# Patient Record
Sex: FEMALE | Race: ASIAN | NOT HISPANIC OR LATINO | Employment: OTHER | URBAN - METROPOLITAN AREA
[De-identification: names, ages, dates, MRNs, and addresses within clinical notes are randomized per-mention and may not be internally consistent; named-entity substitution may affect disease eponyms.]

---

## 2017-02-08 ENCOUNTER — RADIANT APPOINTMENT (OUTPATIENT)
Dept: GENERAL RADIOLOGY | Facility: CLINIC | Age: 77
End: 2017-02-08
Attending: FAMILY MEDICINE
Payer: MEDICARE

## 2017-02-08 ENCOUNTER — OFFICE VISIT (OUTPATIENT)
Dept: FAMILY MEDICINE | Facility: CLINIC | Age: 77
End: 2017-02-08
Payer: MEDICARE

## 2017-02-08 VITALS
SYSTOLIC BLOOD PRESSURE: 139 MMHG | OXYGEN SATURATION: 96 % | HEART RATE: 90 BPM | TEMPERATURE: 97.8 F | WEIGHT: 104.2 LBS | DIASTOLIC BLOOD PRESSURE: 70 MMHG | BODY MASS INDEX: 24.43 KG/M2

## 2017-02-08 DIAGNOSIS — R07.89 LEFT-SIDED CHEST WALL PAIN: ICD-10-CM

## 2017-02-08 DIAGNOSIS — M17.9 OSTEOARTHRITIS OF KNEE, UNSPECIFIED LATERALITY, UNSPECIFIED OSTEOARTHRITIS TYPE: ICD-10-CM

## 2017-02-08 DIAGNOSIS — R07.89 LEFT-SIDED CHEST WALL PAIN: Primary | ICD-10-CM

## 2017-02-08 PROCEDURE — 71101 X-RAY EXAM UNILAT RIBS/CHEST: CPT | Mod: LT

## 2017-02-08 PROCEDURE — 99214 OFFICE O/P EST MOD 30 MIN: CPT | Performed by: FAMILY MEDICINE

## 2017-02-08 NOTE — PATIENT INSTRUCTIONS
This summary includes the important diagnoses, test, medications and other important parts of your medical history.  Below are a few good we sites you can use to learn more about these.     Www.fruux.org : Up to date and easily searchable information on multiple topics.  Www.fruux.org/Pharmacy/c_539084.asp : [facility] $4.99 medications  Www.medlineplus.gov : medication info, interactive tutorials, watch real surgeries online  Www.familydoctor.org : good info from the Academy of Family Physicians  Www.mayoSolar Power Partnersinic.com : good info from the [facility]  Www.cdc.gov : public health info, travel advisories, epidemics (H1N1)  Www.aap.org : children's health info, normal development, vaccinations  Www.health.[facility].[place].us : [place] dept of heat, public health issues in [place], N1N1    Based on your medical history and these are the current health maintenance or preventive care services that you are due for (some may have been done at this visit:)  There are no preventive care reminders to display for this patient.  =================================================================================  Normal Values   Blood pressure  <140/90 for most adults    <130/80 for some chronic diseases (ask your care team about yours)    BMI (body mass index)  18.5-25 kg/m2 (based on height and weight)     Thank you for visiting [facility]    Normal or non-critical lab and imaging results will be communicated to you by MyChart, letter or phone within 7 days.  If you do not hear from us within 10 days, please call the clinic. If you have a critical or abnormal lab result, we will notify you by phone as soon as possible.     If you have any questions regarding your visit please contact:     Team Comfort:   Clinic Hours Telephone Number   Dr. Mic Bellamy   7am-5pm  Monday - Friday [phone]  [facility]   Pharmacy 8:30am-9pm Monday-Friday     9am-5pm Saturday-Sunday [phone]   Urgent Care 11am-9pm Monday-Friday        9am-5pm Saturday-Sunday [phone]     After hours, weekend or if you need to make an appointment with your primary provider please call [phone].   After Hours nurse advise: call [facility]: [phone]    Medication Refills:  Call your pharmacy and they will forward the refill to us. Please allow 3 business days for your refills to be completed.    Use Optini (secure email communication and access to your chart) to send your primary care provider a message or make an appointment. Ask someone on your Team how to sign up for Optini. To log on to FreeLunched or for more information in FoKo please visit the website at [email or web address].  As of October 8, 2013, all password changes, disabled accounts, or ID changes in Optini/MyHealth will be done by our Access Services Department.   If you need help with your account or password, call: [phone]. Clinic staff no longer has the ability to change passwords.

## 2017-02-08 NOTE — MR AVS SNAPSHOT
After Visit Summary   2/8/2017    Kerry Vega    MRN: [id number]           Patient Information     Date Of Birth          1940        Visit Information        Provider Department      2/8/2017 10:00 AM Shadi Cotton MD [facility]        Today's Diagnoses     Left-sided chest wall pain    -  1     Osteoarthritis of knee, unspecified laterality, unspecified osteoarthritis type           Care Instructions    This summary includes the important diagnoses, test, medications and other important parts of your medical history.  Below are a few good we sites you can use to learn more about these.     Www.MarketMeSuite.org : Up to date and easily searchable information on multiple topics.  Www.[facility]ios[employer]"/Pharmacy/c_539084.asp : [facility] $4.99 medications  Www.iStoryTime.gov : medication info, interactive tutorials, watch real surgeries online  Www.familydoctor.org : good info from the Academy of Family Physicians  Www.Passlogix.ScramblerMail : good info from the [facility]  Www.cdc.gov : public health info, travel advisories, epidemics (H1N1)  Www.aap.org : children's health info, normal development, vaccinations  Www.health.state.[place].us : [place] dept of heatlh, public health issues in [place], N1N1    Based on your medical history and these are the current health maintenance or preventive care services that you are due for (some may have been done at this visit:)  There are no preventive care reminders to display for this patient.  =================================================================================  Normal Values   Blood pressure  <140/90 for most adults    <130/80 for some chronic diseases (ask your care team about yours)    BMI (body mass index)  18.5-25 kg/m2 (based on height and weight)     Thank you for visiting [facility]    Normal or non-critical lab and imaging results will be communicated to you by MyChart, letter or phone within 7 days.  If you do  not hear from us within 10 days, please call the clinic. If you have a critical or abnormal lab result, we will notify you by phone as soon as possible.     If you have any questions regarding your visit please contact:     Team Comfort:   Clinic Hours Telephone Number   Dr. Mic Bellamy   7am-5pm  Monday - Friday [phone]  [facility]   Pharmacy 8:30am-9pm Monday-Friday    9am-5pm Saturday-Sunday [phone]   Urgent Care 11am-9pm Monday-Friday        9am-5pm Saturday-Sunday [phone]     After hours, weekend or if you need to make an appointment with your primary provider please call [phone].   After Hours nurse advise: call [facility]: [phone]    Medication Refills:  Call your pharmacy and they will forward the refill to us. Please allow 3 business days for your refills to be completed.    Use Bedloo (secure email communication and access to your chart) to send your primary care provider a message or make an appointment. Ask someone on your Team how to sign up for Bedloo. To log on to Clipyoo or for more information in "[employer] please visit the website at [email or web address].  As of October 8, 2013, all password changes, disabled accounts, or ID changes in Bedloo/MyHealth will be done by our Access Services Department.   If you need help with your account or password, call: [phone]. Clinic staff no longer has the ability to change passwords.           Follow-ups after your visit        Who to contact     If you have questions or need follow up information about today's clinic visit or your schedule please contact [facility] NOWAK directly at [phone].  Normal or non-critical lab and imaging results will be communicated to you by MyChart, letter or phone within 4 business days after the clinic has received the results. If you do not hear from us within 7 days, please contact the  "clinic through Biosensiat or phone. If you have a critical or abnormal lab result, we will notify you by phone as soon as possible.  Submit refill requests through QuantiSense or call your pharmacy and they will forward the refill request to us. Please allow 3 business days for your refill to be completed.          Additional Information About Your Visit        H2MobharBellmetric Information     QuantiSense lets you send messages to your doctor, view your test results, renew your prescriptions, schedule appointments and more. To sign up, go to [email or web address] . Click on \"Log in\" on the left side of the screen, which will take you to the Welcome page. Then click on \"Sign up Now\" on the right side of the page.     You will be asked to enter the access code listed below, as well as some personal information. Please follow the directions to create your username and password.     Your access code is: [email or web address]  Expires: 2017 10:05 AM     Your access code will  in 90 days. If you need help or a new code, please call your [place] clinic or [phone].        Care EveryWhere ID     This is your Care EveryWhere ID. This could be used by other organizations to access your [place] medical records  [id number]        Your Vitals Were     Pulse Temperature Pulse Oximetry             90 97.8  F (36.6  C) (Oral) 96%          Blood Pressure from Last 3 Encounters:   17 139/70   16 115/65   16 114/66    Weight from Last 3 Encounters:   17 104 lb 3.2 oz (47.265 kg)   16 102 lb 3.2 oz (46.358 kg)   16 103 lb 3.2 oz (46.811 kg)                 Today's Medication Changes          These changes are accurate as of: 17 10:05 AM.  If you have any questions, ask your nurse or doctor.               These medicines have changed or have updated prescriptions.        Dose/Directions    ferrous sulfate 325 (65 FE) MG tablet   Commonly known as:  IRON   This may have changed:  when to take this   Used " for:  Iron deficiency anemia, unspecified iron deficiency        Dose:  325 mg   Take 1 tablet (325 mg) by mouth 3 times daily (with meals)   Quantity:  90 tablet   Refills:  3            Where to get your medicines      Some of these will need a paper prescription and others can be bought over the counter.  Ask your nurse if you have questions.     Bring a paper prescription for each of these medications    - acetaminophen-codeine 300-30 MG per tablet             Primary Care Provider Office Phone # Fax #    Shadi Cotton [phone]       [facility] [address] [facility] [address]        Thank you!     Thank you for choosing [facility]  for your care. Our goal is always to provide you with excellent care. Hearing back from our patients is one way we can continue to improve our services. Please take a few minutes to complete the written survey that you may receive in the mail after your visit with us. Thank you!             Your Updated Medication List - Protect others around you: Learn how to safely use, store and throw away your medicines at www.disposemymeds.org.          This list is accurate as of: 2/8/17 10:05 AM.  Always use your most recent med list.                   Brand Name Dispense Instructions for use    acetaminophen-codeine 300-30 MG per tablet    TYLENOL #3    90 tablet    TAKE ONE TO TWO TABLETS BY MOUTH EVERY 6 HOURS AS NEEDED FOR PAIN       alendronate 70 MG tablet    FOSAMAX    12 tablet    Take 1 tablet (70 mg) by mouth every 7 days       * ASPIRIN NOT PRESCRIBED    INTENTIONAL     due to Iron therapy       calcium-vitamin D 600-400 MG-UNIT per tablet    CALTRATE    100 tablet    Take 1 tablet by mouth 2 times daily       cetirizine 10 MG tablet    ZYRTEC    90 tablet    Take 1 tablet (10 mg) by mouth daily       cyanocobalamin 1000 MCG Tbcr    VITAMIN B-12 ER    30 tablet    Take 1,000 mcg by mouth daily       diclofenac 1 % Gel topical  gel    VOLTAREN    100 g    Apply 4 grams to knees or 2 grams to hands four times daily using enclosed dosing card.       ferrous sulfate 325 (65 FE) MG tablet    IRON    90 tablet    Take 1 tablet (325 mg) by mouth 3 times daily (with meals)       hypromellose 0.4 % Soln ophthalmic solution    ARTIFICIAL TEARS    1 Bottle    Place 1 drop into both eyes every hour as needed for dry eyes       naproxen 500 MG tablet    NAPROSYN    60 tablet    TAKE 1 TABLET BY MOUTH TWICE DAILY WITH FOOD AS NEEDED FOR PAIN       olopatadine 0.1 % ophthalmic solution    PATANOL    15 mL    INSTILL 1 DROP IN BOTH EYES TWICE DAILY       omeprazole 40 MG capsule    priLOSEC    90 capsule    TAKE ONE CAPSULE BY MOUTH ONCE DAILY       * order for DME     1 Container    Pull up briefs for incontinence of urine change 2 to 3 times daily       vitamin D 2000 UNITS tablet     100 tablet    Take 2,000 Units by mouth daily Start in 2 months, after completing 8 weeks of weekly high dose vitamin D.       * Notice:  This list has 2 medication(s) that are the same as other medications prescribed for you. Read the directions carefully, and ask your doctor or other care provider to review them with you.

## 2017-02-08 NOTE — NURSING NOTE
"Chief Complaint   Patient presents with     Shoulder Pain     Chest Pain       Initial /78 mmHg  Pulse 90  Temp(Src) 97.8  F (36.6  C) (Oral)  Ht   Wt 104 lb 3.2 oz (47.265 kg)  SpO2 96% Estimated body mass index is 24.43 kg/(m^2) as calculated from the following:    Height as of 9/7/16: 4' 6.75\" (1.391 m).    Weight as of this encounter: 104 lb 3.2 oz (47.265 kg).  Medication Reconciliation: complete. LUPE Kothari      "

## 2017-02-08 NOTE — PROGRESS NOTES
SUBJECTIVE:                                                    Kerry Vega is a 76 year old female who presents to clinic today for the following health issues:    CHEST PAIN     Onset: yesterday after fall from going downstairs     Description:   Location:  left side  Character: achey  Radiation: from left side over shoulder  Duration: constant     Intensity: moderate    Progression of Symptoms:  same    Accompanying Signs & Symptoms:  Shortness of breath: YES  Sweating: no   Nausea/vomiting: no   Lightheadedness: no  Palpitations: no   Fever/Chills: no  Cough: no   Heartburn: YES    History:   Family history of heart disease no   Tobacco use: no     Precipitating factors:   Worse with exertion: YES  Worse with deep breaths :  YES  Related to food: no, but pt isn't sure if she is taking Prilosec     Alleviating factors:  Advil helped per pt       Therapies Tried and outcome: none    No increased pain with exertion.  Problem list and histories reviewed & adjusted, as indicated.  Additional history: as documented    Problem list, Medication list, Allergies, and Medical/Social/Surgical histories reviewed in EPIC and updated as appropriate.    ROS:  Constitutional, HEENT, cardiovascular, pulmonary, GI, , musculoskeletal, neuro, skin, endocrine and psych systems are negative, except as otherwise noted.    OBJECTIVE:                                                    /70 mmHg  Pulse 90  Temp(Src) 97.8  F (36.6  C) (Oral)  Ht   Wt 104 lb 3.2 oz (47.265 kg)  SpO2 96%  Body mass index is 24.43 kg/(m^2).  GENERAL: healthy, alert and no distress  NECK: no adenopathy, no asymmetry, masses, or scars and thyroid normal to palpation  RESP: lungs clear to auscultation - no rales, rhonchi or wheezes  CV: regular rate and rhythm, normal S1 S2, no S3 or S4, no murmur, click or rub, no peripheral edema and peripheral pulses strong  ABDOMEN: soft, nontender, no hepatosplenomegaly, no masses and bowel sounds  normal  MS: left-sided chest wall pain with palpation  Diagnostic Test Results:  none      ASSESSMENT/PLAN:                                                      1. Left-sided chest wall pain  No signs of fx on xray, will await final read. May take tylenol#3 as needed. May take naproxen as needed. May use ice/heat as needed. RTC if no improving as expected.  - XR Ribs & Chest Left G/E 3 Views; Future    2. Osteoarthritis of knee, unspecified laterality, unspecified osteoarthritis type  Needed refills.  - acetaminophen-codeine (TYLENOL #3) 300-30 MG per tablet; TAKE ONE TO TWO TABLETS BY MOUTH EVERY 6 HOURS AS NEEDED FOR PAIN  Dispense: 90 tablet; Refill: 0    See Patient Instructions    Shadi Cotton MD, MD  [facility]

## 2017-02-22 ENCOUNTER — OFFICE VISIT (OUTPATIENT)
Dept: FAMILY MEDICINE | Facility: CLINIC | Age: 77
End: 2017-02-22
Payer: MEDICARE

## 2017-02-22 VITALS
HEART RATE: 99 BPM | HEIGHT: 55 IN | BODY MASS INDEX: 24.07 KG/M2 | DIASTOLIC BLOOD PRESSURE: 64 MMHG | SYSTOLIC BLOOD PRESSURE: 113 MMHG | WEIGHT: 104 LBS | TEMPERATURE: 97 F | OXYGEN SATURATION: 99 %

## 2017-02-22 DIAGNOSIS — D50.8 OTHER IRON DEFICIENCY ANEMIA: ICD-10-CM

## 2017-02-22 DIAGNOSIS — H69.92 ETD (EUSTACHIAN TUBE DYSFUNCTION), LEFT: ICD-10-CM

## 2017-02-22 DIAGNOSIS — R07.89 LEFT-SIDED CHEST WALL PAIN: Primary | ICD-10-CM

## 2017-02-22 LAB
BASOPHILS # BLD AUTO: 0.1 10E9/L (ref 0–0.2)
BASOPHILS NFR BLD AUTO: 0.9 %
DIFFERENTIAL METHOD BLD: ABNORMAL
EOSINOPHIL # BLD AUTO: 0.7 10E9/L (ref 0–0.7)
EOSINOPHIL NFR BLD AUTO: 13.2 %
ERYTHROCYTE [DISTWIDTH] IN BLOOD BY AUTOMATED COUNT: 14.1 % (ref 10–15)
FERRITIN SERPL-MCNC: 12 NG/ML (ref 8–252)
HCT VFR BLD AUTO: 36.4 % (ref 35–47)
HGB BLD-MCNC: 11.4 G/DL (ref 11.7–15.7)
LYMPHOCYTES # BLD AUTO: 1.2 10E9/L (ref 0.8–5.3)
LYMPHOCYTES NFR BLD AUTO: 22.4 %
MCH RBC QN AUTO: 26 PG (ref 26.5–33)
MCHC RBC AUTO-ENTMCNC: 31.3 G/DL (ref 31.5–36.5)
MCV RBC AUTO: 83 FL (ref 78–100)
MONOCYTES # BLD AUTO: 0.4 10E9/L (ref 0–1.3)
MONOCYTES NFR BLD AUTO: 6.7 %
NEUTROPHILS # BLD AUTO: 3 10E9/L (ref 1.6–8.3)
NEUTROPHILS NFR BLD AUTO: 56.8 %
PLATELET # BLD AUTO: 334 10E9/L (ref 150–450)
RBC # BLD AUTO: 4.39 10E12/L (ref 3.8–5.2)
WBC # BLD AUTO: 5.4 10E9/L (ref 4–11)

## 2017-02-22 PROCEDURE — 85025 COMPLETE CBC W/AUTO DIFF WBC: CPT | Performed by: FAMILY MEDICINE

## 2017-02-22 PROCEDURE — 82728 ASSAY OF FERRITIN: CPT | Performed by: FAMILY MEDICINE

## 2017-02-22 PROCEDURE — 36415 COLL VENOUS BLD VENIPUNCTURE: CPT | Performed by: FAMILY MEDICINE

## 2017-02-22 PROCEDURE — 99214 OFFICE O/P EST MOD 30 MIN: CPT | Performed by: FAMILY MEDICINE

## 2017-02-22 RX ORDER — DIPHENHYDRAMINE HCL 25 MG
25-50 TABLET ORAL
Qty: 60 TABLET | Refills: 1 | Status: SHIPPED | OUTPATIENT
Start: 2017-02-22 | End: 2017-09-20

## 2017-02-22 RX ORDER — PREDNISONE 20 MG/1
TABLET ORAL
Qty: 20 TABLET | Refills: 0 | Status: SHIPPED | OUTPATIENT
Start: 2017-02-22 | End: 2017-03-07

## 2017-02-22 ASSESSMENT — PAIN SCALES - GENERAL: PAINLEVEL: EXTREME PAIN (8)

## 2017-02-22 NOTE — LETTER
[address] [id number] [phone]             February 23, 2017    Kerry Vega  [address] [place] [id number]        Dear Kerry,     Your hemoglobin is low but stable. Please continue with the iron supplement daily. Enclosed are the results.  Results for orders placed or performed in visit on 02/22/17   Ferritin   Result Value Ref Range    Ferritin 12 8 - 252 ng/mL   CBC with platelets differential   Result Value Ref Range    WBC 5.4 4.0 - 11.0 10e9/L    RBC Count 4.39 3.8 - 5.2 10e12/L    Hemoglobin 11.4 (L) 11.7 - 15.7 g/dL    Hematocrit 36.4 35.0 - 47.0 %    MCV 83 78 - 100 fl    MCH 26.0 (L) 26.5 - 33.0 pg    MCHC 31.3 (L) 31.5 - 36.5 g/dL    RDW 14.1 10.0 - 15.0 %    Platelet Count 334 150 - 450 10e9/L    Diff Method Automated Method     % Neutrophils 56.8 %    % Lymphocytes 22.4 %    % Monocytes 6.7 %    % Eosinophils 13.2 %    % Basophils 0.9 %    Absolute Neutrophil 3.0 1.6 - 8.3 10e9/L    Absolute Lymphocytes 1.2 0.8 - 5.3 10e9/L    Absolute Monocytes 0.4 0.0 - 1.3 10e9/L    Absolute Eosinophils 0.7 0.0 - 0.7 10e9/L    Absolute Basophils 0.1 0.0 - 0.2 10e9/L       Sincerely,   Shadi Cotton MD/bc

## 2017-02-22 NOTE — NURSING NOTE
"Chief Complaint   Patient presents with     Ear Problem     pain in left ear     Facial Swelling     swelling in left side of face     RECHECK     follow up chest wall pain, not getting better       Initial /64  Pulse 99  Temp 97  F (36.1  C) (Oral)  Ht 4' 6.75\" (1.391 m)  Wt 104 lb (47.2 kg)  SpO2 99%  BMI 24.39 kg/m2 Estimated body mass index is 24.39 kg/(m^2) as calculated from the following:    Height as of this encounter: 4' 6.75\" (1.391 m).    Weight as of this encounter: 104 lb (47.2 kg).  Medication Reconciliation: complete     Nilsa Huerta MA      "

## 2017-02-22 NOTE — PROGRESS NOTES
"  SUBJECTIVE:                                                    Kerry Vega is a 76 year old female who presents to clinic today for the following health issues:      Concern - facial swelling     Onset: 2 days ago    Description:   Swelling left side of face    Intensity: mild    Progression of Symptoms:  same    Accompanying Signs & Symptoms:  Left ear pain       Previous history of similar problem:   no    Precipitating factors:   Worsened by: None.    Alleviating factors:  Improved by: hot pack       Therapies Tried and outcome: heat therapy helps.    Follow up chest wall pain, not getting better.    Problem list and histories reviewed & adjusted, as indicated.  Additional history: as documented    Problem list, Medication list, Allergies, and Medical/Social/Surgical histories reviewed in EPIC and updated as appropriate.    ROS:  Constitutional, HEENT, cardiovascular, pulmonary, GI, , musculoskeletal, neuro, skin, endocrine and psych systems are negative, except as otherwise noted.    OBJECTIVE:                                                    /64  Pulse 99  Temp 97  F (36.1  C) (Oral)  Ht 4' 6.75\" (1.391 m)  Wt 104 lb (47.2 kg)  SpO2 99%  BMI 24.39 kg/m2  Body mass index is 24.39 kg/(m^2).  GENERAL: healthy, alert and no distress  NECK: no adenopathy, no asymmetry, masses, or scars and thyroid normal to palpation  RESP: lungs clear to auscultation - no rales, rhonchi or wheezes  CV: regular rate and rhythm, normal S1 S2, no S3 or S4, no murmur, click or rub, no peripheral edema and peripheral pulses strong  ABDOMEN: soft, nontender, no hepatosplenomegaly, no masses and bowel sounds normal  MS: tenderness along LSB with palpation  Diagnostic Test Results:  none      ASSESSMENT/PLAN:                                                      1. Left-sided chest wall pain  Costochondritis. Treat with oral steroid, tapering dose. RTC if not improving.  - predniSONE (DELTASONE) 20 MG tablet; Take 3 tabs " (60 mg) by mouth daily x 3 days, 2 tabs (40 mg) daily x 3 days, 1 tab (20 mg) daily x 3 days, then 1/2 tab (10 mg) x 3 days.  Dispense: 20 tablet; Refill: 0    2. ETD (eustachian tube dysfunction), left  Reassured that this should resolve. Trial Benadryl to help dry upper airway.  - diphenhydrAMINE (BENADRYL) 25 MG tablet; Take 1-2 tablets (25-50 mg) by mouth nightly as needed for other (for eustachian tube dysfunction, for ear pain.)  Dispense: 60 tablet; Refill: 1    3. Other iron deficiency anemia  Recheck.  - Ferritin  - CBC with platelets differential    See Patient Instructions    Shadi Cotton MD, MD  [facility]

## 2017-02-22 NOTE — PATIENT INSTRUCTIONS
How to contact your care team providers:    Team Heart/Comfort  [phone]  Pharmacy [phone]    [address] Dr., Dr., PA-C    Team RN: Edgardo GARCIA    Clinic hours  M-Th 7am-7pm   Fri 7am-5pm.   Urgent care M-F 11am-9pm,   Sat/sun 9am-5pm.  Pharmacy M-F 8:00am-8pm Sat/sun 9am-5pm.     All password changes, disabled accounts, or ID changes in [employer]/MyHealth will be done by our Access Services Department.   If you need help with your account or password, call: [phone]. Clinic staff no longer has the ability to change passwords.

## 2017-02-22 NOTE — MR AVS SNAPSHOT
After Visit Summary   2/22/2017    Kerry Vega    MRN: [id number]           Patient Information     Date Of Birth          1940        Visit Information        Provider Department      2/22/2017 2:00 PM Shadi Cotton MD [facility]        Today's Diagnoses     Left-sided chest wall pain    -  1    ETD (eustachian tube dysfunction), left        Other iron deficiency anemia          Care Instructions    How to contact your care team providers:    [facility]  [phone]  Pharmacy [phone]    ANTIONE [address] Dr., Dr., PA-C    Team RN: Edgardo GARCIA    Clinic hours  M-Th 7am-7pm   Fri 7am-5pm.   Urgent care M-F 11am-9pm,   Sat/sun 9am-5pm.  Pharmacy M-F 8:00am-8pm Sat/sun 9am-5pm.     All password changes, disabled accounts, or ID changes in Arachnys/MyHealth will be done by our Access Services Department.   If you need help with your account or password, call: [phone]. Clinic staff no longer has the ability to change passwords.                       Follow-ups after your visit        Who to contact     If you have questions or need follow up information about today's clinic visit or your schedule please contact [facility] directly at [phone].  Normal or non-critical lab and imaging results will be communicated to you by NeuStringhart, letter or phone within 4 business days after the clinic has received the results. If you do not hear from us within 7 days, please contact the clinic through TranslationExchanget or phone. If you have a critical or abnormal lab result, we will notify you by phone as soon as possible.  Submit refill requests through Arachnys or call your pharmacy and they will forward the refill request to us. Please allow 3 business days for your refill to be completed.          Additional Information About Your Visit        Arachnys  "Information     Speakaboos lets you send messages to your doctor, view your test results, renew your prescriptions, schedule appointments and more. To sign up, go to [email or web address] . Click on \"Log in\" on the left side of the screen, which will take you to the Welcome page. Then click on \"Sign up Now\" on the right side of the page.     You will be asked to enter the access code listed below, as well as some personal information. Please follow the directions to create your username and password.     Your access code is: [email or web address]  Expires: 2017 10:05 AM     Your access code will  in 90 days. If you need help or a new code, please call your [place] clinic or [phone].        Care EveryWhere ID     This is your Care EveryWhere ID. This could be used by other organizations to access your [place] medical records  [id number]        Your Vitals Were     Pulse Temperature Height Pulse Oximetry BMI (Body Mass Index)       99 97  F (36.1  C) (Oral) 4' 6.75\" (1.391 m) 99% 24.39 kg/m2        Blood Pressure from Last 3 Encounters:   17 113/64   17 139/70   16 115/65    Weight from Last 3 Encounters:   17 104 lb (47.2 kg)   17 104 lb 3.2 oz (47.3 kg)   16 102 lb 3.2 oz (46.4 kg)              We Performed the Following     CBC with platelets differential     Ferritin          Today's Medication Changes          These changes are accurate as of: 17  2:23 PM.  If you have any questions, ask your nurse or doctor.               Start taking these medicines.        Dose/Directions    diphenhydrAMINE 25 MG tablet   Commonly known as:  BENADRYL   Used for:  ETD (eustachian tube dysfunction), left   Started by:  Shadi Cotton MD        Dose:  25-50 mg   Take 1-2 tablets (25-50 mg) by mouth nightly as needed for other (for eustachian tube dysfunction, for ear pain.)   Quantity:  60 tablet   Refills:  1       predniSONE 20 MG tablet   Commonly known as:  DELTASONE   Used for:  " Left-sided chest wall pain   Started by:  Shadi Cotton MD        Take 3 tabs (60 mg) by mouth daily x 3 days, 2 tabs (40 mg) daily x 3 days, 1 tab (20 mg) daily x 3 days, then 1/2 tab (10 mg) x 3 days.   Quantity:  20 tablet   Refills:  0         These medicines have changed or have updated prescriptions.        Dose/Directions    ferrous sulfate 325 (65 FE) MG tablet   Commonly known as:  IRON   This may have changed:  when to take this   Used for:  Iron deficiency anemia, unspecified iron deficiency        Dose:  325 mg   Take 1 tablet (325 mg) by mouth 3 times daily (with meals)   Quantity:  90 tablet   Refills:  3            Where to get your medicines      These medications were sent to [facility] [address] [facility] [address] [facility]  [address] [facility] [address]    Hours:  24-hours Phone:  [phone]     diphenhydrAMINE 25 MG tablet    predniSONE 20 MG tablet                Primary Care Provider Office Phone # Fax #    Shadi Cotton [phone]       [facility] [address]  [facility] [address]        Thank you!     Thank you for choosing [facility]  for your care. Our goal is always to provide you with excellent care. Hearing back from our patients is one way we can continue to improve our services. Please take a few minutes to complete the written survey that you may receive in the mail after your visit with us. Thank you!             Your Updated Medication List - Protect others around you: Learn how to safely use, store and throw away your medicines at www.disposemymeds.org.          This list is accurate as of: 2/22/17  2:23 PM.  Always use your most recent med list.                   Brand Name Dispense Instructions for use    acetaminophen-codeine 300-30 MG per tablet    TYLENOL #3    90 tablet    TAKE ONE TO TWO TABLETS BY MOUTH EVERY 6 HOURS AS NEEDED FOR PAIN       alendronate 70  MG tablet    FOSAMAX    12 tablet    Take 1 tablet (70 mg) by mouth every 7 days       * ASPIRIN NOT PRESCRIBED    INTENTIONAL     due to Iron therapy       calcium-vitamin D 600-400 MG-UNIT per tablet    CALTRATE    100 tablet    Take 1 tablet by mouth 2 times daily       cetirizine 10 MG tablet    ZYRTEC    90 tablet    Take 1 tablet (10 mg) by mouth daily       cyanocobalamin 1000 MCG Tbcr    VITAMIN B-12 ER    30 tablet    Take 1,000 mcg by mouth daily       diclofenac 1 % Gel topical gel    VOLTAREN    100 g    Apply 4 grams to knees or 2 grams to hands four times daily using enclosed dosing card.       diphenhydrAMINE 25 MG tablet    BENADRYL    60 tablet    Take 1-2 tablets (25-50 mg) by mouth nightly as needed for other (for eustachian tube dysfunction, for ear pain.)       ferrous sulfate 325 (65 FE) MG tablet    IRON    90 tablet    Take 1 tablet (325 mg) by mouth 3 times daily (with meals)       hypromellose 0.4 % Soln ophthalmic solution    ARTIFICIAL TEARS    1 Bottle    Place 1 drop into both eyes every hour as needed for dry eyes       naproxen 500 MG tablet    NAPROSYN    60 tablet    TAKE 1 TABLET BY MOUTH TWICE DAILY WITH FOOD AS NEEDED FOR PAIN       olopatadine 0.1 % ophthalmic solution    PATANOL    15 mL    INSTILL 1 DROP IN BOTH EYES TWICE DAILY       omeprazole 40 MG capsule    priLOSEC    90 capsule    TAKE ONE CAPSULE BY MOUTH ONCE DAILY       * order for DME     1 Container    Pull up briefs for incontinence of urine change 2 to 3 times daily       predniSONE 20 MG tablet    DELTASONE    20 tablet    Take 3 tabs (60 mg) by mouth daily x 3 days, 2 tabs (40 mg) daily x 3 days, 1 tab (20 mg) daily x 3 days, then 1/2 tab (10 mg) x 3 days.       vitamin D 2000 UNITS tablet     100 tablet    Take 2,000 Units by mouth daily Start in 2 months, after completing 8 weeks of weekly high dose vitamin D.       * Notice:  This list has 2 medication(s) that are the same as other medications prescribed for  you. Read the directions carefully, and ask your doctor or other care provider to review them with you.

## 2017-03-07 ENCOUNTER — OFFICE VISIT (OUTPATIENT)
Dept: FAMILY MEDICINE | Facility: CLINIC | Age: 77
End: 2017-03-07
Payer: MEDICARE

## 2017-03-07 VITALS
SYSTOLIC BLOOD PRESSURE: 120 MMHG | DIASTOLIC BLOOD PRESSURE: 70 MMHG | HEART RATE: 88 BPM | BODY MASS INDEX: 23.61 KG/M2 | HEIGHT: 55 IN | RESPIRATION RATE: 16 BRPM | TEMPERATURE: 97 F | OXYGEN SATURATION: 99 % | WEIGHT: 102 LBS

## 2017-03-07 DIAGNOSIS — H93.12 TINNITUS, LEFT: ICD-10-CM

## 2017-03-07 DIAGNOSIS — M54.9 UPPER BACK PAIN ON LEFT SIDE: Primary | ICD-10-CM

## 2017-03-07 PROCEDURE — 93000 ELECTROCARDIOGRAM COMPLETE: CPT | Performed by: PHYSICIAN ASSISTANT

## 2017-03-07 PROCEDURE — 99214 OFFICE O/P EST MOD 30 MIN: CPT | Performed by: PHYSICIAN ASSISTANT

## 2017-03-07 RX ORDER — TRAMADOL HYDROCHLORIDE 50 MG/1
50-100 TABLET ORAL EVERY 6 HOURS PRN
Qty: 30 TABLET | Refills: 0 | Status: SHIPPED | OUTPATIENT
Start: 2017-03-07 | End: 2017-09-20

## 2017-03-07 RX ORDER — METHOCARBAMOL 750 MG/1
750 TABLET, FILM COATED ORAL 3 TIMES DAILY PRN
Qty: 45 TABLET | Refills: 1 | Status: SHIPPED | OUTPATIENT
Start: 2017-03-07 | End: 2018-03-21

## 2017-03-07 ASSESSMENT — PAIN SCALES - GENERAL: PAINLEVEL: MODERATE PAIN (5)

## 2017-03-07 NOTE — NURSING NOTE
"Chief Complaint   Patient presents with     Shoulder     Ear Problem       Initial /70  Pulse 88  Temp 97  F (36.1  C) (Oral)  Resp 16  Ht 4' 6.75\" (1.391 m)  Wt 102 lb (46.3 kg)  SpO2 99%  Breastfeeding? No  BMI 23.92 kg/m2 Estimated body mass index is 23.92 kg/(m^2) as calculated from the following:    Height as of this encounter: 4' 6.75\" (1.391 m).    Weight as of this encounter: 102 lb (46.3 kg).  Medication Reconciliation: complete      "

## 2017-03-07 NOTE — MR AVS SNAPSHOT
After Visit Summary   3/7/2017    Kerry Vega    MRN: [id number]           Patient Information     Date Of Birth          1940        Visit Information        Provider Department      3/7/2017 8:20 AM Suzie Sultana PA-C [facility]        Today's Diagnoses     Upper back pain on left side    -  1    Tinnitus, left          Care Instructions    Tramadol 1 tablet every 6 hours as needed for back pain  Robaxin 750 mg three times a day as needed for back and neck muscle spasms  Apply heating pad to the neck and back 4 times a day 30 minutes each time   Do neck stretches  See ENT specialist for ringing in the ear.   Neck Exercises: Passive Neck Rotation        To start, lie on your back, knees bent and feet flat on the floor. Keep your ears, shoulders, and hips aligned, but don t press your lower back to the floor. Rest your hands on your pelvis. Breathe deeply and relax.    With your neck relaxed, place the palm of one hand on your forehead. Use your hand to turn your head to one side until you feel a stretch in the neck muscles. Do not push through pain.    Hold for 5 seconds. Then turn to the other side.    Repeat 5 times on each side.   Note: Keep your shoulders on the floor. Don t lift your chin as you turn your head.    6164-9502 [employer]. [address] [place] [address], [place] [id number]. All rights reserved. This information is not intended as a substitute for professional medical care. Always follow your healthcare professional's instructions.        Neck Exercises: Active Neck Rotation    To start, lie on your back, knees bent and feet flat on the floor. Keep your ears, shoulders, and hips aligned, but don t press your lower back to the floor. Rest your hands on your pelvis. Breathe deeply and relax.      Use your neck muscles to turn your head to one side until you feel a stretch in the muscles.    Hold for 5 seconds. Then turn to the other  side.    Repeat 5 times on each side.  Note: Keep your shoulders on the floor. Don t lift or tuck your chin as you turn your head.    4421-5476 [employer]. [address] [place] [address], [place] [address]. All rights reserved. This information is not intended as a substitute for professional medical care. Always follow your healthcare professional's instructions.              Follow-ups after your visit        Additional Services     OTOLARYNGOLOGY REFERRAL       Your provider has referred you to: [facility]: [facility] [place] [phone]   [email or web address]    Please be aware that coverage of these services is subject to the terms and limitations of your health insurance plan.  Call member services at your health plan with any benefit or coverage questions.      Please bring the following with you to your appointment:    (1) Any X-Rays, CTs or MRIs which have been performed.  Contact the facility where they were done to arrange for  prior to your scheduled appointment.   (2) List of current medications  (3) This referral request   (4) Any documents/labs given to you for this referral                  Who to contact     If you have questions or need follow up information about today's clinic visit or your schedule please contact [facility] directly at [phone].  Normal or non-critical lab and imaging results will be communicated to you by MyChart, letter or phone within 4 business days after the clinic has received the results. If you do not hear from us within 7 days, please contact the clinic through MyChart or phone. If you have a critical or abnormal lab result, we will notify you by phone as soon as possible.  Submit refill requests through cafegive or call your pharmacy and they will forward the refill request to us. Please allow 3 business days for your refill to be completed.          Additional Information About Your  "Visit        OpenbucksharThe Convenience Network Information     AroundWire lets you send messages to your doctor, view your test results, renew your prescriptions, schedule appointments and more. To sign up, go to [email or web address] . Click on \"Log in\" on the left side of the screen, which will take you to the Welcome page. Then click on \"Sign up Now\" on the right side of the page.     You will be asked to enter the access code listed below, as well as some personal information. Please follow the directions to create your username and password.     Your access code is: [email or web address]  Expires: 2017 10:05 AM     Your access code will  in 90 days. If you need help or a new code, please call your [place] clinic or [phone].        Care EveryWhere ID     This is your Care EveryWhere ID. This could be used by other organizations to access your [place] medical records  [id number]        Your Vitals Were     Pulse Temperature Respirations Height Pulse Oximetry Breastfeeding?    88 97  F (36.1  C) (Oral) 16 4' 6.75\" (1.391 m) 99% No    BMI (Body Mass Index)                   23.92 kg/m2            Blood Pressure from Last 3 Encounters:   17 120/70   17 113/64   17 139/70    Weight from Last 3 Encounters:   17 102 lb (46.3 kg)   17 104 lb (47.2 kg)   17 104 lb 3.2 oz (47.3 kg)              We Performed the Following     EKG 12-lead complete w/read - Clinics     OTOLARYNGOLOGY REFERRAL          Today's Medication Changes          These changes are accurate as of: 3/7/17  9:34 AM.  If you have any questions, ask your nurse or doctor.               Start taking these medicines.        Dose/Directions    methocarbamol 750 MG tablet   Commonly known as:  ROBAXIN   Used for:  Upper back pain on left side   Started by:  Suzie Sultana PA-C        Dose:  750 mg   Take 1 tablet (750 mg) by mouth 3 times daily as needed for muscle spasms   Quantity:  45 tablet   Refills:  1       traMADol 50 " MG tablet   Commonly known as:  ULTRAM   Used for:  Upper back pain on left side   Started by:  Suzie Sultana PA-C        Dose:   mg   Take 1-2 tablets ( mg) by mouth every 6 hours as needed for pain maximum 4 tablet(s) per day   Quantity:  30 tablet   Refills:  0         These medicines have changed or have updated prescriptions.        Dose/Directions    ferrous sulfate 325 (65 FE) MG tablet   Commonly known as:  IRON   This may have changed:  when to take this   Used for:  Iron deficiency anemia, unspecified iron deficiency        Dose:  325 mg   Take 1 tablet (325 mg) by mouth 3 times daily (with meals)   Quantity:  90 tablet   Refills:  3            Where to get your medicines      These medications were sent to [facility] - [facility] [address] [facility] [address] [facility]  [address] [facility] [address]    Hours:  24-hours Phone:  [phone]     methocarbamol 750 MG tablet         Some of these will need a paper prescription and others can be bought over the counter.  Ask your nurse if you have questions.     Bring a paper prescription for each of these medications     traMADol 50 MG tablet                Primary Care Provider Office Phone # Fax #    Shadi Cotton [phone]       [address] [address]        Thank you!     Thank you for choosing [facility]  for your care. Our goal is always to provide you with excellent care. Hearing back from our patients is one way we can continue to improve our services. Please take a few minutes to complete the written survey that you may receive in the mail after your visit with us. Thank you!             Your Updated Medication List - Protect others around you: Learn how to safely use, store and throw away your medicines at www.disposemymeds.org.          This list is accurate as of: 3/7/17  9:34 AM.  Always use  your most recent med list.                   Brand Name Dispense Instructions for use    acetaminophen-codeine 300-30 MG per tablet    TYLENOL #3    90 tablet    TAKE ONE TO TWO TABLETS BY MOUTH EVERY 6 HOURS AS NEEDED FOR PAIN       alendronate 70 MG tablet    FOSAMAX    12 tablet    Take 1 tablet (70 mg) by mouth every 7 days       * ASPIRIN NOT PRESCRIBED    INTENTIONAL     due to Iron therapy       calcium-vitamin D 600-400 MG-UNIT per tablet    CALTRATE    100 tablet    Take 1 tablet by mouth 2 times daily       cetirizine 10 MG tablet    ZYRTEC    90 tablet    Take 1 tablet (10 mg) by mouth daily       cyanocobalamin 1000 MCG Tbcr    VITAMIN B-12 ER    30 tablet    Take 1,000 mcg by mouth daily       diclofenac 1 % Gel topical gel    VOLTAREN    100 g    Apply 4 grams to knees or 2 grams to hands four times daily using enclosed dosing card.       diphenhydrAMINE 25 MG tablet    BENADRYL    60 tablet    Take 1-2 tablets (25-50 mg) by mouth nightly as needed for other (for eustachian tube dysfunction, for ear pain.)       ferrous sulfate 325 (65 FE) MG tablet    IRON    90 tablet    Take 1 tablet (325 mg) by mouth 3 times daily (with meals)       hypromellose 0.4 % Soln ophthalmic solution    ARTIFICIAL TEARS    1 Bottle    Place 1 drop into both eyes every hour as needed for dry eyes       methocarbamol 750 MG tablet    ROBAXIN    45 tablet    Take 1 tablet (750 mg) by mouth 3 times daily as needed for muscle spasms       naproxen 500 MG tablet    NAPROSYN    60 tablet    TAKE 1 TABLET BY MOUTH TWICE DAILY WITH FOOD AS NEEDED FOR PAIN       olopatadine 0.1 % ophthalmic solution    PATANOL    15 mL    INSTILL 1 DROP IN BOTH EYES TWICE DAILY       omeprazole 40 MG capsule    priLOSEC    90 capsule    TAKE ONE CAPSULE BY MOUTH ONCE DAILY       * order for DME     1 Container    Pull up briefs for incontinence of urine change 2 to 3 times daily       traMADol 50 MG tablet    ULTRAM    30 tablet    Take 1-2 tablets  ( mg) by mouth every 6 hours as needed for pain maximum 4 tablet(s) per day       vitamin D 2000 UNITS tablet     100 tablet    Take 2,000 Units by mouth daily Start in 2 months, after completing 8 weeks of weekly high dose vitamin D.       * Notice:  This list has 2 medication(s) that are the same as other medications prescribed for you. Read the directions carefully, and ask your doctor or other care provider to review them with you.

## 2017-03-07 NOTE — PATIENT INSTRUCTIONS
Tramadol 1 tablet every 6 hours as needed for back pain  Robaxin 750 mg three times a day as needed for back and neck muscle spasms  Apply heating pad to the neck and back 4 times a day 30 minutes each time   Do neck stretches  See ENT specialist for ringing in the ear.   Neck Exercises: Passive Neck Rotation        To start, lie on your back, knees bent and feet flat on the floor. Keep your ears, shoulders, and hips aligned, but don t press your lower back to the floor. Rest your hands on your pelvis. Breathe deeply and relax.    With your neck relaxed, place the palm of one hand on your forehead. Use your hand to turn your head to one side until you feel a stretch in the neck muscles. Do not push through pain.    Hold for 5 seconds. Then turn to the other side.    Repeat 5 times on each side.   Note: Keep your shoulders on the floor. Don t lift your chin as you turn your head.    2531-2655 [employer]. [address] [place] [address]. All rights reserved. This information is not intended as a substitute for professional medical care. Always follow your healthcare professional's instructions.        Neck Exercises: Active Neck Rotation    To start, lie on your back, knees bent and feet flat on the floor. Keep your ears, shoulders, and hips aligned, but don t press your lower back to the floor. Rest your hands on your pelvis. Breathe deeply and relax.      Use your neck muscles to turn your head to one side until you feel a stretch in the muscles.    Hold for 5 seconds. Then turn to the other side.    Repeat 5 times on each side.  Note: Keep your shoulders on the floor. Don t lift or tuck your chin as you turn your head.    1611-7272 [employer]. [address] [place] [address]. All rights reserved. This information is not intended as a substitute for professional medical care. Always follow your healthcare professional's instructions.

## 2017-03-07 NOTE — PROGRESS NOTES
"  SUBJECTIVE:                                                    Kerry Vega is a 76 year old female who presents to clinic today for the following health issues:    Medication Followup of benadryl. Patient takes it for ear \"buzzing and ringing\"    Taking Medication as prescribed: yes    Side Effects:  None    Medication Helping Symptoms:  NO, still having high pitches ringing in the left ear     Joint Pain     Onset: 2 weeks    Description:   Location: left upper shoulder and upper back  Character: Sharp pain with movement of the arm and muscle spasms     Intensity: moderate    Progression of Symptoms: same    Accompanying Signs & Symptoms:  Other symptoms: none   History:   Previous similar pain: YES      Precipitating factors:   Trauma or overuse: no     Alleviating factors:  Improved by: nothing       Therapies Tried and outcome: prednisone    No shortness of breath, no cough, no chest pain , but occasionally when upper back hurts or spasms she feels pain the chest as well. No chest pain on exertion.     Problem list and histories reviewed & adjusted, as indicated.  Additional history: as documented    Patient Active Problem List   Diagnosis     GERD (gastroesophageal reflux disease)     CARDIOVASCULAR SCREENING; LDL GOAL LESS THAN 160     Right lumbar radiculopathy     Left knee pain     Osteoporosis     Advanced directives, counseling/discussion     Abnormal Pap smear     Dysphagia     Health Care Home     Iron deficiency anemia     Sliding hiatal hernia     OA (osteoarthritis) of knee     Osteoarthritis of left thumb     Cervical radiculopathy     Past Surgical History   Procedure Laterality Date     Esophagoscopy, gastroscopy, duodenoscopy (egd), combined  1/20/2012     Procedure:COMBINED ESOPHAGOSCOPY, GASTROSCOPY, DUODENOSCOPY (EGD); EGD, dysphagia; Surgeon:SHEREE VALLE; Location: OR     Esophagoscopy, gastroscopy, duodenoscopy (egd), combined  7/18/2012     Procedure: COMBINED ESOPHAGOSCOPY, " GASTROSCOPY, DUODENOSCOPY (EGD);  EGD for dysphagia rp Tonio;  Surgeon: Rey Bee MD;  Location: [facility] OR     Esophagoscopy, gastroscopy, duodenoscopy (egd), combined  1/29/2013     Procedure: COMBINED ESOPHAGOSCOPY, GASTROSCOPY, DUODENOSCOPY (EGD), BIOPSY SINGLE OR MULTIPLE;  Colonoscopy, EGD, dysphagia;  Surgeon: Edgard Prieto MD;  Location: [facility] OR     Colonoscopy  1/29/2013     Procedure: COLONOSCOPY;;  Surgeon: Edgard Prieto MD;  Location: [facility] OR       Social History   Substance Use Topics     Smoking status: Never Smoker     Smokeless tobacco: Never Used     Alcohol use No     Family History   Problem Relation Age of Onset     CANCER No family hx of      DIABETES No family hx of      Hypertension No family hx of      CEREBROVASCULAR DISEASE No family hx of      Thyroid Disease No family hx of      Glaucoma No family hx of      Macular Degeneration No family hx of          Current Outpatient Prescriptions   Medication Sig Dispense Refill     methocarbamol (ROBAXIN) 750 MG tablet Take 1 tablet (750 mg) by mouth 3 times daily as needed for muscle spasms 45 tablet 1     traMADol (ULTRAM) 50 MG tablet Take 1-2 tablets ( mg) by mouth every 6 hours as needed for pain maximum 4 tablet(s) per day 30 tablet 0     diphenhydrAMINE (BENADRYL) 25 MG tablet Take 1-2 tablets (25-50 mg) by mouth nightly as needed for other (for eustachian tube dysfunction, for ear pain.) 60 tablet 1     acetaminophen-codeine (TYLENOL #3) 300-30 MG per tablet TAKE ONE TO TWO TABLETS BY MOUTH EVERY 6 HOURS AS NEEDED FOR PAIN 90 tablet 0     naproxen (NAPROSYN) 500 MG tablet TAKE 1 TABLET BY MOUTH TWICE DAILY WITH FOOD AS NEEDED FOR PAIN 60 tablet 3     omeprazole (PRILOSEC) 40 MG capsule TAKE ONE CAPSULE BY MOUTH ONCE DAILY 90 capsule 3     alendronate (FOSAMAX) 70 MG tablet Take 1 tablet (70 mg) by mouth every 7 days 12 tablet 3     olopatadine (PATANOL) 0.1 % ophthalmic solution INSTILL 1 DROP IN BOTH EYES TWICE DAILY 15 mL  "0     hypromellose (ARTIFICIAL TEARS) 0.4 % SOLN Place 1 drop into both eyes every hour as needed for dry eyes 1 Bottle 2     ferrous sulfate (IRON) 325 (65 FE) MG tablet Take 1 tablet (325 mg) by mouth 3 times daily (with meals) (Patient taking differently: Take 325 mg by mouth 2 times daily ) 90 tablet 3     diclofenac (VOLTAREN) 1 % GEL Apply 4 grams to knees or 2 grams to hands four times daily using enclosed dosing card. 100 g 1     Cholecalciferol (VITAMIN D) 2000 UNITS tablet Take 2,000 Units by mouth daily Start in 2 months, after completing 8 weeks of weekly high dose vitamin D. 100 tablet 3     cetirizine (ZYRTEC) 10 MG tablet Take 1 tablet (10 mg) by mouth daily 90 tablet 3     calcium-vitamin D (CALTRATE) 600-400 MG-UNIT per tablet Take 1 tablet by mouth 2 times daily 100 tablet 3     cyanocobalamin 1000 MCG TBCR Take 1,000 mcg by mouth daily 30 tablet 2     ORDER FOR DME Pull up briefs for incontinence of urine change 2 to 3 times daily 1 Container 11     ASPIRIN NOT PRESCRIBED (INTENTIONAL) due to Iron therapy       Allergies   Allergen Reactions     Nkda [No Known Drug Allergies]        Reviewed and updated as needed this visit by clinical staff       Reviewed and updated as needed this visit by Provider         ROS:  Constitutional, HEENT, cardiovascular, pulmonary, gi and gu systems are negative, except as otherwise noted.    OBJECTIVE:                                                    /70  Pulse 88  Temp 97  F (36.1  C) (Oral)  Resp 16  Ht 4' 6.75\" (1.391 m)  Wt 102 lb (46.3 kg)  SpO2 99%  Breastfeeding? No  BMI 23.92 kg/m2  Body mass index is 23.92 kg/(m^2).  GENERAL: healthy, alert and no distress  NECK: no adenopathy, no asymmetry, masses, or scars and thyroid normal to palpation  ENT: normal ear canals and TMs bilaterally, no signs of effusion or infection.   RESP: lungs clear to auscultation - no rales, rhonchi or wheezes  CV: regular rate and rhythm, normal S1 S2, no S3 or S4, no " murmur, click or rub, no peripheral edema and peripheral pulses strong  ABDOMEN: soft, nontender, no hepatosplenomegaly, no masses and bowel sounds normal  MS: no gross musculoskeletal defects noted, no edema  MS: moderate tenderness to palpation of left  upper trapezius and rhomboid muscle region    Diagnostic Test Results:  EKG - negative, NSR, no acute STwave changes     ASSESSMENT/PLAN:                                                        ICD-10-CM    1. Upper back pain on left side M54.9 EKG 12-lead complete w/read - Clinics     methocarbamol (ROBAXIN) 750 MG tablet     traMADol (ULTRAM) 50 MG tablet   2. Tinnitus, left H93.12 OTOLARYNGOLOGY REFERRAL     1.Tramadol 1 tablet every 6 hours as needed for back pain  Robaxin 750 mg three times a day as needed for back and neck muscle spasms  Apply heating pad to the neck and back 4 times a day 30 minutes each time   Do neck stretches  2.See ENT specialist for ringing in the ear.       Suzie Sultana PA-C  [facility]

## 2017-03-24 ENCOUNTER — OFFICE VISIT (OUTPATIENT)
Dept: URGENT CARE | Facility: URGENT CARE | Age: 77
End: 2017-03-24
Payer: MEDICARE

## 2017-03-24 VITALS
BODY MASS INDEX: 22.99 KG/M2 | WEIGHT: 98 LBS | HEART RATE: 108 BPM | DIASTOLIC BLOOD PRESSURE: 67 MMHG | TEMPERATURE: 97.8 F | SYSTOLIC BLOOD PRESSURE: 138 MMHG | OXYGEN SATURATION: 96 %

## 2017-03-24 DIAGNOSIS — R05.9 COUGH: ICD-10-CM

## 2017-03-24 DIAGNOSIS — H66.002 ACUTE SUPPURATIVE OTITIS MEDIA OF LEFT EAR WITHOUT SPONTANEOUS RUPTURE OF TYMPANIC MEMBRANE, RECURRENCE NOT SPECIFIED: Primary | ICD-10-CM

## 2017-03-24 PROCEDURE — 99213 OFFICE O/P EST LOW 20 MIN: CPT | Performed by: NURSE PRACTITIONER

## 2017-03-24 RX ORDER — CODEINE PHOSPHATE AND GUAIFENESIN 10; 100 MG/5ML; MG/5ML
1 SOLUTION ORAL EVERY 4 HOURS PRN
Qty: 120 ML | Refills: 0 | Status: SHIPPED | OUTPATIENT
Start: 2017-03-24 | End: 2017-03-29

## 2017-03-24 NOTE — PROGRESS NOTES
SUBJECTIVE:                                                    Kerry Vega is a 76 year old female who presents to clinic today for the following health issues:    RESPIRATORY SYMPTOMS/ear pain      Duration: 2 days     Description  Coughing, ear pain/cough attacks, right hand tingling and numbness (one week ago), chest discomfort and rib pain from coughing     Severity: moderate    Accompanying signs and symptoms: None    History (predisposing factors):  none    Precipitating or alleviating factors: None    Therapies tried and outcome:  rest and fluids, halls cough drops, Delsym cough syrup- no relief.           Allergies   Allergen Reactions     Nkda [No Known Drug Allergies]        Past Medical History:   Diagnosis Date     Anemia      Arthritis     OSTEO         Current Outpatient Prescriptions on File Prior to Visit:  methocarbamol (ROBAXIN) 750 MG tablet Take 1 tablet (750 mg) by mouth 3 times daily as needed for muscle spasms   traMADol (ULTRAM) 50 MG tablet Take 1-2 tablets ( mg) by mouth every 6 hours as needed for pain maximum 4 tablet(s) per day   diphenhydrAMINE (BENADRYL) 25 MG tablet Take 1-2 tablets (25-50 mg) by mouth nightly as needed for other (for eustachian tube dysfunction, for ear pain.)   naproxen (NAPROSYN) 500 MG tablet TAKE 1 TABLET BY MOUTH TWICE DAILY WITH FOOD AS NEEDED FOR PAIN   omeprazole (PRILOSEC) 40 MG capsule TAKE ONE CAPSULE BY MOUTH ONCE DAILY   alendronate (FOSAMAX) 70 MG tablet Take 1 tablet (70 mg) by mouth every 7 days   olopatadine (PATANOL) 0.1 % ophthalmic solution INSTILL 1 DROP IN BOTH EYES TWICE DAILY   hypromellose (ARTIFICIAL TEARS) 0.4 % SOLN Place 1 drop into both eyes every hour as needed for dry eyes   ferrous sulfate (IRON) 325 (65 FE) MG tablet Take 1 tablet (325 mg) by mouth 3 times daily (with meals) (Patient taking differently: Take 325 mg by mouth 2 times daily )   diclofenac (VOLTAREN) 1 % GEL Apply 4 grams to knees or 2 grams to hands four  times daily using enclosed dosing card.   Cholecalciferol (VITAMIN D) 2000 UNITS tablet Take 2,000 Units by mouth daily Start in 2 months, after completing 8 weeks of weekly high dose vitamin D.   cetirizine (ZYRTEC) 10 MG tablet Take 1 tablet (10 mg) by mouth daily   calcium-vitamin D (CALTRATE) 600-400 MG-UNIT per tablet Take 1 tablet by mouth 2 times daily   cyanocobalamin 1000 MCG TBCR Take 1,000 mcg by mouth daily   ORDER FOR DME Pull up briefs for incontinence of urine change 2 to 3 times daily   ASPIRIN NOT PRESCRIBED (INTENTIONAL) due to Iron therapy     No current facility-administered medications on file prior to visit.     Social History   Substance Use Topics     Smoking status: Never Smoker     Smokeless tobacco: Never Used     Alcohol use No       ROS:   Constitutional: no fevers  ENT: as above    OBJECTIVE:  /67 (BP Location: Left arm, Patient Position: Chair, Cuff Size: Adult Small)  Pulse 108  Temp 97.8  F (36.6  C) (Oral)  Wt 98 lb (44.5 kg)  SpO2 96%  Breastfeeding? No  BMI 22.99 kg/m2   General:   awake, alert, and cooperative.  NAD.   Head: Normocephalic, atraumatic.  Eyes: Conjunctiva clear,   ENT: . Left TM has bulging and erythema, Ear canals are intact  Neuro: Alert and oriented - normal speech.    ASSESSMENT:    ICD-10-CM    1. Acute suppurative otitis media of left ear without spontaneous rupture of tympanic membrane, recurrence not specified H66.002 amoxicillin-clavulanate (AUGMENTIN) 875-125 MG per tablet   2. Cough R05 guaiFENesin-codeine (ROBITUSSIN AC) 100-10 MG/5ML SOLN solution       PLAN:   Patient educational/instructional material provided including reasons for follow-up    The patient indicates understanding of these issues and agrees with the plan.     Advised about symptoms which might herald more serious problems.   Miracle Zhang  [facility]  Family Nurse Practitoner

## 2017-03-24 NOTE — MR AVS SNAPSHOT
After Visit Summary   3/24/2017    Kerry Vega    MRN: [id number]           Patient Information     Date Of Birth          1940        Visit Information        Provider Department      3/24/2017 3:15 PM Miracle Zhang NP [facility]        Today's Diagnoses     Acute suppurative otitis media of left ear without spontaneous rupture of tympanic membrane, recurrence not specified    -  1    Cough          Care Instructions      Otitis Media (Middle-Ear Infection) in Adults  Otitis media is another name for a middle-ear infection. It means an infection behind your eardrum. This kind of ear infection can happen after any condition that keeps fluid from draining from the middle ear. These conditions include allergies, a cold, a sore throat, or a respiratory infection.  Middle-ear infections are common in children, but they can also happen in adults. An ear infection in an adult may mean a more serious problem than in a child. So you may need additional tests. If you have an ear infection, you should see your health care provider for treatment.  What are the types of middle-ear infections?  Infections can affect the middle ear in several ways. They are:    Acute otitis media. This middle-ear infection occurs suddenly. It causes swelling and redness. Fluid and mucus become trapped inside the ear. You can have a fever and ear pain.    Otitis media with effusion. Fluid (effusion) and mucus build up in the middle ear after the infection goes away. You may feel like your middle ear is full. This can continue for months and may affect your hearing.    Chronic otitis media with effusion. Fluid (effusion) remains in the middle ear for a long time. Or it builds up again and again, even though there is no infection. This type of middle-ear infection may be hard to treat. It may also affect your hearing.  Who is more likely to get a middle-ear infection?  You are more likely to get an ear  infection if you:    Smoke or are around someone who smokes    Have seasonal or year-round allergy symptoms    Have a cold or other upper respiratory infection  What causes a middle-ear infection?  The middle ear connects to the throat by a canal called the eustachian tube. This tube helps even out the pressure between the outer ear and the inner ear. A cold or allergy can irritate the tube or cause the area around it to swell. This can keep fluid from draining from the middle ear. The fluid builds up behind the eardrum. Bacteria and viruses can grow in this fluid. The bacteria and viruses cause the middle-ear infection.  What are the symptoms of a middle-ear infection?  Common symptoms of a middle-ear infection in adults are:    Pain in 1 or both ears    Drainage from the ear    Muffled hearing    Sore throat   You may also have a fever. Rarely, your balance can be affected.  These symptoms may be the same as for other conditions. It s important to talk with your health care provider if you think you have a middle-ear infection. If you have a high fever, severe pain behind your ear, or paralysis in your face, see your provider as soon as you can.  How is a middle-ear infection diagnosed?  Your health care provider will take a medical history and do a physical exam. He or she will look at the outer ear and eardrum with an otoscope. The otoscope is a lighted tool that lets your provider see inside the ear. A pneumatic otoscope blows a puff of air into the ear to check how well your eardrum moves. If you eardrum doesn t move well, it may mean you have fluid behind it.  Your provider may also do a test called tympanometry. This test tells how well the middle ear is working. It can find any changes in pressure in the middle ear. Your provider may test your hearing with a tuning fork.  How is a middle-ear infection treated?  A middle-ear infection may be treated with:    Antibiotics, taken by mouth or as ear  drops    Medication for pain    Decongestants, antihistamines, or nasal steroids  Your health care provider may also have you try autoinsufflation. This helps adjust the air pressure in your ear. For this, you pinch your nose and gently exhale. This forces air back through the eustachian tube.  The exact treatment for your ear infection will depend on the type of infection you have. In general, if your symptoms don t get better in 48 to 72 hours, contact your health care provider.  Middle-ear infections can cause long-term problems if not treated. They can lead to:    Infection in other parts of the head    Permanent hearing loss    Paralysis of a nerve in your face  If you have a middle-ear infection that doesn t get better, you may need to see an ear, nose, and throat specialist (otolaryngologist). You may need a CT scan or MRI to check for head and neck cancer.  Ear tubes  Sometimes fluid stays in the middle ear even after you take antibiotics and the infection goes away. In this case, your health care provider may suggest that a small tube be placed in your ear. The tube is put at the opening of the eardrum. The tube keeps fluid from building up and relieves pressure in the middle ear. It can also help you hear better. This surgery is called myringotomy. It is not often done in adults.  The tubes usually fall out on their own after 6 months to a year.    [id number] [employer]. [address] [place], [place] [address]. All rights reserved. This information is not intended as a substitute for professional medical care. Always follow your healthcare professional's instructions.              Follow-ups after your visit        Who to contact     If you have questions or need follow up information about today's clinic visit or your schedule please contact [facility] directly at [phone].  Normal or non-critical lab and imaging results will be communicated to you by [facility]  "letter or phone within 4 business days after the clinic has received the results. If you do not hear from us within 7 days, please contact the clinic through [employer] or phone. If you have a critical or abnormal lab result, we will notify you by phone as soon as possible.  Submit refill requests through [employer] or call your pharmacy and they will forward the refill request to us. Please allow 3 business days for your refill to be completed.          Additional Information About Your Visit        [employer] Information     [employer] lets you send messages to your doctor, view your test results, renew your prescriptions, schedule appointments and more. To sign up, go to [email or web address] . Click on \"Log in\" on the left side of the screen, which will take you to the Welcome page. Then click on \"Sign up Now\" on the right side of the page.     You will be asked to enter the access code listed below, as well as some personal information. Please follow the directions to create your username and password.     Your access code is: [email or web address]  Expires: 2017 11:05 AM     Your access code will  in 90 days. If you need help or a new code, please call your [place] clinic or [phone].        Care EveryWhere ID     This is your Care EveryWhere ID. This could be used by other organizations to access your [place] medical records  [id number]        Your Vitals Were     Pulse Temperature Pulse Oximetry Breastfeeding? BMI (Body Mass Index)       108 97.8  F (36.6  C) (Oral) 96% No 22.99 kg/m2        Blood Pressure from Last 3 Encounters:   17 138/67   17 120/70   17 113/64    Weight from Last 3 Encounters:   17 98 lb (44.5 kg)   17 102 lb (46.3 kg)   17 104 lb (47.2 kg)              Today, you had the following     No orders found for display         Today's Medication Changes          These changes are accurate as of: 3/24/17  5:28 PM.  If you have any questions, ask your nurse or " doctor.               Start taking these medicines.        Dose/Directions    amoxicillin-clavulanate 875-125 MG per tablet   Commonly known as:  AUGMENTIN   Used for:  Acute suppurative otitis media of left ear without spontaneous rupture of tympanic membrane, recurrence not specified   Started by:  Miracle Zhang NP        Dose:  1 tablet   Take 1 tablet by mouth 2 times daily for 7 days   Quantity:  14 tablet   Refills:  0       guaiFENesin-codeine 100-10 MG/5ML Soln solution   Commonly known as:  ROBITUSSIN AC   Used for:  Cough   Started by:  Miracle Zhang NP        Dose:  1 tsp.   Take 5 mLs by mouth every 4 hours as needed   Quantity:  120 mL   Refills:  0         These medicines have changed or have updated prescriptions.        Dose/Directions    ferrous sulfate 325 (65 FE) MG tablet   Commonly known as:  IRON   This may have changed:  when to take this   Used for:  Iron deficiency anemia, unspecified iron deficiency        Dose:  325 mg   Take 1 tablet (325 mg) by mouth 3 times daily (with meals)   Quantity:  90 tablet   Refills:  3            Where to get your medicines      These medications were sent to [id number] - [place], [place] - [address] [facility]  [address] [facility] [address]    Hours:  24-hours Phone:  [phone]     amoxicillin-clavulanate 875-125 MG per tablet         Some of these will need a paper prescription and others can be bought over the counter.  Ask your nurse if you have questions.     Bring a paper prescription for each of these medications     guaiFENesin-codeine 100-10 MG/5ML Soln solution                Primary Care Provider Office Phone # Fax #    Shadi Cotton [phone]       [facility] [address]  [place] [address]        Thank you!     Thank you for choosing [facility]  for your care. Our goal is always to provide you with excellent care. Hearing back  from our patients is one way we can continue to improve our services. Please take a few minutes to complete the written survey that you may receive in the mail after your visit with us. Thank you!             Your Updated Medication List - Protect others around you: Learn how to safely use, store and throw away your medicines at www.disposemymeds.org.          This list is accurate as of: 3/24/17  5:28 PM.  Always use your most recent med list.                   Brand Name Dispense Instructions for use    alendronate 70 MG tablet    FOSAMAX    12 tablet    Take 1 tablet (70 mg) by mouth every 7 days       amoxicillin-clavulanate 875-125 MG per tablet    AUGMENTIN    14 tablet    Take 1 tablet by mouth 2 times daily for 7 days       * ASPIRIN NOT PRESCRIBED    INTENTIONAL     due to Iron therapy       calcium-vitamin D 600-400 MG-UNIT per tablet    CALTRATE    100 tablet    Take 1 tablet by mouth 2 times daily       cetirizine 10 MG tablet    ZYRTEC    90 tablet    Take 1 tablet (10 mg) by mouth daily       cyanocobalamin 1000 MCG Tbcr    VITAMIN B-12 ER    30 tablet    Take 1,000 mcg by mouth daily       diclofenac 1 % Gel topical gel    VOLTAREN    100 g    Apply 4 grams to knees or 2 grams to hands four times daily using enclosed dosing card.       diphenhydrAMINE 25 MG tablet    BENADRYL    60 tablet    Take 1-2 tablets (25-50 mg) by mouth nightly as needed for other (for eustachian tube dysfunction, for ear pain.)       ferrous sulfate 325 (65 FE) MG tablet    IRON    90 tablet    Take 1 tablet (325 mg) by mouth 3 times daily (with meals)       guaiFENesin-codeine 100-10 MG/5ML Soln solution    ROBITUSSIN AC    120 mL    Take 5 mLs by mouth every 4 hours as needed       hypromellose 0.4 % Soln ophthalmic solution    ARTIFICIAL TEARS    1 Bottle    Place 1 drop into both eyes every hour as needed for dry eyes       methocarbamol 750 MG tablet    ROBAXIN    45 tablet    Take 1 tablet (750 mg) by mouth 3 times  daily as needed for muscle spasms       naproxen 500 MG tablet    NAPROSYN    60 tablet    TAKE 1 TABLET BY MOUTH TWICE DAILY WITH FOOD AS NEEDED FOR PAIN       olopatadine 0.1 % ophthalmic solution    PATANOL    15 mL    INSTILL 1 DROP IN BOTH EYES TWICE DAILY       omeprazole 40 MG capsule    priLOSEC    90 capsule    TAKE ONE CAPSULE BY MOUTH ONCE DAILY       * order for DME     1 Container    Pull up briefs for incontinence of urine change 2 to 3 times daily       traMADol 50 MG tablet    ULTRAM    30 tablet    Take 1-2 tablets ( mg) by mouth every 6 hours as needed for pain maximum 4 tablet(s) per day       vitamin D 2000 UNITS tablet     100 tablet    Take 2,000 Units by mouth daily Start in 2 months, after completing 8 weeks of weekly high dose vitamin D.       * Notice:  This list has 2 medication(s) that are the same as other medications prescribed for you. Read the directions carefully, and ask your doctor or other care provider to review them with you.

## 2017-03-24 NOTE — NURSING NOTE
"Chief Complaint   Patient presents with     Cough     Pt c/o Coughing, cough attacks, right hand tingling and numbness (one week ago), chest discomfort and rib pain from coughing        Initial /67 (BP Location: Left arm, Patient Position: Chair, Cuff Size: Adult Small)  Pulse 108  Temp 97.8  F (36.6  C) (Oral)  Wt 98 lb (44.5 kg)  SpO2 96%  Breastfeeding? No  BMI 22.99 kg/m2 Estimated body mass index is 22.99 kg/(m^2) as calculated from the following:    Height as of 3/7/17: 4' 6.75\" (1.391 m).    Weight as of this encounter: 98 lb (44.5 kg).  Medication Reconciliation: complete     Consuelo Wolf CMA (AAMA)      "

## 2017-03-24 NOTE — PATIENT INSTRUCTIONS
Otitis Media (Middle-Ear Infection) in Adults  Otitis media is another name for a middle-ear infection. It means an infection behind your eardrum. This kind of ear infection can happen after any condition that keeps fluid from draining from the middle ear. These conditions include allergies, a cold, a sore throat, or a respiratory infection.  Middle-ear infections are common in children, but they can also happen in adults. An ear infection in an adult may mean a more serious problem than in a child. So you may need additional tests. If you have an ear infection, you should see your health care provider for treatment.  What are the types of middle-ear infections?  Infections can affect the middle ear in several ways. They are:    Acute otitis media. This middle-ear infection occurs suddenly. It causes swelling and redness. Fluid and mucus become trapped inside the ear. You can have a fever and ear pain.    Otitis media with effusion. Fluid (effusion) and mucus build up in the middle ear after the infection goes away. You may feel like your middle ear is full. This can continue for months and may affect your hearing.    Chronic otitis media with effusion. Fluid (effusion) remains in the middle ear for a long time. Or it builds up again and again, even though there is no infection. This type of middle-ear infection may be hard to treat. It may also affect your hearing.  Who is more likely to get a middle-ear infection?  You are more likely to get an ear infection if you:    Smoke or are around someone who smokes    Have seasonal or year-round allergy symptoms    Have a cold or other upper respiratory infection  What causes a middle-ear infection?  The middle ear connects to the throat by a canal called the eustachian tube. This tube helps even out the pressure between the outer ear and the inner ear. A cold or allergy can irritate the tube or cause the area around it to swell. This can keep fluid from draining from  the middle ear. The fluid builds up behind the eardrum. Bacteria and viruses can grow in this fluid. The bacteria and viruses cause the middle-ear infection.  What are the symptoms of a middle-ear infection?  Common symptoms of a middle-ear infection in adults are:    Pain in 1 or both ears    Drainage from the ear    Muffled hearing    Sore throat   You may also have a fever. Rarely, your balance can be affected.  These symptoms may be the same as for other conditions. It s important to talk with your health care provider if you think you have a middle-ear infection. If you have a high fever, severe pain behind your ear, or paralysis in your face, see your provider as soon as you can.  How is a middle-ear infection diagnosed?  Your health care provider will take a medical history and do a physical exam. He or she will look at the outer ear and eardrum with an otoscope. The otoscope is a lighted tool that lets your provider see inside the ear. A pneumatic otoscope blows a puff of air into the ear to check how well your eardrum moves. If you eardrum doesn t move well, it may mean you have fluid behind it.  Your provider may also do a test called tympanometry. This test tells how well the middle ear is working. It can find any changes in pressure in the middle ear. Your provider may test your hearing with a tuning fork.  How is a middle-ear infection treated?  A middle-ear infection may be treated with:    Antibiotics, taken by mouth or as ear drops    Medication for pain    Decongestants, antihistamines, or nasal steroids  Your health care provider may also have you try autoinsufflation. This helps adjust the air pressure in your ear. For this, you pinch your nose and gently exhale. This forces air back through the eustachian tube.  The exact treatment for your ear infection will depend on the type of infection you have. In general, if your symptoms don t get better in 48 to 72 hours, contact your health care  provider.  Middle-ear infections can cause long-term problems if not treated. They can lead to:    Infection in other parts of the head    Permanent hearing loss    Paralysis of a nerve in your face  If you have a middle-ear infection that doesn t get better, you may need to see an ear, nose, and throat specialist (otolaryngologist). You may need a CT scan or MRI to check for head and neck cancer.  Ear tubes  Sometimes fluid stays in the middle ear even after you take antibiotics and the infection goes away. In this case, your health care provider may suggest that a small tube be placed in your ear. The tube is put at the opening of the eardrum. The tube keeps fluid from building up and relieves pressure in the middle ear. It can also help you hear better. This surgery is called myringotomy. It is not often done in adults.  The tubes usually fall out on their own after 6 months to a year.    9089-5861 [employer]. [address] [place] [address], [place] [address]. All rights reserved. This information is not intended as a substitute for professional medical care. Always follow your healthcare professional's instructions.

## 2017-04-04 ENCOUNTER — TELEPHONE (OUTPATIENT)
Dept: FAMILY MEDICINE | Facility: CLINIC | Age: 77
End: 2017-04-04

## 2017-04-04 ENCOUNTER — OFFICE VISIT (OUTPATIENT)
Dept: FAMILY MEDICINE | Facility: CLINIC | Age: 77
End: 2017-04-04
Payer: MEDICARE

## 2017-04-04 VITALS
DIASTOLIC BLOOD PRESSURE: 56 MMHG | BODY MASS INDEX: 22.68 KG/M2 | WEIGHT: 98 LBS | HEART RATE: 105 BPM | HEIGHT: 55 IN | TEMPERATURE: 97.1 F | OXYGEN SATURATION: 99 % | SYSTOLIC BLOOD PRESSURE: 138 MMHG

## 2017-04-04 DIAGNOSIS — L50.9 URTICARIA: ICD-10-CM

## 2017-04-04 DIAGNOSIS — K21.9 GASTROESOPHAGEAL REFLUX DISEASE WITHOUT ESOPHAGITIS: ICD-10-CM

## 2017-04-04 DIAGNOSIS — J01.90 ACUTE SINUSITIS WITH SYMPTOMS > 10 DAYS: Primary | ICD-10-CM

## 2017-04-04 PROCEDURE — 99214 OFFICE O/P EST MOD 30 MIN: CPT | Performed by: PHYSICIAN ASSISTANT

## 2017-04-04 RX ORDER — DOXYCYCLINE 100 MG/1
100 TABLET ORAL 2 TIMES DAILY
Qty: 28 TABLET | Refills: 0 | Status: SHIPPED | OUTPATIENT
Start: 2017-04-04 | End: 2017-04-18

## 2017-04-04 RX ORDER — CETIRIZINE HYDROCHLORIDE 10 MG/1
10 TABLET ORAL DAILY
Qty: 30 TABLET | Refills: 1 | Status: SHIPPED | OUTPATIENT
Start: 2017-04-04 | End: 2018-11-27

## 2017-04-04 RX ORDER — DIPHENHYDRAMINE HCL 25 MG
CAPSULE ORAL
Refills: 1 | COMMUNITY
Start: 2017-03-27 | End: 2017-05-09

## 2017-04-04 NOTE — PROGRESS NOTES
SUBJECTIVE:                                                    Kerry Vega is a 76 year old female who presents to clinic today for the following health issues:      Acute Illness   Acute illness concerns: cough   Onset: 13 days ago     Fever: no    Chills/Sweats: no    Headache (location?): YES    Sinus Pressure:YES- left side     Conjunctivitis:  no    Ear Pain: YES: left    Rhinorrhea: YES    Congestion: YES    Sore Throat: no  Facial pain - feels swollen on left side    Cough: YES-productive of white sputum    Wheeze: no    Decreased Appetite: YES    Nausea: no    Vomiting: no    Diarrhea:  no    Dysuria/Freq.: no    Fatigue/Achiness: YES    Sick/Strep Exposure: no     Therapies Tried and outcome: guaifenesin- codeine and augmentin . Felt better on Augmentin, she was with course  2 days ago, now started to feel worse.   Patient also developed swelling around left eye 3 days. Swelling is worse in am and gets better as day progresses, its slightly itchy, no pain.           Problem list and histories reviewed & adjusted, as indicated.  Additional history: refill for Zantac    Patient Active Problem List   Diagnosis     GERD (gastroesophageal reflux disease)     CARDIOVASCULAR SCREENING; LDL GOAL LESS THAN 160     Right lumbar radiculopathy     Left knee pain     Osteoporosis     Advanced directives, counseling/discussion     Abnormal Pap smear     Dysphagia     Health Care Home     Iron deficiency anemia     Sliding hiatal hernia     OA (osteoarthritis) of knee     Osteoarthritis of left thumb     Cervical radiculopathy     Past Surgical History:   Procedure Laterality Date     COLONOSCOPY  1/29/2013    Procedure: COLONOSCOPY;;  Surgeon: Edgard Prieto MD;  Location:  OR     ESOPHAGOSCOPY, GASTROSCOPY, DUODENOSCOPY (EGD), COMBINED  1/20/2012    Procedure:COMBINED ESOPHAGOSCOPY, GASTROSCOPY, DUODENOSCOPY (EGD); EGD, dysphagia; Surgeon:SHEREE VALLE; Location: OR     ESOPHAGOSCOPY, GASTROSCOPY,  DUODENOSCOPY (EGD), COMBINED  7/18/2012    Procedure: COMBINED ESOPHAGOSCOPY, GASTROSCOPY, DUODENOSCOPY (EGD);  EGD for dysphagia rp Tonio;  Surgeon: Rey Bee MD;  Location:  OR     ESOPHAGOSCOPY, GASTROSCOPY, DUODENOSCOPY (EGD), COMBINED  1/29/2013    Procedure: COMBINED ESOPHAGOSCOPY, GASTROSCOPY, DUODENOSCOPY (EGD), BIOPSY SINGLE OR MULTIPLE;  Colonoscopy, EGD, dysphagia;  Surgeon: Edgard Prieto MD;  Location:  OR       Social History   Substance Use Topics     Smoking status: Never Smoker     Smokeless tobacco: Never Used     Alcohol use No     Family History   Problem Relation Age of Onset     CANCER No family hx of      DIABETES No family hx of      Hypertension No family hx of      CEREBROVASCULAR DISEASE No family hx of      Thyroid Disease No family hx of      Glaucoma No family hx of      Macular Degeneration No family hx of          Current Outpatient Prescriptions   Medication Sig Dispense Refill     diphenhydrAMINE (BENADRYL) 25 MG capsule TK 1-2 CS PO EVERY NIGHT PRF EAR PAIN  1     RaNITidine HCl (ZANTAC PO) Take 150 mg by mouth       doxycycline Monohydrate 100 MG TABS Take 100 mg by mouth 2 times daily for 14 days 28 tablet 0     ranitidine (ZANTAC) 300 MG tablet Take 1 tablet (300 mg) by mouth At Bedtime 30 tablet 11     cetirizine (ZYRTEC) 10 MG tablet Take 1 tablet (10 mg) by mouth daily 30 tablet 1     methocarbamol (ROBAXIN) 750 MG tablet Take 1 tablet (750 mg) by mouth 3 times daily as needed for muscle spasms 45 tablet 1     naproxen (NAPROSYN) 500 MG tablet TAKE 1 TABLET BY MOUTH TWICE DAILY WITH FOOD AS NEEDED FOR PAIN 60 tablet 3     omeprazole (PRILOSEC) 40 MG capsule TAKE ONE CAPSULE BY MOUTH ONCE DAILY 90 capsule 3     Cholecalciferol (VITAMIN D) 2000 UNITS tablet Take 2,000 Units by mouth daily Start in 2 months, after completing 8 weeks of weekly high dose vitamin D. 100 tablet 3     calcium-vitamin D (CALTRATE) 600-400 MG-UNIT per tablet Take 1 tablet by mouth 2  times daily 100 tablet 3     traMADol (ULTRAM) 50 MG tablet Take 1-2 tablets ( mg) by mouth every 6 hours as needed for pain maximum 4 tablet(s) per day (Patient not taking: Reported on 4/4/2017) 30 tablet 0     diphenhydrAMINE (BENADRYL) 25 MG tablet Take 1-2 tablets (25-50 mg) by mouth nightly as needed for other (for eustachian tube dysfunction, for ear pain.) (Patient not taking: Reported on 4/4/2017) 60 tablet 1     alendronate (FOSAMAX) 70 MG tablet Take 1 tablet (70 mg) by mouth every 7 days (Patient not taking: Reported on 4/4/2017) 12 tablet 3     olopatadine (PATANOL) 0.1 % ophthalmic solution INSTILL 1 DROP IN BOTH EYES TWICE DAILY (Patient not taking: Reported on 4/4/2017) 15 mL 0     hypromellose (ARTIFICIAL TEARS) 0.4 % SOLN Place 1 drop into both eyes every hour as needed for dry eyes (Patient not taking: Reported on 4/4/2017) 1 Bottle 2     ferrous sulfate (IRON) 325 (65 FE) MG tablet Take 1 tablet (325 mg) by mouth 3 times daily (with meals) (Patient not taking: Reported on 4/4/2017) 90 tablet 3     diclofenac (VOLTAREN) 1 % GEL Apply 4 grams to knees or 2 grams to hands four times daily using enclosed dosing card. (Patient not taking: Reported on 4/4/2017) 100 g 1     cetirizine (ZYRTEC) 10 MG tablet Take 1 tablet (10 mg) by mouth daily (Patient not taking: Reported on 4/4/2017) 90 tablet 3     cyanocobalamin 1000 MCG TBCR Take 1,000 mcg by mouth daily (Patient not taking: Reported on 4/4/2017) 30 tablet 2     ORDER FOR DME Pull up briefs for incontinence of urine change 2 to 3 times daily (Patient not taking: Reported on 4/4/2017) 1 Container 11     ASPIRIN NOT PRESCRIBED (INTENTIONAL) Reported on 4/4/2017       Allergies   Allergen Reactions     Nkda [No Known Drug Allergies]        Reviewed and updated as needed this visit by clinical staff  Tobacco  Allergies  Meds  Problems  Med Hx  Surg Hx  Fam Hx  Soc Hx        Reviewed and updated as needed this visit by Provider  Allergies   "Meds  Problems         ROS:  Constitutional, HEENT, cardiovascular, pulmonary, gi and gu systems are negative, except as otherwise noted.    OBJECTIVE:                                                    /56  Pulse 105  Temp 97.1  F (36.2  C) (Oral)  Ht 4' 6.75\" (1.391 m)  Wt 98 lb (44.5 kg)  SpO2 99%  Breastfeeding? No  BMI 22.99 kg/m2  Body mass index is 22.99 kg/(m^2).  GENERAL: healthy, alert and no distress  EYES: conjunctivae and sclerae normal and urticarial swelling around left upper nad lower eyelids.   HENT: normal cephalic/atraumatic, ear canals and TM's normal, nasal mucosa edematous , oropharynx clear, oral mucous membranes moist and sinuses: maxillary tenderness on left  NECK: no adenopathy, no asymmetry, masses, or scars and thyroid normal to palpation  RESP: lungs clear to auscultation - no rales, rhonchi or wheezes  CV: regular rate and rhythm, normal S1 S2, no S3 or S4, no murmur, click or rub, no peripheral edema and peripheral pulses strong  ABDOMEN: soft, nontender, no hepatosplenomegaly, no masses and bowel sounds normal  MS: no gross musculoskeletal defects noted, no edema    Diagnostic Test Results:  none      ASSESSMENT/PLAN:                                                        ICD-10-CM    1. Acute sinusitis with symptoms > 10 days J01.90 doxycycline Monohydrate 100 MG TABS   2. Gastroesophageal reflux disease without esophagitis K21.9 ranitidine (ZANTAC) 300 MG tablet   3. Urticaria L50.9 cetirizine (ZYRTEC) 10 MG tablet     1.Doxycyline 100 mg twice a day for 14 days  Increase fluids  Nasal wash  Mucinex DM for cough and mucous 1 tablet twice a day   Follow up if not better after the antibiotic course.     2. Zantac 300 mg at bedtime   3. Zyrtec 10 mg daily  Apply cold compress          Suzie Sultana PA-C  [facility]  "

## 2017-04-04 NOTE — NURSING NOTE
"Chief Complaint   Patient presents with     URI       Initial /56  Pulse 105  Temp 97.1  F (36.2  C) (Oral)  Ht 4' 6.75\" (1.391 m)  Wt 98 lb (44.5 kg)  SpO2 99%  Breastfeeding? No  BMI 22.99 kg/m2 Estimated body mass index is 22.99 kg/(m^2) as calculated from the following:    Height as of this encounter: 4' 6.75\" (1.391 m).    Weight as of this encounter: 98 lb (44.5 kg).  Medication Reconciliation: complete       Kamila Evans CMA      "

## 2017-04-04 NOTE — MR AVS SNAPSHOT
After Visit Summary   4/4/2017    Kerry Vega    MRN: [id number]           Patient Information     Date Of Birth          1940        Visit Information        Provider Department      4/4/2017 2:00 PM Suzie Sultana PA-C [facility]        Today's Diagnoses     Acute sinusitis with symptoms > 10 days    -  1    Gastroesophageal reflux disease without esophagitis        Urticaria          Care Instructions    Doxycyline 100 mg twice a day for 14 days  Increase fluids  Nasal wash  Mucinex DM for cough and mucous 1 tablet twice a day   Follow up if not better after the antibiotic course.     Zyrtec 10 mg daily for eye swelling    Zantac 300 mg at bedtime  for acidity  Sinusitis (Antibiotic Treatment)    The sinuses are air-filled spaces within the bones of the face. They connect to the inside of the nose. Sinusitis is an inflammation of the tissue lining the sinus cavity. Sinus inflammation can occur during a cold. It can also be due to allergies to pollens and other particles in the air. Sinusitis can cause symptoms of sinus congestion and fullness. A sinus infection causes fever, headache and facial pain. There is often green or yellow drainage from the nose or into the back of the throat (post-nasal drip). You have been given antibiotics to treat this condition.  Home care:    Take the full course of antibiotics as instructed. Do not stop taking them, even if you feel better.    Drink plenty of water, hot tea, and other liquids. This may help thin mucus. It also may promote sinus drainage.    Heat may help soothe painful areas of the face. Use a towel soaked in hot water. Or,  the shower and direct the hot spray onto your face. Using a vaporizer along with a menthol rub at night may also help.     An expectorant containing guaifenesin may help thin the mucus and promote drainage from the sinuses.    Over-the-counter decongestants may be used unless  a similar medicine was prescribed. Nasal sprays work the fastest. Use one that contains phenylephrine or oxymetazoline. First blow the nose gently. Then use the spray. Do not use these medicines more often than directed on the label or symptoms may get worse. You may also use tablets containing pseudoephedrine. Avoid products that combine ingredients, because side effects may be increased. Read labels. You can also ask the pharmacist for help. (NOTE: Persons with high blood pressure should not use decongestants. They can raise blood pressure.)    Over-the-counter antihistamines may help if allergies contributed to your sinusitis.      Do not use nasal rinses or irrigation during an acute sinus infection, unless told to by your health care provider. Rinsing may spread the infection to other sinuses.    Use acetaminophen or ibuprofen to control pain, unless another pain medicine was prescribed. (If you have chronic liver or kidney disease or ever had a stomach ulcer, talk with your doctor before using these medicines. Aspirin should never be used in anyone under 18 years of age who is ill with a fever. It may cause severe liver damage.)    Don't smoke. This can worsen symptoms.  Follow-up care  Follow up with your healthcare provider or our staff if you are not improving within the next week.  When to seek medical advice  Call your healthcare provider if any of these occur:    Facial pain or headache becoming more severe    Stiff neck    Unusual drowsiness or confusion    Swelling of the forehead or eyelids    Vision problems, including blurred or double vision    Fever of 100.4 F (38 C) or higher, or as directed by your healthcare provider    Seizure    Breathing problems    Symptoms not resolving within 10 days    6355-5875 [employer]. [address] [place] [address], [place] [address]. All rights reserved. This information is not intended as a substitute for professional medical care. Always follow your  "healthcare professional's instructions.              Follow-ups after your visit        Who to contact     If you have questions or need follow up information about today's clinic visit or your schedule please contact [facility] directly at [phone].  Normal or non-critical lab and imaging results will be communicated to you by [email or web address], letter or phone within 4 business days after the clinic has received the results. If you do not hear from us within 7 days, please contact the clinic through [email or web address] or phone. If you have a critical or abnormal lab result, we will notify you by phone as soon as possible.  Submit refill requests through [employer] or call your pharmacy and they will forward the refill request to us. Please allow 3 business days for your refill to be completed.          Additional Information About Your Visit        [email or web address] Information     [employer] lets you send messages to your doctor, view your test results, renew your prescriptions, schedule appointments and more. To sign up, go to [email or web address] . Click on \"Log in\" on the left side of the screen, which will take you to the Welcome page. Then click on \"Sign up Now\" on the right side of the page.     You will be asked to enter the access code listed below, as well as some personal information. Please follow the directions to create your username and password.     Your access code is: [email or web address]  Expires: 2017 11:05 AM     Your access code will  in 90 days. If you need help or a new code, please call your [facility] or [phone].        Care EveryWhere ID     This is your Care EveryWhere ID. This could be used by other organizations to access your [facility] medical records  [id number]        Your Vitals Were     Pulse Temperature Height Pulse Oximetry Breastfeeding? BMI (Body Mass Index)    105 97.1  F (36.2  C) (Oral) 4' 6.75\" (1.391 m) 99% No 22.99 kg/m2       Blood Pressure from Last 3 Encounters: "   04/04/17 138/56   03/24/17 138/67   03/07/17 120/70    Weight from Last 3 Encounters:   04/04/17 98 lb (44.5 kg)   03/24/17 98 lb (44.5 kg)   03/07/17 102 lb (46.3 kg)              Today, you had the following     No orders found for display         Today's Medication Changes          These changes are accurate as of: 4/4/17  2:35 PM.  If you have any questions, ask your nurse or doctor.               Start taking these medicines.        Dose/Directions    doxycycline Monohydrate 100 MG Tabs   Used for:  Acute sinusitis with symptoms > 10 days   Started by:  Suzie Sultana PA-C        Dose:  100 mg   Take 100 mg by mouth 2 times daily for 14 days   Quantity:  28 tablet   Refills:  0         These medicines have changed or have updated prescriptions.        Dose/Directions    * cetirizine 10 MG tablet   Commonly known as:  ZYRTEC   This may have changed:  Another medication with the same name was added. Make sure you understand how and when to take each.   Used for:  Seasonal allergies   Changed by:  Shadi Cotton MD        Dose:  10 mg   Take 1 tablet (10 mg) by mouth daily   Quantity:  90 tablet   Refills:  3       * cetirizine 10 MG tablet   Commonly known as:  zyrTEC   This may have changed:  You were already taking a medication with the same name, and this prescription was added. Make sure you understand how and when to take each.   Used for:  Urticaria   Changed by:  Suzie Sultana PA-C        Dose:  10 mg   Take 1 tablet (10 mg) by mouth daily   Quantity:  30 tablet   Refills:  1       * ZANTAC PO   This may have changed:  Another medication with the same name was added. Make sure you understand how and when to take each.   Changed by:  Suzie Sultana PA-C        Dose:  150 mg   Take 150 mg by mouth   Refills:  0       * ranitidine 300 MG tablet   Commonly known as:  ZANTAC   This may have changed:  You were already taking a medication with the same name, and this  prescription was added. Make sure you understand how and when to take each.   Used for:  Gastroesophageal reflux disease without esophagitis   Changed by:  Suzie Sultana PA-C        Dose:  300 mg   Take 1 tablet (300 mg) by mouth At Bedtime   Quantity:  30 tablet   Refills:  11       * Notice:  This list has 4 medication(s) that are the same as other medications prescribed for you. Read the directions carefully, and ask your doctor or other care provider to review them with you.         Where to get your medicines      These medications were sent to [id number] - [facility] [address] [facility]  [address], [facility] [address]    Hours:  24-hours Phone:  [phone]     cetirizine 10 MG tablet    doxycycline Monohydrate 100 MG Tabs    ranitidine 300 MG tablet                Primary Care Provider Office Phone # Fax #    Shadi Cotton [phone]       [facility] [place] [address]  [facility] [address]        Thank you!     Thank you for choosing [facility]  for your care. Our goal is always to provide you with excellent care. Hearing back from our patients is one way we can continue to improve our services. Please take a few minutes to complete the written survey that you may receive in the mail after your visit with us. Thank you!             Your Updated Medication List - Protect others around you: Learn how to safely use, store and throw away your medicines at www.disposemymeds.org.          This list is accurate as of: 4/4/17  2:35 PM.  Always use your most recent med list.                   Brand Name Dispense Instructions for use    alendronate 70 MG tablet    FOSAMAX    12 tablet    Take 1 tablet (70 mg) by mouth every 7 days       * ASPIRIN NOT PRESCRIBED    INTENTIONAL     Reported on 4/4/2017       calcium-vitamin D 600-400 MG-UNIT per tablet    CALTRATE    100 tablet    Take 1  tablet by mouth 2 times daily       * cetirizine 10 MG tablet    ZYRTEC    90 tablet    Take 1 tablet (10 mg) by mouth daily       * cetirizine 10 MG tablet    zyrTEC    30 tablet    Take 1 tablet (10 mg) by mouth daily       cyanocobalamin 1000 MCG Tbcr    VITAMIN B-12 ER    30 tablet    Take 1,000 mcg by mouth daily       diclofenac 1 % Gel topical gel    VOLTAREN    100 g    Apply 4 grams to knees or 2 grams to hands four times daily using enclosed dosing card.       * diphenhydrAMINE 25 MG tablet    BENADRYL    60 tablet    Take 1-2 tablets (25-50 mg) by mouth nightly as needed for other (for eustachian tube dysfunction, for ear pain.)       * diphenhydrAMINE 25 MG capsule    BENADRYL     TK 1-2 CS PO EVERY NIGHT PRF EAR PAIN       doxycycline Monohydrate 100 MG Tabs     28 tablet    Take 100 mg by mouth 2 times daily for 14 days       ferrous sulfate 325 (65 FE) MG tablet    IRON    90 tablet    Take 1 tablet (325 mg) by mouth 3 times daily (with meals)       hypromellose 0.4 % Soln ophthalmic solution    ARTIFICIAL TEARS    1 Bottle    Place 1 drop into both eyes every hour as needed for dry eyes       methocarbamol 750 MG tablet    ROBAXIN    45 tablet    Take 1 tablet (750 mg) by mouth 3 times daily as needed for muscle spasms       naproxen 500 MG tablet    NAPROSYN    60 tablet    TAKE 1 TABLET BY MOUTH TWICE DAILY WITH FOOD AS NEEDED FOR PAIN       olopatadine 0.1 % ophthalmic solution    PATANOL    15 mL    INSTILL 1 DROP IN BOTH EYES TWICE DAILY       omeprazole 40 MG capsule    priLOSEC    90 capsule    TAKE ONE CAPSULE BY MOUTH ONCE DAILY       * order for DME     1 Container    Pull up briefs for incontinence of urine change 2 to 3 times daily       traMADol 50 MG tablet    ULTRAM    30 tablet    Take 1-2 tablets ( mg) by mouth every 6 hours as needed for pain maximum 4 tablet(s) per day       vitamin D 2000 UNITS tablet     100 tablet    Take 2,000 Units by mouth daily Start in 2 months, after  completing 8 weeks of weekly high dose vitamin D.       * ZANTAC PO      Take 150 mg by mouth       * ranitidine 300 MG tablet    ZANTAC    30 tablet    Take 1 tablet (300 mg) by mouth At Bedtime       * Notice:  This list has 8 medication(s) that are the same as other medications prescribed for you. Read the directions carefully, and ask your doctor or other care provider to review them with you.

## 2017-04-04 NOTE — PATIENT INSTRUCTIONS
Doxycyline 100 mg twice a day for 14 days  Increase fluids  Nasal wash  Mucinex DM for cough and mucous 1 tablet twice a day   Follow up if not better after the antibiotic course.     Zyrtec 10 mg daily for eye swelling    Zantac 300 mg at bedtime  for acidity  Sinusitis (Antibiotic Treatment)    The sinuses are air-filled spaces within the bones of the face. They connect to the inside of the nose. Sinusitis is an inflammation of the tissue lining the sinus cavity. Sinus inflammation can occur during a cold. It can also be due to allergies to pollens and other particles in the air. Sinusitis can cause symptoms of sinus congestion and fullness. A sinus infection causes fever, headache and facial pain. There is often green or yellow drainage from the nose or into the back of the throat (post-nasal drip). You have been given antibiotics to treat this condition.  Home care:    Take the full course of antibiotics as instructed. Do not stop taking them, even if you feel better.    Drink plenty of water, hot tea, and other liquids. This may help thin mucus. It also may promote sinus drainage.    Heat may help soothe painful areas of the face. Use a towel soaked in hot water. Or,  the shower and direct the hot spray onto your face. Using a vaporizer along with a menthol rub at night may also help.     An expectorant containing guaifenesin may help thin the mucus and promote drainage from the sinuses.    Over-the-counter decongestants may be used unless a similar medicine was prescribed. Nasal sprays work the fastest. Use one that contains phenylephrine or oxymetazoline. First blow the nose gently. Then use the spray. Do not use these medicines more often than directed on the label or symptoms may get worse. You may also use tablets containing pseudoephedrine. Avoid products that combine ingredients, because side effects may be increased. Read labels. You can also ask the pharmacist for help. (NOTE: Persons with high  blood pressure should not use decongestants. They can raise blood pressure.)    Over-the-counter antihistamines may help if allergies contributed to your sinusitis.      Do not use nasal rinses or irrigation during an acute sinus infection, unless told to by your health care provider. Rinsing may spread the infection to other sinuses.    Use acetaminophen or ibuprofen to control pain, unless another pain medicine was prescribed. (If you have chronic liver or kidney disease or ever had a stomach ulcer, talk with your doctor before using these medicines. Aspirin should never be used in anyone under 18 years of age who is ill with a fever. It may cause severe liver damage.)    Don't smoke. This can worsen symptoms.  Follow-up care  Follow up with your healthcare provider or our staff if you are not improving within the next week.  When to seek medical advice  Call your healthcare provider if any of these occur:    Facial pain or headache becoming more severe    Stiff neck    Unusual drowsiness or confusion    Swelling of the forehead or eyelids    Vision problems, including blurred or double vision    Fever of 100.4 F (38 C) or higher, or as directed by your healthcare provider    Seizure    Breathing problems    Symptoms not resolving within 10 days    [id number] [employer]. [address] [place] [address], [place] [address]. All rights reserved. This information is not intended as a substitute for professional medical care. Always follow your healthcare professional's instructions.

## 2017-04-04 NOTE — TELEPHONE ENCOUNTER
calling for wife stating that she is better but not completely better after being seen on 3/24/and 3/7. The patient is still coughing. She is coughing up white. She has a headache that it rating 5-6 out of ten. She is having swollen left side of her face. No redness but is painful. Then he stated she was having left chest pain. Asked further questions about this. Wife and  spoke it native language and he notes that wife is concerned about headache and facial swelling.    Originally advised to be seen in the ER for the chest pain. Review of chart notes a recent EKG has been done. Patient wants to been seen for the facial pain. Patient scheduled in clinic for assessment.     Offered to call for  and  declines.    Elba Ag RN, [facility] Triage

## 2017-04-05 ENCOUNTER — TELEPHONE (OUTPATIENT)
Dept: FAMILY MEDICINE | Facility: CLINIC | Age: 77
End: 2017-04-05

## 2017-04-05 DIAGNOSIS — K21.9 GASTROESOPHAGEAL REFLUX DISEASE WITHOUT ESOPHAGITIS: ICD-10-CM

## 2017-04-05 NOTE — TELEPHONE ENCOUNTER
Last Rx 4/4/17 for #30 with 11 refills.  Resent Rx for 90 day supply with 3 refills per patient request.     Edgardo Woods RN

## 2017-04-05 NOTE — TELEPHONE ENCOUNTER
Patient is requesting 90 days supply for ranitidine (ZANTAC) 300 MG tablet.          Lazaro Faarax  [facility]

## 2017-05-09 ENCOUNTER — RADIANT APPOINTMENT (OUTPATIENT)
Dept: GENERAL RADIOLOGY | Facility: CLINIC | Age: 77
End: 2017-05-09
Attending: FAMILY MEDICINE
Payer: MEDICARE

## 2017-05-09 ENCOUNTER — OFFICE VISIT (OUTPATIENT)
Dept: FAMILY MEDICINE | Facility: CLINIC | Age: 77
End: 2017-05-09
Payer: MEDICARE

## 2017-05-09 VITALS
HEART RATE: 92 BPM | TEMPERATURE: 97.3 F | SYSTOLIC BLOOD PRESSURE: 111 MMHG | DIASTOLIC BLOOD PRESSURE: 52 MMHG | OXYGEN SATURATION: 99 % | BODY MASS INDEX: 23.37 KG/M2 | WEIGHT: 101 LBS | HEIGHT: 55 IN

## 2017-05-09 DIAGNOSIS — M79.652 PAIN OF LEFT THIGH: ICD-10-CM

## 2017-05-09 DIAGNOSIS — M25.561 ACUTE PAIN OF BOTH KNEES: ICD-10-CM

## 2017-05-09 DIAGNOSIS — M25.562 ACUTE PAIN OF BOTH KNEES: ICD-10-CM

## 2017-05-09 DIAGNOSIS — M79.89 SWELLING OF LOWER EXTREMITY: ICD-10-CM

## 2017-05-09 DIAGNOSIS — M79.652 PAIN OF LEFT THIGH: Primary | ICD-10-CM

## 2017-05-09 PROCEDURE — 73565 X-RAY EXAM OF KNEES: CPT

## 2017-05-09 PROCEDURE — 73552 X-RAY EXAM OF FEMUR 2/>: CPT | Mod: LT

## 2017-05-09 PROCEDURE — 99214 OFFICE O/P EST MOD 30 MIN: CPT | Performed by: FAMILY MEDICINE

## 2017-05-09 PROCEDURE — 72100 X-RAY EXAM L-S SPINE 2/3 VWS: CPT

## 2017-05-09 RX ORDER — FUROSEMIDE 20 MG
20 TABLET ORAL DAILY PRN
Qty: 60 TABLET | Refills: 1 | Status: SHIPPED | OUTPATIENT
Start: 2017-05-09 | End: 2018-03-21

## 2017-05-09 RX ORDER — FUROSEMIDE 20 MG
TABLET ORAL
Qty: 90 TABLET | Refills: 1 | OUTPATIENT
Start: 2017-05-09

## 2017-05-09 ASSESSMENT — PAIN SCALES - GENERAL: PAINLEVEL: MODERATE PAIN (5)

## 2017-05-09 NOTE — PATIENT INSTRUCTIONS
================================================================================  Normal Values   Blood pressure  <140/90 for most adults    <130/80 for some chronic diseases (ask your care team about yours)    BMI (body mass index)  18.5-25 kg/m2 (based on height and weight)     Thank you for visiting [facility]    Normal or non-critical lab and imaging results will be communicated to you by MyChart, letter or phone within 7 days.  If you do not hear from us within 10 days, please call the clinic. If you have a critical or abnormal lab result, we will notify you by phone as soon as possible.     If you have any questions regarding your visit please contact:     Team Comfort:   Clinic Hours Telephone Number   Dr. Mic Burns 7am-5pm  Monday - Friday [phone]  Sharon Schuler RN   Pharmacy 8:00am-8pm Monday-Friday    9am-5pm Saturday-Sunday [phone]   Urgent Care 11am-9pm Monday-Friday        9am-5pm Saturday-Sunday [phone]     After hours, weekend or if you need to make an appointment with your primary provider please call [phone].   After Hours nurse advise: call [facility]: [phone]    Medication Refills:  Call your pharmacy and they will forward the refill to us. Please allow 3 business days for your refills to be completed.

## 2017-05-09 NOTE — PROGRESS NOTES
"  SUBJECTIVE:                                                    Kerry Vega is a 77 year old female who presents to clinic today for the following health issues:  She is accompanied by her .     Joint Pain     Onset: 5/6/17    Description:   Location: Left anterior thigh, bilateral knees (worse medially than laterally)  Character: Sharp    Intensity: 5/10    Progression of Symptoms: worse    Accompanying Signs & Symptoms:  -Radiation of pain to lower left leg  -Lower extremity swelling  -Patient notes a little bit of back pain which is new  - reports patient was unable to walk one morning. He notes she took codeine which alleviated the pain enough for her to walk again.      History:   Previous similar pain: no  Previous XR done: YES       Precipitating factors:   Trauma or overuse: no     Alleviating factors:  Improved by: nothing       Therapies Tried and outcome: codeine - helps      Past medical, family, and social histories, medications, and allergies are reviewed and updated in Epic.     ROS:  C: NEGATIVE for fever, chills, change in weight  E/M: NEGATIVE for ear, mouth and throat problems  R: NEGATIVE for significant cough or SOB  CV: NEGATIVE for chest pain, palpitations or peripheral edema  ROS otherwise negative      Any history above obtained by the Medical Assistant was reviewed by Dr. Mic Tai MD, and edited when necessary.    This document serves as a record of the services and decisions personally performed and made by Dr. Tai. It was created on his behalf by Dolly Man, a trained medical scribe. The creation of this document is based on the provider's statements to the medical scribe.  Dolly Man May 9, 2017 10:57 AM   OBJECTIVE:                                                    /52 (BP Location: Left arm, Patient Position: Chair, Cuff Size: Adult Regular)  Pulse 92  Temp 97.3  F (36.3  C) (Oral)  Ht 4' 6.75\" (1.391 m)  Wt 101 lb (45.8 kg)  SpO2 " 99%  BMI 23.69 kg/m2  Body mass index is 23.69 kg/(m^2).  GENERAL: healthy, alert and no distress  EYES: Eyes grossly normal to inspection, PERRL and conjunctivae and sclerae normal  MS: no gross musculoskeletal defects noted, trace pretibial edema bilaterally.   SKIN: no suspicious lesions or rashes  NEURO: Normal strength and tone, mentation intact and speech normal, cranial nerves 2-12 intact   PSYCH: mentation appears normal, affect normal/bright       A left femur X-Ray was ordered. My reading of this film is negative, including left hip joint. (No comparison films available: pending review by Radiologist.)     A bilateral knee X-Ray was ordered. My reading of this film is medial joint line narrowing bilaterally, unchanged from previous. (Comparison films available: pending review by Radiologist.)     A lumbar X-Ray was ordered. My reading of this film is nonspecific degenerative changes. Kyphosis and scoliosis are more pronounced compared to 6 years ago. (Comparison films available: pending review by Radiologist.)      ASSESSMENT/PLAN:                                                    (M79.652) Pain of left thigh  (primary encounter diagnosis)  Comment: normal x-ray, and not enough DJD in the hip to suspect referred pain.  Plan: XR Lumbar Spine 2/3 Views, XR Femur Left 2         Views            (M25.561,  M25.562) Acute pain of both knees  Comment: with medial DJD. Perhaps she would benefit from cortisone injections.   Plan: XR Knee AP Standing Bilateral, ORTHO          REFERRAL, [facility]         REFERRAL            (M79.89) Swelling of lower extremity  Comment: I suspect dependent edema from her knee problem. Perhaps the diuretic will be helpful. I'll have her try this for a little while.   Plan: furosemide (LASIX) 20 MG tablet               The information in this document, created by the medical scribe for me, accurately reflects the services I personally performed and the  decisions made by me. I have reviewed and approved this document for accuracy prior to leaving the patient care area.    Mic aTi MD

## 2017-05-09 NOTE — NURSING NOTE
"Chief Complaint   Patient presents with     Pain     Left hip       Initial /52 (BP Location: Left arm, Patient Position: Chair, Cuff Size: Adult Regular)  Pulse 92  Temp 97.3  F (36.3  C) (Oral)  Ht 4' 6.75\" (1.391 m)  Wt 101 lb (45.8 kg)  SpO2 99%  BMI 23.69 kg/m2 Estimated body mass index is 23.69 kg/(m^2) as calculated from the following:    Height as of this encounter: 4' 6.75\" (1.391 m).    Weight as of this encounter: 101 lb (45.8 kg).  Medication Reconciliation: complete   SACHIN Crespo MA      "

## 2017-05-09 NOTE — TELEPHONE ENCOUNTER
This was sent today for #60 with 1 refill.    Patient is requesting this be filled as a 90 day script.    Please send new Rx if appropriate.

## 2017-05-09 NOTE — TELEPHONE ENCOUNTER
Declined. I do not expect this to be a chronic problem (I don't expect her to use more than 30 tabs, but if she does, she can discuss refilling chronically with her PMD).

## 2017-05-09 NOTE — MR AVS SNAPSHOT
After Visit Summary   5/9/2017    Kerry Vega    MRN: [id number]           Patient Information     Date Of Birth          1940        Visit Information        Provider Department      5/9/2017 10:40 AM Mic Tai MD [facility]        Today's Diagnoses     Pain of left thigh    -  1    Acute pain of both knees        Swelling of lower extremity          Care Instructions        ================================================================================  Normal Values   Blood pressure  <140/90 for most adults    <130/80 for some chronic diseases (ask your care team about yours)    BMI (body mass index)  18.5-25 kg/m2 (based on height and weight)     Thank you for visiting [facility]    Normal or non-critical lab and imaging results will be communicated to you by MyChart, letter or phone within 7 days.  If you do not hear from us within 10 days, please call the clinic. If you have a critical or abnormal lab result, we will notify you by phone as soon as possible.     If you have any questions regarding your visit please contact:     Team Comfort:   Clinic Hours Telephone Number   Dr. Mic Burns 7am-5pm  Monday - Friday [phone]  Sharon Schuler RN   Pharmacy 8:00am-8pm Monday-Friday    9am-5pm Saturday-Sunday [phone]   Urgent Care 11am-9pm Monday-Friday        9am-5pm Saturday-Sunday [phone]     After hours, weekend or if you need to make an appointment with your primary provider please call [phone].   After Hours nurse advise: call [facility]: [phone]    Medication Refills:  Call your pharmacy and they will forward the refill to us. Please allow 3 business days for your refills to be completed.                Follow-ups after your visit        Additional Services     [facility] PT, HAND, AND CHIROPRACTIC REFERRAL       **This  order will print in the [facility] Scheduling Office**    Physical Therapy, Hand Therapy and Chiropractic Care are available through:    *[facility]  *[facility]  *[facility]    Call one number to schedule at any of the above locations: [phone].    Your provider has referred you to: Physical Therapy at [facility] or [facility]    Indication/Reason for Referral: Knee Pain, low back pain, thigh pain  Onset of Illness: 5/6/17  Therapy Orders: Evaluate and Treat  Special Programs: None  Special Request: Equipment: As Indicated, Modalities: As Indicated.    Additional Comments for the Therapist or Chiropractor: Ho patient    Please be aware that coverage of these services is subject to the terms and limitations of your health insurance plan.  Call member services at your health plan with any benefit or coverage questions.      Please bring the following to your appointment:    *Your personal calendar for scheduling future appointments  *Comfortable clothing            ORTHO  REFERRAL       [facility] is referring you to the Orthopedic  Services at [facility].       The  Representative will assist you in the coordination of your Orthopedic and Musculoskeletal Care as prescribed by your physician.    The  Representative will call you within 1 business day to help schedule your appointment, or you may contact the  Representative at:    All areas ~ [phone]     Type of Referral : Surgical / Specialist       Timeframe requested: Routine    Coverage of these services is subject to the terms and limitations of your health insurance plan.  Please call member services at your health plan with any benefit or coverage questions.      If X-rays, CT or MRI's have been performed, please contact the facility where they were done to arrange for , prior to your scheduled appointment.  Please bring  "this referral request to your appointment and present it to your specialist.                  Who to contact     If you have questions or need follow up information about today's clinic visit or your schedule please contact [facility] [place] directly at [phone].  Normal or non-critical lab and imaging results will be communicated to you by Dattohart, letter or phone within 4 business days after the clinic has received the results. If you do not hear from us within 7 days, please contact the clinic through Dattohart or phone. If you have a critical or abnormal lab result, we will notify you by phone as soon as possible.  Submit refill requests through [employer] or call your pharmacy and they will forward the refill request to us. Please allow 3 business days for your refill to be completed.          Additional Information About Your Visit        [employer] Information     [employer] lets you send messages to your doctor, view your test results, renew your prescriptions, schedule appointments and more. To sign up, go to [email or web address] . Click on \"Log in\" on the left side of the screen, which will take you to the Welcome page. Then click on \"Sign up Now\" on the right side of the page.     You will be asked to enter the access code listed below, as well as some personal information. Please follow the directions to create your username and password.     Your access code is: [email or web address]  Expires: 2017 12:20 PM     Your access code will  in 90 days. If you need help or a new code, please call your [facility] or [phone].        Care EveryWhere ID     This is your Care EveryWhere ID. This could be used by other organizations to access your [facility] medical records  [id number]        Your Vitals Were     Pulse Temperature Height Pulse Oximetry BMI (Body Mass Index)       92 97.3  F (36.3  C) (Oral) 4' 6.75\" (1.391 m) 99% 23.69 kg/m2        Blood Pressure from Last 3 Encounters:   17 " 111/52   04/04/17 138/56   03/24/17 138/67    Weight from Last 3 Encounters:   05/09/17 101 lb (45.8 kg)   04/04/17 98 lb (44.5 kg)   03/24/17 98 lb (44.5 kg)              We Performed the Following     [facility] PT, HAND, AND CHIROPRACTIC REFERRAL     ORTHO  REFERRAL          Today's Medication Changes          These changes are accurate as of: 5/9/17 12:20 PM.  If you have any questions, ask your nurse or doctor.               Start taking these medicines.        Dose/Directions    furosemide 20 MG tablet   Commonly known as:  LASIX   Used for:  Swelling of lower extremity   Started by:  Mic Tai MD        Dose:  20 mg   Take 1 tablet (20 mg) by mouth daily as needed for leg swelling.   Quantity:  60 tablet   Refills:  1            Where to get your medicines      These medications were sent to [id number] - [place], [place] - [address] AT [address]  [address] [address]    Hours:  24-hours Phone:  [phone]     furosemide 20 MG tablet                Primary Care Provider Office Phone # Fax #    Shadi Cotton [phone]       [facility] [address]  [place] [address]        Thank you!     Thank you for choosing [facility]  for your care. Our goal is always to provide you with excellent care. Hearing back from our patients is one way we can continue to improve our services. Please take a few minutes to complete the written survey that you may receive in the mail after your visit with us. Thank you!             Your Updated Medication List - Protect others around you: Learn how to safely use, store and throw away your medicines at www.disposemymeds.org.          This list is accurate as of: 5/9/17 12:20 PM.  Always use your most recent med list.                   Brand Name Dispense Instructions for use    alendronate 70 MG tablet    FOSAMAX    12 tablet    Take 1 tablet (70  mg) by mouth every 7 days       * ASPIRIN NOT PRESCRIBED    INTENTIONAL     Reported on 4/4/2017       calcium-vitamin D 600-400 MG-UNIT per tablet    CALTRATE    100 tablet    Take 1 tablet by mouth 2 times daily       cetirizine 10 MG tablet    zyrTEC    30 tablet    Take 1 tablet (10 mg) by mouth daily       cyanocobalamin 1000 MCG Tbcr    VITAMIN B-12 ER    30 tablet    Take 1,000 mcg by mouth daily       diclofenac 1 % Gel topical gel    VOLTAREN    100 g    Apply 4 grams to knees or 2 grams to hands four times daily using enclosed dosing card.       diphenhydrAMINE 25 MG tablet    BENADRYL    60 tablet    Take 1-2 tablets (25-50 mg) by mouth nightly as needed for other (for eustachian tube dysfunction, for ear pain.)       ferrous sulfate 325 (65 FE) MG tablet    IRON    90 tablet    Take 1 tablet (325 mg) by mouth 3 times daily (with meals)       furosemide 20 MG tablet    LASIX    60 tablet    Take 1 tablet (20 mg) by mouth daily as needed for leg swelling.       hypromellose 0.4 % Soln ophthalmic solution    ARTIFICIAL TEARS    1 Bottle    Place 1 drop into both eyes every hour as needed for dry eyes       methocarbamol 750 MG tablet    ROBAXIN    45 tablet    Take 1 tablet (750 mg) by mouth 3 times daily as needed for muscle spasms       naproxen 500 MG tablet    NAPROSYN    60 tablet    TAKE 1 TABLET BY MOUTH TWICE DAILY WITH FOOD AS NEEDED FOR PAIN       olopatadine 0.1 % ophthalmic solution    PATANOL    15 mL    INSTILL 1 DROP IN BOTH EYES TWICE DAILY       omeprazole 40 MG capsule    priLOSEC    90 capsule    TAKE ONE CAPSULE BY MOUTH ONCE DAILY       * order for DME     1 Container    Pull up briefs for incontinence of urine change 2 to 3 times daily       ranitidine 300 MG tablet    ZANTAC    90 tablet    Take 1 tablet (300 mg) by mouth At Bedtime       traMADol 50 MG tablet    ULTRAM    30 tablet    Take 1-2 tablets ( mg) by mouth every 6 hours as needed for pain maximum 4 tablet(s) per day        vitamin D 2000 UNITS tablet     100 tablet    Take 2,000 Units by mouth daily Start in 2 months, after completing 8 weeks of weekly high dose vitamin D.       * Notice:  This list has 2 medication(s) that are the same as other medications prescribed for you. Read the directions carefully, and ask your doctor or other care provider to review them with you.

## 2017-05-11 NOTE — TELEPHONE ENCOUNTER
Call patient and spoke with son.  Informed of Dr. Tai message below.  Stated understood and will let patient know.    Amarjit Uribe MA

## 2017-05-17 ENCOUNTER — OFFICE VISIT (OUTPATIENT)
Dept: ORTHOPEDICS | Facility: CLINIC | Age: 77
End: 2017-05-17
Payer: MEDICARE

## 2017-05-17 ENCOUNTER — RADIANT APPOINTMENT (OUTPATIENT)
Dept: GENERAL RADIOLOGY | Facility: CLINIC | Age: 77
End: 2017-05-17
Attending: ORTHOPAEDIC SURGERY
Payer: MEDICARE

## 2017-05-17 VITALS — WEIGHT: 98.2 LBS | HEIGHT: 55 IN | RESPIRATION RATE: 16 BRPM | BODY MASS INDEX: 22.72 KG/M2

## 2017-05-17 DIAGNOSIS — M25.562 ACUTE PAIN OF BOTH KNEES: ICD-10-CM

## 2017-05-17 DIAGNOSIS — M25.561 CHRONIC PAIN OF BOTH KNEES: Primary | ICD-10-CM

## 2017-05-17 DIAGNOSIS — M25.562 CHRONIC PAIN OF BOTH KNEES: Primary | ICD-10-CM

## 2017-05-17 DIAGNOSIS — G89.29 CHRONIC PAIN OF BOTH KNEES: Primary | ICD-10-CM

## 2017-05-17 DIAGNOSIS — M25.561 ACUTE PAIN OF BOTH KNEES: ICD-10-CM

## 2017-05-17 DIAGNOSIS — M17.0 PRIMARY OSTEOARTHRITIS OF BOTH KNEES: ICD-10-CM

## 2017-05-17 PROCEDURE — 73560 X-RAY EXAM OF KNEE 1 OR 2: CPT | Mod: RT

## 2017-05-17 PROCEDURE — 99213 OFFICE O/P EST LOW 20 MIN: CPT | Mod: 25 | Performed by: ORTHOPAEDIC SURGERY

## 2017-05-17 PROCEDURE — 20610 DRAIN/INJ JOINT/BURSA W/O US: CPT | Mod: 50 | Performed by: ORTHOPAEDIC SURGERY

## 2017-05-17 RX ORDER — METHYLPREDNISOLONE ACETATE 80 MG/ML
160 INJECTION, SUSPENSION INTRA-ARTICULAR; INTRALESIONAL; INTRAMUSCULAR; SOFT TISSUE ONCE
Qty: 2 ML | Refills: 0 | OUTPATIENT
Start: 2017-05-17 | End: 2017-05-17

## 2017-05-17 ASSESSMENT — PAIN SCALES - GENERAL: PAINLEVEL: MODERATE PAIN (5)

## 2017-05-17 NOTE — NURSING NOTE
"Chief Complaint   Patient presents with     Knee Pain     Bilateral knee pain. Onset: 4-5 years. Pain comes and goes. Recently it started about 5/6/17. Pain is over entire knee. She has been getting cortisone injections every 5-6 months. Possible last injections in 11/27/15 with Kenalog. She would like more injections today.        Initial Resp 16  Ht 1.391 m (4' 6.75\")  Wt 44.5 kg (98 lb 3.2 oz)  BMI 23.03 kg/m2 Estimated body mass index is 23.03 kg/(m^2) as calculated from the following:    Height as of this encounter: 1.391 m (4' 6.75\").    Weight as of this encounter: 44.5 kg (98 lb 3.2 oz).  Medication Reconciliation: dick Merchant Certified Medical Assistant    "

## 2017-05-17 NOTE — PROGRESS NOTES
The patient's Bilateral knee was prepped with betadine solution after verification of allergies. Area approximately 10 cm x 10 cm prepped in a sterile fashion. After injection, betadine removed with soap and water and band-aids applied.    4cc Lidocaine 1%  NDC 31914-252-75, LOT [id number],  10/20  3cc Bupivacaine 0.25% NDC 55150-167-10, LOT [id number],  2018  1cc Depo Medrol 80 mg/ml NDC 2894-8423-56, LOT [id number],  2019 injected into patient's Bilateral Knee by:  HUGO DhaliwalC  Supervising physician: Dom Najera MD  Dept. of Orthopedics  [facility]

## 2017-05-17 NOTE — PATIENT INSTRUCTIONS
Please remember to call and schedule a follow up appointment if one was recommended at your earliest convenience.  Orthopedics CLINIC HOURS TELEPHONE NUMBER   Dr. Sha Tony  Certified Medical Assistant   Monday & Wednesday   8am - 5pm  Thursday 1pm - 5pm  Friday 8am -11:30am Specialty schedulers:   [phone] to schedule your surgery.  Main Clinic:   [phone] to make an appointment with any provider.    Urgent Care locations:    [facility] Monday-Friday Closed  Saturday-Sunday 9am-5pm      Monday-Friday 12pm - 8pm  Saturday-Sunday 9am-5pm [phone]     If SURGERY has been recommended, please call our Specialty Schedulers at [phone] to schedule your procedure.    If you need a medication refill, please contact your pharmacy. Please allow 3 business days for your refill to be completed.    If an MRI or CT scan has been recommended, please call [facility]ulers at [phone] to schedule your appointment.  Use NetManage (secure e-mail communication and access to your chart) to send a message or to make an appointment. Please ask how you can sign up for NetManage.  Your care team's suggested websites for health information:   [email or web address] : Up to date and easily searchable information on multiple topics.   [email or web address] : [place] dept of heat, public health issues in [place], N1N1

## 2017-05-17 NOTE — PROGRESS NOTES
CHIEF COMPLAINT:   Chief Complaint   Patient presents with     Knee Pain     Bilateral knee pain. Onset: 4-5 years. Pain comes and goes. Recently it started about 5/6/17. Pain is over entire knee. She has been getting cortisone injections every 5-6 months. Possible last injections in 11/27/15 with Kenalog. She would like more injections today.        HISTORY OF PRESENT ILLNESS    Kerry Vega is a 77 year old female seen for evaluation of ongoing bilateral knee pain (Left>Right) with no known injury. Pain has been present for 4-5 years. She notes that pain is moderate today, rated 5/10. Reports a burning sensation over the left knee intermittently. Pain is located diffusely over the entire knee. Since 5/6/2017, she states pain has gotten worse. Her last injection was on 11/27/2015 with Kenalog. She would like repeat injections today.    Present symptoms: pain medially  and anteriorly, pain sharp, dull/achy, moderate pain, no swelling, +catching/popping, no locking, no giving way.    Pain severity: 6/10  Pain quality: dull, aching and sharp  Frequency of symptoms: are constant  Exacerbating Factors: weight bearing, stairs, [id number]  Relieving Factors: rest, sitting, medications  Night Pain: No  Pain while at rest: No   Numbness or tingling: No   Patient has tried:     NSAIDS: Yes      Physical Therapy: No      Activity modification: Yes      Bracing: No      Injections: Yes , 2+ years ago with relief.     Ice: No      Other: pain medications.    Other PMH:  has a past medical history of Anemia and Arthritis. She also has no past medical history of Amblyopia; Diabetes (H); Diabetic retinopathy (H); Glaucoma; Hypertension; Macular degeneration; Nonsenile cataract; Retinal detachment; Strabismus; or Uveitis.    Surgical Hx:  has a past surgical history that includes Esophagoscopy, gastroscopy, duodenoscopy (EGD), combined (1/20/2012); Esophagoscopy, gastroscopy, duodenoscopy (EGD), combined (7/18/2012);  Esophagoscopy, gastroscopy, duodenoscopy (EGD), combined (1/29/2013); and Colonoscopy (1/29/2013).    Medications:   Current Outpatient Prescriptions:      furosemide (LASIX) 20 MG tablet, Take 1 tablet (20 mg) by mouth daily as needed for leg swelling., Disp: 60 tablet, Rfl: 1     ranitidine (ZANTAC) 300 MG tablet, Take 1 tablet (300 mg) by mouth At Bedtime, Disp: 90 tablet, Rfl: 3     cetirizine (ZYRTEC) 10 MG tablet, Take 1 tablet (10 mg) by mouth daily, Disp: 30 tablet, Rfl: 1     methocarbamol (ROBAXIN) 750 MG tablet, Take 1 tablet (750 mg) by mouth 3 times daily as needed for muscle spasms, Disp: 45 tablet, Rfl: 1     traMADol (ULTRAM) 50 MG tablet, Take 1-2 tablets ( mg) by mouth every 6 hours as needed for pain maximum 4 tablet(s) per day, Disp: 30 tablet, Rfl: 0     diphenhydrAMINE (BENADRYL) 25 MG tablet, Take 1-2 tablets (25-50 mg) by mouth nightly as needed for other (for eustachian tube dysfunction, for ear pain.), Disp: 60 tablet, Rfl: 1     naproxen (NAPROSYN) 500 MG tablet, TAKE 1 TABLET BY MOUTH TWICE DAILY WITH FOOD AS NEEDED FOR PAIN, Disp: 60 tablet, Rfl: 3     omeprazole (PRILOSEC) 40 MG capsule, TAKE ONE CAPSULE BY MOUTH ONCE DAILY, Disp: 90 capsule, Rfl: 3     alendronate (FOSAMAX) 70 MG tablet, Take 1 tablet (70 mg) by mouth every 7 days, Disp: 12 tablet, Rfl: 3     olopatadine (PATANOL) 0.1 % ophthalmic solution, INSTILL 1 DROP IN BOTH EYES TWICE DAILY, Disp: 15 mL, Rfl: 0     hypromellose (ARTIFICIAL TEARS) 0.4 % SOLN, Place 1 drop into both eyes every hour as needed for dry eyes, Disp: 1 Bottle, Rfl: 2     ferrous sulfate (IRON) 325 (65 FE) MG tablet, Take 1 tablet (325 mg) by mouth 3 times daily (with meals), Disp: 90 tablet, Rfl: 3     diclofenac (VOLTAREN) 1 % GEL, Apply 4 grams to knees or 2 grams to hands four times daily using enclosed dosing card., Disp: 100 g, Rfl: 1     Cholecalciferol (VITAMIN D) 2000 UNITS tablet, Take 2,000 Units by mouth daily Start in 2 months, after  "completing 8 weeks of weekly high dose vitamin D., Disp: 100 tablet, Rfl: 3     calcium-vitamin D (CALTRATE) 600-400 MG-UNIT per tablet, Take 1 tablet by mouth 2 times daily, Disp: 100 tablet, Rfl: 3     cyanocobalamin 1000 MCG TBCR, Take 1,000 mcg by mouth daily, Disp: 30 tablet, Rfl: 2     ORDER FOR DME, Pull up briefs for incontinence of urine change 2 to 3 times daily, Disp: 1 Container, Rfl: 11     ASPIRIN NOT PRESCRIBED (INTENTIONAL), Reported on 4/4/2017, Disp: , Rfl:     Allergies:   No Known Allergies    Social Hx: .   reports that she has never smoked. She has never used smokeless tobacco. She reports that she does not drink alcohol or use illicit drugs.    Family Hx: family history is negative for CANCER, DIABETES, Hypertension, CEREBROVASCULAR DISEASE, Thyroid Disease, Glaucoma, and Macular Degeneration.     This document serves as a record of the services and decisions personally performed and made by Navid Dalton MD. It was created on his behalf by Paris Brito, a trained medical scribe. The creation of this document is based the provider's statements to the medical scribe.    Scribe Paris Brito 4:14 PM 5/17/2017     REVIEW OF SYSTEMS:   CONSTITUTIONAL:NEGATIVE for fever, chills, change in weight  INTEGUMENTARY/SKIN: NEGATIVE for worrisome rashes, moles or lesions  MUSCULOSKELETAL:See HPI above  NEURO: NEGATIVE for weakness, dizziness or paresthesias    PHYSICAL EXAM:  Resp 16  Ht 1.391 m (4' 6.75\")  Wt 44.5 kg (98 lb 3.2 oz)  BMI 23.03 kg/m2   GENERAL APPEARANCE: elderly, frail appearing, alert, no distress  SKIN: no suspicious lesions or rashes  NEURO: Normal strength and tone, mentation intact and speech normal  PSYCH:  mentation appears normal and affect normal, not anxious  RESPIRATORY: No increased work of breathing.    BILATERAL LOWER EXTREMITIES:  Gait: Trendelenburg bilateral. Hunched.  Alignment: varus  No gross deformities or masses.  Diffuse bilateral Quad atrophy, strength " weak.  Intact sensation deep peroneal nerve, superficial peroneal nerve, med/lat tibial nerve, sural nerve, saphenous nerve  Intact EHL, EDL, TA, FHL, GS, quadriceps hamstrings and hip flexors  Toes warm and well perfused, brisk capillary refill. Palpable 2+ dp pulses.  Bilateral calf soft and nttp or squeeze.  Edema: none      LEFT KNEE EXAM:    Skin: intact, no ecchymosis or erythema  ROM: 0 extension to 120 flexion  Tight hamstrings on straight leg raise.  Effusion: none  Tender: tender to palpation medial and anteromedial knee; nontender to palpation lateral and posterior knee.     MCL: stable, and non-painful at both 0 and 30 degrees knee flexion  Varus stress: stable, and non-painful at both 0 and 30 degrees knee flexion  Lachmans: neg, firm endpoint  Posterior Drawer stable  Patellofemoral joint:                Apprehension: negative              Crepitations: moderate   Grind: positive     RIGHT KNEE EXAM:    Skin: intact, no ecchymosis or erythema  ROM: 0 extension to 125 flexion with crepitation  Tight hamstrings on straight leg raise.  Effusion: none  Tender: tender to palpation medial joint line, NTTP lat joint line, anterior or posterior knee    MCL: stable, and non-painful at both 0 and 30 degrees knee flexion  Varus stress: stable, and non-painful at both 0 and 30 degrees knee flexion  Lachmans: neg, firm endpoint  Posterior Drawer stable  Patellofemoral joint:                Apprehension: negative              Crepitations: moderate   Grind: positive     X-RAY:  3 views bilateral knee from 5/17/2017 were reviewed in clinic today. On my review, no obvious fractures or dislocations.severe bilateral tricompartmental degenerative changes with  Bone on bone complete loss of medial space, large marginal osteophytes anterior, posterior, medial and lateral. Flattening of the articular surfaces, subchondral sclerosis.      Impression:   77 year old female with bilateral knee pain, advanced bone on bone  primary osteoarthritis.    Plan:    * reviewed imaging studies with patient, . Advanced, end-stage osteoarthritis.    Discussed with patient typical symptoms of arthritic pain is pain aggravated with weight bearing activities, stiffness, relieved by sitting or rest. Swelling may be associated. It is common to have some grinding and popping in the knee with arthritis.    Discussed various treatments for degenerative arthrosis of the knee, including nonoperative and operative approaches. Nonoperative approaches, which are exhausted prior to operative treatment, include doing nothing and living with the pain as patient has been doing, activity modification (avoid aggravating activities), physical therapy and strengthening exercises, weight loss, anti-inflammatory medications, bracing, and injections. Once these have been tried and are deemed unsuccessful with a good effort, and the patient is an appropriate candidate, the next treatment would be knee arthroplasty. Depending on the location of the arthritis, knee replacement can be partial (one side of the knee affected by arthritis) or a total knee arthroplasty (all 3 compartments). The risks, perceived benefits and perioperative rehabilitation expectations of knee arthroplasty were discussed in detail. Also discussed that approximately 10% of patients that undergo knee arthroplasty are not happy following surgery and may have worse pain or no improvement in pain, contrary to their preoperative expectations.    At this time, patient does not have interest in surgery, so nonoperative treatment will be continued.    Treatment:  * rest  * Activity modification - avoid impact activities or activities that aggravate symptoms.  * NSAIDS - regular use for inflammation ( twice daily or three times daily), with food, as long as no contra-indications Please discuss with pcp if needed  * ice, 15-20 minutes at a time several times a day or as needed.  * Strengthening of  quadriceps muscles  * Physical Therapy ordered for strengthening, stretching and range of motion exercises  * Tylenol as needed for pain  * Weight loss: Weight loss: The patient's Body mass index is 23.03 kg/(m^2).. Discussed with patient weight loss benefits, not only for the current pain symptoms, but also overall health. Recommend a good diet plan that works for the patient, with the assistance of a dietician or PCP as needed. Also, a good, low-impact exercise program for at least 20 minutes per day, 3 times per week, such as exercise bike, elliptical , or pool.  * Exercise: low impact such as stationary bike, elliptical, pool.  * Injections: risks and perceived benefits of cortisone versus viscosupplementation injections discussed. Patient elected to proceed. See procedure note below for bilateral knee cortisone injections.  * Bracing: bracing the knee may offer some relief of symptoms when worn  *  Consider visco injections in future as needed.  * Return to clinic as needed.    PROCEDURE NOTE:  The risks, perceived benefits and potential complications (including but not limited to, bleeding, infection, pain, scar, damage to adjacent structures, atrophy or necrosis of soft tissue, skin blanching, failure to relieve symptoms, allergic reaction) of injection were discussed with the patient. Questions were addressed and answered.The patient elected to proceed. Written informed consent was obtained. The correct procedural site was identified and confirmed. A Right Knee intraarticular injection was performed using 1mL Depo Medrol 80mg per mL and 7mL (4mL 1% lidocaine, 3mL 0.25% marcaine)  of local anesthetic after sterile prep, to the correct procedural site. Sterile bandaid applied. This was tolerated well by the patient. No apparent complications.  Did also discuss that if diabetic, recommend close monitoring of blood sugars over the next week as cortisone injections can temporarily elevate blood  sugars.    The risks, perceived benefits and potential complications (including but not limited to, bleeding, infection, pain, scar, damage to adjacent structures, atrophy or necrosis of soft tissue, skin blanching, failure to relieve symptoms, allergic reaction) of injection were discussed with the patient. Questions were addressed and answered.The patient elected to proceed. Written informed consent was obtained. The correct procedural site was identified and confirmed. A Left Knee intraarticular injection was performed using 1mL Depo Medrol 80mg per mL and 7mL (4mL 1% lidocaine, 3mL 0.25% marcaine)  of local anesthetic after sterile prep, to the correct procedural site. Sterile bandaid applied. This was tolerated well by the patient. No apparent complications.  Did also discuss that if diabetic, recommend close monitoring of blood sugars over the next week as cortisone injections can temporarily elevate blood sugars.    The information in this document, created by a scribe for me, accurately reflects the services I personally performed and the decisions made by me. I have reviewed and approved this document for accuracy.      Navid Dalton M.D., M.S.  Dept. of Orthopaedic Surgery  [facility]

## 2017-05-17 NOTE — LETTER
5/17/2017      RE: Kerry Vega  [address]  [place] [facility] [address]       CHIEF COMPLAINT:   Chief Complaint   Patient presents with     Knee Pain     Bilateral knee pain. Onset: 4-5 years. Pain comes and goes. Recently it started about 5/6/17. Pain is over entire knee. She has been getting cortisone injections every 5-6 months. Possible last injections in 11/27/15 with Kenalog. She would like more injections today.        HISTORY OF PRESENT ILLNESS    Kerry Vega is a 77 year old female seen for evaluation of ongoing bilateral knee pain (Left>Right) with no known injury. Pain has been present for 4-5 years. She notes that pain is moderate today, rated 5/10. Reports a burning sensation over the left knee intermittently. Pain is located diffusely over the entire knee. Since 5/6/2017, she states pain has gotten worse. Her last injection was on 11/27/2015 with Kenalog. She would like repeat injections today.    Present symptoms: pain medially  and anteriorly, pain sharp, dull/achy, moderate pain, no swelling, +catching/popping, no locking, no giving way.    Pain severity: 6/10  Pain quality: dull, aching and sharp  Frequency of symptoms: are constant  Exacerbating Factors: weight bearing, stairs, [id number]  Relieving Factors: rest, sitting, medications  Night Pain: No  Pain while at rest: No   Numbness or tingling: No   Patient has tried:     NSAIDS: Yes      Physical Therapy: No      Activity modification: Yes      Bracing: No      Injections: Yes , 2+ years ago with relief.     Ice: No      Other: pain medications.    Other PMH:  has a past medical history of Anemia and Arthritis. She also has no past medical history of Amblyopia; Diabetes (H); Diabetic retinopathy (H); Glaucoma; Hypertension; Macular degeneration; Nonsenile cataract; Retinal detachment; Strabismus; or Uveitis.    Surgical Hx:  has a past surgical history that includes Esophagoscopy, gastroscopy, duodenoscopy (EGD),  combined (1/20/2012); Esophagoscopy, gastroscopy, duodenoscopy (EGD), combined (7/18/2012); Esophagoscopy, gastroscopy, duodenoscopy (EGD), combined (1/29/2013); and Colonoscopy (1/29/2013).    Medications:   Current Outpatient Prescriptions:      furosemide (LASIX) 20 MG tablet, Take 1 tablet (20 mg) by mouth daily as needed for leg swelling., Disp: 60 tablet, Rfl: 1     ranitidine (ZANTAC) 300 MG tablet, Take 1 tablet (300 mg) by mouth At Bedtime, Disp: 90 tablet, Rfl: 3     cetirizine (ZYRTEC) 10 MG tablet, Take 1 tablet (10 mg) by mouth daily, Disp: 30 tablet, Rfl: 1     methocarbamol (ROBAXIN) 750 MG tablet, Take 1 tablet (750 mg) by mouth 3 times daily as needed for muscle spasms, Disp: 45 tablet, Rfl: 1     traMADol (ULTRAM) 50 MG tablet, Take 1-2 tablets ( mg) by mouth every 6 hours as needed for pain maximum 4 tablet(s) per day, Disp: 30 tablet, Rfl: 0     diphenhydrAMINE (BENADRYL) 25 MG tablet, Take 1-2 tablets (25-50 mg) by mouth nightly as needed for other (for eustachian tube dysfunction, for ear pain.), Disp: 60 tablet, Rfl: 1     naproxen (NAPROSYN) 500 MG tablet, TAKE 1 TABLET BY MOUTH TWICE DAILY WITH FOOD AS NEEDED FOR PAIN, Disp: 60 tablet, Rfl: 3     omeprazole (PRILOSEC) 40 MG capsule, TAKE ONE CAPSULE BY MOUTH ONCE DAILY, Disp: 90 capsule, Rfl: 3     alendronate (FOSAMAX) 70 MG tablet, Take 1 tablet (70 mg) by mouth every 7 days, Disp: 12 tablet, Rfl: 3     olopatadine (PATANOL) 0.1 % ophthalmic solution, INSTILL 1 DROP IN BOTH EYES TWICE DAILY, Disp: 15 mL, Rfl: 0     hypromellose (ARTIFICIAL TEARS) 0.4 % SOLN, Place 1 drop into both eyes every hour as needed for dry eyes, Disp: 1 Bottle, Rfl: 2     ferrous sulfate (IRON) 325 (65 FE) MG tablet, Take 1 tablet (325 mg) by mouth 3 times daily (with meals), Disp: 90 tablet, Rfl: 3     diclofenac (VOLTAREN) 1 % GEL, Apply 4 grams to knees or 2 grams to hands four times daily using enclosed dosing card., Disp: 100 g, Rfl: 1      "Cholecalciferol (VITAMIN D) 2000 UNITS tablet, Take 2,000 Units by mouth daily Start in 2 months, after completing 8 weeks of weekly high dose vitamin D., Disp: 100 tablet, Rfl: 3     calcium-vitamin D (CALTRATE) 600-400 MG-UNIT per tablet, Take 1 tablet by mouth 2 times daily, Disp: 100 tablet, Rfl: 3     cyanocobalamin 1000 MCG TBCR, Take 1,000 mcg by mouth daily, Disp: 30 tablet, Rfl: 2     ORDER FOR DME, Pull up briefs for incontinence of urine change 2 to 3 times daily, Disp: 1 Container, Rfl: 11     ASPIRIN NOT PRESCRIBED (INTENTIONAL), Reported on 4/4/2017, Disp: , Rfl:     Allergies:   No Known Allergies    Social Hx: .   reports that she has never smoked. She has never used smokeless tobacco. She reports that she does not drink alcohol or use illicit drugs.    Family Hx: family history is negative for CANCER, DIABETES, Hypertension, CEREBROVASCULAR DISEASE, Thyroid Disease, Glaucoma, and Macular Degeneration.     This document serves as a record of the services and decisions personally performed and made by Navid Dalton MD. It was created on his behalf by Paris Brito, a trained medical scribe. The creation of this document is based the provider's statements to the medical scribe.    Scribe Paris Brito 4:14 PM 5/17/2017     REVIEW OF SYSTEMS:   CONSTITUTIONAL:NEGATIVE for fever, chills, change in weight  INTEGUMENTARY/SKIN: NEGATIVE for worrisome rashes, moles or lesions  MUSCULOSKELETAL:See HPI above  NEURO: NEGATIVE for weakness, dizziness or paresthesias    PHYSICAL EXAM:  Resp 16  Ht 1.391 m (4' 6.75\")  Wt 44.5 kg (98 lb 3.2 oz)  BMI 23.03 kg/m2   GENERAL APPEARANCE: elderly, frail appearing, alert, no distress  SKIN: no suspicious lesions or rashes  NEURO: Normal strength and tone, mentation intact and speech normal  PSYCH:  mentation appears normal and affect normal, not anxious  RESPIRATORY: No increased work of breathing.    BILATERAL LOWER EXTREMITIES:  Gait: Trendelenburg bilateral. " Hunched.  Alignment: varus  No gross deformities or masses.  Diffuse bilateral Quad atrophy, strength weak.  Intact sensation deep peroneal nerve, superficial peroneal nerve, med/lat tibial nerve, sural nerve, saphenous nerve  Intact EHL, EDL, TA, FHL, GS, quadriceps hamstrings and hip flexors  Toes warm and well perfused, brisk capillary refill. Palpable 2+ dp pulses.  Bilateral calf soft and nttp or squeeze.  Edema: none      LEFT KNEE EXAM:    Skin: intact, no ecchymosis or erythema  ROM: 0 extension to 120 flexion  Tight hamstrings on straight leg raise.  Effusion: none  Tender: tender to palpation medial and anteromedial knee; nontender to palpation lateral and posterior knee.     MCL: stable, and non-painful at both 0 and 30 degrees knee flexion  Varus stress: stable, and non-painful at both 0 and 30 degrees knee flexion  Lachmans: neg, firm endpoint  Posterior Drawer stable  Patellofemoral joint:                Apprehension: negative              Crepitations: moderate   Grind: positive     RIGHT KNEE EXAM:    Skin: intact, no ecchymosis or erythema  ROM: 0 extension to 125 flexion with crepitation  Tight hamstrings on straight leg raise.  Effusion: none  Tender: tender to palpation medial joint line, NTTP lat joint line, anterior or posterior knee    MCL: stable, and non-painful at both 0 and 30 degrees knee flexion  Varus stress: stable, and non-painful at both 0 and 30 degrees knee flexion  Lachmans: neg, firm endpoint  Posterior Drawer stable  Patellofemoral joint:                Apprehension: negative              Crepitations: moderate   Grind: positive     X-RAY:  3 views bilateral knee from 5/17/2017 were reviewed in clinic today. On my review, no obvious fractures or dislocations.severe bilateral tricompartmental degenerative changes with  Bone on bone complete loss of medial space, large marginal osteophytes anterior, posterior, medial and lateral. Flattening of the articular surfaces, subchondral  sclerosis.      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    Plan:    * reviewed imaging studies with patient, . Advanced, end-stage osteoarthritis.    Discussed with patient typical symptoms of arthritic pain is pain aggravated with weight bearing activities, stiffness, relieved by sitting or rest. Swelling may be associated. It is common to have some grinding and popping in the knee with arthritis.    Discussed various treatments for degenerative arthrosis of the knee, including nonoperative and operative approaches. Nonoperative approaches, which are exhausted prior to operative treatment, include doing nothing and living with the pain as patient has been doing, activity modification (avoid aggravating activities), physical therapy and strengthening exercises, weight loss, anti-inflammatory medications, bracing, and injections. Once these have been tried and are deemed unsuccessful with a good effort, and the patient is an appropriate candidate, the next treatment would be knee arthroplasty. Depending on the location of the arthritis, knee replacement can be partial (one side of the knee affected by arthritis) or a total knee arthroplasty (all 3 compartments). The risks, perceived benefits and perioperative rehabilitation expectations of knee arthroplasty were discussed in detail. Also discussed that approximately 10% of patients that undergo knee arthroplasty are not happy following surgery and may have worse pain or no improvement in pain, contrary to their preoperative expectations.    At this time, patient does not have interest in surgery, so nonoperative treatment will be continued.    Treatment:  * rest  * Activity modification - avoid impact activities or activities that aggravate symptoms.  * NSAIDS - regular use for inflammation ( twice daily or three times daily), with food, as long as no contra-indications Please discuss with pcp if  needed  * ice, 15-20 minutes at a time several times a day or as needed.  * Strengthening of quadriceps muscles  * Physical Therapy ordered for strengthening, stretching and range of motion exercises  * Tylenol as needed for pain  * Weight loss: Weight loss: The patient's Body mass index is 23.03 kg/(m^2).. Discussed with patient weight loss benefits, not only for the current pain symptoms, but also overall health. Recommend a good diet plan that works for the patient, with the assistance of a dietician or PCP as needed. Also, a good, low-impact exercise program for at least 20 minutes per day, 3 times per week, such as exercise bike, elliptical , or pool.  * Exercise: low impact such as stationary bike, elliptical, pool.  * Injections: risks and perceived benefits of cortisone versus viscosupplementation injections discussed. Patient elected to proceed. See procedure note below for bilateral knee cortisone injections.  * Bracing: bracing the knee may offer some relief of symptoms when worn  *  Consider visco injections in future as needed.  * Return to clinic as needed.    PROCEDURE NOTE:  The risks, perceived benefits and potential complications (including but not limited to, bleeding, infection, pain, scar, damage to adjacent structures, atrophy or necrosis of soft tissue, skin blanching, failure to relieve symptoms, allergic reaction) of injection were discussed with the patient. Questions were addressed and answered.The patient elected to proceed. Written informed consent was obtained. The correct procedural site was identified and confirmed. A Right Knee intraarticular injection was performed using 1mL Depo Medrol 80mg per mL and 7mL (4mL 1% lidocaine, 3mL 0.25% marcaine)  of local anesthetic after sterile prep, to the correct procedural site. Sterile bandaid applied. This was tolerated well by the patient. No apparent complications.  Did also discuss that if diabetic, recommend close monitoring of  blood sugars over the next week as cortisone injections can temporarily elevate blood sugars.    The risks, perceived benefits and potential complications (including but not limited to, bleeding, infection, pain, scar, damage to adjacent structures, atrophy or necrosis of soft tissue, skin blanching, failure to relieve symptoms, allergic reaction) of injection were discussed with the patient. Questions were addressed and answered.The patient elected to proceed. Written informed consent was obtained. The correct procedural site was identified and confirmed. A Left Knee intraarticular injection was performed using 1mL Depo Medrol 80mg per mL and 7mL (4mL 1% lidocaine, 3mL 0.25% marcaine)  of local anesthetic after sterile prep, to the correct procedural site. Sterile bandaid applied. This was tolerated well by the patient. No apparent complications.  Did also discuss that if diabetic, recommend close monitoring of blood sugars over the next week as cortisone injections can temporarily elevate blood sugars.    The information in this document, created by a scribe for me, accurately reflects the services I personally performed and the decisions made by me. I have reviewed and approved this document for accuracy.      Navid Dalton M.D., M.S.  Dept. of Orthopaedic Surgery  [facility]        The patient's Bilateral knee was prepped with betadine solution after verification of allergies. Area approximately 10 cm x 10 cm prepped in a sterile fashion. After injection, betadine removed with soap and water and band-aids applied.    4cc Lidocaine 1%  NDC 73089-113-95, LOT [id number],  10/20  3cc Bupivacaine 0.25% NDC 55150-167-10, LOT [id number],  2018  1cc Depo Medrol 80 mg/ml NDC 8027-2586-01, LOT [id number],  2019 injected into patient's Bilateral Knee by:  Deepak Rubi PA-C  Supervising physician: Dom Najera MD  Dept. of Orthopedics  [facility]          Navid Curiel  MD Sha

## 2017-05-17 NOTE — Clinical Note
5/17/2017       RE: Kerry Vega  [address]  [place] [facility] [address]           Dear Colleague,    Thank you for referring your patient, Kerry Vega, to the [facility]. Please see a copy of my visit note below.    CHIEF COMPLAINT:   Chief Complaint   Patient presents with     Knee Pain     Bilateral knee pain. Onset: 4-5 years. Pain comes and goes. Recently it started about 5/6/17. Pain is over entire knee. She has been getting cortisone injections every 5-6 months. Possible last injections in 11/27/15 with Kenalog. She would like more injections today.        HISTORY OF PRESENT ILLNESS    Kerry Vega is a 77 year old female seen for evaluation of ongoing bilateral knee pain (Left>Right) with no known injury. Pain has been present for 4-5 years. She notes that pain is moderate today, rated 5/10. Reports a burning sensation over the left knee intermittently. Pain is located diffusely over the entire knee. Since 5/6/2017, she states pain has gotten worse. Her last injection was on 11/27/2015 with Kenalog. She would like repeat injections today.    Present symptoms: pain medially  and anteriorly, pain sharp, dull/achy, moderate pain, no swelling, +catching/popping, no locking, no giving way.    Pain severity: 6/10  Pain quality: dull, aching and sharp  Frequency of symptoms: are constant  Exacerbating Factors: weight bearing, stairs, [id number]  Relieving Factors: rest, sitting, medications  Night Pain: No  Pain while at rest: No   Numbness or tingling: No   Patient has tried:     NSAIDS: Yes      Physical Therapy: No      Activity modification: Yes      Bracing: No      Injections: Yes , 2+ years ago with relief.     Ice: No      Other: pain medications.    Other PMH:  has a past medical history of Anemia and Arthritis. She also has no past medical history of Amblyopia; Diabetes (H); Diabetic retinopathy (H); Glaucoma; Hypertension; Macular degeneration;  Nonsenile cataract; Retinal detachment; Strabismus; or Uveitis.    Surgical Hx:  has a past surgical history that includes Esophagoscopy, gastroscopy, duodenoscopy (EGD), combined (1/20/2012); Esophagoscopy, gastroscopy, duodenoscopy (EGD), combined (7/18/2012); Esophagoscopy, gastroscopy, duodenoscopy (EGD), combined (1/29/2013); and Colonoscopy (1/29/2013).    Medications:   Current Outpatient Prescriptions:      furosemide (LASIX) 20 MG tablet, Take 1 tablet (20 mg) by mouth daily as needed for leg swelling., Disp: 60 tablet, Rfl: 1     ranitidine (ZANTAC) 300 MG tablet, Take 1 tablet (300 mg) by mouth At Bedtime, Disp: 90 tablet, Rfl: 3     cetirizine (ZYRTEC) 10 MG tablet, Take 1 tablet (10 mg) by mouth daily, Disp: 30 tablet, Rfl: 1     methocarbamol (ROBAXIN) 750 MG tablet, Take 1 tablet (750 mg) by mouth 3 times daily as needed for muscle spasms, Disp: 45 tablet, Rfl: 1     traMADol (ULTRAM) 50 MG tablet, Take 1-2 tablets ( mg) by mouth every 6 hours as needed for pain maximum 4 tablet(s) per day, Disp: 30 tablet, Rfl: 0     diphenhydrAMINE (BENADRYL) 25 MG tablet, Take 1-2 tablets (25-50 mg) by mouth nightly as needed for other (for eustachian tube dysfunction, for ear pain.), Disp: 60 tablet, Rfl: 1     naproxen (NAPROSYN) 500 MG tablet, TAKE 1 TABLET BY MOUTH TWICE DAILY WITH FOOD AS NEEDED FOR PAIN, Disp: 60 tablet, Rfl: 3     omeprazole (PRILOSEC) 40 MG capsule, TAKE ONE CAPSULE BY MOUTH ONCE DAILY, Disp: 90 capsule, Rfl: 3     alendronate (FOSAMAX) 70 MG tablet, Take 1 tablet (70 mg) by mouth every 7 days, Disp: 12 tablet, Rfl: 3     olopatadine (PATANOL) 0.1 % ophthalmic solution, INSTILL 1 DROP IN BOTH EYES TWICE DAILY, Disp: 15 mL, Rfl: 0     hypromellose (ARTIFICIAL TEARS) 0.4 % SOLN, Place 1 drop into both eyes every hour as needed for dry eyes, Disp: 1 Bottle, Rfl: 2     ferrous sulfate (IRON) 325 (65 FE) MG tablet, Take 1 tablet (325 mg) by mouth 3 times daily (with meals), Disp: 90  "tablet, Rfl: 3     diclofenac (VOLTAREN) 1 % GEL, Apply 4 grams to knees or 2 grams to hands four times daily using enclosed dosing card., Disp: 100 g, Rfl: 1     Cholecalciferol (VITAMIN D) 2000 UNITS tablet, Take 2,000 Units by mouth daily Start in 2 months, after completing 8 weeks of weekly high dose vitamin D., Disp: 100 tablet, Rfl: 3     calcium-vitamin D (CALTRATE) 600-400 MG-UNIT per tablet, Take 1 tablet by mouth 2 times daily, Disp: 100 tablet, Rfl: 3     cyanocobalamin 1000 MCG TBCR, Take 1,000 mcg by mouth daily, Disp: 30 tablet, Rfl: 2     ORDER FOR DME, Pull up briefs for incontinence of urine change 2 to 3 times daily, Disp: 1 Container, Rfl: 11     ASPIRIN NOT PRESCRIBED (INTENTIONAL), Reported on 4/4/2017, Disp: , Rfl:     Allergies:   No Known Allergies    Social Hx: .   reports that she has never smoked. She has never used smokeless tobacco. She reports that she does not drink alcohol or use illicit drugs.    Family Hx: family history is negative for CANCER, DIABETES, Hypertension, CEREBROVASCULAR DISEASE, Thyroid Disease, Glaucoma, and Macular Degeneration.     This document serves as a record of the services and decisions personally performed and made by Navid Dalton MD. It was created on his behalf by Paris Brito, a trained medical scribe. The creation of this document is based the provider's statements to the medical scribe.    Sulmaibcassidy Brito 4:14 PM 5/17/2017     REVIEW OF SYSTEMS: 10 point ROS neg other than the symptoms noted above in the HPI and PMH. Notables include  CONSTITUTIONAL:NEGATIVE for fever, chills, change in weight  INTEGUMENTARY/SKIN: NEGATIVE for worrisome rashes, moles or lesions  MUSCULOSKELETAL:See HPI above  NEURO: NEGATIVE for weakness, dizziness or paresthesias    PHYSICAL EXAM:  Resp 16  Ht 1.391 m (4' 6.75\")  Wt 44.5 kg (98 lb 3.2 oz)  BMI 23.03 kg/m2   GENERAL APPEARANCE: elderly, alert, no distress  SKIN: no suspicious lesions or rashes  NEURO: Normal " strength and tone, mentation intact and speech normal  PSYCH:  mentation appears normal and affect normal/bright, not anxious  RESPIRATORY: No increased work of breathing.  HANDS: no clubbing, nail pitting or nodes.    BILATERAL LOWER EXTREMITIES:  Gait: Trendelenburg bilateral. Hunched.  Alignment: varus  No gross deformities or masses.  Diffuse bilateral Quad atrophy, strength weak.  Intact sensation deep peroneal nerve, superficial peroneal nerve, med/lat tibial nerve, sural nerve, saphenous nerve  Intact EHL, EDL, TA, FHL, GS, quadriceps hamstrings and hip flexors  Toes warm and well perfused, brisk capillary refill. Palpable 2+ dp pulses.  Bilateral calf soft and nttp or squeeze.  No palpable popliteal lymphadenopathy.  DTRs: achilles 2+, patella 2+.  Edema: none  Hips with full, pain-free motion. No irritability with flexion, adduction, and internal rotation.    LEFT KNEE EXAM:    Skin: intact, no ecchymosis or erythema  ROM: 0 extension to 120 flexion  Tight hamstrings on straight leg raise.  Effusion: none  Tender: tender to palpation medial and anteromedial knee; nontender to palpation lateral and posterior knee.   McMurrays: negative    MCL: stable, and non-painful at both 0 and 30 degrees knee flexion  Varus stress: stable, and non-painful at both 0 and 30 degrees knee flexion  Lachmans: neg, firm endpoint  Posterior Drawer stable  Patellofemoral joint:                Apprehension: negative              Crepitations: moderate   Grind: positive     RIGHT KNEE EXAM:    Skin: intact, no ecchymosis or erythema  ROM: 0 extension to 125 flexion with crepitation  Tight hamstrings on straight leg raise.  Effusion: none  Tender: tender to palpation medial joint line, NTTP lat joint line, anterior or posterior knee  McMurrays: negative    MCL: stable, and non-painful at both 0 and 30 degrees knee flexion  Varus stress: stable, and non-painful at both 0 and 30 degrees knee flexion  Lachmans: neg, firm  endpoint  Posterior Drawer stable  Patellofemoral joint:                Apprehension: negative              Crepitations: moderate   Grind: positive     X-RAY:  3 views bilateral knee from 9/26/2013 were reviewed in clinic today. On my review, no obvious fractures or dislocations. Moderate-severe bilateral tricompartmental degenerative changes with near complete loss of medial space, large marginal osteophytes anterior, posterior, medial and lateral. Flattening of the articular surfaces, subchondral sclerosis.      Impression:   77 year old female with bilateral knee pain, advanced arthritis.    Plan:    * reviewed imaging studies with patient.    Discussed with patient typical symptoms of arthritic pain is pain aggravated with weight bearing activities, stiffness, relieved by sitting or rest. Swelling may be associated. It is common to have some grinding and popping in the knee with arthritis.      Workup for degenerative knee arthrosis and pain, typically starts with plain xrays of the knee. Xrays are usually all that is needed for evaluation of ongoing knee pain for arthritis, as they show the bony anatomy well and underlying degenerative changes. An MRI is not usually needed in cases of degenerative knee pain and arthritis, as degenerative cartilage and meniscal changes seen on MRI are expected, given findings on xray. MRI may be indicated if the arthritis is mild and there are mechanical symptoms such as locking or catching in the knee, or an acute knee injury.    Discussed various treatments for degenerative arthrosis of the knee, including nonoperative and operative approaches. Nonoperative approaches, which are exhausted prior to operative treatment, include doing nothing and living with the pain as patient has been doing, activity modification (avoid aggravating activities), physical therapy and strengthening exercises, weight loss, anti-inflammatory medications, bracing, and injections. Once these have  been tried and are deemed unsuccessful with a good effort, and the patient is an appropriate candidate, the next treatment would be knee arthroplasty. Depending on the location of the arthritis, knee replacement can be partial (one side of the knee affected by arthritis) or a total knee arthroplasty (all 3 compartments). The risks, perceived benefits and perioperative rehabilitation expectations of knee arthroplasty were discussed in detail. Also discussed that approximately 10% of patients that undergo knee arthroplasty are not happy following surgery and may have worse pain or no improvement in pain, contrary to their preoperative expectations.    At this time, nonoperative treatment will be pursued.    * reviewed imaging studies with patient    Treatment:  * rest  * Activity modification - avoid impact activities or activities that aggravate symptoms.  * NSAIDS - regular use for inflammation ( twice daily or three times daily), with food, as long as no contra-indications Please discuss with pcp if needed  * ice, 15-20 minutes at a time several times a day or as needed.  * Strengthening of quadriceps muscles  * Physical Therapy ordered for strengthening, stretching and range of motion exercises  * Tylenol as needed for pain  * Weight loss: Weight loss: The patient's Body mass index is 23.03 kg/(m^2).. Discussed with patient weight loss benefits, not only for the current pain symptoms, but also overall health. Recommend a good diet plan that works for the patient, with the assistance of a dietician or PCP as needed. Also, a good, low-impact exercise program for at least 20 minutes per day, 3 times per week, such as exercise bike, elliptical , or pool.  * Exercise: low impact such as stationary bike, elliptical, pool.  * Injections: risks and perceived benefits of cortisone versus viscosupplementation injections discussed. Patient elected to proceed. See procedure note below for bilateral knee cortisone  injections.  * Bracing: bracing the knee may offer some relief of symptoms when worn  *  Consider visco injections in future as needed.  * Return to clinic as needed.    PROCEDURE NOTE:  The risks, perceived benefits and potential complications (including but not limited to, bleeding, infection, pain, scar, damage to adjacent structures, atrophy or necrosis of soft tissue, skin blanching, failure to relieve symptoms, allergic reaction) of injection were discussed with the patient. Questions were addressed and answered.The patient elected to proceed. Written informed consent was obtained. The correct procedural site was identified and confirmed. A Right Knee intraarticular injection was performed using 1mL Depo Medrol 80mg per mL and 7mL (4mL 1% lidocaine, 3mL 0.25% marcaine)  of local anesthetic after sterile prep, to the correct procedural site. Sterile bandaid applied. This was tolerated well by the patient. No apparent complications.  Did also discuss that if diabetic, recommend close monitoring of blood sugars over the next week as cortisone injections can temporarily elevate blood sugars.    The risks, perceived benefits and potential complications (including but not limited to, bleeding, infection, pain, scar, damage to adjacent structures, atrophy or necrosis of soft tissue, skin blanching, failure to relieve symptoms, allergic reaction) of injection were discussed with the patient. Questions were addressed and answered.The patient elected to proceed. Written informed consent was obtained. The correct procedural site was identified and confirmed. A Left Knee intraarticular injection was performed using 1mL Depo Medrol 80mg per mL and 7mL (4mL 1% lidocaine, 3mL 0.25% marcaine)  of local anesthetic after sterile prep, to the correct procedural site. Sterile bandaid applied. This was tolerated well by the patient. No apparent complications.  Did also discuss that if diabetic, recommend close monitoring of blood  sugars over the next week as cortisone injections can temporarily elevate blood sugars.    The information in this document, created by a scribe for me, accurately reflects the services I personally performed and the decisions made by me. I have reviewed and approved this document for accuracy.      Navid Dalton M.D., M.S.  Dept. of Orthopaedic Surgery  [facility]        The patient's Bilateral knee was prepped with betadine solution after verification of allergies. Area approximately 10 cm x 10 cm prepped in a sterile fashion. After injection, betadine removed with soap and water and band-aids applied.    4cc Lidocaine 1%  NDC 25216-072-39, LOT [id number],  10/20  3cc Bupivacaine 0.25% NDC 55150-167-10, LOT [id number],  2018  1cc Depo Medrol 80 mg/ml NDC 7409-5877-24, LOT [id number],  2019 injected into patient's Bilateral Knee by:  SURAJ Dhaliwal-C  Supervising physician: Dom Najera MD  Dept. of Orthopedics  [facility]    Again, thank you for allowing me to participate in the care of your patient.        Sincerely,              Navid Dalton MD

## 2017-05-17 NOTE — MR AVS SNAPSHOT
After Visit Summary   5/17/2017    Kerry Vega    MRN: [id number]           Patient Information     Date Of Birth          1940        Visit Information        Provider Department      5/17/2017 3:45 PM Navid Dalton MD [facility]        Today's Diagnoses     Chronic pain of both knees    -  1    Primary osteoarthritis of both knees          Care Instructions    Please remember to call and schedule a follow up appointment if one was recommended at your earliest convenience.  Orthopedics CLINIC HOURS TELEPHONE NUMBER   Dr. Sha Tony  Certified Medical Assistant   Monday & Wednesday   8am - 5pm  Thursday 1pm - 5pm  Friday 8am -11:30am Specialty schedulers:   [phone] to schedule your surgery.  Main Clinic:   [phone] to make an appointment with any provider.    Urgent Care locations:    [facility] Monday-Friday Closed  Saturday-Sunday 9am-5pm      Monday-Friday 12pm - 8pm  Saturday-Sunday 9am-5pm [phone]     If SURGERY has been recommended, please call our Specialty Schedulers at [phone] to schedule your procedure.    If you need a medication refill, please contact your pharmacy. Please allow 3 business days for your refill to be completed.    If an MRI or CT scan has been recommended, please call [facility] Schedulers at [phone] to schedule your appointment.  Use [employer] (secure e-mail communication and access to your chart) to send a message or to make an appointment. Please ask how you can sign up for Mayomi.  Your care team's suggested websites for health information:   [email or web address] : Up to date and easily searchable information on multiple topics.   [email or web address] : [place] dept of heat, public health issues in [place], N1N1            Follow-ups after your visit        Follow-up notes from your care team     Return if symptoms worsen or fail to improve.     "  Who to contact     If you have questions or need follow up information about today's clinic visit or your schedule please contact [facility] [place] directly at [phone].  Normal or non-critical lab and imaging results will be communicated to you by MyChart, letter or phone within 4 business days after the clinic has received the results. If you do not hear from us within 7 days, please contact the clinic through MyChart or phone. If you have a critical or abnormal lab result, we will notify you by phone as soon as possible.  Submit refill requests through [employer] or call your pharmacy and they will forward the refill request to us. Please allow 3 business days for your refill to be completed.          Additional Information About Your Visit        [facility] Information     [employer] lets you send messages to your doctor, view your test results, renew your prescriptions, schedule appointments and more. To sign up, go to [email or web address] . Click on \"Log in\" on the left side of the screen, which will take you to the Welcome page. Then click on \"Sign up Now\" on the right side of the page.     You will be asked to enter the access code listed below, as well as some personal information. Please follow the directions to create your username and password.     Your access code is: [email or web address]  Expires: 2017 12:20 PM     Your access code will  in 90 days. If you need help or a new code, please call your [place] clinic or [phone].        Care EveryWhere ID     This is your Care EveryWhere ID. This could be used by other organizations to access your [place] medical records  [id number]        Your Vitals Were     Respirations Height BMI (Body Mass Index)             16 4' 6.75\" (1.391 m) 23.03 kg/m2          Blood Pressure from Last 3 Encounters:   17 111/52   17 138/56   17 138/67    Weight from Last 3 Encounters:   17 98 lb 3.2 oz (44.5 kg)   17 101 lb " (45.8 kg)   04/04/17 98 lb (44.5 kg)              We Performed the Following     DRAIN/INJECT LARGE JOINT/BURSA     METHYLPREDNISOLONE 80 MG INJ          Today's Medication Changes          These changes are accurate as of: 5/17/17  7:25 PM.  If you have any questions, ask your nurse or doctor.               Start taking these medicines.        Dose/Directions    methylPREDNISolone acetate 80 MG/ML injection   Commonly known as:  DEPO-MEDROL   Used for:  Chronic pain of both knees   Started by:  Navid Dalton MD        Dose:  160 mg   2 mLs (160 mg) by INTRA-ARTICULAR route once for 1 dose   Quantity:  2 mL   Refills:  0            Where to get your medicines      Some of these will need a paper prescription and others can be bought over the counter.  Ask your nurse if you have questions.     You don't need a prescription for these medications     methylPREDNISolone acetate 80 MG/ML injection                Primary Care Provider Office Phone # Fax #    Shadi Cotton [phone]       [facility] [address] [facility] [address]        Thank you!     Thank you for choosing [facility]  for your care. Our goal is always to provide you with excellent care. Hearing back from our patients is one way we can continue to improve our services. Please take a few minutes to complete the written survey that you may receive in the mail after your visit with us. Thank you!             Your Updated Medication List - Protect others around you: Learn how to safely use, store and throw away your medicines at www.disposemymeds.org.          This list is accurate as of: 5/17/17  7:25 PM.  Always use your most recent med list.                   Brand Name Dispense Instructions for use    alendronate 70 MG tablet    FOSAMAX    12 tablet    Take 1 tablet (70 mg) by mouth every 7 days       * ASPIRIN NOT PRESCRIBED    INTENTIONAL     Reported on 4/4/2017       calcium-vitamin D  600-400 MG-UNIT per tablet    CALTRATE    100 tablet    Take 1 tablet by mouth 2 times daily       cetirizine 10 MG tablet    zyrTEC    30 tablet    Take 1 tablet (10 mg) by mouth daily       cyanocobalamin 1000 MCG Tbcr    VITAMIN B-12 ER    30 tablet    Take 1,000 mcg by mouth daily       diclofenac 1 % Gel topical gel    VOLTAREN    100 g    Apply 4 grams to knees or 2 grams to hands four times daily using enclosed dosing card.       diphenhydrAMINE 25 MG tablet    BENADRYL    60 tablet    Take 1-2 tablets (25-50 mg) by mouth nightly as needed for other (for eustachian tube dysfunction, for ear pain.)       ferrous sulfate 325 (65 FE) MG tablet    IRON    90 tablet    Take 1 tablet (325 mg) by mouth 3 times daily (with meals)       furosemide 20 MG tablet    LASIX    60 tablet    Take 1 tablet (20 mg) by mouth daily as needed for leg swelling.       hypromellose 0.4 % Soln ophthalmic solution    ARTIFICIAL TEARS    1 Bottle    Place 1 drop into both eyes every hour as needed for dry eyes       methocarbamol 750 MG tablet    ROBAXIN    45 tablet    Take 1 tablet (750 mg) by mouth 3 times daily as needed for muscle spasms       methylPREDNISolone acetate 80 MG/ML injection    DEPO-MEDROL    2 mL    2 mLs (160 mg) by INTRA-ARTICULAR route once for 1 dose       naproxen 500 MG tablet    NAPROSYN    60 tablet    TAKE 1 TABLET BY MOUTH TWICE DAILY WITH FOOD AS NEEDED FOR PAIN       olopatadine 0.1 % ophthalmic solution    PATANOL    15 mL    INSTILL 1 DROP IN BOTH EYES TWICE DAILY       omeprazole 40 MG capsule    priLOSEC    90 capsule    TAKE ONE CAPSULE BY MOUTH ONCE DAILY       * order for DME     1 Container    Pull up briefs for incontinence of urine change 2 to 3 times daily       ranitidine 300 MG tablet    ZANTAC    90 tablet    Take 1 tablet (300 mg) by mouth At Bedtime       traMADol 50 MG tablet    ULTRAM    30 tablet    Take 1-2 tablets ( mg) by mouth every 6 hours as needed for pain maximum 4  tablet(s) per day       vitamin D 2000 UNITS tablet     100 tablet    Take 2,000 Units by mouth daily Start in 2 months, after completing 8 weeks of weekly high dose vitamin D.       * Notice:  This list has 2 medication(s) that are the same as other medications prescribed for you. Read the directions carefully, and ask your doctor or other care provider to review them with you.

## 2017-07-01 DIAGNOSIS — M17.9 OSTEOARTHRITIS OF KNEE, UNSPECIFIED LATERALITY, UNSPECIFIED OSTEOARTHRITIS TYPE: ICD-10-CM

## 2017-07-01 NOTE — TELEPHONE ENCOUNTER
naproxen (NAPROSYN) 500 MG tablet      Last Written Prescription Date: 9/7/16  Last Quantity: 60, # refills: 3  Last Office Visit with G, P or [facility] prescribing provider: 5/9/17       Creatinine   Date Value Ref Range Status   04/15/2016 0.67 0.52 - 1.04 mg/dL Final     Lab Results   Component Value Date    AST 11 04/15/2016     Lab Results   Component Value Date    ALT 16 04/15/2016     BP Readings from Last 3 Encounters:   05/09/17 111/52   04/04/17 138/56   03/24/17 138/67         Gina Amezcua  [facility]

## 2017-07-07 RX ORDER — NAPROXEN 500 MG/1
TABLET ORAL
Qty: 60 TABLET | Refills: 0 | Status: SHIPPED | OUTPATIENT
Start: 2017-07-07 | End: 2017-09-20

## 2017-07-16 ENCOUNTER — NURSE TRIAGE (OUTPATIENT)
Dept: NURSING | Facility: CLINIC | Age: 77
End: 2017-07-16

## 2017-07-17 ENCOUNTER — RADIANT APPOINTMENT (OUTPATIENT)
Dept: GENERAL RADIOLOGY | Facility: CLINIC | Age: 77
End: 2017-07-17
Attending: FAMILY MEDICINE
Payer: MEDICARE

## 2017-07-17 ENCOUNTER — OFFICE VISIT (OUTPATIENT)
Dept: FAMILY MEDICINE | Facility: CLINIC | Age: 77
End: 2017-07-17
Payer: MEDICARE

## 2017-07-17 VITALS
OXYGEN SATURATION: 99 % | WEIGHT: 96.4 LBS | HEIGHT: 55 IN | TEMPERATURE: 98.1 F | DIASTOLIC BLOOD PRESSURE: 69 MMHG | BODY MASS INDEX: 22.31 KG/M2 | HEART RATE: 83 BPM | SYSTOLIC BLOOD PRESSURE: 113 MMHG

## 2017-07-17 DIAGNOSIS — R10.11 ABDOMINAL PAIN, RIGHT UPPER QUADRANT: Primary | ICD-10-CM

## 2017-07-17 DIAGNOSIS — R07.89 ATYPICAL CHEST PAIN: ICD-10-CM

## 2017-07-17 DIAGNOSIS — R30.0 DYSURIA: ICD-10-CM

## 2017-07-17 DIAGNOSIS — K44.9 SLIDING HIATAL HERNIA: ICD-10-CM

## 2017-07-17 DIAGNOSIS — D50.8 OTHER IRON DEFICIENCY ANEMIA: ICD-10-CM

## 2017-07-17 DIAGNOSIS — R82.90 NONSPECIFIC FINDING ON EXAMINATION OF URINE: ICD-10-CM

## 2017-07-17 DIAGNOSIS — N30.00 ACUTE CYSTITIS WITHOUT HEMATURIA: ICD-10-CM

## 2017-07-17 DIAGNOSIS — Z13.5 SCREENING FOR DIABETIC RETINOPATHY: ICD-10-CM

## 2017-07-17 LAB
ALBUMIN SERPL-MCNC: 3.4 G/DL (ref 3.4–5)
ALBUMIN UR-MCNC: NEGATIVE MG/DL
ALP SERPL-CCNC: 64 U/L (ref 40–150)
ALT SERPL W P-5'-P-CCNC: 18 U/L (ref 0–50)
ANION GAP SERPL CALCULATED.3IONS-SCNC: 10 MMOL/L (ref 3–14)
APPEARANCE UR: ABNORMAL
AST SERPL W P-5'-P-CCNC: 13 U/L (ref 0–45)
BACTERIA #/AREA URNS HPF: ABNORMAL /HPF
BILIRUB SERPL-MCNC: 0.4 MG/DL (ref 0.2–1.3)
BILIRUB UR QL STRIP: NEGATIVE
BUN SERPL-MCNC: 20 MG/DL (ref 7–30)
CALCIUM SERPL-MCNC: 9 MG/DL (ref 8.5–10.1)
CHLORIDE SERPL-SCNC: 107 MMOL/L (ref 94–109)
CO2 SERPL-SCNC: 26 MMOL/L (ref 20–32)
COLOR UR AUTO: YELLOW
CREAT SERPL-MCNC: 0.83 MG/DL (ref 0.52–1.04)
ERYTHROCYTE [DISTWIDTH] IN BLOOD BY AUTOMATED COUNT: 15.2 % (ref 10–15)
GFR SERPL CREATININE-BSD FRML MDRD: 67 ML/MIN/1.7M2
GLUCOSE SERPL-MCNC: 83 MG/DL (ref 70–99)
GLUCOSE UR STRIP-MCNC: NEGATIVE MG/DL
HCT VFR BLD AUTO: 32.7 % (ref 35–47)
HGB BLD-MCNC: 10.3 G/DL (ref 11.7–15.7)
HGB UR QL STRIP: NEGATIVE
KETONES UR STRIP-MCNC: NEGATIVE MG/DL
LEUKOCYTE ESTERASE UR QL STRIP: ABNORMAL
MCH RBC QN AUTO: 25.4 PG (ref 26.5–33)
MCHC RBC AUTO-ENTMCNC: 31.5 G/DL (ref 31.5–36.5)
MCV RBC AUTO: 81 FL (ref 78–100)
MUCOUS THREADS #/AREA URNS LPF: PRESENT /LPF
NITRATE UR QL: NEGATIVE
NON-SQ EPI CELLS #/AREA URNS LPF: ABNORMAL /LPF
PH UR STRIP: 5.5 PH (ref 5–7)
PLATELET # BLD AUTO: 232 10E9/L (ref 150–450)
POTASSIUM SERPL-SCNC: 4.6 MMOL/L (ref 3.4–5.3)
PROT SERPL-MCNC: 6.1 G/DL (ref 6.8–8.8)
RBC # BLD AUTO: 4.05 10E12/L (ref 3.8–5.2)
RBC #/AREA URNS AUTO: ABNORMAL /HPF (ref 0–2)
SODIUM SERPL-SCNC: 143 MMOL/L (ref 133–144)
SP GR UR STRIP: 1.02 (ref 1–1.03)
URN SPEC COLLECT METH UR: ABNORMAL
UROBILINOGEN UR STRIP-ACNC: 0.2 EU/DL (ref 0.2–1)
WBC # BLD AUTO: 5.5 10E9/L (ref 4–11)
WBC #/AREA URNS AUTO: ABNORMAL /HPF (ref 0–2)

## 2017-07-17 PROCEDURE — 85027 COMPLETE CBC AUTOMATED: CPT | Performed by: FAMILY MEDICINE

## 2017-07-17 PROCEDURE — 36415 COLL VENOUS BLD VENIPUNCTURE: CPT | Performed by: FAMILY MEDICINE

## 2017-07-17 PROCEDURE — 87186 SC STD MICRODIL/AGAR DIL: CPT | Performed by: FAMILY MEDICINE

## 2017-07-17 PROCEDURE — 87086 URINE CULTURE/COLONY COUNT: CPT | Performed by: FAMILY MEDICINE

## 2017-07-17 PROCEDURE — 99214 OFFICE O/P EST MOD 30 MIN: CPT | Performed by: FAMILY MEDICINE

## 2017-07-17 PROCEDURE — 71020 XR CHEST 2 VW: CPT

## 2017-07-17 PROCEDURE — 87088 URINE BACTERIA CULTURE: CPT | Performed by: FAMILY MEDICINE

## 2017-07-17 PROCEDURE — 80053 COMPREHEN METABOLIC PANEL: CPT | Performed by: FAMILY MEDICINE

## 2017-07-17 PROCEDURE — 81001 URINALYSIS AUTO W/SCOPE: CPT | Performed by: FAMILY MEDICINE

## 2017-07-17 RX ORDER — NITROFURANTOIN 25; 75 MG/1; MG/1
100 CAPSULE ORAL 2 TIMES DAILY
Qty: 14 CAPSULE | Refills: 0 | Status: SHIPPED | OUTPATIENT
Start: 2017-07-17 | End: 2017-09-20

## 2017-07-17 ASSESSMENT — PAIN SCALES - GENERAL: PAINLEVEL: MILD PAIN (2)

## 2017-07-17 NOTE — MR AVS SNAPSHOT
After Visit Summary   7/17/2017    Kerry Vega    MRN: [id number]           Patient Information     Date Of Birth          1940        Visit Information        Provider Department      7/17/2017 2:20 PM Yadira Gutierrez MD [facility]        Today's Diagnoses     Abdominal pain, right upper quadrant    -  1    Atypical chest pain        Acute cystitis without hematuria        Dysuria        Screening for diabetic retinopathy        Nonspecific finding on examination of urine          Care Instructions    How to contact your care team: [phone] Pharmacy [phone]   MD VALE HURT PA-C CHRIS JONES, PA-C NAM HO, MD JONATHAN BATES, MD ARVIN VOCAL, MD    Clinic hours M-Th 7am-7pm Fri 7am-5pm.   Urgent care M-F 11am-9pm  Sat/Sun 9am-5pm.   Pharmacy   Mon-Th:  8:00am-8pm   Fri:  8:00am-6:00pm  Sat/Sun  8:00am-5:00 pm   Please schedule your ultrasound by calling [facility] at [phone].        *Abdominal Pain, Unknown Cause (Female)    The exact cause of your abdominal (stomach) pain is not certain. This does not mean that this is something to worry about, or the right tests were not done. Everyone likes to know the exact cause of the problem, but sometimes with abdominal pain, there is no clear-cut cause, and this could be a good thing. The good news is that your symptoms can be treated, and you will feel better.   Your condition does not seem serious now; however, sometimes the signs of a serious problem may take more time to appear. For this reason, it is important for you to watch for any new symptoms, problems, or worsening of your condition.  Over the next few days, the abdominal pain may come and go, or be continuous. Other common symptoms can include nausea and vomiting. Sometimes it can be difficult to tell if you feel nauseous, you may just feel bad and not associate that feeling with nausea. Constipation, diarrhea,  and a fever may go along with the pain.  The pain may continue even if treated correctly over the following days. Depending on how things go, sometimes the cause can become clear and may require further or different treatment. Additional evaluations, medications, or tests may be needed.  Home care  Your health care provider may prescribe medications for pain, symptoms, or an infection.  Follow the health care provider's instructions for taking these medications.  General care    Rest until your next exam. No strenuous activities.    Try to find positions that ease discomfort. A small pillow placed on the abdomen may help relieve pain.    Something warm on your abdomen (such as a heating pad) may help, but be careful not to burn yourself.  Diet    Do not force yourself to eat, especially if having cramps, vomiting, or diarrhea.    Water is important so you do not get dehydrated. Soup may also be good. Sports drinks may also help, especially if they are not too acidic. Make sure you don't drink sugary drinks as this can make things worse. Take liquids in small amounts. Do not guzzle them.    Caffeine sometimes makes the pain and cramping worse.    Avoid dairy products if you have vomiting or diarrhea.    Don't eat large amounts at a time. Wait a few minutes between bites.    Eat a diet low in fiber (called a low-residue diet). Foods allowed include refined breads, white rice, fruit and vegetable juices without pulp, tender meats. These foods will pass more easily through the intestine.    Avoid fried or fatty foods, dairy, alcohol and spicy foods until your symptoms go away.  Follow-up care  Follow up with your health care provider as instructed, or if your pain does not begin to improve in the next 24 hours.  When to seek medical care  Seek prompt medical care if any of the following occur:    Pain gets worse or moves to the right lower abdomen    New or worsening vomiting or diarrhea    Swelling of the  abdomen    Unable to pass stool for more than three days    New fever over 101  F (38.3 C), or rising fever    Blood in vomit or bowel movements (dark red or black color)    Jaundice (yellow color of eyes and skin)    Weakness, dizziness    Chest, arm, back, neck or jaw pain    Unexpected vaginal bleeding or missed period  Call 911  Call emergency services if any of the following occur:    Trouble breathing    Confusion    Fainting or loss of consciousness    Rapid heart rate    Seizure    [id number] [employer], [address], [place] [address]. All rights reserved. This information is not intended as a substitute for professional medical care. Always follow your healthcare professional's instructions.                Follow-ups after your visit        Follow-up notes from your care team     Return in about 4 weeks (around 8/14/2017) for recheck, medication follow up.      Future tests that were ordered for you today     Open Future Orders        Priority Expected Expires Ordered    US Abdomen Complete Routine  7/17/2018 7/17/2017    XR Chest 2 Views Routine 7/17/2017 7/17/2018 7/17/2017            Who to contact     If you have questions or need follow up information about today's clinic visit or your schedule please contact [facility] directly at [phone].  Normal or non-critical lab and imaging results will be communicated to you by [employer], letter or phone within 4 business days after the clinic has received the results. If you do not hear from us within 7 days, please contact the clinic through [employer] or phone. If you have a critical or abnormal lab result, we will notify you by phone as soon as possible.  Submit refill requests through "[employer]" or call your pharmacy and they will forward the refill request to us. Please allow 3 business days for your refill to be completed.          Additional Information About Your Visit        [employer] Information     "[employer] lets you send  "messages to your doctor, view your test results, renew your prescriptions, schedule appointments and more. To sign up, go to [email or web address] . Click on \"Log in\" on the left side of the screen, which will take you to the Welcome page. Then click on \"Sign up Now\" on the right side of the page.     You will be asked to enter the access code listed below, as well as some personal information. Please follow the directions to create your username and password.     Your access code is: [email or web address]  Expires: 2017 12:20 PM     Your access code will  in 90 days. If you need help or a new code, please call your [facility] clinic or [phone].        Care EveryWhere ID     This is your Care EveryWhere ID. This could be used by other organizations to access your [facility] medical records  [id number]        Your Vitals Were     Pulse Temperature Height Pulse Oximetry Breastfeeding? BMI (Body Mass Index)    83 98.1  F (36.7  C) (Oral) 4' 6.75\" (1.391 m) 99% No 22.61 kg/m2       Blood Pressure from Last 3 Encounters:   17 113/69   17 111/52   17 138/56    Weight from Last 3 Encounters:   17 96 lb 6.4 oz (43.7 kg)   17 98 lb 3.2 oz (44.5 kg)   17 101 lb (45.8 kg)              We Performed the Following     *UA reflex to Microscopic and Culture ([place] and [facility] (except [place] and [place])     CBC with platelets     Comprehensive metabolic panel     Urine Culture Aerobic Bacterial     Urine Microscopic          Today's Medication Changes          These changes are accurate as of: 17  3:03 PM.  If you have any questions, ask your nurse or doctor.               Start taking these medicines.        Dose/Directions    nitroFURantoin (macrocrystal-monohydrate) 100 MG capsule   Commonly known as:  MACROBID   Used for:  Acute cystitis without hematuria   Started by:  Yadira Gutierrez MD        Dose:  100 mg   Take 1 capsule (100 mg) by mouth 2 times daily "   Quantity:  14 capsule   Refills:  0            Where to get your medicines      These medications were sent to [id number] - [facility], [place] - [address]  [address], [facility] [address]    Hours:  24-hours Phone:  [phone]     nitroFURantoin (macrocrystal-monohydrate) 100 MG capsule                Primary Care Provider Office Phone # Fax #    Shadi Cotton [phone]       [facility] [address]  [facility] [address]        Equal Access to Services     AMBER DELGADO : Hadii aad ku hadasho Soomaali, waaxda luqadaha, qaybta kaalmada adeegyada, cruz mathew . So [facility] [phone].    ATENCIÓN: Si habla español, tiene a cornejo disposición servicios gratuitos de asistencia lingüística. Llame al [phone].    We comply with applicable federal civil rights laws and [place] laws. We do not discriminate on the basis of race, color, national origin, age, disability sex, sexual orientation or gender identity.            Thank you!     Thank you for choosing [facility]  for your care. Our goal is always to provide you with excellent care. Hearing back from our patients is one way we can continue to improve our services. Please take a few minutes to complete the written survey that you may receive in the mail after your visit with us. Thank you!             Your Updated Medication List - Protect others around you: Learn how to safely use, store and throw away your medicines at www.disposemymeds.org.          This list is accurate as of: 7/17/17  3:03 PM.  Always use your most recent med list.                   Brand Name Dispense Instructions for use Diagnosis    alendronate 70 MG tablet    FOSAMAX    12 tablet    Take 1 tablet (70 mg) by mouth every 7 days    Osteoporosis       * ASPIRIN NOT PRESCRIBED    INTENTIONAL     Reported on 4/4/2017        calcium-vitamin D  600-400 MG-UNIT per tablet    CALTRATE    100 tablet    Take 1 tablet by mouth 2 times daily    Osteoporosis       cetirizine 10 MG tablet    zyrTEC    30 tablet    Take 1 tablet (10 mg) by mouth daily    Urticaria       cyanocobalamin 1000 MCG Tbcr    VITAMIN B-12 ER    30 tablet    Take 1,000 mcg by mouth daily    Vitamin B12 deficiency (dietary) anemia       diclofenac 1 % Gel topical gel    VOLTAREN    100 g    Apply 4 grams to knees or 2 grams to hands four times daily using enclosed dosing card.    Osteoarthritis of left thumb, Cervical radiculopathy       diphenhydrAMINE 25 MG tablet    BENADRYL    60 tablet    Take 1-2 tablets (25-50 mg) by mouth nightly as needed for other (for eustachian tube dysfunction, for ear pain.)    ETD (eustachian tube dysfunction), left       ferrous sulfate 325 (65 FE) MG tablet    IRON    90 tablet    Take 1 tablet (325 mg) by mouth 3 times daily (with meals)    Iron deficiency anemia, unspecified iron deficiency       furosemide 20 MG tablet    LASIX    60 tablet    Take 1 tablet (20 mg) by mouth daily as needed for leg swelling.    Swelling of lower extremity       hypromellose 0.4 % Soln ophthalmic solution    ARTIFICIAL TEARS    1 Bottle    Place 1 drop into both eyes every hour as needed for dry eyes    Dry eyes, bilateral       methocarbamol 750 MG tablet    ROBAXIN    45 tablet    Take 1 tablet (750 mg) by mouth 3 times daily as needed for muscle spasms    Upper back pain on left side       naproxen 500 MG tablet    NAPROSYN    60 tablet    TAKE 1 TABLET BY MOUTH TWICE DAILY WITH FOOD AS NEEDED FOR PAIN    Osteoarthritis of knee, unspecified laterality, unspecified osteoarthritis type       nitroFURantoin (macrocrystal-monohydrate) 100 MG capsule    MACROBID    14 capsule    Take 1 capsule (100 mg) by mouth 2 times daily    Acute cystitis without hematuria       olopatadine 0.1 % ophthalmic solution    PATANOL    15 mL    INSTILL 1 DROP IN BOTH EYES TWICE DAILY    Chronic  allergic conjunctivitis       omeprazole 40 MG capsule    priLOSEC    90 capsule    TAKE ONE CAPSULE BY MOUTH ONCE DAILY    Gastroesophageal reflux disease without esophagitis       * order for DME     1 Container    Pull up briefs for incontinence of urine change 2 to 3 times daily    Urine incontinence       ranitidine 300 MG tablet    ZANTAC    90 tablet    Take 1 tablet (300 mg) by mouth At Bedtime    Gastroesophageal reflux disease without esophagitis       traMADol 50 MG tablet    ULTRAM    30 tablet    Take 1-2 tablets ( mg) by mouth every 6 hours as needed for pain maximum 4 tablet(s) per day    Upper back pain on left side       vitamin D 2000 UNITS tablet     100 tablet    Take 2,000 Units by mouth daily Start in 2 months, after completing 8 weeks of weekly high dose vitamin D.    Vitamin D deficiency disease       * Notice:  This list has 2 medication(s) that are the same as other medications prescribed for you. Read the directions carefully, and ask your doctor or other care provider to review them with you.

## 2017-07-17 NOTE — NURSING NOTE
"Chief Complaint   Patient presents with     UTI       Initial /69 (BP Location: Left arm, Patient Position: Chair, Cuff Size: Adult Small)  Pulse 83  Temp 98.1  F (36.7  C) (Oral)  Ht 4' 6.75\" (1.391 m)  Wt 96 lb 6.4 oz (43.7 kg)  SpO2 99%  Breastfeeding? No  BMI 22.61 kg/m2 Estimated body mass index is 22.61 kg/(m^2) as calculated from the following:    Height as of this encounter: 4' 6.75\" (1.391 m).    Weight as of this encounter: 96 lb 6.4 oz (43.7 kg).  Medication Reconciliation: complete     Rony Yeager CMA    "

## 2017-07-17 NOTE — PROGRESS NOTES
SUBJECTIVE:                                                    Kerry Vega is a 77 year old female who presents to clinic today for the following health issues:    URINARY TRACT SYMPTOMS - see triage 7/16/17  Onset: x1week    Description:   Painful urination (Dysuria): YES  Blood in urine (Hematuria): no   Delay in urine (Hesitency): YES    Intensity: moderate    Progression of Symptoms:  same    Accompanying Signs & Symptoms:  Fever/chills: YES- fever in the evening  Flank pain YES  Nausea and vomiting: no   Any vaginal symptoms: none  Abdominal/Pelvic Pain: YES, right upper quadrant abdominal pain worse after eating. Also has some chest pain with activity over the past year. Had an EKG earlier this year that was normal but no stress test done.   Other: YES Low back pain    History:   History of frequent UTI's: no   History of kidney stones: no   Sexually Active: no   Possibility of pregnancy: No    Precipitating factors:   None    Therapies Tried and outcome: Cranberry juice prn (contraindicated in Coumadin patients) and Increase fluid intake        Problem list and histories reviewed & adjusted, as indicated.  Additional history: as documented    Patient Active Problem List   Diagnosis     GERD (gastroesophageal reflux disease)     CARDIOVASCULAR SCREENING; LDL GOAL LESS THAN 160     Right lumbar radiculopathy     Left knee pain     Osteoporosis     Advanced directives, counseling/discussion     Abnormal Pap smear     Dysphagia     Health Care Home     Iron deficiency anemia     Sliding hiatal hernia     OA (osteoarthritis) of knee     Osteoarthritis of left thumb     Past Surgical History:   Procedure Laterality Date     COLONOSCOPY  1/29/2013    Procedure: COLONOSCOPY;;  Surgeon: Edgard Prieto MD;  Location:  OR     ESOPHAGOSCOPY, GASTROSCOPY, DUODENOSCOPY (EGD), COMBINED  1/20/2012    Procedure:COMBINED ESOPHAGOSCOPY, GASTROSCOPY, DUODENOSCOPY (EGD); EGD, dysphagia; Surgeon:SHEREE VALLE;  Location:[facility] OR     ESOPHAGOSCOPY, GASTROSCOPY, DUODENOSCOPY (EGD), COMBINED  7/18/2012    Procedure: COMBINED ESOPHAGOSCOPY, GASTROSCOPY, DUODENOSCOPY (EGD);  EGD for dysphagia rp Ton[place];  Surgeon: Rey Bee MD;  Location: [facility] OR     ESOPHAGOSCOPY, GASTROSCOPY, DUODENOSCOPY (EGD), COMBINED  1/29/2013    Procedure: COMBINED ESOPHAGOSCOPY, GASTROSCOPY, DUODENOSCOPY (EGD), BIOPSY SINGLE OR MULTIPLE;  Colonoscopy, EGD, dysphagia;  Surgeon: Edgard Prieto MD;  Location: [facility] OR       Social History   Substance Use Topics     Smoking status: Never Smoker     Smokeless tobacco: Never Used     Alcohol use No     Family History   Problem Relation Age of Onset     CANCER No family hx of      DIABETES No family hx of      Hypertension No family hx of      CEREBROVASCULAR DISEASE No family hx of      Thyroid Disease No family hx of      Glaucoma No family hx of      Macular Degeneration No family hx of          Current Outpatient Prescriptions   Medication Sig Dispense Refill     nitroFURantoin, macrocrystal-monohydrate, (MACROBID) 100 MG capsule Take 1 capsule (100 mg) by mouth 2 times daily 14 capsule 0     naproxen (NAPROSYN) 500 MG tablet TAKE 1 TABLET BY MOUTH TWICE DAILY WITH FOOD AS NEEDED FOR PAIN 60 tablet 0     furosemide (LASIX) 20 MG tablet Take 1 tablet (20 mg) by mouth daily as needed for leg swelling. 60 tablet 1     ranitidine (ZANTAC) 300 MG tablet Take 1 tablet (300 mg) by mouth At Bedtime 90 tablet 3     cetirizine (ZYRTEC) 10 MG tablet Take 1 tablet (10 mg) by mouth daily 30 tablet 1     methocarbamol (ROBAXIN) 750 MG tablet Take 1 tablet (750 mg) by mouth 3 times daily as needed for muscle spasms 45 tablet 1     traMADol (ULTRAM) 50 MG tablet Take 1-2 tablets ( mg) by mouth every 6 hours as needed for pain maximum 4 tablet(s) per day 30 tablet 0     diphenhydrAMINE (BENADRYL) 25 MG tablet Take 1-2 tablets (25-50 mg) by mouth nightly as needed for other (for eustachian tube dysfunction, for  ear pain.) 60 tablet 1     omeprazole (PRILOSEC) 40 MG capsule TAKE ONE CAPSULE BY MOUTH ONCE DAILY 90 capsule 3     alendronate (FOSAMAX) 70 MG tablet Take 1 tablet (70 mg) by mouth every 7 days 12 tablet 3     olopatadine (PATANOL) 0.1 % ophthalmic solution INSTILL 1 DROP IN BOTH EYES TWICE DAILY 15 mL 0     hypromellose (ARTIFICIAL TEARS) 0.4 % SOLN Place 1 drop into both eyes every hour as needed for dry eyes 1 Bottle 2     ferrous sulfate (IRON) 325 (65 FE) MG tablet Take 1 tablet (325 mg) by mouth 3 times daily (with meals) 90 tablet 3     diclofenac (VOLTAREN) 1 % GEL Apply 4 grams to knees or 2 grams to hands four times daily using enclosed dosing card. 100 g 1     Cholecalciferol (VITAMIN D) 2000 UNITS tablet Take 2,000 Units by mouth daily Start in 2 months, after completing 8 weeks of weekly high dose vitamin D. 100 tablet 3     calcium-vitamin D (CALTRATE) 600-400 MG-UNIT per tablet Take 1 tablet by mouth 2 times daily 100 tablet 3     cyanocobalamin 1000 MCG TBCR Take 1,000 mcg by mouth daily 30 tablet 2     ORDER FOR DME Pull up briefs for incontinence of urine change 2 to 3 times daily 1 Container 11     ASPIRIN NOT PRESCRIBED (INTENTIONAL) Reported on 4/4/2017       No Known Allergies  Recent Labs   Lab Test  04/15/16   1552  01/02/15   1557  09/04/14   1437   12/14/12   1707   10/13/10   0823   LDL   --   80   --    --   96   --   103   HDL   --   70   --    --   88   --   78   TRIG   --   115   --    --   92   --   66   ALT  16  21  18   --   26   < >   --    CR  0.67  0.78  0.78   < >  0.70   < >   --    GFRESTIMATED  86  72  72   < >  82   < >   --    GFRESTBLACK  >90   GFR Calc    87  87   < >  >90   < >   --    POTASSIUM  4.1  3.9  3.9   < >  4.1   < >   --    TSH  0.45  0.47   --    --   0.80   --    --     < > = values in this interval not displayed.      BP Readings from Last 3 Encounters:   07/17/17 113/69   05/09/17 111/52   04/04/17 138/56    Wt Readings from Last 3  "Encounters:   07/17/17 96 lb 6.4 oz (43.7 kg)   05/17/17 98 lb 3.2 oz (44.5 kg)   05/09/17 101 lb (45.8 kg)                  Labs reviewed in EPIC    Reviewed and updated as needed this visit by clinical staff       Reviewed and updated as needed this visit by Provider         ROS:  Constitutional, HEENT, cardiovascular, pulmonary, gi and gu systems are negative, except as otherwise noted.    OBJECTIVE:     /69 (BP Location: Left arm, Patient Position: Chair, Cuff Size: Adult Small)  Pulse 83  Temp 98.1  F (36.7  C) (Oral)  Ht 4' 6.75\" (1.391 m)  Wt 96 lb 6.4 oz (43.7 kg)  SpO2 99%  Breastfeeding? No  BMI 22.61 kg/m2  Body mass index is 22.61 kg/(m^2).  GENERAL: elderly, alert, well nourished, well hydrated, no distress  HENT: ear canals- normal; TMs- normal; Nose- normal; Mouth- no ulcers, no lesions, missing dentition  NECK: no tenderness, no adenopathy, no asymmetry, no masses, no stiffness; thyroid- normal to palpation  RESP: lungs clear to auscultation - no rales, no rhonchi, no wheezes  CV: regular rates and rhythm, normal S1 S2, no S3 or S4 and no murmur, no click or rub, normal pulses  ABDOMEN: soft, no tenderness, no  hepatosplenomegaly, no masses, normal bowel sounds  MS: extremities- no gross deformities noted, no edema  SKIN: no suspicious lesions, no rashes, age related skin changes with seborrheic keratosis and no actinic keratosis.    NEURO: strength and tone- decreased, sensory exam- grossly normal, reflexes- symmetric  BACK: no CVA tenderness, no paralumbar tenderness  MENTAL STATUS EXAM:  Appearance/Behavior: no apparent distress, neatly groomed, dressed appropriately for weather, appears stated age and is frail-appearing  Speech: normal  Mood/Affect: normal affect  Insight: Good     Diagnostic Test Results:  Results for orders placed or performed in visit on 07/17/17 (from the past 24 hour(s))   *UA reflex to Microscopic and Culture ([place] and [facility] (except [place] and " "[place])   Result Value Ref Range    Color Urine Yellow     Appearance Urine Slightly Cloudy     Glucose Urine Negative NEG mg/dL    Bilirubin Urine Negative NEG    Ketones Urine Negative NEG mg/dL    Specific Gravity Urine 1.020 1.003 - 1.035    Blood Urine Negative NEG    pH Urine 5.5 5.0 - 7.0 pH    Protein Albumin Urine Negative NEG mg/dL    Urobilinogen Urine 0.2 0.2 - 1.0 EU/dL    Nitrite Urine Negative NEG    Leukocyte Esterase Urine Small (A) NEG    Source Midstream Urine    Urine Microscopic   Result Value Ref Range    WBC Urine 25-50  Clumps of WBC's seen   (A) 0 - 2 /HPF    RBC Urine O - 2 0 - 2 /HPF    Squamous Epithelial /LPF Urine Few FEW /LPF    Bacteria Urine Moderate (A) NEG /HPF    Mucous Urine Present (A) NEG /LPF   CBC with platelets   Result Value Ref Range    WBC 5.5 4.0 - 11.0 10e9/L    RBC Count 4.05 3.8 - 5.2 10e12/L    Hemoglobin 10.3 (L) 11.7 - 15.7 g/dL    Hematocrit 32.7 (L) 35.0 - 47.0 %    MCV 81 78 - 100 fl    MCH 25.4 (L) 26.5 - 33.0 pg    MCHC 31.5 31.5 - 36.5 g/dL    RDW 15.2 (H) 10.0 - 15.0 %    Platelet Count 232 150 - 450 10e9/L       ASSESSMENT/PLAN:         Tobacco Cessation:   reports that she has never smoked. She has never used smokeless tobacco.      BMI:   Estimated body mass index is 22.61 kg/(m^2) as calculated from the following:    Height as of this encounter: 4' 6.75\" (1.391 m).    Weight as of this encounter: 96 lb 6.4 oz (43.7 kg).           ICD-10-CM    1. Abdominal pain, right upper quadrant- tenderness by exam in right upper quadrant may be consistent with liver or gallbladder disease R10.11 CBC with platelets     Comprehensive metabolic panel     US Abdomen Complete   2. Atypical chest pain- hiatal hernia seen by chest xray but chest pain seems exertional R07.89 XR Chest 2 Views  May need stress test.         3. Acute cystitis without hematuria N30.00 nitroFURantoin, macrocrystal-monohydrate, (MACROBID) 100 MG capsule twice a day for 7 days   4. Dysuria R30.0 " *UA reflex to Microscopic and Culture ([place] and [facility] (except [place] and [place])     Urine Microscopic   5. Screening for diabetic retinopathy Z13.5 Due for eye exam   6. Nonspecific finding on examination of urine R82.90 Urine Culture Aerobic Bacterial   7. Other iron deficiency anemia D50.8 Recheck iron levels    8. Sliding hiatal hernia K44.9 May be causing chest pain and dyspepsia.        CONSULTATION/REFERRAL to gastroenterology or general surgery depending on findings.   FUTURE LABS:       - Schedule fasting labs in 3 months  FUTURE APPOINTMENTS:       - Follow-up visit in 2-3 weeks or sooner if any questions or concerns.   Work on weight loss  Regular exercise  See Patient Instructions    Yadira Gutierrez MD  [facility]

## 2017-07-17 NOTE — LETTER
July 20, 2017        Kerry Vega  [address] [place] [address]          Dear Kerry Vega,     Please schedule follow up to discuss this results. Enclosed are the results.  Results for orders placed or performed in visit on 07/17/17   XR Chest 2 Views    Narrative    CHEST TWO VIEWS  7/17/2017 3:26 PM     HISTORY: Chest pain.    COMPARISON: 2/8/2017      Impression    IMPRESSION: Cardiomegaly. Sliding hiatal hernia. Widened AP diameter  of the chest. Lungs are clear. No pleural effusion. Midthoracic spine  compression fracture, probably at T5, new since 3/29/2012.    MD Yadira ROSALES MD/bc

## 2017-07-17 NOTE — LETTER
July 20, 2017        Kerry Vega  [address] [place] [address]        Dear Kerry Vega,     The hemoglobin is a little low and I will call in some easy to take iron supplements. There is a new compression fracture or osteoporosis of the spine and this may be the cause of some of your pain. You have taken 5 years of Alendronate and this requires a 2 year break before restarting.     The hiatal hernia is part of what is also causing pain. I will call with these results when back. The kidney and liver tests were normal. Urinary tract infection is sensitive to the Macrobid. Follow up in 2-3 weeks. Enclosed are the results.  Results for orders placed or performed in visit on 07/17/17   *UA reflex to Microscopic and Culture ([facility] (except [place] and [place])   Result Value Ref Range    Color Urine Yellow     Appearance Urine Slightly Cloudy     Glucose Urine Negative NEG mg/dL    Bilirubin Urine Negative NEG    Ketones Urine Negative NEG mg/dL    Specific Gravity Urine 1.020 1.003 - 1.035    Blood Urine Negative NEG    pH Urine 5.5 5.0 - 7.0 pH    Protein Albumin Urine Negative NEG mg/dL    Urobilinogen Urine 0.2 0.2 - 1.0 EU/dL    Nitrite Urine Negative NEG    Leukocyte Esterase Urine Small (A) NEG    Source Midstream Urine    Urine Microscopic   Result Value Ref Range    WBC Urine 25-50  Clumps of WBC's seen   (A) 0 - 2 /HPF    RBC Urine O - 2 0 - 2 /HPF    Squamous Epithelial /LPF Urine Few FEW /LPF    Bacteria Urine Moderate (A) NEG /HPF    Mucous Urine Present (A) NEG /LPF   CBC with platelets   Result Value Ref Range    WBC 5.5 4.0 - 11.0 10e9/L    RBC Count 4.05 3.8 - 5.2 10e12/L    Hemoglobin 10.3 (L) 11.7 - 15.7 g/dL    Hematocrit 32.7 (L) 35.0 - 47.0 %    MCV 81 78 - 100 fl    MCH 25.4 (L) 26.5 - 33.0 pg    MCHC 31.5 31.5 - 36.5 g/dL    RDW 15.2 (H) 10.0 - 15.0 %    Platelet Count 232 150 - 450 10e9/L   Comprehensive metabolic panel   Result  Value Ref Range    Sodium 143 133 - 144 mmol/L    Potassium 4.6 3.4 - 5.3 mmol/L    Chloride 107 94 - 109 mmol/L    Carbon Dioxide 26 20 - 32 mmol/L    Anion Gap 10 3 - 14 mmol/L    Glucose 83 70 - 99 mg/dL    Urea Nitrogen 20 7 - 30 mg/dL    Creatinine 0.83 0.52 - 1.04 mg/dL    GFR Estimate 67 >60 mL/min/1.7m2    GFR Estimate If Black 81 >60 mL/min/1.7m2    Calcium 9.0 8.5 - 10.1 mg/dL    Bilirubin Total 0.4 0.2 - 1.3 mg/dL    Albumin 3.4 3.4 - 5.0 g/dL    Protein Total 6.1 (L) 6.8 - 8.8 g/dL    Alkaline Phosphatase 64 40 - 150 U/L    ALT 18 0 - 50 U/L    AST 13 0 - 45 U/L   Urine Culture Aerobic Bacterial   Result Value Ref Range    Specimen Description Midstream Urine     Culture Micro (A)      10,000 to 50,000 colonies/mL Escherichia coli  <10,000 colonies/mL mixed urogenital lily Susceptibility testing not routinely   done      Micro Report Status FINAL 07/19/2017     Organism: 10,000 to 50,000 colonies/mL Escherichia coli        Susceptibility    10,000 to 50,000 colonies/ml escherichia coli (silas) -  (no method available)     AMPICILLIN >=32 Resistant  ug/mL     CEFAZOLIN Value in next row  ug/mL      <=4 SusceptibleCefazolin SILAS breakpoints are for the treatment of uncomplicated urinary tract infections.  For the treatment of systemic infections, please contact the laboratory for additional testing.     CEFOXITIN Value in next row  ug/mL      <=4 SusceptibleCefazolin SILAS breakpoints are for the treatment of uncomplicated urinary tract infections.  For the treatment of systemic infections, please contact the laboratory for additional testing.     CEFTAZIDIME Value in next row  ug/mL      <=4 SusceptibleCefazolin SILAS breakpoints are for the treatment of uncomplicated urinary tract infections.  For the treatment of systemic infections, please contact the laboratory for additional testing.     CEFTRIAXONE Value in next row  ug/mL      <=4 SusceptibleCefazolin SILAS breakpoints are for the treatment of  uncomplicated urinary tract infections.  For the treatment of systemic infections, please contact the laboratory for additional testing.     CIPROFLOXACIN Value in next row  ug/mL      <=4 SusceptibleCefazolin KAUSHAL breakpoints are for the treatment of uncomplicated urinary tract infections.  For the treatment of systemic infections, please contact the laboratory for additional testing.     GENTAMICIN Value in next row  ug/mL      <=4 SusceptibleCefazolin KAUSHAL breakpoints are for the treatment of uncomplicated urinary tract infections.  For the treatment of systemic infections, please contact the laboratory for additional testing.     LEVOFLOXACIN Value in next row  ug/mL      <=4 SusceptibleCefazolin KAUSHAL breakpoints are for the treatment of uncomplicated urinary tract infections.  For the treatment of systemic infections, please contact the laboratory for additional testing.     NITROFURANTOIN Value in next row  ug/mL      <=4 SusceptibleCefazolin KAUSHAL breakpoints are for the treatment of uncomplicated urinary tract infections.  For the treatment of systemic infections, please contact the laboratory for additional testing.     TOBRAMYCIN Value in next row  ug/mL      <=4 SusceptibleCefazolin KAUSHAL breakpoints are for the treatment of uncomplicated urinary tract infections.  For the treatment of systemic infections, please contact the laboratory for additional testing.     Trimethoprim/Sulfa Value in next row  ug/mL      <=4 SusceptibleCefazolin KAUSHAL breakpoints are for the treatment of uncomplicated urinary tract infections.  For the treatment of systemic infections, please contact the laboratory for additional testing.     AMPICILLIN/SULBACTAM Value in next row  ug/mL      <=4 SusceptibleCefazolin KAUSHAL breakpoints are for the treatment of uncomplicated urinary tract infections.  For the treatment of systemic infections, please contact the laboratory for additional testing.     Piperacillin/Tazo Value in next row  ug/mL       <=4 SusceptibleCefazolin KAUSHAL breakpoints are for the treatment of uncomplicated urinary tract infections.  For the treatment of systemic infections, please contact the laboratory for additional testing.     CEFEPIME Value in next row  ug/mL      <=4 SusceptibleCefazolin KAUSHAL breakpoints are for the treatment of uncomplicated urinary tract infections.  For the treatment of systemic infections, please contact the laboratory for additional testing.         Yadira Gutierrez MD/bc

## 2017-07-17 NOTE — PATIENT INSTRUCTIONS
How to contact your care team: [phone] Pharmacy [phone]   MD VALE HURT PA-C CHRIS JONES, PA-C NAM HO, MD JONATHAN BATES, MD ARVIN VOCAL, MD    Clinic hours M-Th 7am-7pm Fri 7am-5pm.   Urgent care M-F 11am-9pm  Sat/Sun 9am-5pm.   Pharmacy   Mon-Th:  8:00am-8pm   Fri:  8:00am-6:00pm  Sat/Sun  8:00am-5:00 pm   Please schedule your ultrasound by calling [facility] at [phone].        *Abdominal Pain, Unknown Cause (Female)    The exact cause of your abdominal (stomach) pain is not certain. This does not mean that this is something to worry about, or the right tests were not done. Everyone likes to know the exact cause of the problem, but sometimes with abdominal pain, there is no clear-cut cause, and this could be a good thing. The good news is that your symptoms can be treated, and you will feel better.   Your condition does not seem serious now; however, sometimes the signs of a serious problem may take more time to appear. For this reason, it is important for you to watch for any new symptoms, problems, or worsening of your condition.  Over the next few days, the abdominal pain may come and go, or be continuous. Other common symptoms can include nausea and vomiting. Sometimes it can be difficult to tell if you feel nauseous, you may just feel bad and not associate that feeling with nausea. Constipation, diarrhea, and a fever may go along with the pain.  The pain may continue even if treated correctly over the following days. Depending on how things go, sometimes the cause can become clear and may require further or different treatment. Additional evaluations, medications, or tests may be needed.  Home care  Your health care provider may prescribe medications for pain, symptoms, or an infection.  Follow the health care provider's instructions for taking these medications.  General care    Rest until your next exam. No strenuous activities.    Try to find  positions that ease discomfort. A small pillow placed on the abdomen may help relieve pain.    Something warm on your abdomen (such as a heating pad) may help, but be careful not to burn yourself.  Diet    Do not force yourself to eat, especially if having cramps, vomiting, or diarrhea.    Water is important so you do not get dehydrated. Soup may also be good. Sports drinks may also help, especially if they are not too acidic. Make sure you don't drink sugary drinks as this can make things worse. Take liquids in small amounts. Do not guzzle them.    Caffeine sometimes makes the pain and cramping worse.    Avoid dairy products if you have vomiting or diarrhea.    Don't eat large amounts at a time. Wait a few minutes between bites.    Eat a diet low in fiber (called a low-residue diet). Foods allowed include refined breads, white rice, fruit and vegetable juices without pulp, tender meats. These foods will pass more easily through the intestine.    Avoid fried or fatty foods, dairy, alcohol and spicy foods until your symptoms go away.  Follow-up care  Follow up with your health care provider as instructed, or if your pain does not begin to improve in the next 24 hours.  When to seek medical care  Seek prompt medical care if any of the following occur:    Pain gets worse or moves to the right lower abdomen    New or worsening vomiting or diarrhea    Swelling of the abdomen    Unable to pass stool for more than three days    New fever over 101  F (38.3 C), or rising fever    Blood in vomit or bowel movements (dark red or black color)    Jaundice (yellow color of eyes and skin)    Weakness, dizziness    Chest, arm, back, neck or jaw pain    Unexpected vaginal bleeding or missed period  Call 911  Call emergency services if any of the following occur:    Trouble breathing    Confusion    Fainting or loss of consciousness    Rapid heart rate    Seizure    [id number] [address], [address] [facility] [facility], [place] [address].  All rights reserved. This information is not intended as a substitute for professional medical care. Always follow your healthcare professional's instructions.

## 2017-07-19 LAB
BACTERIA SPEC CULT: ABNORMAL
MICRO REPORT STATUS: ABNORMAL
MICROORGANISM SPEC CULT: ABNORMAL
SPECIMEN SOURCE: ABNORMAL

## 2017-07-19 NOTE — PROGRESS NOTES
Please call patient with results (If unable to reach patient, this may be sent as a letter instead):    Dear Kerry Vega,     The hemoglobin is a little low and I will call in some easy to take iron supplements. There is a new compression fracture or osteoporosis of the spine and this may be the cause of some of your pain. You have taken 5 years of Alendronate and this requires a 2 year break before restarting.     The hiatal hernia is part of what is also causing pain. I will call with these results when back. The kidney and liver tests were normal. Urinary tract infection is sensitive to the Macrobid. Follow up in 2-3 weeks.       Yadira Gutierrez MD

## 2017-07-19 NOTE — PROGRESS NOTES
See other result note.    Dear Kerry Vega,     Please schedule follow up to discuss results.      Yadira Gutierrez MD

## 2017-07-20 NOTE — PROGRESS NOTES
Letter sent to patients home address with results. Pt may call our appt line to schedule the appt.  Jerry Waters,  For [facility]

## 2017-08-09 ENCOUNTER — TELEPHONE (OUTPATIENT)
Dept: FAMILY MEDICINE | Facility: CLINIC | Age: 77
End: 2017-08-09

## 2017-08-09 NOTE — TELEPHONE ENCOUNTER
Reason for Call:  Other call back    Detailed comments: Pt's Spouse calling for Pt has underwent an injury and cannot walk on her feet.  He would like to know what options Pt has to be seeing and would like to discuss  further    Phone Number Patient can be reached at: Home number on file [phone] (home)    Best Time: anytime    Can we leave a detailed message on this number? YES    Call taken on 8/9/2017 at 10:07 AM by Karlos García

## 2017-08-09 NOTE — TELEPHONE ENCOUNTER
This writer attempted to contact Kerry on 08/09/17      Reason for call return call below and left message to return call.      When patient calls back, please contact clinic RN team. If no one available, document that pt called and route to care team. routine priority or per symptoms.          Edgardo Woods, RN

## 2017-08-10 NOTE — TELEPHONE ENCOUNTER
Attempted to contact callerShane. Shane reports patient is in hospital currently. Verbalized will remind patient to schedule hospital f/u appointment with PCP after she is discharged.     Cira Zaragoza RN

## 2017-08-16 ENCOUNTER — TELEPHONE (OUTPATIENT)
Dept: FAMILY MEDICINE | Facility: CLINIC | Age: 77
End: 2017-08-16

## 2017-08-16 NOTE — TELEPHONE ENCOUNTER
Reason for Call:  Home Health Care    PT: access evaluation and treat    OT: access evaluation and treat    Skilled Nursing: access evaluation and treat    Pt Provider: Yury    Phone Number Homecare Nurse Joelle can be reached at: [phone]    Can we leave a detailed message on this number? YES    Best Time: any    Call taken on 8/16/2017 at 12:40 PM by Gem Vu

## 2017-08-18 ENCOUNTER — TELEPHONE (OUTPATIENT)
Dept: FAMILY MEDICINE | Facility: CLINIC | Age: 77
End: 2017-08-18

## 2017-08-18 NOTE — TELEPHONE ENCOUNTER
Filemon back to Home care and spoke to Edwin and advised of below. Voiced understanding.  Harini Cotter RN.

## 2017-08-18 NOTE — TELEPHONE ENCOUNTER
Reason for Call:  Other Order    Detailed comments: [facility] calling for additional orders for  home health and occupational therapy for times a week for two weeks.    Phone Number PayPropcan be reached at: Other phone number:  [phone]    Best Time: anytime    Can we leave a detailed message on this number? YES    Call taken on 8/18/2017 at 3:21 PM by Karlos García

## 2017-08-18 NOTE — TELEPHONE ENCOUNTER
Verbal orders for plan of care for homecare    Skilled nursing: once a week for 4 weeks    OT/PT to eval and treat    Call   [facility] (HOME CARE) [phone]         Ok to leave verbal ok

## 2017-08-18 NOTE — TELEPHONE ENCOUNTER
OT 2 x /week x 2 weeks -approved by RN per protocol    RN unable to approve skilled nursing visits 1x/week x 4 weeks    Routing to provider to review and advise  Harini Cotter RN.

## 2017-08-21 ENCOUNTER — TELEPHONE (OUTPATIENT)
Dept: FAMILY MEDICINE | Facility: CLINIC | Age: 77
End: 2017-08-21

## 2017-08-21 NOTE — TELEPHONE ENCOUNTER
Reason for Call:  Other     Detailed comments:  APPROVAL OF  PLAN OF CARE    Phone Number Patient can be reached at: Other phone number:    [facility]/JENNIFER [phone]         Best Time: ANY    Can we leave a detailed message on this number? YES    Call taken on 8/21/2017 at 11:49 AM by Doreen Aquino

## 2017-08-21 NOTE — TELEPHONE ENCOUNTER
Called and spoke with Heather, from home care. She needs additional orders as follows:    Skilled nursing 1 time a week for 4 weeks, OT and PT eval and treat.    I advised that we have already given these orders requested but caller stated she needs this orders again (see 8/16 and 8/18/17 telephone encounters)  Verbal orders given to approve the requested services below per the 'Home Care, Assisted Living, or Nursing Home Evaluations and Treatments [facility] Policy'.    Edgardo Woods RN

## 2017-08-23 ENCOUNTER — TELEPHONE (OUTPATIENT)
Dept: FAMILY MEDICINE | Facility: CLINIC | Age: 77
End: 2017-08-23

## 2017-08-23 NOTE — TELEPHONE ENCOUNTER
Reason for call:  Home Healthcare Reason for Call:  Home Health Care    [facility] (reason for call): verbal orders    Orders are needed for this patient. Home health PT    PT: 2 times a week for 2 weeks      OT:     Skilled Nursing:     Pt Provider: Dr Cotton    Phone Number Homecare Nurse can be reached at:   [facility] PT (Other) [phone]         Can we leave a detailed message on this number? YES    Phone number patient can be reached at:     Best Time:     Call taken on 8/23/2017 at 8:37 AM by Radha Alvarado        Phone number to reach patient:      Best Time:      Can we leave a detailed message on this number?

## 2017-08-23 NOTE — TELEPHONE ENCOUNTER
Call to [place] nurse, with Home Health advised orders were approved per [facility] protocol.  Harini Cotter RN.

## 2017-08-24 ENCOUNTER — MEDICAL CORRESPONDENCE (OUTPATIENT)
Dept: HEALTH INFORMATION MANAGEMENT | Facility: CLINIC | Age: 77
End: 2017-08-24

## 2017-09-06 ENCOUNTER — MEDICAL CORRESPONDENCE (OUTPATIENT)
Dept: HEALTH INFORMATION MANAGEMENT | Facility: CLINIC | Age: 77
End: 2017-09-06

## 2017-09-06 DIAGNOSIS — Z53.9 DIAGNOSIS NOT YET DEFINED: Primary | ICD-10-CM

## 2017-09-06 PROCEDURE — G0180 MD CERTIFICATION HHA PATIENT: HCPCS | Performed by: FAMILY MEDICINE

## 2017-09-20 ENCOUNTER — OFFICE VISIT (OUTPATIENT)
Dept: FAMILY MEDICINE | Facility: CLINIC | Age: 77
End: 2017-09-20
Payer: MEDICARE

## 2017-09-20 VITALS
BODY MASS INDEX: 22.17 KG/M2 | WEIGHT: 95.8 LBS | TEMPERATURE: 98.2 F | SYSTOLIC BLOOD PRESSURE: 105 MMHG | DIASTOLIC BLOOD PRESSURE: 59 MMHG | HEIGHT: 55 IN | HEART RATE: 93 BPM | OXYGEN SATURATION: 98 %

## 2017-09-20 DIAGNOSIS — Z23 ENCOUNTER FOR IMMUNIZATION: ICD-10-CM

## 2017-09-20 DIAGNOSIS — M17.0 PRIMARY OSTEOARTHRITIS OF BOTH KNEES: Primary | ICD-10-CM

## 2017-09-20 DIAGNOSIS — M54.12 CERVICAL RADICULOPATHY: ICD-10-CM

## 2017-09-20 DIAGNOSIS — K21.9 GASTROESOPHAGEAL REFLUX DISEASE WITHOUT ESOPHAGITIS: ICD-10-CM

## 2017-09-20 DIAGNOSIS — M18.12 OSTEOARTHRITIS OF LEFT THUMB: ICD-10-CM

## 2017-09-20 PROCEDURE — 99213 OFFICE O/P EST LOW 20 MIN: CPT | Mod: 25 | Performed by: FAMILY MEDICINE

## 2017-09-20 PROCEDURE — G0008 ADMIN INFLUENZA VIRUS VAC: HCPCS | Performed by: FAMILY MEDICINE

## 2017-09-20 PROCEDURE — 90662 IIV NO PRSV INCREASED AG IM: CPT | Performed by: FAMILY MEDICINE

## 2017-09-20 NOTE — PATIENT INSTRUCTIONS
How to contact your care team providers:    [facility]  [phone]  Pharmacy [phone]    ANTIONE [address] Dr., PA-C, Dr., PA-C    Team RN: Edgardo GARCIA    Clinic hours  M-Th 7am-7pm   Fri 7am-5pm.   Urgent care M-F 11am-9pm,   Sat/sun 9am-5pm.  Pharmacy M-F 8:00am-8pm Sat/sun 9am-5pm.     All password changes, disabled accounts, or ID changes in [facility] will be done by our Access Services Department.   If you need help with your account or password, call: [phone]. Clinic staff no longer has the ability to change passwords.

## 2017-09-20 NOTE — PROGRESS NOTES
SUBJECTIVE:   Kerry Vega is a 77 year old female who presents to clinic today for the following health issues:    Follow up left leg per othopedic.  Joint Pain/ knee pain follow up    Onset: few months    Description:   Location: right knee  Character: ache    Intensity: moderate    Progression of Symptoms: worse    Accompanying Signs & Symptoms:  Other symptoms: none    History:   Previous similar pain: no       Precipitating factors:   Trauma or overuse: YES- putting more weight on right leg, due to left leg pain/fracture    Alleviating factors:  Improved by: nothing    Therapies Tried and outcome: None.      Problem list and histories reviewed & adjusted, as indicated.  Additional history: as documented    Patient Active Problem List   Diagnosis     CARDIOVASCULAR SCREENING; LDL GOAL LESS THAN 160     Right lumbar radiculopathy     Left knee pain     Osteoporosis     Advanced directives, counseling/discussion     Abnormal Pap smear     Dysphagia     Health Care Home     Iron deficiency anemia     Sliding hiatal hernia     OA (osteoarthritis) of knee     Osteoarthritis of left thumb     Primary osteoarthritis of both knees     Gastroesophageal reflux disease without esophagitis     Past Surgical History:   Procedure Laterality Date     COLONOSCOPY  1/29/2013    Procedure: COLONOSCOPY;;  Surgeon: Edgard Prieto MD;  Location: [facility] OR     ESOPHAGOSCOPY, GASTROSCOPY, DUODENOSCOPY (EGD), COMBINED  1/20/2012    Procedure:COMBINED ESOPHAGOSCOPY, GASTROSCOPY, DUODENOSCOPY (EGD); EGD, dysphagia; Surgeon:SHEREE VALLE; Location:[facility] OR     ESOPHAGOSCOPY, GASTROSCOPY, DUODENOSCOPY (EGD), COMBINED  7/18/2012    Procedure: COMBINED ESOPHAGOSCOPY, GASTROSCOPY, DUODENOSCOPY (EGD);  EGD for dysphagia rp Tonio;  Surgeon: Rey Bee MD;  Location: [facility] OR     ESOPHAGOSCOPY, GASTROSCOPY, DUODENOSCOPY (EGD), COMBINED  1/29/2013    Procedure: COMBINED ESOPHAGOSCOPY, GASTROSCOPY, DUODENOSCOPY (EGD), BIOPSY SINGLE OR  "MULTIPLE;  Colonoscopy, EGD, dysphagia;  Surgeon: Edgard Prieto MD;  Location: [facility] OR       Social History   Substance Use Topics     Smoking status: Never Smoker     Smokeless tobacco: Never Used     Alcohol use No     Family History   Problem Relation Age of Onset     CANCER No family hx of      DIABETES No family hx of      Hypertension No family hx of      CEREBROVASCULAR DISEASE No family hx of      Thyroid Disease No family hx of      Glaucoma No family hx of      Macular Degeneration No family hx of              Reviewed and updated as needed this visit by clinical staffTobacco  Allergies  Med Hx  Surg Hx  Fam Hx  Soc Hx      Reviewed and updated as needed this visit by Provider         ROS:  Constitutional, HEENT, cardiovascular, pulmonary, GI, , musculoskeletal, neuro, skin, endocrine and psych systems are negative, except as otherwise noted.      OBJECTIVE:   /59 (BP Location: Left arm, Patient Position: Chair, Cuff Size: Adult Regular)  Pulse 93  Temp 98.2  F (36.8  C) (Oral)  Ht 4' 6.75\" (1.391 m)  Wt 95 lb 12.8 oz (43.5 kg)  SpO2 98%  BMI 22.47 kg/m2  Body mass index is 22.47 kg/(m^2).  GENERAL: healthy, alert and no distress  NECK: no adenopathy, no asymmetry, masses, or scars and thyroid normal to palpation  RESP: lungs clear to auscultation - no rales, rhonchi or wheezes  CV: regular rate and rhythm, normal S1 S2, no S3 or S4, no murmur, click or rub, no peripheral edema and peripheral pulses strong  ABDOMEN: soft, nontender, no hepatosplenomegaly, no masses and bowel sounds normal  MS: no gross musculoskeletal defects noted, no edema    Diagnostic Test Results:  none     ASSESSMENT/PLAN:     1. Primary osteoarthritis of both knees  Steroidal injections has help in past. Patient will f/u with Orthopedics.  - ORTHO  REFERRAL    2. Osteoarthritis of left thumb  Needed refills.  - diclofenac (VOLTAREN) 1 % GEL topical gel; Apply 4 grams to knees or 2 grams to hands four " times daily using enclosed dosing card.  Dispense: 100 g; Refill: 1    3. Cervical radiculopathy  Needed refills.  - diclofenac (VOLTAREN) 1 % GEL topical gel; Apply 4 grams to knees or 2 grams to hands four times daily using enclosed dosing card.  Dispense: 100 g; Refill: 1    4. Gastroesophageal reflux disease without esophagitis  Needed refills.  - ranitidine (ZANTAC) 300 MG tablet; Take 1 tablet (300 mg) by mouth At Bedtime  Dispense: 90 tablet; Refill: 3    5. Encounter for immunization    - FLU VACCINE, INCREASED ANTIGEN, PRESV FREE  - ADMIN 1st VACCINE    See Patient Instructions    Shadi Cotton MD, MD  [facility]

## 2017-09-20 NOTE — NURSING NOTE
"Chief Complaint   Patient presents with     RECHECK     follow up left leg     Knee Pain     right knee pain       Initial /59 (BP Location: Left arm, Patient Position: Chair, Cuff Size: Adult Regular)  Pulse 93  Temp 98.2  F (36.8  C) (Oral)  Ht 4' 6.75\" (1.391 m)  Wt 95 lb 12.8 oz (43.5 kg)  SpO2 98%  BMI 22.47 kg/m2 Estimated body mass index is 22.47 kg/(m^2) as calculated from the following:    Height as of this encounter: 4' 6.75\" (1.391 m).    Weight as of this encounter: 95 lb 12.8 oz (43.5 kg).  Medication Reconciliation: complete     Nilsa Huerta MA      "

## 2017-09-20 NOTE — NURSING NOTE
Injectable Influenza Immunization Documentation      1.  Has the patient received the information for the injectable influenza vaccine? YES    2. Is the patient 6 months of age or older? YES    3. Does the patient have any of the following contraindications?          Severe allergy to eggs? No     Severe allergic reaction to previous influenza vaccines? No     Allergy to contact lens solution/thimerosol? No     History of Guillain-Kellyton syndrome? No     Currently have moderate or severe illness? No         4.  The vaccine has been administered and the patient was instructed to wait 15 minutes before leaving the building in the event of an allergic reaction: YES    Vaccination given by Nilsa Huerta MA

## 2017-09-27 DIAGNOSIS — M17.9 OSTEOARTHRITIS OF KNEE, UNSPECIFIED LATERALITY, UNSPECIFIED OSTEOARTHRITIS TYPE: ICD-10-CM

## 2017-09-27 NOTE — TELEPHONE ENCOUNTER
Naprosyn 500 mg      Last Written Prescription Date:  n/a  Last Fill Quantity: n/a,   # refills: n/a  Last Office Visit with [facility] or [facility] prescribing provider: n/a  Future Office visit:       Routing refill request to provider for review/approval because:  Drug not active on patient's medication list

## 2017-09-28 RX ORDER — NAPROXEN 500 MG/1
TABLET ORAL
Qty: 60 TABLET | Refills: 0 | Status: SHIPPED | OUTPATIENT
Start: 2017-09-28 | End: 2017-12-08

## 2017-10-04 ENCOUNTER — OFFICE VISIT (OUTPATIENT)
Dept: ORTHOPEDICS | Facility: CLINIC | Age: 77
End: 2017-10-04
Payer: MEDICARE

## 2017-10-04 ENCOUNTER — OFFICE VISIT (OUTPATIENT)
Dept: FAMILY MEDICINE | Facility: CLINIC | Age: 77
End: 2017-10-04
Payer: MEDICARE

## 2017-10-04 VITALS
WEIGHT: 97.4 LBS | HEART RATE: 88 BPM | SYSTOLIC BLOOD PRESSURE: 134 MMHG | DIASTOLIC BLOOD PRESSURE: 76 MMHG | TEMPERATURE: 97.6 F | BODY MASS INDEX: 22.85 KG/M2 | OXYGEN SATURATION: 100 %

## 2017-10-04 VITALS — HEIGHT: 55 IN | RESPIRATION RATE: 16 BRPM | BODY MASS INDEX: 22.21 KG/M2 | WEIGHT: 96 LBS

## 2017-10-04 DIAGNOSIS — M25.562 CHRONIC PAIN OF BOTH KNEES: ICD-10-CM

## 2017-10-04 DIAGNOSIS — G89.29 CHRONIC PAIN OF BOTH KNEES: ICD-10-CM

## 2017-10-04 DIAGNOSIS — M17.0 PRIMARY OSTEOARTHRITIS OF BOTH KNEES: Primary | ICD-10-CM

## 2017-10-04 DIAGNOSIS — M25.561 CHRONIC PAIN OF BOTH KNEES: ICD-10-CM

## 2017-10-04 DIAGNOSIS — M79.605 PAIN OF LEFT LOWER EXTREMITY: Primary | ICD-10-CM

## 2017-10-04 PROCEDURE — 99214 OFFICE O/P EST MOD 30 MIN: CPT | Performed by: FAMILY MEDICINE

## 2017-10-04 PROCEDURE — 20610 DRAIN/INJ JOINT/BURSA W/O US: CPT | Mod: 50 | Performed by: ORTHOPAEDIC SURGERY

## 2017-10-04 RX ORDER — METHYLPREDNISOLONE ACETATE 80 MG/ML
80 INJECTION, SUSPENSION INTRA-ARTICULAR; INTRALESIONAL; INTRAMUSCULAR; SOFT TISSUE ONCE
Qty: 2 ML | Refills: 0 | OUTPATIENT
Start: 2017-10-04 | End: 2017-10-04

## 2017-10-04 ASSESSMENT — PAIN SCALES - GENERAL: PAINLEVEL: MODERATE PAIN (5)

## 2017-10-04 NOTE — LETTER
10/4/2017         RE: Kerry Vega  [address]  [place] [facility] [address]        Dear Colleague,    Thank you for referring your patient, Kerry Vega, to the [facility]. Please see a copy of my visit note below.    CHIEF COMPLAINT:   Chief Complaint   Patient presents with     RECHECK     Bilateral knee OA. Last cortisone injections on 5/17/17. This works well. She had a midshaft femur fracture in the end of July that was fixed surgically at [facility] by [facility]. THis occurred while going downstairs without a fall, she was evaluated and found to NOT have osteoporosis or bone disease.       HISTORY OF PRESENT ILLNESS    Kerry Vega is a 77 year old female seen for follow up evaluation of ongoing bilateral knee pain (Left>Right) with no known injury. Pain has been present for 4-5 years. She presents with her son today and her son was giving most of the history today. She notes that pain is moderate today, rated 5/10. Pain is located diffusely over the entire bilateral knees. Her last injections were on 5/17/2017. She notes these injection were working well until recently. She thinks this could be because of her recent injury. Patient broke her femur in July 26-27, 2017. She was seen at [facility]. She had surgery with [facility] about 2 days later, after having more tests done. Since surgery she had notice a lump like sensation, which sounds like it could be the bone healing per her surgeon. They plan to see him later this month    Present symptoms: pain medially  and anteriorly, pain sharp, dull/achy, moderate pain, no swelling, +catching/popping, no locking, no giving way.    Pain severity: 6/10  Pain quality: dull, aching and sharp  Frequency of symptoms: are constant  Exacerbating Factors: weight bearing, stairs, [id number]  Relieving Factors: rest, sitting, medications  Night Pain: No  Pain while at rest: No   Numbness or tingling: No   Patient  has tried:     NSAIDS: Yes      Physical Therapy: No      Activity modification: Yes      Bracing: No      Injections: Yes, 5/17/2017     Ice: No      Other: pain medications.    Other PMH:  has a past medical history of Anemia and Arthritis. She also has no past medical history of Amblyopia; Diabetes (H); Diabetic retinopathy (H); Glaucoma; Hypertension; Macular degeneration; Nonsenile cataract; Retinal detachment; Strabismus; or Uveitis.    Surgical Hx:  has a past surgical history that includes Esophagoscopy, gastroscopy, duodenoscopy (EGD), combined (1/20/2012); Esophagoscopy, gastroscopy, duodenoscopy (EGD), combined (7/18/2012); Esophagoscopy, gastroscopy, duodenoscopy (EGD), combined (1/29/2013); and Colonoscopy (1/29/2013).    Medications:   Current Outpatient Prescriptions:      naproxen (NAPROSYN) 500 MG tablet, TAKE 1 TABLET BY MOUTH TWICE DAILY WITH FOOD AS NEEDED FOR PAIN, Disp: 60 tablet, Rfl: 0     diclofenac (VOLTAREN) 1 % GEL topical gel, Apply 4 grams to knees or 2 grams to hands four times daily using enclosed dosing card., Disp: 100 g, Rfl: 1     ranitidine (ZANTAC) 300 MG tablet, Take 1 tablet (300 mg) by mouth At Bedtime, Disp: 90 tablet, Rfl: 3     furosemide (LASIX) 20 MG tablet, Take 1 tablet (20 mg) by mouth daily as needed for leg swelling., Disp: 60 tablet, Rfl: 1     cetirizine (ZYRTEC) 10 MG tablet, Take 1 tablet (10 mg) by mouth daily, Disp: 30 tablet, Rfl: 1     methocarbamol (ROBAXIN) 750 MG tablet, Take 1 tablet (750 mg) by mouth 3 times daily as needed for muscle spasms, Disp: 45 tablet, Rfl: 1     alendronate (FOSAMAX) 70 MG tablet, Take 1 tablet (70 mg) by mouth every 7 days, Disp: 12 tablet, Rfl: 3     ferrous sulfate (IRON) 325 (65 FE) MG tablet, Take 1 tablet (325 mg) by mouth 3 times daily (with meals), Disp: 90 tablet, Rfl: 3     calcium-vitamin D (CALTRATE) 600-400 MG-UNIT per tablet, Take 1 tablet by mouth 2 times daily, Disp: 100 tablet, Rfl: 3     cyanocobalamin 1000 MCG  "TBCR, Take 1,000 mcg by mouth daily, Disp: 30 tablet, Rfl: 2     ORDER FOR DME, Pull up briefs for incontinence of urine change 2 to 3 times daily, Disp: 1 Container, Rfl: 11     ASPIRIN NOT PRESCRIBED (INTENTIONAL), Reported on 4/4/2017, Disp: , Rfl:     Allergies:   No Known Allergies    Social Hx: .   reports that she has never smoked. She has never used smokeless tobacco. She reports that she does not drink alcohol or use illicit drugs.    Family Hx: family history is negative for CANCER, DIABETES, Hypertension, CEREBROVASCULAR DISEASE, Thyroid Disease, Glaucoma, and Macular Degeneration.     This document serves as a record of the services and decisions personally performed and made by Navid Dalton MD. It was created on his behalf by Paris Brito, a trained medical scribe. The creation of this document is based the provider's statements to the medical scribe.    Scribe Paris Brito 3:35 PM 10/4/2017     REVIEW OF SYSTEMS:   CONSTITUTIONAL:NEGATIVE for fever, chills, change in weight  INTEGUMENTARY/SKIN: NEGATIVE for worrisome rashes, moles or lesions  MUSCULOSKELETAL:See HPI above  NEURO: NEGATIVE for weakness, dizziness or paresthesias    PHYSICAL EXAM:  Resp 16  Ht 1.391 m (4' 6.75\")  Wt 43.5 kg (96 lb)  BMI 22.52 kg/m2   GENERAL APPEARANCE: elderly, frail appearing, alert, no distress; presents with her son.  SKIN: no suspicious lesions or rashes  NEURO: Normal strength and tone, mentation intact and speech normal  PSYCH:  mentation appears normal and affect normal, not anxious  RESPIRATORY: No increased work of breathing.    BILATERAL LOWER EXTREMITIES:  Gait: favors the left. Hunched.  Alignment: varus  No gross deformities or masses.  Diffuse bilateral Quad atrophy, strength weak.  Intact sensation deep peroneal nerve, superficial peroneal nerve, med/lat tibial nerve, sural nerve, saphenous nerve  Intact EHL, EDL, TA, FHL, GS, quadriceps hamstrings and hip flexors  Toes warm and well perfused, " brisk capillary refill. Palpable 2+ dp pulses.  Bilateral calf soft and nttp or squeeze.  Edema: none    LEFT KNEE EXAM:    Skin: intact, no ecchymosis or erythema  ROM: 0 extension to 120 flexion  Tight hamstrings on straight leg raise.  Effusion: none  Tender: tender to palpation medial and anteromedial knee; nontender to palpation lateral and posterior knee.     MCL: stable, and non-painful at both 0 and 30 degrees knee flexion  Varus stress: stable, and non-painful at both 0 and 30 degrees knee flexion  Lachmans: neg, firm endpoint  Posterior Drawer stable  Patellofemoral joint:                Apprehension: negative              Crepitations: moderate   Grind: positive     RIGHT KNEE EXAM:    Skin: intact, no ecchymosis or erythema  ROM: 0 extension to 125 flexion with crepitation  Tight hamstrings on straight leg raise.  Effusion: none  Tender: tender to palpation medial joint line, NTTP lat joint line, anterior or posterior knee    MCL: stable, and non-painful at both 0 and 30 degrees knee flexion  Varus stress: stable, and non-painful at both 0 and 30 degrees knee flexion  Lachmans: neg, firm endpoint  Posterior Drawer stable  Patellofemoral joint:                Apprehension: negative              Crepitations: moderate   Grind: positive     X-RAY: no new xrays today.      3 views bilateral knee from 5/17/2017 were reviewed in clinic today. On my review, no obvious fractures or dislocations.severe bilateral tricompartmental degenerative changes with  Bone on bone complete loss of medial space, large marginal osteophytes anterior, posterior, medial and lateral. Flattening of the articular surfaces, subchondral sclerosis.      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    Plan:    * reviewed imaging studies with patient, . Advanced, end-stage osteoarthritis.    Discussed with patient typical symptoms of arthritic pain is pain aggravated with  weight bearing activities, stiffness, relieved by sitting or rest. Swelling may be associated. It is common to have some grinding and popping in the knee with arthritis.    Discussed various treatments for degenerative arthrosis of the knee, including nonoperative and operative approaches. Nonoperative approaches, which are exhausted prior to operative treatment, include doing nothing and living with the pain as patient has been doing, activity modification (avoid aggravating activities), physical therapy and strengthening exercises, weight loss, anti-inflammatory medications, bracing, and injections. Once these have been tried and are deemed unsuccessful with a good effort, and the patient is an appropriate candidate, the next treatment would be knee arthroplasty. Depending on the location of the arthritis, knee replacement can be partial (one side of the knee affected by arthritis) or a total knee arthroplasty (all 3 compartments). The risks, perceived benefits and perioperative rehabilitation expectations of knee arthroplasty were discussed in detail. Also discussed that approximately 10% of patients that undergo knee arthroplasty are not happy following surgery and may have worse pain or no improvement in pain, contrary to their preoperative expectations.    * At this time, patient does not have interest in surgery, so nonoperative treatment will be continued.  * Also discussed that patient and son should consider asking her surgeon about her femur fracture and use of fosamax.    Treatment:  * rest  * Activity modification - avoid impact activities or activities that aggravate symptoms.  * NSAIDS - regular use for inflammation ( twice daily or three times daily), with food, as long as no contra-indications Please discuss with pcp if needed  * ice, 15-20 minutes at a time several times a day or as needed.  * heat as needed.  * Strengthening of quadriceps muscles  * Physical Therapy ordered for strengthening,  stretching and range of motion exercises  * Tylenol as needed for pain  * Weight loss: Weight loss: The patient's Body mass index is 22.52 kg/(m^2).. Discussed with patient weight loss benefits, not only for the current pain symptoms, but also overall health. Recommend a good diet plan that works for the patient, with the assistance of a dietician or PCP as needed. Also, a good, low-impact exercise program for at least 20 minutes per day, 3 times per week, such as exercise bike, elliptical , or pool.  * Exercise: low impact such as stationary bike, elliptical, pool.  * Injections: risks and perceived benefits of cortisone versus viscosupplementation injections discussed. Patient elected to proceed. See procedure note below for bilateral knee cortisone injections.  * Bracing: bracing the knee may offer some relief of symptoms when worn  *  Consider visco injections in future as needed.  * Return to clinic as needed.    PROCEDURE NOTE:  The risks, perceived benefits and potential complications (including but not limited to, bleeding, infection, pain, scar, damage to adjacent structures, atrophy or necrosis of soft tissue, skin blanching, failure to relieve symptoms, allergic reaction) of injection were discussed with the patient. Questions were addressed and answered.The patient elected to proceed. Written informed consent was obtained. The correct procedural site was identified and confirmed. A Right Knee intraarticular injection was performed using 1mL Depo Medrol 80mg per mL and 7mL (4mL 1% lidocaine, 3mL 0.25% marcaine)  of local anesthetic after sterile prep, to the correct procedural site. Sterile bandaid applied. This was tolerated well by the patient. No apparent complications.  Did also discuss that if diabetic, recommend close monitoring of blood sugars over the next week as cortisone injections can temporarily elevate blood sugars.    The risks, perceived benefits and potential complications  (including but not limited to, bleeding, infection, pain, scar, damage to adjacent structures, atrophy or necrosis of soft tissue, skin blanching, failure to relieve symptoms, allergic reaction) of injection were discussed with the patient. Questions were addressed and answered.The patient elected to proceed. Written informed consent was obtained. The correct procedural site was identified and confirmed. A Left Knee intraarticular injection was performed using 1mL Depo Medrol 80mg per mL and 7mL (4mL 1% lidocaine, 3mL 0.25% marcaine)  of local anesthetic after sterile prep, to the correct procedural site. Sterile bandaid applied. This was tolerated well by the patient. No apparent complications.  Did also discuss that if diabetic, recommend close monitoring of blood sugars over the next week as cortisone injections can temporarily elevate blood sugars.    The information in this document, created by a scribe for me, accurately reflects the services I personally performed and the decisions made by me. I have reviewed and approved this document for accuracy.      Navid Dalton M.D., M.S.  Dept. of Orthopaedic Surgery  [facility]    The patient's Bilateral knees were prepped with betadine solution after verification of allergies. Area approximately 10 cm x 10 cm prepped in a sterile fashion. After injection, betadine removed with soap and water and band-aids applied.    4cc Lidocaine 1%  NDC 12687-632-43, LOT [id number],    3cc Bupivacaine 0.25% NDC 45385-500-38, LOT [id number],  2018  1cc Depo Medrol 80 mg/ml NDC 6582-1391-97, LOT [id number],  2019 injected into patient's Bilateral Knees by:  Honorio Thakkar PA-C, CAQ (Ortho)  Supervising Physician: Navid Dalton M.D., M.S.  Dept. of Orthopaedic Surgery  [facility]          Again, thank you for allowing me to participate in the care of your patient.        Sincerely,        Navid Dalton MD

## 2017-10-04 NOTE — PROGRESS NOTES
CHIEF COMPLAINT:   Chief Complaint   Patient presents with     RECHECK     Bilateral knee OA. Last cortisone injections on 5/17/17. This works well. She had a midshaft femur fracture in the end of July that was fixed surgically at [facility] by [facility]. THis occurred while going downstairs without a fall, she was evaluated and found to NOT have osteoporosis or bone disease.       HISTORY OF PRESENT ILLNESS    Kerry Vega is a 77 year old female seen for follow up evaluation of ongoing bilateral knee pain (Left>Right) with no known injury. Pain has been present for 4-5 years. She presents with her son today and her son was giving most of the history today. She notes that pain is moderate today, rated 5/10. Pain is located diffusely over the entire bilateral knees. Her last injections were on 5/17/2017. She notes these injection were working well until recently. She thinks this could be because of her recent injury. Patient broke her femur in July 26-27, 2017. She was seen at [facility]. She had surgery with [facility] about 2 days later, after having more tests done. Since surgery she had notice a lump like sensation, which sounds like it could be the bone healing per her surgeon. They plan to see him later this month    Present symptoms: pain medially  and anteriorly, pain sharp, dull/achy, moderate pain, no swelling, +catching/popping, no locking, no giving way.    Pain severity: 6/10  Pain quality: dull, aching and sharp  Frequency of symptoms: are constant  Exacerbating Factors: weight bearing, stairs, [id number]  Relieving Factors: rest, sitting, medications  Night Pain: No  Pain while at rest: No   Numbness or tingling: No   Patient has tried:     NSAIDS: Yes      Physical Therapy: No      Activity modification: Yes      Bracing: No      Injections: Yes, 5/17/2017     Ice: No      Other: pain medications.    Other PMH:  has a past medical history of Anemia and Arthritis. She also has no past  medical history of Amblyopia; Diabetes (H); Diabetic retinopathy (H); Glaucoma; Hypertension; Macular degeneration; Nonsenile cataract; Retinal detachment; Strabismus; or Uveitis.    Surgical Hx:  has a past surgical history that includes Esophagoscopy, gastroscopy, duodenoscopy (EGD), combined (1/20/2012); Esophagoscopy, gastroscopy, duodenoscopy (EGD), combined (7/18/2012); Esophagoscopy, gastroscopy, duodenoscopy (EGD), combined (1/29/2013); and Colonoscopy (1/29/2013).    Medications:   Current Outpatient Prescriptions:      naproxen (NAPROSYN) 500 MG tablet, TAKE 1 TABLET BY MOUTH TWICE DAILY WITH FOOD AS NEEDED FOR PAIN, Disp: 60 tablet, Rfl: 0     diclofenac (VOLTAREN) 1 % GEL topical gel, Apply 4 grams to knees or 2 grams to hands four times daily using enclosed dosing card., Disp: 100 g, Rfl: 1     ranitidine (ZANTAC) 300 MG tablet, Take 1 tablet (300 mg) by mouth At Bedtime, Disp: 90 tablet, Rfl: 3     furosemide (LASIX) 20 MG tablet, Take 1 tablet (20 mg) by mouth daily as needed for leg swelling., Disp: 60 tablet, Rfl: 1     cetirizine (ZYRTEC) 10 MG tablet, Take 1 tablet (10 mg) by mouth daily, Disp: 30 tablet, Rfl: 1     methocarbamol (ROBAXIN) 750 MG tablet, Take 1 tablet (750 mg) by mouth 3 times daily as needed for muscle spasms, Disp: 45 tablet, Rfl: 1     alendronate (FOSAMAX) 70 MG tablet, Take 1 tablet (70 mg) by mouth every 7 days, Disp: 12 tablet, Rfl: 3     ferrous sulfate (IRON) 325 (65 FE) MG tablet, Take 1 tablet (325 mg) by mouth 3 times daily (with meals), Disp: 90 tablet, Rfl: 3     calcium-vitamin D (CALTRATE) 600-400 MG-UNIT per tablet, Take 1 tablet by mouth 2 times daily, Disp: 100 tablet, Rfl: 3     cyanocobalamin 1000 MCG TBCR, Take 1,000 mcg by mouth daily, Disp: 30 tablet, Rfl: 2     ORDER FOR DME, Pull up briefs for incontinence of urine change 2 to 3 times daily, Disp: 1 Container, Rfl: 11     ASPIRIN NOT PRESCRIBED (INTENTIONAL), Reported on 4/4/2017, Disp: , Rfl:  "    Allergies:   No Known Allergies    Social Hx: .   reports that she has never smoked. She has never used smokeless tobacco. She reports that she does not drink alcohol or use illicit drugs.    Family Hx: family history is negative for CANCER, DIABETES, Hypertension, CEREBROVASCULAR DISEASE, Thyroid Disease, Glaucoma, and Macular Degeneration.     This document serves as a record of the services and decisions personally performed and made by Navid Dalton MD. It was created on his behalf by Paris Brito, a trained medical scribe. The creation of this document is based the provider's statements to the medical scribe.    Scribe Paris Brito 3:35 PM 10/4/2017     REVIEW OF SYSTEMS:   CONSTITUTIONAL:NEGATIVE for fever, chills, change in weight  INTEGUMENTARY/SKIN: NEGATIVE for worrisome rashes, moles or lesions  MUSCULOSKELETAL:See HPI above  NEURO: NEGATIVE for weakness, dizziness or paresthesias    PHYSICAL EXAM:  Resp 16  Ht 1.391 m (4' 6.75\")  Wt 43.5 kg (96 lb)  BMI 22.52 kg/m2   GENERAL APPEARANCE: elderly, frail appearing, alert, no distress; presents with her son.  SKIN: no suspicious lesions or rashes  NEURO: Normal strength and tone, mentation intact and speech normal  PSYCH:  mentation appears normal and affect normal, not anxious  RESPIRATORY: No increased work of breathing.    BILATERAL LOWER EXTREMITIES:  Gait: favors the left. Hunched.  Alignment: varus  No gross deformities or masses.  Diffuse bilateral Quad atrophy, strength weak.  Intact sensation deep peroneal nerve, superficial peroneal nerve, med/lat tibial nerve, sural nerve, saphenous nerve  Intact EHL, EDL, TA, FHL, GS, quadriceps hamstrings and hip flexors  Toes warm and well perfused, brisk capillary refill. Palpable 2+ dp pulses.  Bilateral calf soft and nttp or squeeze.  Edema: none    LEFT KNEE EXAM:    Skin: intact, no ecchymosis or erythema  ROM: 0 extension to 120 flexion  Tight hamstrings on straight leg raise.  Effusion: " none  Tender: tender to palpation medial and anteromedial knee; nontender to palpation lateral and posterior knee.     MCL: stable, and non-painful at both 0 and 30 degrees knee flexion  Varus stress: stable, and non-painful at both 0 and 30 degrees knee flexion  Lachmans: neg, firm endpoint  Posterior Drawer stable  Patellofemoral joint:                Apprehension: negative              Crepitations: moderate   Grind: positive     RIGHT KNEE EXAM:    Skin: intact, no ecchymosis or erythema  ROM: 0 extension to 125 flexion with crepitation  Tight hamstrings on straight leg raise.  Effusion: none  Tender: tender to palpation medial joint line, NTTP lat joint line, anterior or posterior knee    MCL: stable, and non-painful at both 0 and 30 degrees knee flexion  Varus stress: stable, and non-painful at both 0 and 30 degrees knee flexion  Lachmans: neg, firm endpoint  Posterior Drawer stable  Patellofemoral joint:                Apprehension: negative              Crepitations: moderate   Grind: positive     X-RAY: no new xrays today.      3 views bilateral knee from 5/17/2017 were reviewed in clinic today. On my review, no obvious fractures or dislocations.severe bilateral tricompartmental degenerative changes with  Bone on bone complete loss of medial space, large marginal osteophytes anterior, posterior, medial and lateral. Flattening of the articular surfaces, subchondral sclerosis.      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    Plan:    * reviewed imaging studies with patient, . Advanced, end-stage osteoarthritis.    Discussed with patient typical symptoms of arthritic pain is pain aggravated with weight bearing activities, stiffness, relieved by sitting or rest. Swelling may be associated. It is common to have some grinding and popping in the knee with arthritis.    Discussed various treatments for degenerative arthrosis of the knee,  including nonoperative and operative approaches. Nonoperative approaches, which are exhausted prior to operative treatment, include doing nothing and living with the pain as patient has been doing, activity modification (avoid aggravating activities), physical therapy and strengthening exercises, weight loss, anti-inflammatory medications, bracing, and injections. Once these have been tried and are deemed unsuccessful with a good effort, and the patient is an appropriate candidate, the next treatment would be knee arthroplasty. Depending on the location of the arthritis, knee replacement can be partial (one side of the knee affected by arthritis) or a total knee arthroplasty (all 3 compartments). The risks, perceived benefits and perioperative rehabilitation expectations of knee arthroplasty were discussed in detail. Also discussed that approximately 10% of patients that undergo knee arthroplasty are not happy following surgery and may have worse pain or no improvement in pain, contrary to their preoperative expectations.    * At this time, patient does not have interest in surgery, so nonoperative treatment will be continued.  * Also discussed that patient and son should consider asking her surgeon about her femur fracture and use of fosamax.    Treatment:  * rest  * Activity modification - avoid impact activities or activities that aggravate symptoms.  * NSAIDS - regular use for inflammation ( twice daily or three times daily), with food, as long as no contra-indications Please discuss with pcp if needed  * ice, 15-20 minutes at a time several times a day or as needed.  * heat as needed.  * Strengthening of quadriceps muscles  * Physical Therapy ordered for strengthening, stretching and range of motion exercises  * Tylenol as needed for pain  * Weight loss: Weight loss: The patient's Body mass index is 22.52 kg/(m^2).. Discussed with patient weight loss benefits, not only for the current pain symptoms, but also  overall health. Recommend a good diet plan that works for the patient, with the assistance of a dietician or PCP as needed. Also, a good, low-impact exercise program for at least 20 minutes per day, 3 times per week, such as exercise bike, elliptical , or pool.  * Exercise: low impact such as stationary bike, elliptical, pool.  * Injections: risks and perceived benefits of cortisone versus viscosupplementation injections discussed. Patient elected to proceed. See procedure note below for bilateral knee cortisone injections.  * Bracing: bracing the knee may offer some relief of symptoms when worn  *  Consider visco injections in future as needed.  * Return to clinic as needed.    PROCEDURE NOTE:  The risks, perceived benefits and potential complications (including but not limited to, bleeding, infection, pain, scar, damage to adjacent structures, atrophy or necrosis of soft tissue, skin blanching, failure to relieve symptoms, allergic reaction) of injection were discussed with the patient. Questions were addressed and answered.The patient elected to proceed. Written informed consent was obtained. The correct procedural site was identified and confirmed. A Right Knee intraarticular injection was performed using 1mL Depo Medrol 80mg per mL and 7mL (4mL 1% lidocaine, 3mL 0.25% marcaine)  of local anesthetic after sterile prep, to the correct procedural site. Sterile bandaid applied. This was tolerated well by the patient. No apparent complications.  Did also discuss that if diabetic, recommend close monitoring of blood sugars over the next week as cortisone injections can temporarily elevate blood sugars.    The risks, perceived benefits and potential complications (including but not limited to, bleeding, infection, pain, scar, damage to adjacent structures, atrophy or necrosis of soft tissue, skin blanching, failure to relieve symptoms, allergic reaction) of injection were discussed with the patient. Questions  were addressed and answered.The patient elected to proceed. Written informed consent was obtained. The correct procedural site was identified and confirmed. A Left Knee intraarticular injection was performed using 1mL Depo Medrol 80mg per mL and 7mL (4mL 1% lidocaine, 3mL 0.25% marcaine)  of local anesthetic after sterile prep, to the correct procedural site. Sterile bandaid applied. This was tolerated well by the patient. No apparent complications.  Did also discuss that if diabetic, recommend close monitoring of blood sugars over the next week as cortisone injections can temporarily elevate blood sugars.    The information in this document, created by a scribe for me, accurately reflects the services I personally performed and the decisions made by me. I have reviewed and approved this document for accuracy.      Navid Dalton M.D., M.S.  Dept. of Orthopaedic Surgery  [facility]

## 2017-10-04 NOTE — NURSING NOTE
"Chief Complaint   Patient presents with     RECHECK     Bilateral knee OA. Last cortisone injections on 5/17/17. This works well. She had a midshaft femur fracture in the end of July that was fixed surgically at [facility] by [facility]. THis occurred while going downstairs without a fall, she was evaluated and found to NOT have osteoporosis or bone disease.       Initial Resp 16  Ht 1.391 m (4' 6.75\")  Wt 43.5 kg (96 lb)  BMI 22.52 kg/m2 Estimated body mass index is 22.52 kg/(m^2) as calculated from the following:    Height as of this encounter: 1.391 m (4' 6.75\").    Weight as of this encounter: 43.5 kg (96 lb).  Medication Reconciliation: complete   Honorio Thakkar PA-C, CAQ (Ortho)  Supervising Physician: Navid Dalton M.D., M.S.  Dept. of Orthopaedic Surgery  [facility]      "

## 2017-10-04 NOTE — PROGRESS NOTES
The patient's Bilateral knees were prepped with betadine solution after verification of allergies. Area approximately 10 cm x 10 cm prepped in a sterile fashion. After injection, betadine removed with soap and water and band-aids applied.    4cc Lidocaine 1%  NDC 59731-194-59, LOT [id number],    3cc Bupivacaine 0.25% NDC 47403-617-65, LOT [id number],  2018  1cc Depo Medrol 80 mg/ml NDC 4532-4836-62, LOT [id number],  2019 injected into patient's Bilateral Knees by:  Honorio Thakkar PA-C, JOSE (Ortho)  Supervising Physician: Navid Dalton M.D., M.S.  Dept. of Orthopaedic Surgery  [facility]

## 2017-10-04 NOTE — PROGRESS NOTES
SUBJECTIVE:   Kerry Vega is a 77 year old female who presents to clinic today for the following health issues:  Joint Pain    Onset:1 week    Description:   Location: left hip  Character: Sharp    Intensity: 4/10    Progression of Symptoms: same    Accompanying Signs & Symptoms:  Other symptoms: Lump on left thigh by the head of femur    History:   Previous similar pain: no       Precipitating factors:   Trauma or overuse: no - had a surgery close to that area    Alleviating factors:  Improved by: rest/inactivity-sitting down    Therapies Tried and outcome: tylenol-helps with pain    H/o left femoral fracture s/p surgery.      Problem list and histories reviewed & adjusted, as indicated.  Additional history: as documented    Patient Active Problem List   Diagnosis     CARDIOVASCULAR SCREENING; LDL GOAL LESS THAN 160     Right lumbar radiculopathy     Left knee pain     Osteoporosis     Advanced directives, counseling/discussion     Abnormal Pap smear     Dysphagia     Health Care Home     Iron deficiency anemia     Sliding hiatal hernia     OA (osteoarthritis) of knee     Osteoarthritis of left thumb     Primary osteoarthritis of both knees     Gastroesophageal reflux disease without esophagitis     Past Surgical History:   Procedure Laterality Date     COLONOSCOPY  1/29/2013    Procedure: COLONOSCOPY;;  Surgeon: Edgard Prieto MD;  Location: [facility] OR     ESOPHAGOSCOPY, GASTROSCOPY, DUODENOSCOPY (EGD), COMBINED  1/20/2012    Procedure:COMBINED ESOPHAGOSCOPY, GASTROSCOPY, DUODENOSCOPY (EGD); EGD, dysphagia; Surgeon:SHEREE VALLE; Location:[facility] OR     ESOPHAGOSCOPY, GASTROSCOPY, DUODENOSCOPY (EGD), COMBINED  7/18/2012    Procedure: COMBINED ESOPHAGOSCOPY, GASTROSCOPY, DUODENOSCOPY (EGD);  EGD for dysphagia rp Tonio;  Surgeon: Rey Bee MD;  Location: [facility] OR     ESOPHAGOSCOPY, GASTROSCOPY, DUODENOSCOPY (EGD), COMBINED  1/29/2013    Procedure: COMBINED ESOPHAGOSCOPY, GASTROSCOPY, DUODENOSCOPY (EGD),  BIOPSY SINGLE OR MULTIPLE;  Colonoscopy, EGD, dysphagia;  Surgeon: Edgard Prieto MD;  Location: [facility] OR       Social History   Substance Use Topics     Smoking status: Never Smoker     Smokeless tobacco: Never Used     Alcohol use No     Family History   Problem Relation Age of Onset     CANCER No family hx of      DIABETES No family hx of      Hypertension No family hx of      CEREBROVASCULAR DISEASE No family hx of      Thyroid Disease No family hx of      Glaucoma No family hx of      Macular Degeneration No family hx of              Reviewed and updated as needed this visit by clinical staffTobacco  Allergies  Med Hx  Surg Hx  Fam Hx  Soc Hx      Reviewed and updated as needed this visit by Provider         ROS:  Constitutional, HEENT, cardiovascular, pulmonary, GI, , musculoskeletal, neuro, skin, endocrine and psych systems are negative, except as otherwise noted.      OBJECTIVE:   /76 (BP Location: Left arm, Patient Position: Chair, Cuff Size: Adult Small)  Pulse 88  Temp 97.6  F (36.4  C) (Oral)  Wt 97 lb 6.4 oz (44.2 kg)  SpO2 100%  BMI 22.85 kg/m2  Body mass index is 22.85 kg/(m^2).  GENERAL: healthy, alert and no distress  NECK: no adenopathy, no asymmetry, masses, or scars and thyroid normal to palpation  RESP: lungs clear to auscultation - no rales, rhonchi or wheezes  CV: regular rate and rhythm, normal S1 S2, no S3 or S4, no murmur, click or rub, no peripheral edema and peripheral pulses strong  ABDOMEN: soft, nontender, no hepatosplenomegaly, no masses and bowel sounds normal  MS: tenderness proximal, anterior left leg, firm, tender mass?  Diagnostic Test Results:  none     ASSESSMENT/PLAN:       1. Pain of left lower extremity  Unclear etiology? Ultrasound ordered to further evaluated proximal leg around area of tenderness. Hematoma?   - US Extremity Non Vascular Left; Future    See Patient Instructions    Shadi Cotton MD, MD  [facility]

## 2017-10-04 NOTE — MR AVS SNAPSHOT
After Visit Summary   10/4/2017    Kerry Vega    MRN: [id number]           Patient Information     Date Of Birth          1940        Visit Information        Provider Department      10/4/2017 3:00 PM Navid Dalton MD [facility]        Care Instructions    Please remember to call and schedule a follow up appointment if one was recommended at your earliest convenience.  Orthopedics CLINIC HOURS TELEPHONE NUMBER   Dr. Sha Tony  Certified Medical Assistant   Monday & Wednesday   8am - 5pm  Thursday 1pm - 5pm  Friday 8am -11:30am Specialty schedulers:   [phone] to schedule your surgery.  Main Clinic:   [phone] to make an appointment with any provider.    Urgent Care locations:    [facility] Monday-Friday Closed  Saturday-Sunday 9am-5pm      Monday-Friday 12pm - 8pm  Saturday-Sunday 9am-5pm [phone]     If SURGERY has been recommended, please call our Specialty Schedulers at [phone] to schedule your procedure.    If you need a medication refill, please contact your pharmacy. Please allow 3 business days for your refill to be completed.    If an MRI or CT scan has been recommended, please call [facility] Schedulers at [phone] to schedule your appointment.  Use [email or web address] (secure e-mail communication and access to your chart) to send a message or to make an appointment. Please ask how you can sign up for [employer].  Your care team's suggested websites for health information:   [email or web address] : Up to date and easily searchable information on multiple topics.   [email or web address] : [place] dept of heat, public health issues in [place], N1N1              Follow-ups after your visit        Your next 10 appointments already scheduled     Oct 06, 2017  9:00 AM CDT   US ABDOMEN COMPLETE with BKUS1   [facility]    [address] [place] [address]  "[facility] [address]   [phone]           Please bring a list of your medicines (including vitamins, minerals and over-the-counter drugs). Also, tell your doctor about any allergies you may have. Wear comfortable clothes and leave your valuables at home.  Adults: No eating or drinking for 8 hours before the exam. You may take medicine with a small sip of water.  Children: - Children 6+ years: No food or drink for 6 hours before exam. - Children 1-5 years: No food or drink for 4 hours before exam. - Infants, breast-fed: may have breast milk up to 2 hours before exam. - Infants, formula: may have bottle until 4 hours before exam.  Please call the Imaging Department at your exam site with any questions.              Future tests that were ordered for you today     Open Future Orders        Priority Expected Expires Ordered    US Extremity Non Vascular Left Routine  10/4/2018 10/4/2017            Who to contact     If you have questions or need follow up information about today's clinic visit or your schedule please contact [facility] directly at [phone].  Normal or non-critical lab and imaging results will be communicated to you by [employer], letter or phone within 4 business days after the clinic has received the results. If you do not hear from us within 7 days, please contact the clinic through Pulse [employer] or phone. If you have a critical or abnormal lab result, we will notify you by phone as soon as possible.  Submit refill requests through [employer] or call your pharmacy and they will forward the refill request to us. Please allow 3 business days for your refill to be completed.          Additional Information About Your Visit        [employer] Information     [employer] lets you send messages to your doctor, view your test results, renew your prescriptions, schedule appointments and more. To sign up, go to [email or web address] . Click on \"Log in\" on the left side of the screen, " "which will take you to the Welcome page. Then click on \"Sign up Now\" on the right side of the page.     You will be asked to enter the access code listed below, as well as some personal information. Please follow the directions to create your username and password.     Your access code is: [email or web address]  Expires: 2017  4:15 PM     Your access code will  in 90 days. If you need help or a new code, please call your [facility] or [phone].        Care EveryWhere ID     This is your Care EveryWhere ID. This could be used by other organizations to access your [facility] medical records  [id number]        Your Vitals Were     Respirations Height BMI (Body Mass Index)             16 1.391 m (4' 6.75\") 22.52 kg/m2          Blood Pressure from Last 3 Encounters:   10/04/17 134/76   17 105/59   17 113/69    Weight from Last 3 Encounters:   10/04/17 43.5 kg (96 lb)   10/04/17 44.2 kg (97 lb 6.4 oz)   17 43.5 kg (95 lb 12.8 oz)              Today, you had the following     No orders found for display       Primary Care Provider Office Phone # Fax #    Shadi Cotton [phone]       [address]  [facility] [address]        Equal Access to Services     [facility]: Hadii aad ku hadasho Soomaali, waaxda luqadaha, qaybta kaalmada adeegyada, cruz mathew . So [facility] [phone].    ATENCIÓN: Si habla español, tiene a cornejo disposición servicios gratuitos de asistencia lingüística. Llame al [phone].    We comply with applicable federal civil rights laws and [place] laws. We do not discriminate on the basis of race, color, national origin, age, disability, sex, sexual orientation, or gender identity.            Thank you!     Thank you for choosing [facility]  for your care. Our goal is always to provide you with excellent care. Hearing back from our patients is one way we can continue to improve our services. Please take a " few minutes to complete the written survey that you may receive in the mail after your visit with us. Thank you!             Your Updated Medication List - Protect others around you: Learn how to safely use, store and throw away your medicines at www.disposemymeds.org.          This list is accurate as of: 10/4/17  3:26 PM.  Always use your most recent med list.                   Brand Name Dispense Instructions for use Diagnosis    alendronate 70 MG tablet    FOSAMAX    12 tablet    Take 1 tablet (70 mg) by mouth every 7 days    Osteoporosis       * ASPIRIN NOT PRESCRIBED    INTENTIONAL     Reported on 4/4/2017        calcium-vitamin D 600-400 MG-UNIT per tablet    CALTRATE    100 tablet    Take 1 tablet by mouth 2 times daily    Osteoporosis       cetirizine 10 MG tablet    zyrTEC    30 tablet    Take 1 tablet (10 mg) by mouth daily    Urticaria       cyanocobalamin 1000 MCG Tbcr    VITAMIN B-12 ER    30 tablet    Take 1,000 mcg by mouth daily    Vitamin B12 deficiency (dietary) anemia       diclofenac 1 % Gel topical gel    VOLTAREN    100 g    Apply 4 grams to knees or 2 grams to hands four times daily using enclosed dosing card.    Osteoarthritis of left thumb, Cervical radiculopathy       ferrous sulfate 325 (65 FE) MG tablet    IRON    90 tablet    Take 1 tablet (325 mg) by mouth 3 times daily (with meals)    Iron deficiency anemia, unspecified iron deficiency       furosemide 20 MG tablet    LASIX    60 tablet    Take 1 tablet (20 mg) by mouth daily as needed for leg swelling.    Swelling of lower extremity       methocarbamol 750 MG tablet    ROBAXIN    45 tablet    Take 1 tablet (750 mg) by mouth 3 times daily as needed for muscle spasms    Upper back pain on left side       naproxen 500 MG tablet    NAPROSYN    60 tablet    TAKE 1 TABLET BY MOUTH TWICE DAILY WITH FOOD AS NEEDED FOR PAIN    Osteoarthritis of knee, unspecified laterality, unspecified osteoarthritis type       * order for DME     1 Container     Pull up briefs for incontinence of urine change 2 to 3 times daily    Urine incontinence       ranitidine 300 MG tablet    ZANTAC    90 tablet    Take 1 tablet (300 mg) by mouth At Bedtime    Gastroesophageal reflux disease without esophagitis       * Notice:  This list has 2 medication(s) that are the same as other medications prescribed for you. Read the directions carefully, and ask your doctor or other care provider to review them with you.

## 2017-10-04 NOTE — PATIENT INSTRUCTIONS
Please remember to call and schedule a follow up appointment if one was recommended at your earliest convenience.  Orthopedics CLINIC HOURS TELEPHONE NUMBER   Dr. Sha Tony  Certified Medical Assistant   Monday & Wednesday   8am - 5pm  Thursday 1pm - 5pm  Friday 8am -11:30am Specialty schedulers:   [phone] to schedule your surgery.  Main Clinic:   [phone] to make an appointment with any provider.    Urgent Care locations:    [facility] Monday-Friday Closed  Saturday-Sunday 9am-5pm      Monday-Friday 12pm - 8pm  Saturday-Sunday 9am-5pm [phone]     If SURGERY has been recommended, please call our Specialty Schedulers at [phone] to schedule your procedure.    If you need a medication refill, please contact your pharmacy. Please allow 3 business days for your refill to be completed.    If an MRI or CT scan has been recommended, please call [facility] Schedulers at [phone] to schedule your appointment.  Use [employer] (secure e-mail communication and access to your chart) to send a message or to make an appointment. Please ask how you can sign up for [employer].  Your care team's suggested websites for health information:   [email or web address] : Up to date and easily searchable information on multiple topics.   [email or web address].[email or web address] : [place] dept of heat, public health issues in [place], N1N1

## 2017-10-04 NOTE — NURSING NOTE
"Chief Complaint   Patient presents with     Mass     on leg       Initial /76 (BP Location: Left arm, Patient Position: Chair, Cuff Size: Adult Small)  Pulse 88  Temp 97.6  F (36.4  C) (Oral)  Wt 97 lb 6.4 oz (44.2 kg)  SpO2 100%  BMI 22.85 kg/m2 Estimated body mass index is 22.85 kg/(m^2) as calculated from the following:    Height as of 9/20/17: 4' 6.75\" (1.391 m).    Weight as of this encounter: 97 lb 6.4 oz (44.2 kg).  Medication Reconciliation: complete   Lara Huston CMA      "

## 2017-10-04 NOTE — MR AVS SNAPSHOT
After Visit Summary   10/4/2017    Kerry Vega    MRN: [id number]           Patient Information     Date Of Birth          1940        Visit Information        Provider Department      10/4/2017 2:40 PM Shadi Cotton MD [facility]        Today's Diagnoses     Pain of left lower extremity    -  1       Follow-ups after your visit        Your next 10 appointments already scheduled     Oct 06, 2017  9:00 AM CDT   US ABDOMEN COMPLETE with BKUS1   [facility] ([facility]    [address] [place] [address] [phone]           Please bring a list of your medicines (including vitamins, minerals and over-the-counter drugs). Also, tell your doctor about any allergies you may have. Wear comfortable clothes and leave your valuables at home.  Adults: No eating or drinking for 8 hours before the exam. You may take medicine with a small sip of water.  Children: - Children 6+ years: No food or drink for 6 hours before exam. - Children 1-5 years: No food or drink for 4 hours before exam. - Infants, breast-fed: may have breast milk up to 2 hours before exam. - Infants, formula: may have bottle until 4 hours before exam.  Please call the Imaging Department at your exam site with any questions.              Future tests that were ordered for you today     Open Future Orders        Priority Expected Expires Ordered    US Extremity Non Vascular Left Routine  10/4/2018 10/4/2017            Who to contact     If you have questions or need follow up information about today's clinic visit or your schedule please contact [facility] directly at [phone].  Normal or non-critical lab and imaging results will be communicated to you by MyChart, letter or phone within 4 business days after the clinic has received the results. If you do not hear from us within 7 days, please contact the clinic through MyChart or phone.  "If you have a critical or abnormal lab result, we will notify you by phone as soon as possible.  Submit refill requests through [employer] or call your pharmacy and they will forward the refill request to us. Please allow 3 business days for your refill to be completed.          Additional Information About Your Visit        [employer] Information     [employer]View lets you send messages to your doctor, view your test results, renew your prescriptions, schedule appointments and more. To sign up, go to [email or web address] . Click on \"Log in\" on the left side of the screen, which will take you to the Welcome page. Then click on \"Sign up Now\" on the right side of the page.     You will be asked to enter the access code listed below, as well as some personal information. Please follow the directions to create your username and password.     Your access code is: [email or web address]  Expires: 2017  4:15 PM     Your access code will  in 90 days. If you need help or a new code, please call your [facility] clinic or [phone].        Care EveryWhere ID     This is your Care EveryWhere ID. This could be used by other organizations to access your [facility] medical records  [id number]        Your Vitals Were     Pulse Temperature Pulse Oximetry BMI (Body Mass Index)          88 97.6  F (36.4  C) (Oral) 100% 22.85 kg/m2         Blood Pressure from Last 3 Encounters:   10/04/17 134/76   17 105/59   17 113/69    Weight from Last 3 Encounters:   10/04/17 97 lb 6.4 oz (44.2 kg)   17 95 lb 12.8 oz (43.5 kg)   17 96 lb 6.4 oz (43.7 kg)               Primary Care Provider Office Phone # Fax #    Shadi Cotton [phone]       [address]  [facility] [address]        Equal Access to Services     [facility]: Hadii aad ku hadasho Soomaali, waaxda luqadaha, qaybta kaalmicky denis, cruz anderson. [facility] [phone].    ATENCIÓN: Si ross lara a cornejo " disposición servicios gratuitos de asistencia lingüística. Ady ramos [phone].    We comply with applicable federal civil rights laws and [place] laws. We do not discriminate on the basis of race, color, national origin, age, disability, sex, sexual orientation, or gender identity.            Thank you!     Thank you for choosing [facility]  for your care. Our goal is always to provide you with excellent care. Hearing back from our patients is one way we can continue to improve our services. Please take a few minutes to complete the written survey that you may receive in the mail after your visit with us. Thank you!             Your Updated Medication List - Protect others around you: Learn how to safely use, store and throw away your medicines at www.disposemymeds.org.          This list is accurate as of: 10/4/17  3:01 PM.  Always use your most recent med list.                   Brand Name Dispense Instructions for use Diagnosis    alendronate 70 MG tablet    FOSAMAX    12 tablet    Take 1 tablet (70 mg) by mouth every 7 days    Osteoporosis       * ASPIRIN NOT PRESCRIBED    INTENTIONAL     Reported on 4/4/2017        calcium-vitamin D 600-400 MG-UNIT per tablet    CALTRATE    100 tablet    Take 1 tablet by mouth 2 times daily    Osteoporosis       cetirizine 10 MG tablet    zyrTEC    30 tablet    Take 1 tablet (10 mg) by mouth daily    Urticaria       cyanocobalamin 1000 MCG Tbcr    VITAMIN B-12 ER    30 tablet    Take 1,000 mcg by mouth daily    Vitamin B12 deficiency (dietary) anemia       diclofenac 1 % Gel topical gel    VOLTAREN    100 g    Apply 4 grams to knees or 2 grams to hands four times daily using enclosed dosing card.    Osteoarthritis of left thumb, Cervical radiculopathy       ferrous sulfate 325 (65 FE) MG tablet    IRON    90 tablet    Take 1 tablet (325 mg) by mouth 3 times daily (with meals)    Iron deficiency anemia, unspecified iron deficiency       furosemide 20 MG  tablet    LASIX    60 tablet    Take 1 tablet (20 mg) by mouth daily as needed for leg swelling.    Swelling of lower extremity       methocarbamol 750 MG tablet    ROBAXIN    45 tablet    Take 1 tablet (750 mg) by mouth 3 times daily as needed for muscle spasms    Upper back pain on left side       naproxen 500 MG tablet    NAPROSYN    60 tablet    TAKE 1 TABLET BY MOUTH TWICE DAILY WITH FOOD AS NEEDED FOR PAIN    Osteoarthritis of knee, unspecified laterality, unspecified osteoarthritis type       * order for DME     1 Container    Pull up briefs for incontinence of urine change 2 to 3 times daily    Urine incontinence       ranitidine 300 MG tablet    ZANTAC    90 tablet    Take 1 tablet (300 mg) by mouth At Bedtime    Gastroesophageal reflux disease without esophagitis       * Notice:  This list has 2 medication(s) that are the same as other medications prescribed for you. Read the directions carefully, and ask your doctor or other care provider to review them with you.

## 2017-10-06 ENCOUNTER — RADIANT APPOINTMENT (OUTPATIENT)
Dept: ULTRASOUND IMAGING | Facility: CLINIC | Age: 77
End: 2017-10-06
Attending: FAMILY MEDICINE
Payer: MEDICARE

## 2017-10-06 DIAGNOSIS — R10.11 ABDOMINAL PAIN, RIGHT UPPER QUADRANT: ICD-10-CM

## 2017-10-06 PROCEDURE — 76700 US EXAM ABDOM COMPLETE: CPT

## 2017-10-06 NOTE — LETTER
[address]  [address]  [phone]    October 9, 2017      Kerry Vega  [address] [place] [address]          Dear Kerry Vega,    Your test results are attached.     The ultrasound shows gallstones and fatty liver but no signs of gallbladder disease. Please follow up with Dr. Cotton if you continue to have abdominal pain.     Please call me if you have any questions about these test results or about your care.    Sincerely,    Yadira Gutierrez MD

## 2017-10-07 NOTE — PROGRESS NOTES
Dear Kerry Vega,    Your test results are attached.     The ultrasound shows gallstones and fatty liver but no signs of gallbladder disease. Please follow up with Dr. Cotton if you continue to have abdominal pain.     Please call me if you have any questions about these test results or about your care.    Sincerely,    Yadira Gutierrez MD

## 2017-11-07 DIAGNOSIS — M81.0 OSTEOPOROSIS: ICD-10-CM

## 2017-11-14 ENCOUNTER — MEDICAL CORRESPONDENCE (OUTPATIENT)
Dept: HEALTH INFORMATION MANAGEMENT | Facility: CLINIC | Age: 77
End: 2017-11-14

## 2017-11-18 DIAGNOSIS — K59.00 CONSTIPATION, UNSPECIFIED CONSTIPATION TYPE: Primary | ICD-10-CM

## 2017-11-18 NOTE — TELEPHONE ENCOUNTER
Senexon tabs       Last Written Prescription Date:  ?  Last Fill Quantity: ?,   # refills: ?  Future Office visit:     Last OV: 10/4/2017    Routing refill request to provider for review/approval because:  Drug not active on patient's medication list

## 2017-11-20 RX ORDER — DOCUSATE SODIUM 50MG AND SENNOSIDES 8.6MG 8.6; 5 MG/1; MG/1
TABLET, FILM COATED ORAL
Qty: 60 TABLET | Refills: 3 | Status: SHIPPED | OUTPATIENT
Start: 2017-11-20 | End: 2018-10-29

## 2017-12-08 DIAGNOSIS — M17.9 OSTEOARTHRITIS OF KNEE, UNSPECIFIED LATERALITY, UNSPECIFIED OSTEOARTHRITIS TYPE: ICD-10-CM

## 2017-12-08 NOTE — TELEPHONE ENCOUNTER
Requested Prescriptions   Pending Prescriptions Disp Refills     naproxen (NAPROSYN) 500 MG tablet [Pharmacy Med Name: NAPROXEN 500MG TABLETS]  Last Written Prescription Date:  10/10/17  Last Fill Quantity: 60,  # refills: 0   Last Office Visit with [facility], [facility] or [facility] prescribing provider:  10/04/17   Future Office Visit:    60 tablet 0     Sig: TAKE 1 TABLET BY MOUTH TWICE DAILY WITH FOOD AS NEEDED FOR PAIN    NSAID Medications Failed    12/8/2017 11:39 AM       Failed - Patient is age 6-64 years       Failed - Normal CBC on file in past 12 months    Recent Labs   Lab Test  07/17/17   1505   WBC  5.5   RBC  4.05   HGB  10.3*   HCT  32.7*   PLT  232            Passed - Blood pressure under 140/90    BP Readings from Last 3 Encounters:   10/04/17 134/76   09/20/17 105/59   07/17/17 113/69                Passed - Normal ALT on file in past 12 months    Recent Labs   Lab Test  07/17/17   1505   ALT  18            Passed - Normal AST on file in past 12 months    Recent Labs   Lab Test  07/17/17   1505   AST  13            Passed - Recent or future visit with authorizing provider's specialty    Patient had office visit in the last year or has a visit in the next 30 days with authorizing provider.  See chart review.              Passed - No active pregnancy on record       Passed - Normal serum creatinine on file in past 12 months    Recent Labs   Lab Test  07/17/17   1505   CR  0.83            Passed - No positive pregnancy test in past 12 months

## 2017-12-11 ENCOUNTER — TELEPHONE (OUTPATIENT)
Dept: FAMILY MEDICINE | Facility: CLINIC | Age: 77
End: 2017-12-11

## 2017-12-11 DIAGNOSIS — Z71.84 TRAVEL ADVICE ENCOUNTER: Primary | ICD-10-CM

## 2017-12-11 RX ORDER — NAPROXEN 500 MG/1
TABLET ORAL
Qty: 60 TABLET | Refills: 0 | Status: SHIPPED | OUTPATIENT
Start: 2017-12-11 | End: 2018-02-22

## 2017-12-11 NOTE — TELEPHONE ENCOUNTER
Would like patient to see travel clinic to review her itinerary and to see what is recommended. Patient referred to [facility] - Please call [phone].    Shadi Cotton MD

## 2017-12-11 NOTE — TELEPHONE ENCOUNTER
Routing refill request to provider for review/approval because:  RN unable to fill Naproxen for pt > 64 yr old per protocol below.    Valeria Segura RN

## 2017-12-11 NOTE — TELEPHONE ENCOUNTER
Shane Vega mrn [phone]  saw Savanna Higgins today and recommended patient to see Dr Cotton for the same medication, hopefully without office appointment.    1. Azithromycin 2 tablets twice a day for 3 days for any diarrhea or other illness while traveling     2. Start Malaria medication now and take every 7 days continue for 3 weeks after returning.    Please advise. Dr Yury Yeager CMA

## 2017-12-11 NOTE — TELEPHONE ENCOUNTER
Son stated pt is traveling to [place], [place], and [place] for three weeks with her . Pt is requesting malaria pills and an antibiotic. Routing to pcp to review and advise. LUPE Kothari

## 2017-12-12 DIAGNOSIS — Z71.84 TRAVEL ADVICE ENCOUNTER: ICD-10-CM

## 2017-12-12 RX ORDER — AZITHROMYCIN 250 MG/1
1000 TABLET, FILM COATED ORAL ONCE
Qty: 12 TABLET | Refills: 0 | Status: SHIPPED | OUTPATIENT
Start: 2017-12-12 | End: 2017-12-12

## 2017-12-12 RX ORDER — MEFLOQUINE HYDROCHLORIDE 250 MG/1
TABLET ORAL
Qty: 10 TABLET | Refills: 0 | Status: SHIPPED | OUTPATIENT
Start: 2017-12-12 | End: 2017-12-12

## 2017-12-12 NOTE — TELEPHONE ENCOUNTER
Please notify patient that both rxs sent. For the traveler's diarrhea, patient can take the azithromycin 1000 mg once for severe diarrhea.     Shadi Cotton MD

## 2017-12-12 NOTE — TELEPHONE ENCOUNTER
Called and spoke with patient. Notified that prescriptions were sent to [facility] pharmacy per Dr. Cotton for upcoming travel outside of [place]. Advised that one prescription is anti-malarial and one is for traveler's diarrhea, if needed. Also advised patient to contact [facility] travel clinic, per Dr. Cotton recommendation, to review travel itinerary and make sure patient is given what is needed to be traveling to intended countries. Patient agreed with plan. Travel clinic phone number given to patient.    Yanet Leon RN  [facility] Triage

## 2017-12-13 RX ORDER — MEFLOQUINE HYDROCHLORIDE 250 MG/1
TABLET ORAL
Qty: 12 TABLET | Refills: 0 | Status: SHIPPED | OUTPATIENT
Start: 2017-12-13 | End: 2018-03-08

## 2018-01-11 ENCOUNTER — OFFICE VISIT (OUTPATIENT)
Dept: URGENT CARE | Facility: URGENT CARE | Age: 78
End: 2018-01-11
Payer: MEDICARE

## 2018-01-11 ENCOUNTER — TELEPHONE (OUTPATIENT)
Dept: FAMILY MEDICINE | Facility: CLINIC | Age: 78
End: 2018-01-11

## 2018-01-11 VITALS
DIASTOLIC BLOOD PRESSURE: 54 MMHG | SYSTOLIC BLOOD PRESSURE: 127 MMHG | BODY MASS INDEX: 20.78 KG/M2 | HEART RATE: 104 BPM | HEIGHT: 55 IN | RESPIRATION RATE: 16 BRPM | TEMPERATURE: 97.6 F | OXYGEN SATURATION: 98 % | WEIGHT: 89.8 LBS

## 2018-01-11 DIAGNOSIS — J20.9 ACUTE BRONCHITIS WITH SYMPTOMS > 10 DAYS: ICD-10-CM

## 2018-01-11 DIAGNOSIS — K21.9 GASTROESOPHAGEAL REFLUX DISEASE WITHOUT ESOPHAGITIS: Primary | ICD-10-CM

## 2018-01-11 PROCEDURE — 99214 OFFICE O/P EST MOD 30 MIN: CPT | Performed by: NURSE PRACTITIONER

## 2018-01-11 RX ORDER — AZITHROMYCIN 250 MG/1
TABLET, FILM COATED ORAL
Qty: 6 TABLET | Refills: 0 | Status: SHIPPED | OUTPATIENT
Start: 2018-01-11 | End: 2018-02-13

## 2018-01-11 RX ORDER — CODEINE PHOSPHATE AND GUAIFENESIN 10; 100 MG/5ML; MG/5ML
1 SOLUTION ORAL EVERY 4 HOURS PRN
Qty: 200 ML | Refills: 0 | Status: SHIPPED | OUTPATIENT
Start: 2018-01-11 | End: 2018-01-16

## 2018-01-11 NOTE — TELEPHONE ENCOUNTER
Son of Kerry Vega is a 77 year old female who calls with cough and chest pain.    NURSING ASSESSMENT:  Description:  Cough, chest pain(heartburn)  Onset/duration:  Tuesday after returning from traveling  Precip. factors:  Long flight    Allergies: No Known Allergies    NURSING PLAN: Nursing advice to patient ER visit    RECOMMENDED DISPOSITION:  To ED, another person to drive - son informed  Will comply with recommendation: No- Barriers to comply with plan of care feels patient is getting better and does not want to go ER..  If further questions/concerns or if symptoms do not improve, worsen or new symptoms develop, call your PCP or [facility] as soon as possible.      Guideline used:  Telephone Triage Protocols for Nurses, Fifth Edition, Sejal Ag RN

## 2018-01-11 NOTE — MR AVS SNAPSHOT
After Visit Summary   1/11/2018    Kerry Vega    MRN: [id number]           Patient Information     Date Of Birth          1940        Visit Information        Provider Department      1/11/2018 6:25 PM Miracle Zhang NP [facility]        Today's Diagnoses     Gastroesophageal reflux disease without esophagitis    -  1    Acute bronchitis with symptoms > 10 days          Care Instructions      Tips to Control Acid Reflux    To control acid reflux, you ll need to make some basic diet and lifestyle changes. The simple steps outlined below may be all you ll need to ease discomfort.  Watch what you eat    Avoid fatty foods and spicy foods.    Eat fewer acidic foods, such as citrus and tomato-based foods. These can increase symptoms.    Limit drinking alcohol, caffeine, and fizzy beverages. All increase acid reflux.    Try limiting chocolate, peppermint, and spearmint. These can worsen acid reflux in some people.  Watch when you eat    Avoid lying down for 3 hours after eating.    Do not snack before going to bed.  Raise your head  Raising your head and upper body by 4 to 6 inches helps limit reflux when you re lying down. Put blocks under the head of your bed frame to raise it.  Other changes    Lose weight, if you need to    Don t exercise near bedtime    Avoid tight-fitting clothes    Limit aspirin and ibuprofen    Stop smoking   Date Last Reviewed: 7/1/2016 2000-2017 [employer]. [address] [place] [address], [place] [address]. All rights reserved. This information is not intended as a substitute for professional medical care. Always follow your healthcare professional's instructions.        Bronchitis, Antibiotic Treatment (Adult)    Bronchitis is an infection of the air passages (bronchial tubes) in your lungs. It often occurs when you have a cold. This illness is contagious during the first few days and is spread through the air by coughing and sneezing, or by  direct contact (touching the sick person and then touching your own eyes, nose, or mouth).  Symptoms of bronchitis include cough with mucus (phlegm) and low-grade fever. Bronchitis usually lasts 7 to 14 days. Mild cases can be treated with simple home remedies. More severe infection is treated with an antibiotic.  Home care  Follow these guidelines when caring for yourself at home:    If your symptoms are severe, rest at home for the first 2 to 3 days. When you go back to your usual activities, don't let yourself get too tired.    Do not smoke. Also avoid being exposed to secondhand smoke.    You may use over-the-counter medicines to control fever or pain, unless another medicine was prescribed. (Note: If you have chronic liver or kidney disease or have ever had a stomach ulcer or gastrointestinal bleeding, talk with your healthcare provider before using these medicines. Also talk to your provider if you are taking medicine to prevent blood clots.) Aspirin should never be given to anyone younger than 18 years of age who is ill with a viral infection or fever. It may cause severe liver or brain damage.    Your appetite may be poor, so a light diet is fine. Avoid dehydration by drinking 6 to 8 glasses of fluids per day (such as water, soft drinks, sports drinks, juices, tea, or soup). Extra fluids will help loosen secretions in the nose and lungs.    Over-the-counter cough, cold, and sore-throat medicines will not shorten the length of the illness, but they may be helpful to reduce symptoms. (Note: Do not use decongestants if you have high blood pressure.)    Finish all antibiotic medicine. Do this even if you are feeling better after only a few days.  Follow-up care  Follow up with your healthcare provider, or as advised. If you had an X-ray or ECG (electrocardiogram), a specialist will review it. You will be notified of any new findings that may affect your care.  Note: If you are age 65 or older, or if you have a  chronic lung disease or condition that affects your immune system, or you smoke, talk to your healthcare provider about having pneumococcal vaccinations and a yearly influenza vaccination (flu shot).  When to seek medical advice  Call your healthcare provider right away if any of these occur:    Fever of 100.4 F (38 C) or higher    Coughing up increased amounts of colored sputum    Weakness, drowsiness, headache, facial pain, ear pain, or a stiff neck  Call 911, or get immediate medical care  Contact emergency services right away if any of these occur.    Coughing up blood    Worsening weakness, drowsiness, headache, or stiff neck    Trouble breathing, wheezing, or pain with breathing  Date Last Reviewed: 9/13/2015 2000-2017 [employer]. [address] [place] [address], [place] [address]. All rights reserved. This information is not intended as a substitute for professional medical care. Always follow your healthcare professional's instructions.                Follow-ups after your visit        Who to contact     If you have questions or need follow up information about today's clinic visit or your schedule please contact [facility] directly at [phone].  Normal or non-critical lab and imaging results will be communicated to you by [employer], letter or phone within 4 business days after the clinic has received the results. If you do not hear from us within 7 days, please contact the clinic through [facility] [employer] or phone. If you have a critical or abnormal lab result, we will notify you by phone as soon as possible.  Submit refill requests through [employer] or call your pharmacy and they will forward the refill request to us. Please allow 3 business days for your refill to be completed.          Additional Information About Your Visit        [employer] Information     [employer] lets you send messages to your doctor, view your test results, renew your prescriptions, schedule appointments and more. To  "sign up, go to [email or web address] . Click on \"Log in\" on the left side of the screen, which will take you to the Welcome page. Then click on \"Sign up Now\" on the right side of the page.     You will be asked to enter the access code listed below, as well as some personal information. Please follow the directions to create your username and password.     Your access code is: [email or web address]  Expires: 2018  7:02 PM     Your access code will  in 90 days. If you need help or a new code, please call your [facility] clinic or [phone].        Care EveryWhere ID     This is your Care EveryWhere ID. This could be used by other organizations to access your [facility] medical records  [id number]        Your Vitals Were     Pulse Temperature Respirations Height Pulse Oximetry Breastfeeding?    104 97.6  F (36.4  C) (Oral) 16 4' 6.72\" (1.39 m) 98% No    BMI (Body Mass Index)                   21.08 kg/m2            Blood Pressure from Last 3 Encounters:   18 127/54   10/04/17 134/76   17 105/59    Weight from Last 3 Encounters:   18 89 lb 12.8 oz (40.7 kg)   10/04/17 96 lb (43.5 kg)   10/04/17 97 lb 6.4 oz (44.2 kg)              Today, you had the following     No orders found for display         Today's Medication Changes          These changes are accurate as of: 18  7:02 PM.  If you have any questions, ask your nurse or doctor.               Start taking these medicines.        Dose/Directions    azithromycin 250 MG tablet   Commonly known as:  ZITHROMAX   Used for:  Acute bronchitis with symptoms > 10 days   Started by:  Miracle Zhang NP        Two tablets first day, then one tablet daily for four days.   Quantity:  6 tablet   Refills:  0       guaiFENesin-codeine 100-10 MG/5ML Soln solution   Commonly known as:  ROBITUSSIN AC   Used for:  Acute bronchitis with symptoms > 10 days   Started by:  Miracle Zhang NP        Dose:  1 tsp.   Take 5 mLs by mouth every 4 hours as needed   Quantity: "  200 mL   Refills:  0       omeprazole 20 MG CR capsule   Commonly known as:  priLOSEC   Used for:  Gastroesophageal reflux disease without esophagitis   Started by:  Miracle Zhang NP        Dose:  20 mg   Take 1 capsule (20 mg) by mouth daily   Quantity:  30 capsule   Refills:  0            Where to get your medicines      These [id number] - [facility], [place] - [address] AT [facility]  [address] [facility] [address]    Hours:  24-hours Phone:  [phone]     azithromycin 250 MG tablet    omeprazole 20 MG CR capsule         Some of these will need a paper prescription and others can be bought over the counter.  Ask your nurse if you have questions.     Bring a paper prescription for each of these medications     guaiFENesin-codeine 100-10 MG/5ML Soln solution                Primary Care Provider Office Phone # Fax #    Shadi Cotton [phone]       [address]  [facility] [address]        Equal Access to Services     [facility]: Hadii aad ku hadasho Soomaali, waaxda luqadaha, qaybta kaalmada adeegyada, waxdejon mathew . So [facility] [phone].    ATENCIÓN: Si habla español, tiene a cornejo disposición servicios gratuitos de asistencia lingüística. Llame al [phone].    We comply with applicable federal civil rights laws and [place] laws. We do not discriminate on the basis of race, color, national origin, age, disability, sex, sexual orientation, or gender identity.            Thank you!     Thank you for choosing [facility]  for your care. Our goal is always to provide you with excellent care. Hearing back from our patients is one way we can continue to improve our services. Please take a few minutes to complete the written survey that you may receive in the mail after your visit with us. Thank you!             Your Updated Medication List - Protect others around  you: Learn how to safely use, store and throw away your medicines at www.disposemymeds.org.          This list is accurate as of: 1/11/18  7:02 PM.  Always use your most recent med list.                   Brand Name Dispense Instructions for use Diagnosis    alendronate 70 MG tablet    FOSAMAX    12 tablet    Take 1 tablet (70 mg) by mouth every 7 days    Osteoporosis       * ASPIRIN NOT PRESCRIBED    INTENTIONAL     Reported on 4/4/2017        azithromycin 250 MG tablet    ZITHROMAX    6 tablet    Two tablets first day, then one tablet daily for four days.    Acute bronchitis with symptoms > 10 days       CALCIUM 600+D HIGH POTENCY 600-400 MG-UNIT per tablet   Generic drug:  calcium-vitamin D     180 tablet    TAKE 1 TABLET BY MOUTH TWICE DAILY    Osteoporosis       cetirizine 10 MG tablet    zyrTEC    30 tablet    Take 1 tablet (10 mg) by mouth daily    Urticaria       cyanocobalamin 1000 MCG Tbcr    VITAMIN B-12 ER    30 tablet    Take 1,000 mcg by mouth daily    Vitamin B12 deficiency (dietary) anemia       diclofenac 1 % Gel topical gel    VOLTAREN    100 g    Apply 4 grams to knees or 2 grams to hands four times daily using enclosed dosing card.    Osteoarthritis of left thumb, Cervical radiculopathy       ferrous sulfate 325 (65 FE) MG tablet    IRON    90 tablet    Take 1 tablet (325 mg) by mouth 3 times daily (with meals)    Iron deficiency anemia, unspecified iron deficiency       furosemide 20 MG tablet    LASIX    60 tablet    Take 1 tablet (20 mg) by mouth daily as needed for leg swelling.    Swelling of lower extremity       guaiFENesin-codeine 100-10 MG/5ML Soln solution    ROBITUSSIN AC    200 mL    Take 5 mLs by mouth every 4 hours as needed    Acute bronchitis with symptoms > 10 days       mefloquine 250 MG tablet    LARIAM    12 tablet    TAKE 1 TABLET BY MOUTH EVERY 7 DAYS STARTING 1 WEEK BEFORE TRAVEL AND ENDING 3 WEEKS AFTER TRAVEL    Travel advice encounter       methocarbamol 750 MG tablet     ROBAXIN    45 tablet    Take 1 tablet (750 mg) by mouth 3 times daily as needed for muscle spasms    Upper back pain on left side       * naproxen 500 MG tablet    NAPROSYN    60 tablet    TAKE 1 TABLET BY MOUTH TWICE DAILY WITH FOOD AS NEEDED FOR PAIN    Osteoarthritis of knee, unspecified laterality, unspecified osteoarthritis type       * naproxen 500 MG tablet    NAPROSYN    60 tablet    TAKE 1 TABLET BY MOUTH TWICE DAILY WITH FOOD AS NEEDED FOR PAIN    Osteoarthritis of knee, unspecified laterality, unspecified osteoarthritis type       omeprazole 20 MG CR capsule    priLOSEC    30 capsule    Take 1 capsule (20 mg) by mouth daily    Gastroesophageal reflux disease without esophagitis       * order for DME     1 Container    Pull up briefs for incontinence of urine change 2 to 3 times daily    Urine incontinence       ranitidine 300 MG tablet    ZANTAC    90 tablet    Take 1 tablet (300 mg) by mouth At Bedtime    Gastroesophageal reflux disease without esophagitis       SENEXON-S 8.6-50 MG per tablet   Generic drug:  senna-docusate     60 tablet    TAKE 1 TO 2 TABLETS BY MOUTH AT BEDTIME AS NEEDED FOR CONSTIPATION, HOLD FOR LOOSE STOOLS    Constipation, unspecified constipation type       * Notice:  This list has 4 medication(s) that are the same as other medications prescribed for you. Read the directions carefully, and ask your doctor or other care provider to review them with you.

## 2018-01-12 ENCOUNTER — TELEPHONE (OUTPATIENT)
Dept: URGENT CARE | Facility: URGENT CARE | Age: 78
End: 2018-01-12

## 2018-01-12 NOTE — PROGRESS NOTES
SUBJECTIVE:   Kerry Vega is a 77 year old female who presents to clinic today for the following health issues:    RESPIRATORY SYMPTOMS      Duration: 1.5 weeks    Description  Chills, chest cough, wheezing, fatigue, green dark sputum. stomach ache and loss of appetite, swallowing concerns, chills  Recent travels 3 different country    Severity: severe    Accompanying signs and symptoms: fever    History (predisposing factors):  Yes in 2017    Precipitating or alleviating factors: recent travel, flew in 3 days ago from [facility]    Therapies tried and outcome:  OTC medications, and antiacids,     Gastrointestinal symptoms-acid reflux      Duration: 1 month    Description:           REFLUX SYMPTOMS - heartburn, acid taste in mouth and belching      Intensity:  severe    Accompanying signs and symptoms:  none and cough    History  Previous {similar problem: YES  Previous evaluation:  upper GI    Aggravating factors: none and meals    Alleviating factors: antacids but does not have any    Other Therapies tried: None        Problem list and histories reviewed & adjusted, as indicated.  Additional history: as documented    Patient Active Problem List   Diagnosis     CARDIOVASCULAR SCREENING; LDL GOAL LESS THAN 160     Right lumbar radiculopathy     Left knee pain     Osteoporosis     Advanced directives, counseling/discussion     Abnormal Pap smear     Dysphagia     Health Care Home     Iron deficiency anemia     Sliding hiatal hernia     OA (osteoarthritis) of knee     Osteoarthritis of left thumb     Primary osteoarthritis of both knees     Gastroesophageal reflux disease without esophagitis     Past Surgical History:   Procedure Laterality Date     COLONOSCOPY  1/29/2013    Procedure: COLONOSCOPY;;  Surgeon: Edgard Prieto MD;  Location:  OR     ESOPHAGOSCOPY, GASTROSCOPY, DUODENOSCOPY (EGD), COMBINED  1/20/2012    Procedure:COMBINED ESOPHAGOSCOPY, GASTROSCOPY, DUODENOSCOPY (EGD); EGD, dysphagia;  Surgeon:SHEREE VALLE; Location:[facility] OR     ESOPHAGOSCOPY, GASTROSCOPY, DUODENOSCOPY (EGD), COMBINED  7/18/2012    Procedure: COMBINED ESOPHAGOSCOPY, GASTROSCOPY, DUODENOSCOPY (EGD);  EGD for dysphagia rp Tonio;  Surgeon: Rey Bee MD;  Location: [facility] OR     ESOPHAGOSCOPY, GASTROSCOPY, DUODENOSCOPY (EGD), COMBINED  1/29/2013    Procedure: COMBINED ESOPHAGOSCOPY, GASTROSCOPY, DUODENOSCOPY (EGD), BIOPSY SINGLE OR MULTIPLE;  Colonoscopy, EGD, dysphagia;  Surgeon: Edgard Prieto MD;  Location: [facility] OR       Social History   Substance Use Topics     Smoking status: Never Smoker     Smokeless tobacco: Never Used     Alcohol use No     Family History   Problem Relation Age of Onset     CANCER No family hx of      DIABETES No family hx of      Hypertension No family hx of      CEREBROVASCULAR DISEASE No family hx of      Thyroid Disease No family hx of      Glaucoma No family hx of      Macular Degeneration No family hx of          Current Outpatient Prescriptions   Medication Sig Dispense Refill     omeprazole (PRILOSEC) 20 MG CR capsule Take 1 capsule (20 mg) by mouth daily 30 capsule 0     azithromycin (ZITHROMAX) 250 MG tablet Two tablets first day, then one tablet daily for four days. 6 tablet 0     guaiFENesin-codeine (ROBITUSSIN AC) 100-10 MG/5ML SOLN solution Take 5 mLs by mouth every 4 hours as needed 200 mL 0     mefloquine (LARIAM) 250 MG tablet TAKE 1 TABLET BY MOUTH EVERY 7 DAYS STARTING 1 WEEK BEFORE TRAVEL AND ENDING 3 WEEKS AFTER TRAVEL 12 tablet 0     naproxen (NAPROSYN) 500 MG tablet TAKE 1 TABLET BY MOUTH TWICE DAILY WITH FOOD AS NEEDED FOR PAIN 60 tablet 0     SENEXON-S 8.6-50 MG per tablet TAKE 1 TO 2 TABLETS BY MOUTH AT BEDTIME AS NEEDED FOR CONSTIPATION, HOLD FOR LOOSE STOOLS 60 tablet 3     CALCIUM 600+D HIGH POTENCY 600-400 MG-UNIT per tablet TAKE 1 TABLET BY MOUTH TWICE DAILY 180 tablet 3     naproxen (NAPROSYN) 500 MG tablet TAKE 1 TABLET BY MOUTH TWICE DAILY WITH FOOD AS NEEDED FOR PAIN  "60 tablet 0     diclofenac (VOLTAREN) 1 % GEL topical gel Apply 4 grams to knees or 2 grams to hands four times daily using enclosed dosing card. 100 g 1     ranitidine (ZANTAC) 300 MG tablet Take 1 tablet (300 mg) by mouth At Bedtime 90 tablet 3     furosemide (LASIX) 20 MG tablet Take 1 tablet (20 mg) by mouth daily as needed for leg swelling. 60 tablet 1     cetirizine (ZYRTEC) 10 MG tablet Take 1 tablet (10 mg) by mouth daily 30 tablet 1     methocarbamol (ROBAXIN) 750 MG tablet Take 1 tablet (750 mg) by mouth 3 times daily as needed for muscle spasms 45 tablet 1     alendronate (FOSAMAX) 70 MG tablet Take 1 tablet (70 mg) by mouth every 7 days 12 tablet 3     ferrous sulfate (IRON) 325 (65 FE) MG tablet Take 1 tablet (325 mg) by mouth 3 times daily (with meals) 90 tablet 3     cyanocobalamin 1000 MCG TBCR Take 1,000 mcg by mouth daily 30 tablet 2     ORDER FOR DME Pull up briefs for incontinence of urine change 2 to 3 times daily 1 Container 11     ASPIRIN NOT PRESCRIBED (INTENTIONAL) Reported on 4/4/2017       No Known Allergies      Reviewed and updated as needed this visit by clinical staffAllergies       Reviewed and updated as needed this visit by Provider         ROS:  C: NEGATIVE for fever, chills, change in weight  E/M: NEGATIVE for ear, mouth and throat problems  Eyes: Negative  RESP:POSITIVE for cough-productive, sputum and wheezing  CV: NEGATIVE for chest pain, palpitations or peripheral edema  GI: NEGATIVE for nausea, abdominal pain,  or change in bowel habits,  POSITIVE for  heartburn or reflux and Hx GERD  Neuro: Negative  MS: Negative   Negative    OBJECTIVE:     /54 (BP Location: Left arm, Patient Position: Chair, Cuff Size: Adult Regular)  Pulse 104  Temp 97.6  F (36.4  C) (Oral)  Resp 16  Ht 4' 6.72\" (1.39 m)  Wt 89 lb 12.8 oz (40.7 kg)  SpO2 98%  Breastfeeding? No  BMI 21.08 kg/m2  Body mass index is 21.08 kg/(m^2).  GENERAL: healthy, alert and no distress  EYES: Eyes grossly " normal to inspection, PERRL and conjunctivae and sclerae normal  NECK: no adenopathy, no asymmetry, masses, or scars and thyroid normal to palpation  RESP: rhonchi upper lung fields, clears with cough, coughing persisitently  CV: regular rate and rhythm, normal S1 S2, no S3 or S4, no murmur, click or rub, no peripheral edema and peripheral pulses strong  ABDOMEN: soft, nontender, no hepatosplenomegaly, no masses and bowel sounds normal  MS: no gross musculoskeletal defects noted, no edema    Diagnostic Test Results:  none     ASSESSMENT/PLAN:       ICD-10-CM    1. Gastroesophageal reflux disease without esophagitis K21.9 omeprazole (PRILOSEC) 20 MG CR capsule   2. Acute bronchitis with symptoms > 10 days J20.9 azithromycin (ZITHROMAX) 250 MG tablet     guaiFENesin-codeine (ROBITUSSIN AC) 100-10 MG/5ML SOLN solution     I recommend follow up with PCP in 3 days or sooner if symptoms are getting worse  Side effects of medications discussed  All questions are answered and patient is in agreement with treatment plan.  Avoid fatty foods and spicy foods.  Eat fewer acidic foods, such as citrus and tomato-based foods. These can increase symptoms.  Limit drinking alcohol, caffeine, and fizzy beverages. All increase acid reflux.  Try limiting chocolate, peppermint, and spearmint.     Miracle Zhang NP  [facility]

## 2018-01-12 NOTE — PATIENT INSTRUCTIONS
Tips to Control Acid Reflux    To control acid reflux, you ll need to make some basic diet and lifestyle changes. The simple steps outlined below may be all you ll need to ease discomfort.  Watch what you eat    Avoid fatty foods and spicy foods.    Eat fewer acidic foods, such as citrus and tomato-based foods. These can increase symptoms.    Limit drinking alcohol, caffeine, and fizzy beverages. All increase acid reflux.    Try limiting chocolate, peppermint, and spearmint. These can worsen acid reflux in some people.  Watch when you eat    Avoid lying down for 3 hours after eating.    Do not snack before going to bed.  Raise your head  Raising your head and upper body by 4 to 6 inches helps limit reflux when you re lying down. Put blocks under the head of your bed frame to raise it.  Other changes    Lose weight, if you need to    Don t exercise near bedtime    Avoid tight-fitting clothes    Limit aspirin and ibuprofen    Stop smoking   Date Last Reviewed: 7/1/2016 2000-2017 [employer]. [address] [place] [address], [place] [address]. All rights reserved. This information is not intended as a substitute for professional medical care. Always follow your healthcare professional's instructions.        Bronchitis, Antibiotic Treatment (Adult)    Bronchitis is an infection of the air passages (bronchial tubes) in your lungs. It often occurs when you have a cold. This illness is contagious during the first few days and is spread through the air by coughing and sneezing, or by direct contact (touching the sick person and then touching your own eyes, nose, or mouth).  Symptoms of bronchitis include cough with mucus (phlegm) and low-grade fever. Bronchitis usually lasts 7 to 14 days. Mild cases can be treated with simple home remedies. More severe infection is treated with an antibiotic.  Home care  Follow these guidelines when caring for yourself at home:    If your symptoms are severe, rest at home for the  first 2 to 3 days. When you go back to your usual activities, don't let yourself get too tired.    Do not smoke. Also avoid being exposed to secondhand smoke.    You may use over-the-counter medicines to control fever or pain, unless another medicine was prescribed. (Note: If you have chronic liver or kidney disease or have ever had a stomach ulcer or gastrointestinal bleeding, talk with your healthcare provider before using these medicines. Also talk to your provider if you are taking medicine to prevent blood clots.) Aspirin should never be given to anyone younger than 18 years of age who is ill with a viral infection or fever. It may cause severe liver or brain damage.    Your appetite may be poor, so a light diet is fine. Avoid dehydration by drinking 6 to 8 glasses of fluids per day (such as water, soft drinks, sports drinks, juices, tea, or soup). Extra fluids will help loosen secretions in the nose and lungs.    Over-the-counter cough, cold, and sore-throat medicines will not shorten the length of the illness, but they may be helpful to reduce symptoms. (Note: Do not use decongestants if you have high blood pressure.)    Finish all antibiotic medicine. Do this even if you are feeling better after only a few days.  Follow-up care  Follow up with your healthcare provider, or as advised. If you had an X-ray or ECG (electrocardiogram), a specialist will review it. You will be notified of any new findings that may affect your care.  Note: If you are age 65 or older, or if you have a chronic lung disease or condition that affects your immune system, or you smoke, talk to your healthcare provider about having pneumococcal vaccinations and a yearly influenza vaccination (flu shot).  When to seek medical advice  Call your healthcare provider right away if any of these occur:    Fever of 100.4 F (38 C) or higher    Coughing up increased amounts of colored sputum    Weakness, drowsiness, headache, facial pain, ear pain,  or a stiff neck  Call 911, or get immediate medical care  Contact emergency services right away if any of these occur.    Coughing up blood    Worsening weakness, drowsiness, headache, or stiff neck    Trouble breathing, wheezing, or pain with breathing  Date Last Reviewed: 9/13/2015 2000-2017 [employer]. [address] [place] [address], [place] [address]. All rights reserved. This information is not intended as a substitute for professional medical care. Always follow your healthcare professional's instructions.

## 2018-01-12 NOTE — NURSING NOTE
"Chief Complaint   Patient presents with     URI     cough, chest congested and pain, upset stomach       Initial /54 (BP Location: Left arm, Patient Position: Chair, Cuff Size: Adult Regular)  Pulse 104  Temp 97.6  F (36.4  C) (Oral)  Resp 16  Ht 4' 6.72\" (1.39 m)  Wt 89 lb 12.8 oz (40.7 kg)  SpO2 98%  Breastfeeding? No  BMI 21.08 kg/m2 Estimated body mass index is 21.08 kg/(m^2) as calculated from the following:    Height as of this encounter: 4' 6.72\" (1.39 m).    Weight as of this encounter: 89 lb 12.8 oz (40.7 kg).  Medication Reconciliation: incomplete       Мария Aguilar MA        "

## 2018-01-12 NOTE — TELEPHONE ENCOUNTER
Bernabe campos faxing request:    Patient is requesting a 90 day supply for omeprazole (PRILOSEC) 20 MG CR capsule.    Sent 01/11/17 for 30 day supply.    Please send new Rx if appropriate, or let pharmacy know if it was denied.

## 2018-02-05 ENCOUNTER — OFFICE VISIT (OUTPATIENT)
Dept: FAMILY MEDICINE | Facility: CLINIC | Age: 78
End: 2018-02-05
Payer: MEDICARE

## 2018-02-05 VITALS
OXYGEN SATURATION: 99 % | HEIGHT: 55 IN | TEMPERATURE: 97.3 F | WEIGHT: 94 LBS | BODY MASS INDEX: 21.76 KG/M2 | DIASTOLIC BLOOD PRESSURE: 51 MMHG | SYSTOLIC BLOOD PRESSURE: 117 MMHG | HEART RATE: 97 BPM

## 2018-02-05 DIAGNOSIS — M79.605 LEFT LEG PAIN: Primary | ICD-10-CM

## 2018-02-05 PROCEDURE — 99214 OFFICE O/P EST MOD 30 MIN: CPT | Performed by: NURSE PRACTITIONER

## 2018-02-05 ASSESSMENT — PAIN SCALES - GENERAL: PAINLEVEL: SEVERE PAIN (6)

## 2018-02-05 NOTE — PROGRESS NOTES
SUBJECTIVE:   Kerry Vega is a 77 year old female who presents to clinic today for the following health issues:      Joint Pain    Onset: about 1 week ago    Description:   Location: Left upper leg  Character: Sharp    Intensity: 6/10    Progression of Symptoms: worse    Accompanying Signs & Symptoms:  Other symptoms: lump    History:   Previous similar pain: YES- had surgery about 6 months ago       Precipitating factors:   Trauma or overuse: no     Alleviating factors:  Improved by: nothing    Therapies Tried and outcome: Tylenol    Pleasant 77 year old female presents with  with concerns for pain/lump/swelling to left upper thigh with radiation of pain distally toward knee on anterior aspect. Worsening pain x1 week. Tried 2 tabs tylenol x1. Had surgery in this area 6-7 months ago for broken leg. No recent trauma or fall. No cough or cold symptoms. No chest pain or shortness of breath. No swelling of ankles/calves. No calf pain.    Problem list and histories reviewed & adjusted, as indicated.  Additional history: as documented    Patient Active Problem List   Diagnosis     CARDIOVASCULAR SCREENING; LDL GOAL LESS THAN 160     Right lumbar radiculopathy     Left knee pain     Osteoporosis     Advanced directives, counseling/discussion     Abnormal Pap smear     Dysphagia     Health Care Home     Iron deficiency anemia     Sliding hiatal hernia     OA (osteoarthritis) of knee     Osteoarthritis of left thumb     Primary osteoarthritis of both knees     Gastroesophageal reflux disease without esophagitis     Past Surgical History:   Procedure Laterality Date     COLONOSCOPY  1/29/2013    Procedure: COLONOSCOPY;;  Surgeon: Edgard Prieto MD;  Location:  OR     ESOPHAGOSCOPY, GASTROSCOPY, DUODENOSCOPY (EGD), COMBINED  1/20/2012    Procedure:COMBINED ESOPHAGOSCOPY, GASTROSCOPY, DUODENOSCOPY (EGD); EGD, dysphagia; Surgeon:SHEREE VALLE; Location: OR     ESOPHAGOSCOPY, GASTROSCOPY, DUODENOSCOPY  (EGD), COMBINED  7/18/2012    Procedure: COMBINED ESOPHAGOSCOPY, GASTROSCOPY, DUODENOSCOPY (EGD);  EGD for dysphagia rp Tonio;  Surgeon: Rey Bee MD;  Location:  OR     ESOPHAGOSCOPY, GASTROSCOPY, DUODENOSCOPY (EGD), COMBINED  1/29/2013    Procedure: COMBINED ESOPHAGOSCOPY, GASTROSCOPY, DUODENOSCOPY (EGD), BIOPSY SINGLE OR MULTIPLE;  Colonoscopy, EGD, dysphagia;  Surgeon: Edgard Prieto MD;  Location:  OR       Social History   Substance Use Topics     Smoking status: Never Smoker     Smokeless tobacco: Never Used     Alcohol use No     Family History   Problem Relation Age of Onset     CANCER No family hx of      DIABETES No family hx of      Hypertension No family hx of      CEREBROVASCULAR DISEASE No family hx of      Thyroid Disease No family hx of      Glaucoma No family hx of      Macular Degeneration No family hx of          Current Outpatient Prescriptions   Medication Sig Dispense Refill     omeprazole (PRILOSEC) 20 MG CR capsule Take 1 capsule (20 mg) by mouth daily 30 capsule 0     azithromycin (ZITHROMAX) 250 MG tablet Two tablets first day, then one tablet daily for four days. 6 tablet 0     mefloquine (LARIAM) 250 MG tablet TAKE 1 TABLET BY MOUTH EVERY 7 DAYS STARTING 1 WEEK BEFORE TRAVEL AND ENDING 3 WEEKS AFTER TRAVEL 12 tablet 0     naproxen (NAPROSYN) 500 MG tablet TAKE 1 TABLET BY MOUTH TWICE DAILY WITH FOOD AS NEEDED FOR PAIN 60 tablet 0     SENEXON-S 8.6-50 MG per tablet TAKE 1 TO 2 TABLETS BY MOUTH AT BEDTIME AS NEEDED FOR CONSTIPATION, HOLD FOR LOOSE STOOLS 60 tablet 3     CALCIUM 600+D HIGH POTENCY 600-400 MG-UNIT per tablet TAKE 1 TABLET BY MOUTH TWICE DAILY 180 tablet 3     naproxen (NAPROSYN) 500 MG tablet TAKE 1 TABLET BY MOUTH TWICE DAILY WITH FOOD AS NEEDED FOR PAIN 60 tablet 0     diclofenac (VOLTAREN) 1 % GEL topical gel Apply 4 grams to knees or 2 grams to hands four times daily using enclosed dosing card. 100 g 1     ranitidine (ZANTAC) 300 MG tablet Take 1 tablet  "(300 mg) by mouth At Bedtime 90 tablet 3     furosemide (LASIX) 20 MG tablet Take 1 tablet (20 mg) by mouth daily as needed for leg swelling. 60 tablet 1     cetirizine (ZYRTEC) 10 MG tablet Take 1 tablet (10 mg) by mouth daily 30 tablet 1     methocarbamol (ROBAXIN) 750 MG tablet Take 1 tablet (750 mg) by mouth 3 times daily as needed for muscle spasms 45 tablet 1     alendronate (FOSAMAX) 70 MG tablet Take 1 tablet (70 mg) by mouth every 7 days 12 tablet 3     ferrous sulfate (IRON) 325 (65 FE) MG tablet Take 1 tablet (325 mg) by mouth 3 times daily (with meals) 90 tablet 3     cyanocobalamin 1000 MCG TBCR Take 1,000 mcg by mouth daily 30 tablet 2     ORDER FOR DME Pull up briefs for incontinence of urine change 2 to 3 times daily 1 Container 11     ASPIRIN NOT PRESCRIBED (INTENTIONAL) Reported on 4/4/2017       No Known Allergies    Reviewed and updated as needed this visit by clinical staff  Tobacco  Allergies  Meds  Problems  Med Hx  Surg Hx  Fam Hx  Soc Hx        Reviewed and updated as needed this visit by Provider  Allergies  Meds  Problems         ROS:  Constitutional, HEENT, cardiovascular, pulmonary, gi and gu systems are negative, except as otherwise noted.    OBJECTIVE:     /51 (BP Location: Left arm, Patient Position: Chair, Cuff Size: Adult Regular)  Pulse 97  Temp 97.3  F (36.3  C) (Oral)  Ht 4' 6.72\" (1.39 m)  Wt 94 lb (42.6 kg)  SpO2 99%  BMI 22.07 kg/m2  Body mass index is 22.07 kg/(m^2).  GENERAL: healthy, alert and no distress  RESP: lungs clear to auscultation - no rales, rhonchi or wheezes  CV: regular rate and rhythm, normal S1 S2, no S3 or S4, no murmur, click or rub, no peripheral edema and peripheral pulses strong  MS: peripheral pulses normal. There is an area of focal swelling and warmth to left upper thigh that measures approx 2\" in diameter. No erythema or ecchymosis noted. There is a healed surgical wound noted to upper left lateral leg  PSYCH: mentation appears " normal, affect normal/bright    Diagnostic Test Results:  Ultrasound: scheduled for 4:15 pm today in [place]    ASSESSMENT/PLAN:       ICD-10-CM    1. Left leg pain M79.605 US Lower Extremity Venous Duplex Left     Concern for DVT given recent surgical intervention for broken leg in this area. US scheduled for 4:15 in [place] this afternoon. Further plan pending results of ultrasound as outlined below.    If positive for DVT, will send to emergency department. If negative, will have her apply ice or heat 15 minutes 4-6 times daily, tylenol as needed for pain. Follow up with PCP in 1 week if worsening or not improving, sooner if needed.    Patient and  verbalize understanding and agree with plan of care.     HERO Panchal [facility]

## 2018-02-05 NOTE — MR AVS SNAPSHOT
After Visit Summary   2/5/2018    Kerry Vega    MRN: [id number]           Patient Information     Date Of Birth          1940        Visit Information        Provider Department      2/5/2018 1:40 PM Anjana Miranda APRN CNP [facility]        Today's Diagnoses     Left leg pain    -  1      Care Instructions    At [facility], we strive to deliver an exceptional experience to you, every time we see you.  If you receive a survey in the mail, please send us back your thoughts. We really do value your feedback.    Based on your medical history, these are the current health maintenance/preventive care services that you are due for (some may have been done at this visit.)  Health Maintenance Due   Topic Date Due     EYE EXAM Q1 YEAR  06/13/2017     MAMMO Q2 YR  11/07/2017         Suggested websites for health information:  WwwWheresTheBus : Up to date and easily searchable information on multiple topics.  Www.Ubisense.gov : medication info, interactive tutorials, watch real surgeries online  Www.familydoctor.org : good info from the Academy of Family Physicians  Www.cdc.gov : public health info, travel advisories, epidemics (H1N1)  Www.aap.org : children's health info, normal development, vaccinations  Www.health.[facility].[place].us : [place] dept of health, public health issues in [place], N1N1    Your care team:                            Family Medicine Internal Medicine   MD Regan Stearns MD Shantel Branch-Fleming, MD Katya Georgiev PA-C Megan Hill, APRN CNP Nam Ho, MD Pediatrics   ANTIONE Chin CNP Amelia Massimini APRN CNP Shaista Malik, MD Bethany Templen, MD Deborah Mielke, MD Kim Thein, APRN CNP      Clinic hours: Monday - Thursday 7 am-7 pm; Fridays 7 am-5 pm.   Urgent care: Monday - Friday 11 am-9 pm; Saturday and Sunday 9 am-5 pm.  Pharmacy : Monday -Thursday 8 am-8 pm; Friday 8 am-6 pm; Saturday and  "Sunday 9 am-5 pm.     Clinic: [phone]   Pharmacy: [phone]              Follow-ups after your visit        Your next 10 appointments already scheduled     Feb 05, 2018  4:30 PM CST   US LOWER EXTREMITY VENOUS DUPLEX LEFT with ANDUS1   [facility]    [address] [facility] [address] [phone]           Please bring a list of your medicines (including vitamins, minerals and over-the-counter drugs). Also, tell your doctor about any allergies you may have. Wear comfortable clothes and leave your valuables at home.  You do not need to do anything special to prepare for your exam.  Please call the Imaging Department at your exam site with any questions.              Who to contact     If you have questions or need follow up information about today's clinic visit or your schedule please contact [facility] directly at [phone].  Normal or non-critical lab and imaging results will be communicated to you by [employer], letter or phone within 4 business days after the clinic has received the results. If you do not hear from us within 7 days, please contact the clinic through [employer] or phone. If you have a critical or abnormal lab result, we will notify you by phone as soon as possible.  Submit refill requests through [employer] or call your pharmacy and they will forward the refill request to us. Please allow 3 business days for your refill to be completed.          Additional Information About Your Visit        [employer] Information     [employer] lets you send messages to your doctor, view your test results, renew your prescriptions, schedule appointments and more. To sign up, go to [email or web address] . Click on \"Log in\" on the left side of the screen, which will take you to the Welcome page. Then click on \"Sign up Now\" on the right side of the page.     You will be asked to enter the access code listed below, as well as some personal " "information. Please follow the directions to create your username and password.     Your access code is: [email or web address]  Expires: 2018  7:02 PM     Your access code will  in 90 days. If you need help or a new code, please call your [place] clinic or [phone].        Care EveryWhere ID     This is your Care EveryWhere ID. This could be used by other organizations to access your [place] medical records  [id number]        Your Vitals Were     Pulse Temperature Height Pulse Oximetry BMI (Body Mass Index)       97 97.3  F (36.3  C) (Oral) 4' 6.72\" (1.39 m) 99% 22.07 kg/m2        Blood Pressure from Last 3 Encounters:   18 117/51   18 127/54   10/04/17 134/76    Weight from Last 3 Encounters:   18 94 lb (42.6 kg)   18 89 lb 12.8 oz (40.7 kg)   10/04/17 96 lb (43.5 kg)              We Performed the Following     US Lower Extremity Venous Duplex Left        Primary Care Provider Office Phone # Fax #    Shadi Cotton [phone]       [address] [place]  [facility] [address]        Equal Access to Services     [facility] : Hadii aad ku hadasho Soomaali, waaxda luqadaha, qaybta kaalmada adeegyada, waxay idiin hayaan karsten trejo lareyna . So [facility] [phone].    ATENCIÓN: Si habla español, tiene a cornejo disposición servicios gratuitos de asistencia lingüística. Llame al [phone].    We comply with applicable federal civil rights laws and [place] laws. We do not discriminate on the basis of race, color, national origin, age, disability, sex, sexual orientation, or gender identity.            Thank you!     Thank you for choosing [facility]  for your care. Our goal is always to provide you with excellent care. Hearing back from our patients is one way we can continue to improve our services. Please take a few minutes to complete the written survey that you may receive in the mail after your visit with us. Thank you!             Your Updated " Medication List - Protect others around you: Learn how to safely use, store and throw away your medicines at www.disposemymeds.org.          This list is accurate as of 2/5/18  3:05 PM.  Always use your most recent med list.                   Brand Name Dispense Instructions for use Diagnosis    alendronate 70 MG tablet    FOSAMAX    12 tablet    Take 1 tablet (70 mg) by mouth every 7 days    Osteoporosis       * ASPIRIN NOT PRESCRIBED    INTENTIONAL     Reported on 4/4/2017        azithromycin 250 MG tablet    ZITHROMAX    6 tablet    Two tablets first day, then one tablet daily for four days.    Acute bronchitis with symptoms > 10 days       CALCIUM 600+D HIGH POTENCY 600-400 MG-UNIT per tablet   Generic drug:  calcium-vitamin D     180 tablet    TAKE 1 TABLET BY MOUTH TWICE DAILY    Osteoporosis       cetirizine 10 MG tablet    zyrTEC    30 tablet    Take 1 tablet (10 mg) by mouth daily    Urticaria       cyanocobalamin 1000 MCG Tbcr    VITAMIN B-12 ER    30 tablet    Take 1,000 mcg by mouth daily    Vitamin B12 deficiency (dietary) anemia       diclofenac 1 % Gel topical gel    VOLTAREN    100 g    Apply 4 grams to knees or 2 grams to hands four times daily using enclosed dosing card.    Osteoarthritis of left thumb, Cervical radiculopathy       ferrous sulfate 325 (65 FE) MG tablet    IRON    90 tablet    Take 1 tablet (325 mg) by mouth 3 times daily (with meals)    Iron deficiency anemia, unspecified iron deficiency       furosemide 20 MG tablet    LASIX    60 tablet    Take 1 tablet (20 mg) by mouth daily as needed for leg swelling.    Swelling of lower extremity       mefloquine 250 MG tablet    LARIAM    12 tablet    TAKE 1 TABLET BY MOUTH EVERY 7 DAYS STARTING 1 WEEK BEFORE TRAVEL AND ENDING 3 WEEKS AFTER TRAVEL    Travel advice encounter       methocarbamol 750 MG tablet    ROBAXIN    45 tablet    Take 1 tablet (750 mg) by mouth 3 times daily as needed for muscle spasms    Upper back pain on left side        * naproxen 500 MG tablet    NAPROSYN    60 tablet    TAKE 1 TABLET BY MOUTH TWICE DAILY WITH FOOD AS NEEDED FOR PAIN    Osteoarthritis of knee, unspecified laterality, unspecified osteoarthritis type       * naproxen 500 MG tablet    NAPROSYN    60 tablet    TAKE 1 TABLET BY MOUTH TWICE DAILY WITH FOOD AS NEEDED FOR PAIN    Osteoarthritis of knee, unspecified laterality, unspecified osteoarthritis type       omeprazole 20 MG CR capsule    priLOSEC    30 capsule    Take 1 capsule (20 mg) by mouth daily    Gastroesophageal reflux disease without esophagitis       * order for DME     1 Container    Pull up briefs for incontinence of urine change 2 to 3 times daily    Urine incontinence       ranitidine 300 MG tablet    ZANTAC    90 tablet    Take 1 tablet (300 mg) by mouth At Bedtime    Gastroesophageal reflux disease without esophagitis       SENEXON-S 8.6-50 MG per tablet   Generic drug:  senna-docusate     60 tablet    TAKE 1 TO 2 TABLETS BY MOUTH AT BEDTIME AS NEEDED FOR CONSTIPATION, HOLD FOR LOOSE STOOLS    Constipation, unspecified constipation type       * Notice:  This list has 4 medication(s) that are the same as other medications prescribed for you. Read the directions carefully, and ask your doctor or other care provider to review them with you.

## 2018-02-05 NOTE — PATIENT INSTRUCTIONS
At [facility], we strive to deliver an exceptional experience to you, every time we see you.  If you receive a survey in the mail, please send us back your thoughts. We really do value your feedback.    Based on your medical history, these are the current health maintenance/preventive care services that you are due for (some may have been done at this visit.)  Health Maintenance Due   Topic Date Due     EYE EXAM Q1 YEAR  06/13/2017     MAMMO Q2 YR  11/07/2017         Suggested websites for health information:  Www.[employer] ([employer]OOF) : Up to date and easily searchable information on multiple topics.  Www.IBUonline.gov : medication info, interactive tutorials, watch real surgeries online  Www.familydoctor.org : good info from the Academy of Family Physicians  Www.cdc.gov : public health info, travel advisories, epidemics (H1N1)  Www.aap.org : children's health info, normal development, vaccinations  Www.health.[facility].[place].us : [place] dept of health, public health issues in [place], N1N1    Your care team:                            Family Medicine Internal Medicine   MD Regan Stearns MD Shantel Branch-Fleming, MD Katya Georgiev PA-C Megan Hill, APRN CNP    Shadi Cotton MD Pediatrics   Jesse Stone PAMANINDER Higgins, CNP Stefanie Florian APRN CNP   MD Anna Farris MD Deborah Mielke, MD Kim Thein, APRN CNP      Clinic hours: Monday - Thursday 7 am-7 pm; Fridays 7 am-5 pm.   Urgent care: Monday - Friday 11 am-9 pm; Saturday and Sunday 9 am-5 pm.  Pharmacy : Monday -Thursday 8 am-8 pm; Friday 8 am-6 pm; Saturday and Sunday 9 am-5 pm.     Clinic: [phone]   Pharmacy: [phone]

## 2018-02-06 DIAGNOSIS — K21.9 GASTROESOPHAGEAL REFLUX DISEASE WITHOUT ESOPHAGITIS: ICD-10-CM

## 2018-02-06 NOTE — TELEPHONE ENCOUNTER
"Requested Prescriptions   Pending Prescriptions Disp Refills     omeprazole (PRILOSEC) 20 MG CR capsule  Last Written Prescription Date:  01/11/18  Last Fill Quantity: 30,  # refills: 0   Last Office Visit with [facility] or [facility] prescribing provider:  02/05/18  Future Office Visit:    30 capsule 0     Sig: Take 1 capsule (20 mg) by mouth daily    PPI Protocol Failed    2/6/2018  9:20 AM       Failed - No diagnosis of osteoporosis on record       Passed - Not on Clopidogrel (unless Pantoprazole ordered)       Passed - Recent or future visit with authorizing provider's specialty    Patient had office visit in the last year or has a visit in the next 30 days with authorizing provider.  See \"Patient Info\" tab in inbasket, or \"Choose Columns\" in Meds & Orders section of the refill encounter.            Passed - Patient is age 18 or older       Passed - No active pregnacy on record       Passed - No positive pregnancy test in past 12 months          "

## 2018-02-08 NOTE — TELEPHONE ENCOUNTER
Routing refill request to provider for review/approval because:  Protocol failed  Sammie Becker RN

## 2018-02-12 ENCOUNTER — RADIANT APPOINTMENT (OUTPATIENT)
Dept: ULTRASOUND IMAGING | Facility: CLINIC | Age: 78
End: 2018-02-12
Attending: NURSE PRACTITIONER
Payer: MEDICARE

## 2018-02-12 PROCEDURE — 93971 EXTREMITY STUDY: CPT | Mod: LT

## 2018-02-13 ENCOUNTER — OFFICE VISIT (OUTPATIENT)
Dept: FAMILY MEDICINE | Facility: CLINIC | Age: 78
End: 2018-02-13
Payer: MEDICARE

## 2018-02-13 VITALS
TEMPERATURE: 97 F | WEIGHT: 94.6 LBS | BODY MASS INDEX: 21.89 KG/M2 | HEART RATE: 88 BPM | DIASTOLIC BLOOD PRESSURE: 60 MMHG | SYSTOLIC BLOOD PRESSURE: 120 MMHG | HEIGHT: 55 IN | OXYGEN SATURATION: 100 %

## 2018-02-13 DIAGNOSIS — S80.12XA LEG HEMATOMA, LEFT, INITIAL ENCOUNTER: Primary | ICD-10-CM

## 2018-02-13 PROCEDURE — 99213 OFFICE O/P EST LOW 20 MIN: CPT | Performed by: FAMILY MEDICINE

## 2018-02-13 ASSESSMENT — PAIN SCALES - GENERAL: PAINLEVEL: NO PAIN (0)

## 2018-02-13 NOTE — PROGRESS NOTES
SUBJECTIVE:   Kerry Vega is a 77 year old female who presents to clinic today for the following health issues:      Follow up Leg pain and discuss ultrasound result.  Patient stated has had some pain since left hip surgery. Pain increases with movements and seems to radiate down her anterior thigh.    Problem list and histories reviewed & adjusted, as indicated.  Additional history: as documented    Patient Active Problem List   Diagnosis     CARDIOVASCULAR SCREENING; LDL GOAL LESS THAN 160     Right lumbar radiculopathy     Left knee pain     Osteoporosis     Advanced directives, counseling/discussion     Abnormal Pap smear     Dysphagia     Health Care Home     Iron deficiency anemia     Sliding hiatal hernia     OA (osteoarthritis) of knee     Osteoarthritis of left thumb     Primary osteoarthritis of both knees     Gastroesophageal reflux disease without esophagitis     Past Surgical History:   Procedure Laterality Date     COLONOSCOPY  1/29/2013    Procedure: COLONOSCOPY;;  Surgeon: Edgard Prieto MD;  Location: [facility] OR     ESOPHAGOSCOPY, GASTROSCOPY, DUODENOSCOPY (EGD), COMBINED  1/20/2012    Procedure:COMBINED ESOPHAGOSCOPY, GASTROSCOPY, DUODENOSCOPY (EGD); EGD, dysphagia; Surgeon:SHEREE VALLE; Location:[facility] OR     ESOPHAGOSCOPY, GASTROSCOPY, DUODENOSCOPY (EGD), COMBINED  7/18/2012    Procedure: COMBINED ESOPHAGOSCOPY, GASTROSCOPY, DUODENOSCOPY (EGD);  EGD for dysphagia rp Tonio;  Surgeon: Rey Bee MD;  Location: [facility] OR     ESOPHAGOSCOPY, GASTROSCOPY, DUODENOSCOPY (EGD), COMBINED  1/29/2013    Procedure: COMBINED ESOPHAGOSCOPY, GASTROSCOPY, DUODENOSCOPY (EGD), BIOPSY SINGLE OR MULTIPLE;  Colonoscopy, EGD, dysphagia;  Surgeon: Edgard Prieto MD;  Location:  OR       Social History   Substance Use Topics     Smoking status: Never Smoker     Smokeless tobacco: Never Used     Alcohol use No     Family History   Problem Relation Age of Onset     CANCER No family hx of      DIABETES No  "family hx of      Hypertension No family hx of      CEREBROVASCULAR DISEASE No family hx of      Thyroid Disease No family hx of      Glaucoma No family hx of      Macular Degeneration No family hx of            Reviewed and updated as needed this visit by clinical staff  Tobacco  Allergies  Meds  Med Hx  Surg Hx  Fam Hx  Soc Hx      Reviewed and updated as needed this visit by Provider         ROS:  Constitutional, HEENT, cardiovascular, pulmonary, GI, , musculoskeletal, neuro, skin, endocrine and psych systems are negative, except as otherwise noted.    OBJECTIVE:     /60 (BP Location: Left arm, Patient Position: Chair, Cuff Size: Adult Regular)  Pulse 88  Temp 97  F (36.1  C) (Oral)  Ht 4' 6.72\" (1.39 m)  Wt 94 lb 9.6 oz (42.9 kg)  SpO2 100%  BMI 22.21 kg/m2  Body mass index is 22.21 kg/(m^2).  GENERAL: healthy, alert and no distress  NECK: no adenopathy, no asymmetry, masses, or scars and thyroid normal to palpation  RESP: lungs clear to auscultation - no rales, rhonchi or wheezes  CV: regular rate and rhythm, normal S1 S2, no S3 or S4, no murmur, click or rub, no peripheral edema and peripheral pulses strong  ABDOMEN: soft, nontender, no hepatosplenomegaly, no masses and bowel sounds normal  MS: firm mass, nonfluctuant proximal anterior left leg, no erythema, tender  Diagnostic Test Results:  none     ASSESSMENT/PLAN:     1. Leg hematoma, left, initial encounter  Referred to Surgery for evaluation and recommendations.  - GENERAL SURG ADULT REFERRAL    See Patient Instructions    Shadi Cotton MD, MD  [facility]  "

## 2018-02-13 NOTE — MR AVS SNAPSHOT
After Visit Summary   2/13/2018    Kerry Vega    MRN: [id number]           Patient Information     Date Of Birth          1940        Visit Information        Provider Department      2/13/2018 2:20 PM Shadi Cotton MD [facility]        Today's Diagnoses     Leg hematoma, left, initial encounter    -  1      Care Instructions    At [facility], we strive to deliver an exceptional experience to you, every time we see you.  If you receive a survey in the mail, please send us back your thoughts. We really do value your feedback.    Based on your medical history, these are the current health maintenance/preventive care services that you are due for (some may have been done at this visit.)  Health Maintenance Due   Topic Date Due     EYE EXAM Q1 YEAR  06/13/2017     MAMMO Q2 YR  11/07/2017         Suggested websites for health information:  WwwGlofox : Up to date and easily searchable information on multiple topics.  Www.RETC.gov : medication info, interactive tutorials, watch real surgeries online  Www.familydoctor.org : good info from the Academy of Family Physicians  Www.cdc.gov : public health info, travel advisories, epidemics (H1N1)  Www.aap.org : children's health info, normal development, vaccinations  Www.health.[facility].[place].us : [place] dept of health, public health issues in [place], N1N1    Your care team:                            Family Medicine Internal Medicine   MD Regan Stearns MD Shantel Branch-Fleming, MD Katya Georgiev PA-C Nam Ho, MD Pediatrics   ANTIONE Chin, MD Anna Conner CNP, MD Deborah Mielke, MD Kim Thein, APRN CNP      Clinic hours: Monday - Thursday 7 am-7 pm; Fridays 7 am-5 pm.   Urgent care: Monday - Friday 11 am-9 pm; Saturday and Sunday 9 am-5 pm.  Pharmacy : Monday -Thursday 8 am-8 pm; Friday 8 am-6 pm; Saturday and Sunday 9  "am-5 pm.     Clinic: [phone]   Pharmacy: [phone]            Follow-ups after your visit        Additional Services     GENERAL SURG ADULT REFERRAL       Your provider has referred you to: [facility]: [place] [facility] Sada Vishal [phone]   [email or web address]    Please be aware that coverage of these services is subject to the terms and limitations of your health insurance plan.  Call member services at your health plan with any benefit or coverage questions.      Please bring the following with you to your appointment:    (1) Any X-Rays, CTs or MRIs which have been performed.  Contact the facility where they were done to arrange for  prior to your scheduled appointment.   (2) List of current medications   (3) This referral request   (4) Any documents/labs given to you for this referral                  Who to contact     If you have questions or need follow up information about today's clinic visit or your schedule please contact [facility] directly at [phone].  Normal or non-critical lab and imaging results will be communicated to you by MyChart, letter or phone within 4 business days after the clinic has received the results. If you do not hear from us within 7 days, please contact the clinic through Speech[employer]hart or phone. If you have a critical or abnormal lab result, we will notify you by phone as soon as possible.  Submit refill requests through [employer] or call your pharmacy and they will forward the refill request to us. Please allow 3 business days for your refill to be completed.          Additional Information About Your Visit        SpeechTransharGiftMe Information     Clinkle lets you send messages to your doctor, view your test results, renew your prescriptions, schedule appointments and more. To sign up, go to [email or web address] . Click on \"Log in\" on the left side of the screen, which will take you to the Welcome page. Then click on \"Sign up " "Now\" on the right side of the page.     You will be asked to enter the access code listed below, as well as some personal information. Please follow the directions to create your username and password.     Your access code is: [email or web address]  Expires: 2018  7:02 PM     Your access code will  in 90 days. If you need help or a new code, please call your [facility] or [phone].        Care EveryWhere ID     This is your Care EveryWhere ID. This could be used by other organizations to access your [facility] medical records  [id number]        Your Vitals Were     Pulse Temperature Height Pulse Oximetry BMI (Body Mass Index)       88 97  F (36.1  C) (Oral) 4' 6.72\" (1.39 m) 100% 22.21 kg/m2        Blood Pressure from Last 3 Encounters:   18 120/60   18 117/51   18 127/54    Weight from Last 3 Encounters:   18 94 lb 9.6 oz (42.9 kg)   18 94 lb (42.6 kg)   18 89 lb 12.8 oz (40.7 kg)              We Performed the Following     GENERAL SURG ADULT REFERRAL        Primary Care Provider Office Phone # Fax #    Shadi Cotton [phone]       [address] [place]  [facility] [address]        Equal Access to Services     [facility]: Hadii aad ku hadasho Soomaali, waaxda luqadaha, qaybta kaalmada adeegyada, cruz mathew . So [facility] [phone].    ATENCIÓN: Si habla español, tiene a cornejo disposición servicios gratuitos de asistencia lingüística. Llame al [phone].    We comply with applicable federal civil rights laws and [place] laws. We do not discriminate on the basis of race, color, national origin, age, disability, sex, sexual orientation, or gender identity.            Thank you!     Thank you for choosing [facility]  for your care. Our goal is always to provide you with excellent care. Hearing back from our patients is one way we can continue to improve our services. Please take a few minutes to complete the " written survey that you may receive in the mail after your visit with us. Thank you!             Your Updated Medication List - Protect others around you: Learn how to safely use, store and throw away your medicines at www.disposemymeds.org.          This list is accurate as of 2/13/18  2:44 PM.  Always use your most recent med list.                   Brand Name Dispense Instructions for use Diagnosis    alendronate 70 MG tablet    FOSAMAX    12 tablet    Take 1 tablet (70 mg) by mouth every 7 days    Osteoporosis       * ASPIRIN NOT PRESCRIBED    INTENTIONAL     Reported on 4/4/2017        CALCIUM 600+D HIGH POTENCY 600-400 MG-UNIT per tablet   Generic drug:  calcium-vitamin D     180 tablet    TAKE 1 TABLET BY MOUTH TWICE DAILY    Osteoporosis       cetirizine 10 MG tablet    zyrTEC    30 tablet    Take 1 tablet (10 mg) by mouth daily    Urticaria       cyanocobalamin 1000 MCG Tbcr    VITAMIN B-12 ER    30 tablet    Take 1,000 mcg by mouth daily    Vitamin B12 deficiency (dietary) anemia       diclofenac 1 % Gel topical gel    VOLTAREN    100 g    Apply 4 grams to knees or 2 grams to hands four times daily using enclosed dosing card.    Osteoarthritis of left thumb, Cervical radiculopathy       ferrous sulfate 325 (65 FE) MG tablet    IRON    90 tablet    Take 1 tablet (325 mg) by mouth 3 times daily (with meals)    Iron deficiency anemia, unspecified iron deficiency       furosemide 20 MG tablet    LASIX    60 tablet    Take 1 tablet (20 mg) by mouth daily as needed for leg swelling.    Swelling of lower extremity       mefloquine 250 MG tablet    LARIAM    12 tablet    TAKE 1 TABLET BY MOUTH EVERY 7 DAYS STARTING 1 WEEK BEFORE TRAVEL AND ENDING 3 WEEKS AFTER TRAVEL    Travel advice encounter       methocarbamol 750 MG tablet    ROBAXIN    45 tablet    Take 1 tablet (750 mg) by mouth 3 times daily as needed for muscle spasms    Upper back pain on left side       naproxen 500 MG tablet    NAPROSYN    60 tablet     TAKE 1 TABLET BY MOUTH TWICE DAILY WITH FOOD AS NEEDED FOR PAIN    Osteoarthritis of knee, unspecified laterality, unspecified osteoarthritis type       omeprazole 20 MG CR capsule    priLOSEC    90 capsule    Take 1 capsule (20 mg) by mouth daily    Gastroesophageal reflux disease without esophagitis       * order for DME     1 Container    Pull up briefs for incontinence of urine change 2 to 3 times daily    Urine incontinence       ranitidine 300 MG tablet    ZANTAC    90 tablet    Take 1 tablet (300 mg) by mouth At Bedtime    Gastroesophageal reflux disease without esophagitis       SENEXON-S 8.6-50 MG per tablet   Generic drug:  senna-docusate     60 tablet    TAKE 1 TO 2 TABLETS BY MOUTH AT BEDTIME AS NEEDED FOR CONSTIPATION, HOLD FOR LOOSE STOOLS    Constipation, unspecified constipation type       * Notice:  This list has 2 medication(s) that are the same as other medications prescribed for you. Read the directions carefully, and ask your doctor or other care provider to review them with you.

## 2018-02-13 NOTE — PATIENT INSTRUCTIONS
At [facility], we strive to deliver an exceptional experience to you, every time we see you.  If you receive a survey in the mail, please send us back your thoughts. We really do value your feedback.    Based on your medical history, these are the current health maintenance/preventive care services that you are due for (some may have been done at this visit.)  Health Maintenance Due   Topic Date Due     EYE EXAM Q1 YEAR  06/13/2017     MAMMO Q2 YR  11/07/2017         Suggested websites for health information:  Www.[employer] : Up to date and easily searchable information on multiple topics.  Www.Dataslide.gov : medication info, interactive tutorials, watch real surgeries online  Www.familydoctor.org : good info from the Academy of Family Physicians  Www.cdc.gov : public health info, travel advisories, epidemics (H1N1)  Www.aap.org : children's health info, normal development, vaccinations  Www.health.[facility].[place].us : [place] dept of health, public health issues in [place], N1N1    Your care team:                            Family Medicine Internal Medicine   MD Regan Stearns MD Shantel Branch-Fleming, MD Katya Georgiev PA-C Nam Ho, MD Pediatrics   Jesse Stone PAMANINDER Higgins, MD Anna Conner CNP, MD Deborah Mielke, MD Kim Thein, APRN CNP      Clinic hours: Monday - Thursday 7 am-7 pm; Fridays 7 am-5 pm.   Urgent care: Monday - Friday 11 am-9 pm; Saturday and Sunday 9 am-5 pm.  Pharmacy : Monday -Thursday 8 am-8 pm; Friday 8 am-6 pm; Saturday and Sunday 9 am-5 pm.     Clinic: [phone]   Pharmacy: [phone]

## 2018-02-19 ENCOUNTER — OFFICE VISIT (OUTPATIENT)
Dept: SURGERY | Facility: CLINIC | Age: 78
End: 2018-02-19
Payer: MEDICARE

## 2018-02-19 VITALS
HEART RATE: 96 BPM | WEIGHT: 92 LBS | SYSTOLIC BLOOD PRESSURE: 125 MMHG | HEIGHT: 55 IN | DIASTOLIC BLOOD PRESSURE: 70 MMHG | BODY MASS INDEX: 21.29 KG/M2

## 2018-02-19 DIAGNOSIS — S70.12XA THIGH HEMATOMA, LEFT, INITIAL ENCOUNTER: Primary | ICD-10-CM

## 2018-02-19 DIAGNOSIS — K40.90 NON-RECURRENT INGUINAL HERNIA OF LEFT SIDE WITHOUT OBSTRUCTION OR GANGRENE: ICD-10-CM

## 2018-02-19 PROCEDURE — 99203 OFFICE O/P NEW LOW 30 MIN: CPT | Performed by: SURGERY

## 2018-02-19 NOTE — NURSING NOTE
"Chief Complaint   Patient presents with     Consult       Initial /70  Pulse 96  Ht 1.39 m (4' 6.72\")  Wt 41.7 kg (92 lb)  BMI 21.6 kg/m2 Estimated body mass index is 21.6 kg/(m^2) as calculated from the following:    Height as of this encounter: 1.39 m (4' 6.72\").    Weight as of this encounter: 41.7 kg (92 lb).  Medication Reconciliation: dick Man Cma      "

## 2018-02-19 NOTE — MR AVS SNAPSHOT
"              After Visit Summary   2018    Kerry Vega    MRN: [id number]           Patient Information     Date Of Birth          1940        Visit Information        Provider Department      2018 1:00 PM Ray Diaz MD [facility]        Today's Diagnoses     Thigh hematoma, left, initial encounter    -  1    Non-recurrent inguinal hernia of left side without obstruction or gangrene           Follow-ups after your visit        Who to contact     If you have questions or need follow up information about today's clinic visit or your schedule please contact [facility] directly at [phone].  Normal or non-critical lab and imaging results will be communicated to you by MyChart, letter or phone within 4 business days after the clinic has received the results. If you do not hear from us within 7 days, please contact the clinic through MyChart or phone. If you have a critical or abnormal lab result, we will notify you by phone as soon as possible.  Submit refill requests through [employer] or call your pharmacy and they will forward the refill request to us. Please allow 3 business days for your refill to be completed.          Additional Information About Your Visit        MyChart Information     [employer] lets you send messages to your doctor, view your test results, renew your prescriptions, schedule appointments and more. To sign up, go to [email or web address] . Click on \"Log in\" on the left side of the screen, which will take you to the Welcome page. Then click on \"Sign up Now\" on the right side of the page.     You will be asked to enter the access code listed below, as well as some personal information. Please follow the directions to create your username and password.     Your access code is: [email or web address]  Expires: 2018  7:02 PM     Your access code will  in 90 days. If you need help or a new code, please call your [place] " "clinic or [phone].        Care EveryWhere ID     This is your Care EveryWhere ID. This could be used by other organizations to access your [facility] medical records  [id number]        Your Vitals Were     Pulse Height BMI (Body Mass Index)             96 1.39 m (4' 6.72\") 21.6 kg/m2          Blood Pressure from Last 3 Encounters:   02/19/18 125/70   02/13/18 120/60   02/05/18 117/51    Weight from Last 3 Encounters:   02/19/18 41.7 kg (92 lb)   02/13/18 42.9 kg (94 lb 9.6 oz)   02/05/18 42.6 kg (94 lb)              Today, you had the following     No orders found for display       Primary Care Provider Office Phone # Fax #    Shadi Cotton [phone]       [address]  [facility] [address]        Equal Access to Services     [facility] : Hadii aad ku hadasho Sosharonaali, waaxda luqadaha, qaybta kaalmada adeegyada, cruz mathew . So [facility] [phone].    ATENCIÓN: Si belenla español, tiene a cornejo disposición servicios gratuitos de asistencia lingüística. Llame al [phone].    We comply with applicable federal civil rights laws and [place] laws. We do not discriminate on the basis of race, color, national origin, age, disability, sex, sexual orientation, or gender identity.            Thank you!     Thank you for choosing [facility]  for your care. Our goal is always to provide you with excellent care. Hearing back from our patients is one way we can continue to improve our services. Please take a few minutes to complete the written survey that you may receive in the mail after your visit with us. Thank you!             Your Updated Medication List - Protect others around you: Learn how to safely use, store and throw away your medicines at www.disposemymeds.org.          This list is accurate as of 2/19/18  2:11 PM.  Always use your most recent med list.                   Brand Name Dispense Instructions for use Diagnosis    alendronate 70 MG " tablet    FOSAMAX    12 tablet    Take 1 tablet (70 mg) by mouth every 7 days    Osteoporosis       * ASPIRIN NOT PRESCRIBED    INTENTIONAL     Reported on 4/4/2017        CALCIUM 600+D HIGH POTENCY 600-400 MG-UNIT per tablet   Generic drug:  calcium-vitamin D     180 tablet    TAKE 1 TABLET BY MOUTH TWICE DAILY    Osteoporosis       cetirizine 10 MG tablet    zyrTEC    30 tablet    Take 1 tablet (10 mg) by mouth daily    Urticaria       cyanocobalamin 1000 MCG Tbcr    VITAMIN B-12 ER    30 tablet    Take 1,000 mcg by mouth daily    Vitamin B12 deficiency (dietary) anemia       diclofenac 1 % Gel topical gel    VOLTAREN    100 g    Apply 4 grams to knees or 2 grams to hands four times daily using enclosed dosing card.    Osteoarthritis of left thumb, Cervical radiculopathy       ferrous sulfate 325 (65 FE) MG tablet    IRON    90 tablet    Take 1 tablet (325 mg) by mouth 3 times daily (with meals)    Iron deficiency anemia, unspecified iron deficiency       furosemide 20 MG tablet    LASIX    60 tablet    Take 1 tablet (20 mg) by mouth daily as needed for leg swelling.    Swelling of lower extremity       mefloquine 250 MG tablet    LARIAM    12 tablet    TAKE 1 TABLET BY MOUTH EVERY 7 DAYS STARTING 1 WEEK BEFORE TRAVEL AND ENDING 3 WEEKS AFTER TRAVEL    Travel advice encounter       methocarbamol 750 MG tablet    ROBAXIN    45 tablet    Take 1 tablet (750 mg) by mouth 3 times daily as needed for muscle spasms    Upper back pain on left side       naproxen 500 MG tablet    NAPROSYN    60 tablet    TAKE 1 TABLET BY MOUTH TWICE DAILY WITH FOOD AS NEEDED FOR PAIN    Osteoarthritis of knee, unspecified laterality, unspecified osteoarthritis type       omeprazole 20 MG CR capsule    priLOSEC    90 capsule    Take 1 capsule (20 mg) by mouth daily    Gastroesophageal reflux disease without esophagitis       * order for DME     1 Container    Pull up briefs for incontinence of urine change 2 to 3 times daily    Urine  incontinence       ranitidine 300 MG tablet    ZANTAC    90 tablet    Take 1 tablet (300 mg) by mouth At Bedtime    Gastroesophageal reflux disease without esophagitis       SENEXON-S 8.6-50 MG per tablet   Generic drug:  senna-docusate     60 tablet    TAKE 1 TO 2 TABLETS BY MOUTH AT BEDTIME AS NEEDED FOR CONSTIPATION, HOLD FOR LOOSE STOOLS    Constipation, unspecified constipation type       * Notice:  This list has 2 medication(s) that are the same as other medications prescribed for you. Read the directions carefully, and ask your doctor or other care provider to review them with you.

## 2018-02-19 NOTE — PROGRESS NOTES
Patient seen in consultation for leg hematoma by Shadi Cotton    Patient accompanied by son who supplies most of history  HPI:  Patient is a 77 year old female  with complaints of hematoma in leg  The patient noticed the symptoms about few months ago.    She had surgery to fix her broken left femur end of July  The lump in the thigh was able to be felt a few weeks later at [facility]  This was noticed at follow ups with the Orthopedic surgeon  Feels better when using heat. Size seems about the same to perhaps a bit smaller  Also notices another lump more in the groin area on the left. Not painful    Review Of Systems    Skin: negative  Ears/Nose/Throat: negative  Respiratory: No shortness of breath, dyspnea on exertion, cough, or hemoptysis  Cardiovascular: negative  Gastrointestinal: negative  Genitourinary: negative  Musculoskeletal: as above  Neurologic: negative  Hematologic/Lymphatic/Immunologic: negative  Endocrine: negative      Past Medical History:   Diagnosis Date     Anemia      Arthritis     OSTEO       Past Surgical History:   Procedure Laterality Date     COLONOSCOPY  1/29/2013    Procedure: COLONOSCOPY;;  Surgeon: Edgard Prieto MD;  Location:  OR     ESOPHAGOSCOPY, GASTROSCOPY, DUODENOSCOPY (EGD), COMBINED  1/20/2012    Procedure:COMBINED ESOPHAGOSCOPY, GASTROSCOPY, DUODENOSCOPY (EGD); EGD, dysphagia; Surgeon:SHEREE VALLE; Location: OR     ESOPHAGOSCOPY, GASTROSCOPY, DUODENOSCOPY (EGD), COMBINED  7/18/2012    Procedure: COMBINED ESOPHAGOSCOPY, GASTROSCOPY, DUODENOSCOPY (EGD);  EGD for dysphagia rp Tonio;  Surgeon: Rey Bee MD;  Location:  OR     ESOPHAGOSCOPY, GASTROSCOPY, DUODENOSCOPY (EGD), COMBINED  1/29/2013    Procedure: COMBINED ESOPHAGOSCOPY, GASTROSCOPY, DUODENOSCOPY (EGD), BIOPSY SINGLE OR MULTIPLE;  Colonoscopy, EGD, dysphagia;  Surgeon: Edgard Prieto MD;  Location:  OR       Social History     Social History     Marital status:      Spouse name: N/A      Number of children: N/A     Years of education: N/A     Occupational History     Not on file.     Social History Main Topics     Smoking status: Never Smoker     Smokeless tobacco: Never Used     Alcohol use No     Drug use: No     Sexual activity: Not Currently     Partners: Male     Birth control/ protection: None     Other Topics Concern     Not on file     Social History Narrative       Current Outpatient Prescriptions   Medication Sig Dispense Refill     omeprazole (PRILOSEC) 20 MG CR capsule Take 1 capsule (20 mg) by mouth daily 90 capsule 3     mefloquine (LARIAM) 250 MG tablet TAKE 1 TABLET BY MOUTH EVERY 7 DAYS STARTING 1 WEEK BEFORE TRAVEL AND ENDING 3 WEEKS AFTER TRAVEL 12 tablet 0     naproxen (NAPROSYN) 500 MG tablet TAKE 1 TABLET BY MOUTH TWICE DAILY WITH FOOD AS NEEDED FOR PAIN 60 tablet 0     SENEXON-S 8.6-50 MG per tablet TAKE 1 TO 2 TABLETS BY MOUTH AT BEDTIME AS NEEDED FOR CONSTIPATION, HOLD FOR LOOSE STOOLS 60 tablet 3     CALCIUM 600+D HIGH POTENCY 600-400 MG-UNIT per tablet TAKE 1 TABLET BY MOUTH TWICE DAILY 180 tablet 3     diclofenac (VOLTAREN) 1 % GEL topical gel Apply 4 grams to knees or 2 grams to hands four times daily using enclosed dosing card. 100 g 1     ranitidine (ZANTAC) 300 MG tablet Take 1 tablet (300 mg) by mouth At Bedtime 90 tablet 3     furosemide (LASIX) 20 MG tablet Take 1 tablet (20 mg) by mouth daily as needed for leg swelling. 60 tablet 1     cetirizine (ZYRTEC) 10 MG tablet Take 1 tablet (10 mg) by mouth daily 30 tablet 1     methocarbamol (ROBAXIN) 750 MG tablet Take 1 tablet (750 mg) by mouth 3 times daily as needed for muscle spasms 45 tablet 1     alendronate (FOSAMAX) 70 MG tablet Take 1 tablet (70 mg) by mouth every 7 days 12 tablet 3     ferrous sulfate (IRON) 325 (65 FE) MG tablet Take 1 tablet (325 mg) by mouth 3 times daily (with meals) 90 tablet 3     cyanocobalamin 1000 MCG TBCR Take 1,000 mcg by mouth daily 30 tablet 2     ORDER FOR DME Pull up briefs  "for incontinence of urine change 2 to 3 times daily 1 Container 11     ASPIRIN NOT PRESCRIBED (INTENTIONAL) Reported on 4/4/2017         Medications and history reviewed    Physical exam:  Vitals: /70  Pulse 96  Ht 1.39 m (4' 6.72\")  Wt 41.7 kg (92 lb)  BMI 21.6 kg/m2  BMI= Body mass index is 21.6 kg/(m^2).    Constitutional: healthy, alert and no distress  Head: Normocephalic. No masses, lesions, tenderness or abnormalities  Cardiovascular: negative, PMI normal. No lifts, heaves, or thrills. RRR. No murmurs, clicks gallops or rub  Respiratory: negative, Percussion normal. Good diaphragmatic excursion. Lungs clear  Gastrointestinal: Abdomen soft, non-tender. BS normal. No masses, organomegaly  : Normal external genitalia without lesions and female positive for left inguinal hernia. Non tender, reducible  Musculoskeletal: Left thigh with mass anteriorly about 8 x 6 cm. Tender. Somewhat mobile, feels fluid filled. No Bruising or overlaying skin changes  Skin: no suspicious lesions or rashes  Psychiatric: mentation appears normal and affect normal/bright  Hematologic/Lymphatic/Immunologic: Normal cervical lymph nodes  Patient able to get up on table without difficulty.      Imaging shows: imaging personally reviewed  ULTRASOUND LEFT LOWER EXTREMITY VENOUS DUPLEX February 12, 2018 9:53  AM      HISTORY: Lump to left upper thigh with warmth and pain radiating down  thigh. Left leg pain.      FINDINGS: The deep veins in the left lower extremity are compressible  throughout. The deep veins demonstrate normal venous augmentation,  waveforms and color Doppler flow. No evidence of superficial  thrombophlebitis. Complex cystic areas noted along the upper lateral  left thigh measuring 7.7 x 4.3 x 5.3 cm in area of patient's swelling.  Findings could reflect a hematoma if the patient has had recent  injury. Abscess remains in the differential.         IMPRESSION:   1. No evidence of left lower extremity deep vein " thrombosis or  superficial thrombophlebitis.  2. Area of lateral left upper thigh swelling corresponds to a complex  appearing fluid collection possibly a hematoma or abscess. Follow-up  as clinically warranted.    Assessment:     ICD-10-CM    1. Thigh hematoma, left, initial encounter S70.12XA    2. Non-recurrent inguinal hernia of left side without obstruction or gangrene K40.90      Plan: Left thigh mass seems consistent with hematoma given previous surgery and appearance on US. Has not resolved with conservative therapies thus far and has been about 7 months. Discussed options for drainage either with needle or I&D. Given size and possible septations on US I do not think needle will be adequate and then have risk of infecting any remaining hematoma fluid. Recommending operative I&D.  In regards to the left inguinal hernia this seems small, easily reducible and asymptomatic. We have decided to wait on any repair here until becoming symptomatic. Risks of incarceration, obstruction and strangulation discussed and patient and son know what to look for and go to ER.  Will work on scheduling I&D    Ray Diaz MD

## 2018-02-19 NOTE — LETTER
2/19/2018         RE: Kerry Vega  [address]  [place] [id number]        Dear Colleague,    Thank you for referring your patient, Kerry Vega, to the [facility]. Please see a copy of my visit note below.    Patient seen in consultation for leg hematoma by Shadi Cotton    Patient accompanied by son who supplies most of history  HPI:  Patient is a 77 year old female  with complaints of hematoma in leg  The patient noticed the symptoms about few months ago.    She had surgery to fix her broken left femur end of July  The lump in the thigh was able to be felt a few weeks later at [facility]  This was noticed at follow ups with the Orthopedic surgeon  Feels better when using heat. Size seems about the same to perhaps a bit smaller  Also notices another lump more in the groin area on the left. Not painful    Review Of Systems    Skin: negative  Ears/Nose/Throat: negative  Respiratory: No shortness of breath, dyspnea on exertion, cough, or hemoptysis  Cardiovascular: negative  Gastrointestinal: negative  Genitourinary: negative  Musculoskeletal: as above  Neurologic: negative  Hematologic/Lymphatic/Immunologic: negative  Endocrine: negative      Past Medical History:   Diagnosis Date     Anemia      Arthritis     OSTEO       Past Surgical History:   Procedure Laterality Date     COLONOSCOPY  1/29/2013    Procedure: COLONOSCOPY;;  Surgeon: Edgard Prieto MD;  Location:  OR     ESOPHAGOSCOPY, GASTROSCOPY, DUODENOSCOPY (EGD), COMBINED  1/20/2012    Procedure:COMBINED ESOPHAGOSCOPY, GASTROSCOPY, DUODENOSCOPY (EGD); EGD, dysphagia; Surgeon:SHEREE VALLE; Location:[facility] OR     ESOPHAGOSCOPY, GASTROSCOPY, DUODENOSCOPY (EGD), COMBINED  7/18/2012    Procedure: COMBINED ESOPHAGOSCOPY, GASTROSCOPY, DUODENOSCOPY (EGD);  EGD for dysphagia rp Tonio;  Surgeon: Rey Bee MD;  Location: [facility] OR     ESOPHAGOSCOPY, GASTROSCOPY, DUODENOSCOPY (EGD), COMBINED  1/29/2013     Procedure: COMBINED ESOPHAGOSCOPY, GASTROSCOPY, DUODENOSCOPY (EGD), BIOPSY SINGLE OR MULTIPLE;  Colonoscopy, EGD, dysphagia;  Surgeon: Edgard Prieto MD;  Location:  OR       Social History     Social History     Marital status:      Spouse name: N/A     Number of children: N/A     Years of education: N/A     Occupational History     Not on file.     Social History Main Topics     Smoking status: Never Smoker     Smokeless tobacco: Never Used     Alcohol use No     Drug use: No     Sexual activity: Not Currently     Partners: Male     Birth control/ protection: None     Other Topics Concern     Not on file     Social History Narrative       Current Outpatient Prescriptions   Medication Sig Dispense Refill     omeprazole (PRILOSEC) 20 MG CR capsule Take 1 capsule (20 mg) by mouth daily 90 capsule 3     mefloquine (LARIAM) 250 MG tablet TAKE 1 TABLET BY MOUTH EVERY 7 DAYS STARTING 1 WEEK BEFORE TRAVEL AND ENDING 3 WEEKS AFTER TRAVEL 12 tablet 0     naproxen (NAPROSYN) 500 MG tablet TAKE 1 TABLET BY MOUTH TWICE DAILY WITH FOOD AS NEEDED FOR PAIN 60 tablet 0     SENEXON-S 8.6-50 MG per tablet TAKE 1 TO 2 TABLETS BY MOUTH AT BEDTIME AS NEEDED FOR CONSTIPATION, HOLD FOR LOOSE STOOLS 60 tablet 3     CALCIUM 600+D HIGH POTENCY 600-400 MG-UNIT per tablet TAKE 1 TABLET BY MOUTH TWICE DAILY 180 tablet 3     diclofenac (VOLTAREN) 1 % GEL topical gel Apply 4 grams to knees or 2 grams to hands four times daily using enclosed dosing card. 100 g 1     ranitidine (ZANTAC) 300 MG tablet Take 1 tablet (300 mg) by mouth At Bedtime 90 tablet 3     furosemide (LASIX) 20 MG tablet Take 1 tablet (20 mg) by mouth daily as needed for leg swelling. 60 tablet 1     cetirizine (ZYRTEC) 10 MG tablet Take 1 tablet (10 mg) by mouth daily 30 tablet 1     methocarbamol (ROBAXIN) 750 MG tablet Take 1 tablet (750 mg) by mouth 3 times daily as needed for muscle spasms 45 tablet 1     alendronate (FOSAMAX) 70 MG tablet Take 1 tablet (70  "mg) by mouth every 7 days 12 tablet 3     ferrous sulfate (IRON) 325 (65 FE) MG tablet Take 1 tablet (325 mg) by mouth 3 times daily (with meals) 90 tablet 3     cyanocobalamin 1000 MCG TBCR Take 1,000 mcg by mouth daily 30 tablet 2     ORDER FOR DME Pull up briefs for incontinence of urine change 2 to 3 times daily 1 Container 11     ASPIRIN NOT PRESCRIBED (INTENTIONAL) Reported on 4/4/2017         Medications and history reviewed    Physical exam:  Vitals: /70  Pulse 96  Ht 1.39 m (4' 6.72\")  Wt 41.7 kg (92 lb)  BMI 21.6 kg/m2  BMI= Body mass index is 21.6 kg/(m^2).    Constitutional: healthy, alert and no distress  Head: Normocephalic. No masses, lesions, tenderness or abnormalities  Cardiovascular: negative, PMI normal. No lifts, heaves, or thrills. RRR. No murmurs, clicks gallops or rub  Respiratory: negative, Percussion normal. Good diaphragmatic excursion. Lungs clear  Gastrointestinal: Abdomen soft, non-tender. BS normal. No masses, organomegaly  : Normal external genitalia without lesions and female positive for left inguinal hernia. Non tender, reducible  Musculoskeletal: Left thigh with mass anteriorly about 8 x 6 cm. Tender. Somewhat mobile, feels fluid filled. No Bruising or overlaying skin changes  Skin: no suspicious lesions or rashes  Psychiatric: mentation appears normal and affect normal/bright  Hematologic/Lymphatic/Immunologic: Normal cervical lymph nodes  Patient able to get up on table without difficulty.      Imaging shows: imaging personally reviewed  ULTRASOUND LEFT LOWER EXTREMITY VENOUS DUPLEX February 12, 2018 9:53  AM      HISTORY: Lump to left upper thigh with warmth and pain radiating down  thigh. Left leg pain.      FINDINGS: The deep veins in the left lower extremity are compressible  throughout. The deep veins demonstrate normal venous augmentation,  waveforms and color Doppler flow. No evidence of superficial  thrombophlebitis. Complex cystic areas noted along the upper " lateral  left thigh measuring 7.7 x 4.3 x 5.3 cm in area of patient's swelling.  Findings could reflect a hematoma if the patient has had recent  injury. Abscess remains in the differential.         IMPRESSION:   1. No evidence of left lower extremity deep vein thrombosis or  superficial thrombophlebitis.  2. Area of lateral left upper thigh swelling corresponds to a complex  appearing fluid collection possibly a hematoma or abscess. Follow-up  as clinically warranted.    Assessment:     ICD-10-CM    1. Thigh hematoma, left, initial encounter S70.12XA    2. Non-recurrent inguinal hernia of left side without obstruction or gangrene K40.90      Plan: Left thigh mass seems consistent with hematoma given previous surgery and appearance on US. Has not resolved with conservative therapies thus far and has been about 7 months. Discussed options for drainage either with needle or I&D. Given size and possible septations on US I do not think needle will be adequate and then have risk of infecting any remaining hematoma fluid. Recommending operative I&D.  In regards to the left inguinal hernia this seems small, easily reducible and asymptomatic. We have decided to wait on any repair here until becoming symptomatic. Risks of incarceration, obstruction and strangulation discussed and patient and son know what to look for and go to ER.  Will work on scheduling I&D    Ray Diaz MD      Again, thank you for allowing me to participate in the care of your patient.        Sincerely,        Ray Diaz MD

## 2018-02-22 DIAGNOSIS — M17.9 OSTEOARTHRITIS OF KNEE, UNSPECIFIED LATERALITY, UNSPECIFIED OSTEOARTHRITIS TYPE: ICD-10-CM

## 2018-02-22 NOTE — TELEPHONE ENCOUNTER
"Requested Prescriptions   Pending Prescriptions Disp Refills     naproxen (NAPROSYN) 500 MG tablet  Last Written Prescription Date:  12/11/17  Last Fill Quantity: 60,  # refills: 0   Last Office Visit with G, RENATE or [facility] prescribing provider:  02/13/18   Future Office Visit:    Next 5 appointments (look out 90 days)     Mar 08, 2018  5:40 PM CST   [facility]-Op physical with Shadi Cotton MD   [facility]    [address] [place] [id number] [phone]                60 tablet 0    NSAID Medications Failed    2/22/2018  2:08 PM       Failed - Patient is age 6-64 years       Passed - Blood pressure under 140/90 in past 12 months    BP Readings from Last 3 Encounters:   02/19/18 125/70   02/13/18 120/60   02/05/18 117/51                Passed - Normal ALT on file in past 12 months    Recent Labs   Lab Test  07/17/17   1505   ALT  18            Passed - Normal AST on file in past 12 months    Recent Labs   Lab Test  07/17/17   1505   AST  13            Passed - Recent or future visit with authorizing provider's specialty    Patient had office visit in the last year or has a visit in the next 30 days with authorizing provider.  See \"Patient Info\" tab in inbasket, or \"Choose Columns\" in Meds & Orders section of the refill encounter.            Passed - Normal CBC on file in past 12 months    Recent Labs   Lab Test  07/17/17   1505   WBC  5.5   RBC  4.05   HGB  10.3*   HCT  32.7*   PLT  232            Passed - No active pregnancy on record       Passed - Normal serum creatinine on file in past 12 months    Recent Labs   Lab Test  07/17/17   1505   CR  0.83            Passed - No positive pregnancy test in past 12 months          "

## 2018-02-26 RX ORDER — NAPROXEN 500 MG/1
TABLET ORAL
Qty: 60 TABLET | Refills: 3 | Status: SHIPPED | OUTPATIENT
Start: 2018-02-26 | End: 2018-06-20

## 2018-02-26 NOTE — TELEPHONE ENCOUNTER
RN unable to fill refill request per protocol.    Elba Ag RN, [facility]

## 2018-03-08 ENCOUNTER — OFFICE VISIT (OUTPATIENT)
Dept: FAMILY MEDICINE | Facility: CLINIC | Age: 78
End: 2018-03-08
Payer: MEDICARE

## 2018-03-08 VITALS
WEIGHT: 96 LBS | SYSTOLIC BLOOD PRESSURE: 115 MMHG | BODY MASS INDEX: 22.21 KG/M2 | DIASTOLIC BLOOD PRESSURE: 60 MMHG | HEIGHT: 55 IN | TEMPERATURE: 97.4 F | HEART RATE: 94 BPM | OXYGEN SATURATION: 100 %

## 2018-03-08 DIAGNOSIS — Z01.818 PREOP GENERAL PHYSICAL EXAM: Primary | ICD-10-CM

## 2018-03-08 DIAGNOSIS — D50.9 IRON DEFICIENCY ANEMIA, UNSPECIFIED IRON DEFICIENCY ANEMIA TYPE: ICD-10-CM

## 2018-03-08 LAB
ERYTHROCYTE [DISTWIDTH] IN BLOOD BY AUTOMATED COUNT: 17 % (ref 10–15)
HCT VFR BLD AUTO: 28.3 % (ref 35–47)
HGB BLD-MCNC: 8.3 G/DL (ref 11.7–15.7)
MCH RBC QN AUTO: 20.9 PG (ref 26.5–33)
MCHC RBC AUTO-ENTMCNC: 29.3 G/DL (ref 31.5–36.5)
MCV RBC AUTO: 71 FL (ref 78–100)
PLATELET # BLD AUTO: 336 10E9/L (ref 150–450)
RBC # BLD AUTO: 3.98 10E12/L (ref 3.8–5.2)
WBC # BLD AUTO: 6 10E9/L (ref 4–11)

## 2018-03-08 PROCEDURE — 36415 COLL VENOUS BLD VENIPUNCTURE: CPT | Performed by: FAMILY MEDICINE

## 2018-03-08 PROCEDURE — 99213 OFFICE O/P EST LOW 20 MIN: CPT | Performed by: FAMILY MEDICINE

## 2018-03-08 PROCEDURE — 85027 COMPLETE CBC AUTOMATED: CPT | Performed by: FAMILY MEDICINE

## 2018-03-08 RX ORDER — FERROUS SULFATE 325(65) MG
325 TABLET ORAL 2 TIMES DAILY
Qty: 60 TABLET | Refills: 2 | Status: SHIPPED | OUTPATIENT
Start: 2018-03-08 | End: 2018-06-03

## 2018-03-08 ASSESSMENT — PAIN SCALES - GENERAL: PAINLEVEL: MODERATE PAIN (4)

## 2018-03-08 NOTE — PATIENT INSTRUCTIONS
Before Your Surgery      Call your surgeon if there is any change in your health. This includes signs of a cold or flu (such as a sore throat, runny nose, cough, rash or fever).    Do not smoke, drink alcohol or take over the counter medicine (unless your surgeon or primary care doctor tells you to) for the 24 hours before and after surgery.    If you take prescribed drugs: Follow your doctor s orders about which medicines to take and which to stop until after surgery.    Eating and drinking prior to surgery: follow the instructions from your surgeon    Take a shower or bath the night before surgery. Use the soap your surgeon gave you to gently clean your skin. If you do not have soap from your surgeon, use your regular soap. Do not shave or scrub the surgery site.  Wear clean pajamas and have clean sheets on your bed.     At [facility], we strive to deliver an exceptional experience to you, every time we see you.  If you receive a survey in the mail, please send us back your thoughts. We really do value your feedback.    Based on your medical history, these are the current health maintenance/preventive care services that you are due for (some may have been done at this visit.)  Health Maintenance Due   Topic Date Due     EYE EXAM Q1 YEAR  06/13/2017     MAMMO Q2 YR  11/07/2017         Suggested websites for health information:  Www.Sk[employer].Subtext : Up to date and easily searchable information on multiple topics.  Www.medlineplus.gov : medication info, interactive tutorials, watch real surgeries online  Www.familydoctor.org : good info from the Academy of Family Physicians  Www.cdc.gov : public health info, travel advisories, epidemics (H1N1)  Www.aap.org : children's health info, normal development, vaccinations  Www.health.state.[place].us : [place] dept of health, public health issues in [place], N1N1    Your care team:                            Family Medicine Internal Medicine   MD Regan Stearns  MD Jewell Burns MD Katya Georgiev PA-C Nam Ho, MD Pediatrics   ANTIONE Chin, GABINO Florian APRN MD Anna Hernandez MD Deborah Mielke, MD Kim Thein, APRN [facility]      Clinic hours: Monday - Thursday 7 am-7 pm; Fridays 7 am-5 pm.   Urgent care: Monday - Friday 11 am-9 pm; Saturday and Sunday 9 am-5 pm.  Pharmacy : Monday -Thursday 8 am-8 pm; Friday 8 am-6 pm; Saturday and Sunday 9 am-5 pm.     Clinic: [phone]   Pharmacy: [phone]

## 2018-03-08 NOTE — MR AVS SNAPSHOT
After Visit Summary   3/8/2018    Kerry Vega    MRN: [id number]           Patient Information     Date Of Birth          1940        Visit Information        Provider Department      3/8/2018 5:40 PM Shadi Cotton MD [facility]        Today's Diagnoses     Preop general physical exam    -  1      Care Instructions      Before Your Surgery      Call your surgeon if there is any change in your health. This includes signs of a cold or flu (such as a sore throat, runny nose, cough, rash or fever).    Do not smoke, drink alcohol or take over the counter medicine (unless your surgeon or primary care doctor tells you to) for the 24 hours before and after surgery.    If you take prescribed drugs: Follow your doctor s orders about which medicines to take and which to stop until after surgery.    Eating and drinking prior to surgery: follow the instructions from your surgeon    Take a shower or bath the night before surgery. Use the soap your surgeon gave you to gently clean your skin. If you do not have soap from your surgeon, use your regular soap. Do not shave or scrub the surgery site.  Wear clean pajamas and have clean sheets on your bed.     At [facility], we strive to deliver an exceptional experience to you, every time we see you.  If you receive a survey in the mail, please send us back your thoughts. We really do value your feedback.    Based on your medical history, these are the current health maintenance/preventive care services that you are due for (some may have been done at this visit.)  Health Maintenance Due   Topic Date Due     EYE EXAM Q1 YEAR  06/13/2017     MAMMO Q2 YR  11/07/2017         Suggested websites for health information:  Www.PerSay.org : Up to date and easily searchable information on multiple topics.  Www.medlineplus.gov : medication info, interactive tutorials, watch real surgeries online  Www.familydoctor.org : good info  from the Academy of Family Physicians  Www.cdc.gov : public health info, travel advisories, epidemics (H1N1)  Www.aap.org : children's health info, normal development, vaccinations  Www.health.[facility].[place].us : [place] dept of health, public health issues in [place], N1N1    Your care team:                            Family Medicine Internal Medicine   MD Regan Stearns MD Shantel Branch-Fleming, MD Katya Georgiev PA-C Nam Ho, MD Pediatrics   ANTIONE Chin, GABINO Florian APRN CNP   MD Anna Farris MD Deborah Mielke, MD Kim Thein, APRN CNP      Clinic hours: Monday - Thursday 7 am-7 pm; Fridays 7 am-5 pm.   Urgent care: Monday - Friday 11 am-9 pm; Saturday and Sunday 9 am-5 pm.  Pharmacy : Monday -Thursday 8 am-8 pm; Friday 8 am-6 pm; Saturday and Sunday 9 am-5 pm.     Clinic: [phone]   Pharmacy: [phone]            Follow-ups after your visit        Your next 10 appointments already scheduled     Mar 21, 2018   Pro[place] with Ray Diaz [place]   [facility] [place]    [address]  [facility] [address] [phone]            Apr 02, 2018 12:30 PM CDT   Post Op with Ray Diaz MD   [facility] ([facility]    [address] [place] [address] [phone]              Who to contact     If you have questions or need follow up information about today's clinic visit or your schedule please contact [facility] directly at [phone].  Normal or non-critical lab and imaging results will be communicated to you by MyChart, letter or phone within 4 business days after the clinic has received the results. If you do not hear from us within 7 days, please contact the clinic through MyChart or phone. If you have a critical or abnormal lab result, we will notify you by phone as soon as possible.  Submit refill requests through GOkeyhart  "or call your pharmacy and they will forward the refill request to us. Please allow 3 business days for your refill to be completed.          Additional Information About Your Visit        MyChart Information     EverybodyCarhart lets you send messages to your doctor, view your test results, renew your prescriptions, schedule appointments and more. To sign up, go to [email or web address] . Click on \"Log in\" on the left side of the screen, which will take you to the Welcome page. Then click on \"Sign up Now\" on the right side of the page.     You will be asked to enter the access code listed below, as well as some personal information. Please follow the directions to create your username and password.     Your access code is: [email or web address]  Expires: 2018  7:02 PM     Your access code will  in 90 days. If you need help or a new code, please call your [facility] clinic or [phone].        Care EveryWhere ID     This is your [facility] EveryWhere ID. This could be used by other organizations to access your [facility] medical records  [id number]        Your Vitals Were     Pulse Temperature Height Pulse Oximetry BMI (Body Mass Index)       94 97.4  F (36.3  C) (Oral) 4' 6.72\" (1.39 m) 100% 22.54 kg/m2        Blood Pressure from Last 3 Encounters:   18 115/60   18 125/70   18 120/60    Weight from Last 3 Encounters:   18 96 lb (43.5 kg)   18 92 lb (41.7 kg)   18 94 lb 9.6 oz (42.9 kg)              We Performed the Following     CBC with platelets     EKG 12-lead complete w/read - Clinics        Primary Care Provider Office Phone # Fax #    Shadi Cotton [phone] [address]  [place] [facility] [address]        Equal Access to Services     [facility] : Amanda chavarria Socorona, waaxda luqadaha, qaybta kaalmicky denis, cruz mathew . [facility] [phone].    ATENCIÓN: Si habla español, tiene a cornejo disposición servicios gratuitos de " asistencia lingüística. Ady al [phone].    We comply with applicable federal civil rights laws and [place] laws. We do not discriminate on the basis of race, color, national origin, age, disability, sex, sexual orientation, or gender identity.            Thank you!     Thank you for choosing [facility]  for your care. Our goal is always to provide you with excellent care. Hearing back from our patients is one way we can continue to improve our services. Please take a few minutes to complete the written survey that you may receive in the mail after your visit with us. Thank you!             Your Updated Medication List - Protect others around you: Learn how to safely use, store and throw away your medicines at www.disposemymeds.org.          This list is accurate as of 3/8/18  6:01 PM.  Always use your most recent med list.                   Brand Name Dispense Instructions for use Diagnosis    alendronate 70 MG tablet    FOSAMAX    12 tablet    Take 1 tablet (70 mg) by mouth every 7 days    Osteoporosis       * ASPIRIN NOT PRESCRIBED    INTENTIONAL     Reported on 4/4/2017        CALCIUM 600+D HIGH POTENCY 600-400 MG-UNIT per tablet   Generic drug:  calcium-vitamin D     180 tablet    TAKE 1 TABLET BY MOUTH TWICE DAILY    Osteoporosis       cetirizine 10 MG tablet    zyrTEC    30 tablet    Take 1 tablet (10 mg) by mouth daily    Urticaria       cyanocobalamin 1000 MCG Tbcr    VITAMIN B-12 ER    30 tablet    Take 1,000 mcg by mouth daily    Vitamin B12 deficiency (dietary) anemia       diclofenac 1 % Gel topical gel    VOLTAREN    100 g    Apply 4 grams to knees or 2 grams to hands four times daily using enclosed dosing card.    Osteoarthritis of left thumb, Cervical radiculopathy       ferrous sulfate 325 (65 FE) MG tablet    IRON    90 tablet    Take 1 tablet (325 mg) by mouth 3 times daily (with meals)    Iron deficiency anemia, unspecified iron deficiency       furosemide 20 MG tablet     LASIX    60 tablet    Take 1 tablet (20 mg) by mouth daily as needed for leg swelling.    Swelling of lower extremity       methocarbamol 750 MG tablet    ROBAXIN    45 tablet    Take 1 tablet (750 mg) by mouth 3 times daily as needed for muscle spasms    Upper back pain on left side       naproxen 500 MG tablet    NAPROSYN    60 tablet    TAKE 1 TABLET BY MOUTH TWICE DAILY WITH FOOD AS NEEDED FOR PAIN    Osteoarthritis of knee, unspecified laterality, unspecified osteoarthritis type       omeprazole 20 MG CR capsule    priLOSEC    90 capsule    Take 1 capsule (20 mg) by mouth daily    Gastroesophageal reflux disease without esophagitis       * order for DME     1 Container    Pull up briefs for incontinence of urine change 2 to 3 times daily    Urine incontinence       ranitidine 300 MG tablet    ZANTAC    90 tablet    Take 1 tablet (300 mg) by mouth At Bedtime    Gastroesophageal reflux disease without esophagitis       SENEXON-S 8.6-50 MG per tablet   Generic drug:  senna-docusate     60 tablet    TAKE 1 TO 2 TABLETS BY MOUTH AT BEDTIME AS NEEDED FOR CONSTIPATION, HOLD FOR LOOSE STOOLS    Constipation, unspecified constipation type       * Notice:  This list has 2 medication(s) that are the same as other medications prescribed for you. Read the directions carefully, and ask your doctor or other care provider to review them with you.

## 2018-03-08 NOTE — PROGRESS NOTES
[address] [address]  [phone]  Dept: [phone]    PRE-OP EVALUATION:  Today's date: 3/8/2018    Kerry Vega (: 1940) presents for pre-operative evaluation assessment as requested by Dr. Diaz.  She requires evaluation and anesthesia risk assessment prior to undergoing surgery/procedure for treatment of left thigh hematoma.    Proposed Surgery/ Procedure: COMBINED INCISION AND DRAINAGE LOWER EXTREMITY  Date of Surgery/ Procedure: 3/21/18  Time of Surgery/ Procedure: 2 pm  [facility]   Fax number for surgical facility: does not have  Primary Physician: Shadi Cotton  Type of Anesthesia Anticipated: General    Patient has a Health Care Directive or Living Will:  NO    1. NO - Do you have a history of heart attack, stroke, stent, bypass or surgery on an artery in the head, neck, heart or legs?  2. NO - Do you ever have any pain or discomfort in your chest?  3. NO - Do you have a history of  Heart Failure?  4. YES - ARE YOUR TROUBLED BY SHORTNESS OF BREATH WHEN WALKING ON THE LEVEL, UP A SLIGHT HILL OR AT NIGHT?   5. NO - Do you currently have a cold, bronchitis or other respiratory infection?  6. NO - Do you have a cough, shortness of breath or wheezing?  7. NO - Do you sometimes get pains in the calves of your legs when you walk?  8. NO - Do you or anyone in your family have previous history of blood clots?  9. NO - Do you or does anyone in your family have a serious bleeding problem such as prolonged bleeding following surgeries or cuts?  10. YES - HAVE YOU EVER HAD PROBLEMS WITH ANEMIA OR BEEN TOLD TO TAKE IRON PILLS?   11. NO - Have you had any abnormal blood loss such as black, tarry or bloody stools, or abnormal vaginal bleeding?  12. NO - Have you ever had a blood transfusion?  13. NO - Have you or any of your relatives ever had problems with anesthesia?  14. NO - Do you have sleep apnea, excessive  snoring or daytime drowsiness?  15. NO - Do you have any prosthetic heart valves?  16. NO - Do you have prosthetic joints?  17. NO - Is there any chance that you may be pregnant?      HPI:     HPI related to upcoming procedure: h/o symptomatic left thigh hematoma      See problem list for active medical problems.  Problems all longstanding and stable, except as noted/documented.  See ROS for pertinent symptoms related to these conditions.                                                                                                  .    MEDICAL HISTORY:     Patient Active Problem List    Diagnosis Date Noted     Primary osteoarthritis of both knees 09/20/2017     Priority: Medium     Gastroesophageal reflux disease without esophagitis 09/20/2017     Priority: Medium     Osteoarthritis of left thumb 04/10/2015     Priority: Medium     OA (osteoarthritis) of knee 11/06/2013     Priority: Medium     Sliding hiatal hernia 04/18/2013     Priority: Medium     Iron deficiency anemia 12/27/2012     Priority: Medium     Health Care Home 12/19/2012     Priority: Medium     Trinidad Hadley RN-PHN  FPA / RODRIGUEZ [facility]   [phone]   Per [facility] report dated 1-1-13. Patient is disenrolled from [facility] as of December 2012    DX V65.8 REPLACED WITH 62518 HEALTH CARE HOME (04/08/2013)       Dysphagia 01/29/2012     Priority: Medium     Advanced directives, counseling/discussion 05/18/2011     Priority: Medium     Advance Care Planning:   ACP Review and Resources Provided:  Reviewed chart for advance care plan.  Kerry Vega has no plan or code status on file. Mailed available resources and provided with information. Confirmed code status reflects current choices pending further ACP discussions.  Confirmed/documented designated decision maker(s). See permanent comments section of demographics in clinical tab.   Added by Cecilia Noriega on 2/26/2015       Abnormal Pap smear 05/18/2011      Priority: Medium     Osteoporosis 03/28/2011     Priority: Medium     Right lumbar radiculopathy 02/23/2011     Priority: Medium     Left knee pain 02/23/2011     Priority: Medium     CARDIOVASCULAR SCREENING; LDL GOAL LESS THAN 160 10/31/2010     Priority: Medium      Past Medical History:   Diagnosis Date     Anemia      Arthritis     OSTEO     Past Surgical History:   Procedure Laterality Date     COLONOSCOPY  1/29/2013    Procedure: COLONOSCOPY;;  Surgeon: Edgard Prieto MD;  Location: [facility] OR     ESOPHAGOSCOPY, GASTROSCOPY, DUODENOSCOPY (EGD), COMBINED  1/20/2012    Procedure:COMBINED ESOPHAGOSCOPY, GASTROSCOPY, DUODENOSCOPY (EGD); EGD, dysphagia; Surgeon:SHEREE VALLE; Location:[facility] OR     ESOPHAGOSCOPY, GASTROSCOPY, DUODENOSCOPY (EGD), COMBINED  7/18/2012    Procedure: COMBINED ESOPHAGOSCOPY, GASTROSCOPY, DUODENOSCOPY (EGD);  EGD for dysphagia rp Tonio;  Surgeon: Rey Bee MD;  Location: [facility] OR     ESOPHAGOSCOPY, GASTROSCOPY, DUODENOSCOPY (EGD), COMBINED  1/29/2013    Procedure: COMBINED ESOPHAGOSCOPY, GASTROSCOPY, DUODENOSCOPY (EGD), BIOPSY SINGLE OR MULTIPLE;  Colonoscopy, EGD, dysphagia;  Surgeon: Edgard Prieto MD;  Location: [facility] OR     Current Outpatient Prescriptions   Medication Sig Dispense Refill     naproxen (NAPROSYN) 500 MG tablet TAKE 1 TABLET BY MOUTH TWICE DAILY WITH FOOD AS NEEDED FOR PAIN 60 tablet 3     omeprazole (PRILOSEC) 20 MG CR capsule Take 1 capsule (20 mg) by mouth daily 90 capsule 3     SENEXON-S 8.6-50 MG per tablet TAKE 1 TO 2 TABLETS BY MOUTH AT BEDTIME AS NEEDED FOR CONSTIPATION, HOLD FOR LOOSE STOOLS 60 tablet 3     CALCIUM 600+D HIGH POTENCY 600-400 MG-UNIT per tablet TAKE 1 TABLET BY MOUTH TWICE DAILY 180 tablet 3     diclofenac (VOLTAREN) 1 % GEL topical gel Apply 4 grams to knees or 2 grams to hands four times daily using enclosed dosing card. 100 g 1     ranitidine (ZANTAC) 300 MG tablet Take 1 tablet (300 mg) by mouth At Bedtime 90 tablet 3     furosemide  "(LASIX) 20 MG tablet Take 1 tablet (20 mg) by mouth daily as needed for leg swelling. 60 tablet 1     cetirizine (ZYRTEC) 10 MG tablet Take 1 tablet (10 mg) by mouth daily 30 tablet 1     methocarbamol (ROBAXIN) 750 MG tablet Take 1 tablet (750 mg) by mouth 3 times daily as needed for muscle spasms 45 tablet 1     ferrous sulfate (IRON) 325 (65 FE) MG tablet Take 1 tablet (325 mg) by mouth 3 times daily (with meals) 90 tablet 3     cyanocobalamin 1000 MCG TBCR Take 1,000 mcg by mouth daily 30 tablet 2     ORDER FOR DME Pull up briefs for incontinence of urine change 2 to 3 times daily 1 Container 11     ASPIRIN NOT PRESCRIBED (INTENTIONAL) Reported on 4/4/2017       alendronate (FOSAMAX) 70 MG tablet Take 1 tablet (70 mg) by mouth every 7 days (Patient not taking: Reported on 3/8/2018) 12 tablet 3     OTC products: no recent use of OTC ASA, NSAIDS or Steroids    No Known Allergies   Latex Allergy: NO    Social History   Substance Use Topics     Smoking status: Never Smoker     Smokeless tobacco: Never Used     Alcohol use No     History   Drug Use No       REVIEW OF SYSTEMS:   CONSTITUTIONAL: NEGATIVE for fever, chills, change in weight  ENT/MOUTH: NEGATIVE for ear, mouth and throat problems  RESP: NEGATIVE for significant cough or SOB  CV: NEGATIVE for chest pain, palpitations or peripheral edema    EXAM:   /60 (BP Location: Left arm, Patient Position: Chair, Cuff Size: Adult Regular)  Pulse 94  Temp 97.4  F (36.3  C) (Oral)  Ht 4' 6.72\" (1.39 m)  Wt 96 lb (43.5 kg)  SpO2 100%  BMI 22.54 kg/m2  GENERAL APPEARANCE: healthy, alert and no distress  HENT: ear canals and TM's normal and nose and mouth without ulcers or lesions  RESP: lungs clear to auscultation - no rales, rhonchi or wheezes  CV: regular rate and rhythm, normal S1 S2, no S3 or S4 and no murmur, click or rub   ABDOMEN: soft, nontender, no HSM or masses and bowel sounds normal  NEURO: Normal strength and tone, sensory exam grossly normal, " mentation intact and speech normal    DIAGNOSTICS:   No labs or EKG required for low risk surgery (cataract, skin procedure, breast biopsy, etc)  CBC pending.  Recent Labs   Lab Test  07/17/17   1505  02/22/17   1426   04/15/16   1552   HGB  10.3*  11.4*   < >  9.7*   PLT  232  334   < >  246   NA  143   --    --   143   POTASSIUM  4.6   --    --   4.1   CR  0.83   --    --   0.67    < > = values in this interval not displayed.        IMPRESSION:   Reason for surgery/procedure: COMBINED INCISION AND DRAINAGE LOWER EXTREMITY  Diagnosis/reason for consult: preop clearance    The proposed surgical procedure is considered LOW risk.    REVISED CARDIAC RISK INDEX  The patient has the following serious cardiovascular risks for perioperative complications such as (MI, PE, VFib and 3  AV Block):  No serious cardiac risks  INTERPRETATION: 0 risks: Class I (very low risk - 0.4% complication rate)    The patient has the following additional risks for perioperative complications:  No identified additional risks      ICD-10-CM    1. Preop general physical exam Z01.818 EKG 12-lead complete w/read - Clinics     CBC with platelets       RECOMMENDATIONS:     APPROVAL GIVEN to proceed with proposed procedure, without further diagnostic evaluation       Signed Electronically by: Shadi Cotton MD, MD    Copy of this evaluation report is provided to requesting physician.    [facility] Preop Guidelines

## 2018-03-14 ENCOUNTER — ANESTHESIA EVENT (OUTPATIENT)
Dept: SURGERY | Facility: AMBULATORY SURGERY CENTER | Age: 78
End: 2018-03-14

## 2018-03-20 NOTE — ANESTHESIA PREPROCEDURE EVALUATION
Physical Exam  Normal systems: cardiovascular and pulmonary    Airway   Mallampati: II  TM distance: >3 FB  Neck ROM: full    Dental     Cardiovascular   Rhythm and rate: regular and normal      Pulmonary    breath sounds clear to auscultation                    Anesthesia Plan      History & Physical Review  History and physical reviewed and following examination; no interval change.    ASA Status:  2 .    NPO Status:  > 8 hours    Plan for General and ETT with Intravenous and Propofol induction. Maintenance will be Balanced.    PONV prophylaxis:  Ondansetron (or other 5HT-3) and Dexamethasone or Solumedrol  General ETT  RSI 2/2 massive Hiatal Hernia  Glidescope  Desflurane  No versed. Minimal narcotics.  Tylenol and Toradol   Local anesthetic by surgeon.      Postoperative Care  Postoperative pain management:  Oral pain medications, IV analgesics and Multi-modal analgesia.      Consents  Anesthetic plan, risks, benefits and alternatives discussed with:  Patient and Daughter/Son..          ANESTHESIA PREOP EVALUATION    PROCEDURE: Procedure(s):  Incision and Drainage of left thigh hemotoma - Wound Class:     HPI: Kerry Vega is a 77 year old female who presents for above procedure.    PAST MEDICAL HISTORY:    Past Medical History:   Diagnosis Date     Anemia      Arthritis     OSTEO       PAST SURGICAL HISTORY:    Past Surgical History:   Procedure Laterality Date     COLONOSCOPY  1/29/2013    Procedure: COLONOSCOPY;;  Surgeon: Edgard Prieto MD;  Location:  OR     ESOPHAGOSCOPY, GASTROSCOPY, DUODENOSCOPY (EGD), COMBINED  1/20/2012    Procedure:COMBINED ESOPHAGOSCOPY, GASTROSCOPY, DUODENOSCOPY (EGD); EGD, dysphagia; Surgeon:SHEREE VALLE; Location: OR     ESOPHAGOSCOPY, GASTROSCOPY, DUODENOSCOPY (EGD), COMBINED  7/18/2012    Procedure: COMBINED ESOPHAGOSCOPY, GASTROSCOPY, DUODENOSCOPY (EGD);  EGD for dysphagia rp Tonio;  Surgeon: Rey Bee MD;  Location:  OR     ESOPHAGOSCOPY,  GASTROSCOPY, DUODENOSCOPY (EGD), COMBINED  1/29/2013    Procedure: COMBINED ESOPHAGOSCOPY, GASTROSCOPY, DUODENOSCOPY (EGD), BIOPSY SINGLE OR MULTIPLE;  Colonoscopy, EGD, dysphagia;  Surgeon: Edgard Prieto MD;  Location: [facility] OR       PAST ANESTHESIA HISTORY:     No personal or family h/o anesthesia problems    SOCIAL HISTORY:       Social History   Substance Use Topics     Smoking status: Never Smoker     Smokeless tobacco: Never Used     Alcohol use No       ALLERGIES:     No Known Allergies    MEDICATIONS:       (Not in a hospital admission)    Current Outpatient Prescriptions   Medication Sig Dispense Refill     omeprazole (PRILOSEC) 20 MG CR capsule Take 1 capsule (20 mg) by mouth daily 90 capsule 3     SENEXON-S 8.6-50 MG per tablet TAKE 1 TO 2 TABLETS BY MOUTH AT BEDTIME AS NEEDED FOR CONSTIPATION, HOLD FOR LOOSE STOOLS 60 tablet 3     CALCIUM 600+D HIGH POTENCY 600-400 MG-UNIT per tablet TAKE 1 TABLET BY MOUTH TWICE DAILY 180 tablet 3     diclofenac (VOLTAREN) 1 % GEL topical gel Apply 4 grams to knees or 2 grams to hands four times daily using enclosed dosing card. 100 g 1     ranitidine (ZANTAC) 300 MG tablet Take 1 tablet (300 mg) by mouth At Bedtime 90 tablet 3     furosemide (LASIX) 20 MG tablet Take 1 tablet (20 mg) by mouth daily as needed for leg swelling. 60 tablet 1     cetirizine (ZYRTEC) 10 MG tablet Take 1 tablet (10 mg) by mouth daily 30 tablet 1     methocarbamol (ROBAXIN) 750 MG tablet Take 1 tablet (750 mg) by mouth 3 times daily as needed for muscle spasms 45 tablet 1     alendronate (FOSAMAX) 70 MG tablet Take 1 tablet (70 mg) by mouth every 7 days 12 tablet 3     ferrous sulfate (IRON) 325 (65 FE) MG tablet Take 1 tablet (325 mg) by mouth 3 times daily (with meals) 90 tablet 3     cyanocobalamin 1000 MCG TBCR Take 1,000 mcg by mouth daily 30 tablet 2     ORDER FOR DME Pull up briefs for incontinence of urine change 2 to 3 times daily 1 Container 11     ASPIRIN NOT PRESCRIBED  (INTENTIONAL) Reported on 4/4/2017       ferrous sulfate (IRON) 325 (65 FE) MG tablet Take 1 tablet (325 mg) by mouth 2 times daily 60 tablet 2     naproxen (NAPROSYN) 500 MG tablet TAKE 1 TABLET BY MOUTH TWICE DAILY WITH FOOD AS NEEDED FOR PAIN 60 tablet 3       Current Outpatient Prescriptions Ordered in [facility]   Medication Sig Dispense Refill     omeprazole (PRILOSEC) 20 MG CR capsule Take 1 capsule (20 mg) by mouth daily 90 capsule 3     SENEXON-S 8.6-50 MG per tablet TAKE 1 TO 2 TABLETS BY MOUTH AT BEDTIME AS NEEDED FOR CONSTIPATION, HOLD FOR LOOSE STOOLS 60 tablet 3     CALCIUM 600+D HIGH POTENCY 600-400 MG-UNIT per tablet TAKE 1 TABLET BY MOUTH TWICE DAILY 180 tablet 3     diclofenac (VOLTAREN) 1 % GEL topical gel Apply 4 grams to knees or 2 grams to hands four times daily using enclosed dosing card. 100 g 1     ranitidine (ZANTAC) 300 MG tablet Take 1 tablet (300 mg) by mouth At Bedtime 90 tablet 3     furosemide (LASIX) 20 MG tablet Take 1 tablet (20 mg) by mouth daily as needed for leg swelling. 60 tablet 1     cetirizine (ZYRTEC) 10 MG tablet Take 1 tablet (10 mg) by mouth daily 30 tablet 1     methocarbamol (ROBAXIN) 750 MG tablet Take 1 tablet (750 mg) by mouth 3 times daily as needed for muscle spasms 45 tablet 1     alendronate (FOSAMAX) 70 MG tablet Take 1 tablet (70 mg) by mouth every 7 days 12 tablet 3     ferrous sulfate (IRON) 325 (65 FE) MG tablet Take 1 tablet (325 mg) by mouth 3 times daily (with meals) 90 tablet 3     cyanocobalamin 1000 MCG TBCR Take 1,000 mcg by mouth daily 30 tablet 2     ORDER FOR DME Pull up briefs for incontinence of urine change 2 to 3 times daily 1 Container 11     ASPIRIN NOT PRESCRIBED (INTENTIONAL) Reported on 4/4/2017       ferrous sulfate (IRON) 325 (65 FE) MG tablet Take 1 tablet (325 mg) by mouth 2 times daily 60 tablet 2     naproxen (NAPROSYN) 500 MG tablet TAKE 1 TABLET BY MOUTH TWICE DAILY WITH FOOD AS NEEDED FOR PAIN 60 tablet 3     No current Epic-ordered  facility-administered medications on file.        PHYSICAL EXAM:    Vitals: T Data Unavailable, P Data Unavailable, BP Data Unavailable, R Data Unavailable, SpO2  , Weight Wt Readings from Last 2 Encounters:   03/08/18 43.5 kg (96 lb)   03/14/18 43.5 kg (96 lb)       See doc flowsheet      No Data Recorded    No Data Recorded    No Data Recorded    No Data Recorded    No Data Recorded    No data recorded.  No data recorded.      NPO STATUS: see doc flowsheet    LABS:    BMP:  Recent Labs   Lab Test  07/17/17   1505   NA  143   POTASSIUM  4.6   CHLORIDE  107   CO2  26   BUN  20   CR  0.83   GLC  83   BIGG  9.0       LFTs:   Recent Labs   Lab Test  07/17/17   1505   PROTTOTAL  6.1*   ALBUMIN  3.4   BILITOTAL  0.4   ALKPHOS  64   AST  13   ALT  18       CBC:   Recent Labs   Lab Test  03/08/18   1816   WBC  6.0   RBC  3.98   HGB  8.3*   HCT  28.3*   MCV  71*   MCH  20.9*   MCHC  29.3*   RDW  17.0*   PLT  336       Coags:  No results for input(s): INR, PTT, FIBR in the last 39527 hours.    Imaging:  No orders to display       Ryan William MD  Anesthesiology Staff  Pager [phone]    3/20/2018  4:28 PM                    .

## 2018-03-21 ENCOUNTER — ANESTHESIA (OUTPATIENT)
Dept: SURGERY | Facility: AMBULATORY SURGERY CENTER | Age: 78
End: 2018-03-21
Payer: MEDICARE

## 2018-03-21 ENCOUNTER — HOSPITAL ENCOUNTER (OUTPATIENT)
Facility: AMBULATORY SURGERY CENTER | Age: 78
Discharge: HOME | End: 2018-03-21
Attending: SURGERY | Admitting: SURGERY
Payer: MEDICARE

## 2018-03-21 ENCOUNTER — SURGERY (OUTPATIENT)
Age: 78
End: 2018-03-21

## 2018-03-21 VITALS
SYSTOLIC BLOOD PRESSURE: 137 MMHG | OXYGEN SATURATION: 97 % | WEIGHT: 96 LBS | BODY MASS INDEX: 22.21 KG/M2 | HEIGHT: 55 IN | TEMPERATURE: 97.9 F | RESPIRATION RATE: 16 BRPM | DIASTOLIC BLOOD PRESSURE: 71 MMHG

## 2018-03-21 DIAGNOSIS — R22.42 MASS OF LEFT THIGH: Primary | ICD-10-CM

## 2018-03-21 DIAGNOSIS — M76.892: ICD-10-CM

## 2018-03-21 LAB
ANION GAP SERPL CALCULATED.3IONS-SCNC: 6 MMOL/L (ref 3–14)
BUN SERPL-MCNC: 16 MG/DL (ref 7–30)
CALCIUM SERPL-MCNC: 9.1 MG/DL (ref 8.5–10.1)
CHLORIDE SERPL-SCNC: 107 MMOL/L (ref 94–109)
CO2 SERPL-SCNC: 29 MMOL/L (ref 20–32)
CREAT SERPL-MCNC: 0.76 MG/DL (ref 0.52–1.04)
GFR SERPL CREATININE-BSD FRML MDRD: 73 ML/MIN/1.7M2
GLUCOSE SERPL-MCNC: 91 MG/DL (ref 70–99)
HGB BLD-MCNC: 9.7 G/DL (ref 11.7–15.7)
POTASSIUM SERPL-SCNC: 4.3 MMOL/L (ref 3.4–5.3)
SODIUM SERPL-SCNC: 142 MMOL/L (ref 133–144)

## 2018-03-21 PROCEDURE — 27355 REMOVE FEMUR LESION: CPT | Mod: LT | Performed by: SURGERY

## 2018-03-21 PROCEDURE — G8916 PT W IV AB GIVEN ON TIME: HCPCS

## 2018-03-21 PROCEDURE — 85018 HEMOGLOBIN: CPT | Performed by: STUDENT IN AN ORGANIZED HEALTH CARE EDUCATION/TRAINING PROGRAM

## 2018-03-21 PROCEDURE — 36415 COLL VENOUS BLD VENIPUNCTURE: CPT | Performed by: STUDENT IN AN ORGANIZED HEALTH CARE EDUCATION/TRAINING PROGRAM

## 2018-03-21 PROCEDURE — 27355 REMOVE FEMUR LESION: CPT | Mod: LT

## 2018-03-21 PROCEDURE — G8907 PT DOC NO EVENTS ON DISCHARG: HCPCS

## 2018-03-21 PROCEDURE — 80048 BASIC METABOLIC PNL TOTAL CA: CPT | Performed by: STUDENT IN AN ORGANIZED HEALTH CARE EDUCATION/TRAINING PROGRAM

## 2018-03-21 RX ORDER — IBUPROFEN 600 MG/1
600 TABLET, FILM COATED ORAL
Status: DISCONTINUED | OUTPATIENT
Start: 2018-03-21 | End: 2018-03-22 | Stop reason: HOSPADM

## 2018-03-21 RX ORDER — ACETAMINOPHEN 325 MG/1
975 TABLET ORAL ONCE
Status: COMPLETED | OUTPATIENT
Start: 2018-03-21 | End: 2018-03-21

## 2018-03-21 RX ORDER — ONDANSETRON 4 MG/1
4 TABLET, ORALLY DISINTEGRATING ORAL EVERY 30 MIN PRN
Status: DISCONTINUED | OUTPATIENT
Start: 2018-03-21 | End: 2018-03-22 | Stop reason: HOSPADM

## 2018-03-21 RX ORDER — FENTANYL CITRATE 50 UG/ML
25-50 INJECTION, SOLUTION INTRAMUSCULAR; INTRAVENOUS
Status: DISCONTINUED | OUTPATIENT
Start: 2018-03-21 | End: 2018-03-22 | Stop reason: HOSPADM

## 2018-03-21 RX ORDER — LIDOCAINE HYDROCHLORIDE 20 MG/ML
INJECTION, SOLUTION INFILTRATION; PERINEURAL PRN
Status: DISCONTINUED | OUTPATIENT
Start: 2018-03-21 | End: 2018-03-21

## 2018-03-21 RX ORDER — NALOXONE HYDROCHLORIDE 0.4 MG/ML
.1-.4 INJECTION, SOLUTION INTRAMUSCULAR; INTRAVENOUS; SUBCUTANEOUS
Status: DISCONTINUED | OUTPATIENT
Start: 2018-03-21 | End: 2018-03-22 | Stop reason: HOSPADM

## 2018-03-21 RX ORDER — HYDROMORPHONE HYDROCHLORIDE 1 MG/ML
.3-.5 INJECTION, SOLUTION INTRAMUSCULAR; INTRAVENOUS; SUBCUTANEOUS EVERY 10 MIN PRN
Status: DISCONTINUED | OUTPATIENT
Start: 2018-03-21 | End: 2018-03-22 | Stop reason: HOSPADM

## 2018-03-21 RX ORDER — ONDANSETRON 2 MG/ML
4 INJECTION INTRAMUSCULAR; INTRAVENOUS EVERY 30 MIN PRN
Status: DISCONTINUED | OUTPATIENT
Start: 2018-03-21 | End: 2018-03-22 | Stop reason: HOSPADM

## 2018-03-21 RX ORDER — ALBUTEROL SULFATE 0.83 MG/ML
2.5 SOLUTION RESPIRATORY (INHALATION) EVERY 4 HOURS PRN
Status: DISCONTINUED | OUTPATIENT
Start: 2018-03-21 | End: 2018-03-22 | Stop reason: HOSPADM

## 2018-03-21 RX ORDER — LIDOCAINE 40 MG/G
CREAM TOPICAL
Status: DISCONTINUED | OUTPATIENT
Start: 2018-03-21 | End: 2018-03-22 | Stop reason: HOSPADM

## 2018-03-21 RX ORDER — DEXAMETHASONE SODIUM PHOSPHATE 4 MG/ML
4 INJECTION, SOLUTION INTRA-ARTICULAR; INTRALESIONAL; INTRAMUSCULAR; INTRAVENOUS; SOFT TISSUE EVERY 10 MIN PRN
Status: DISCONTINUED | OUTPATIENT
Start: 2018-03-21 | End: 2018-03-22 | Stop reason: HOSPADM

## 2018-03-21 RX ORDER — SODIUM CHLORIDE, SODIUM LACTATE, POTASSIUM CHLORIDE, CALCIUM CHLORIDE 600; 310; 30; 20 MG/100ML; MG/100ML; MG/100ML; MG/100ML
INJECTION, SOLUTION INTRAVENOUS CONTINUOUS
Status: DISCONTINUED | OUTPATIENT
Start: 2018-03-21 | End: 2018-03-22 | Stop reason: HOSPADM

## 2018-03-21 RX ORDER — OXYCODONE HYDROCHLORIDE 5 MG/1
5-10 TABLET ORAL EVERY 4 HOURS PRN
Status: DISCONTINUED | OUTPATIENT
Start: 2018-03-21 | End: 2018-03-22 | Stop reason: HOSPADM

## 2018-03-21 RX ORDER — MEPERIDINE HYDROCHLORIDE 25 MG/ML
12.5 INJECTION INTRAMUSCULAR; INTRAVENOUS; SUBCUTANEOUS
Status: DISCONTINUED | OUTPATIENT
Start: 2018-03-21 | End: 2018-03-22 | Stop reason: HOSPADM

## 2018-03-21 RX ORDER — HYDROCODONE BITARTRATE AND ACETAMINOPHEN 5; 325 MG/1; MG/1
1 TABLET ORAL
Status: DISCONTINUED | OUTPATIENT
Start: 2018-03-21 | End: 2018-03-22 | Stop reason: HOSPADM

## 2018-03-21 RX ORDER — ONDANSETRON 2 MG/ML
INJECTION INTRAMUSCULAR; INTRAVENOUS PRN
Status: DISCONTINUED | OUTPATIENT
Start: 2018-03-21 | End: 2018-03-21

## 2018-03-21 RX ORDER — ACETAMINOPHEN 325 MG/1
975 TABLET ORAL ONCE
Status: DISCONTINUED | OUTPATIENT
Start: 2018-03-21 | End: 2018-03-22 | Stop reason: HOSPADM

## 2018-03-21 RX ORDER — CEFAZOLIN SODIUM 2 G/100ML
2 INJECTION, SOLUTION INTRAVENOUS
Status: COMPLETED | OUTPATIENT
Start: 2018-03-21 | End: 2018-03-21

## 2018-03-21 RX ORDER — BUPIVACAINE HYDROCHLORIDE AND EPINEPHRINE 2.5; 5 MG/ML; UG/ML
INJECTION, SOLUTION INFILTRATION; PERINEURAL PRN
Status: DISCONTINUED | OUTPATIENT
Start: 2018-03-21 | End: 2018-03-21 | Stop reason: HOSPADM

## 2018-03-21 RX ORDER — HYDROCODONE BITARTRATE AND ACETAMINOPHEN 5; 325 MG/1; MG/1
1-2 TABLET ORAL EVERY 4 HOURS PRN
Qty: 20 TABLET | Refills: 0 | Status: SHIPPED | OUTPATIENT
Start: 2018-03-21 | End: 2018-09-11

## 2018-03-21 RX ORDER — DEXAMETHASONE SODIUM PHOSPHATE 4 MG/ML
INJECTION, SOLUTION INTRA-ARTICULAR; INTRALESIONAL; INTRAMUSCULAR; INTRAVENOUS; SOFT TISSUE PRN
Status: DISCONTINUED | OUTPATIENT
Start: 2018-03-21 | End: 2018-03-21

## 2018-03-21 RX ORDER — CEFAZOLIN SODIUM 1 G/3ML
1 INJECTION, POWDER, FOR SOLUTION INTRAMUSCULAR; INTRAVENOUS SEE ADMIN INSTRUCTIONS
Status: DISCONTINUED | OUTPATIENT
Start: 2018-03-21 | End: 2018-03-22 | Stop reason: HOSPADM

## 2018-03-21 RX ORDER — KETOROLAC TROMETHAMINE 30 MG/ML
INJECTION, SOLUTION INTRAMUSCULAR; INTRAVENOUS PRN
Status: DISCONTINUED | OUTPATIENT
Start: 2018-03-21 | End: 2018-03-21

## 2018-03-21 RX ORDER — PROPOFOL 10 MG/ML
INJECTION, EMULSION INTRAVENOUS PRN
Status: DISCONTINUED | OUTPATIENT
Start: 2018-03-21 | End: 2018-03-21

## 2018-03-21 RX ADMIN — OXYCODONE HYDROCHLORIDE 5 MG: 5 TABLET ORAL at 15:40

## 2018-03-21 RX ADMIN — HYDROMORPHONE HYDROCHLORIDE 0.3 MG: 1 INJECTION, SOLUTION INTRAMUSCULAR; INTRAVENOUS; SUBCUTANEOUS at 15:34

## 2018-03-21 RX ADMIN — SODIUM CHLORIDE, SODIUM LACTATE, POTASSIUM CHLORIDE, CALCIUM CHLORIDE: 600; 310; 30; 20 INJECTION, SOLUTION INTRAVENOUS at 13:57

## 2018-03-21 RX ADMIN — LIDOCAINE HYDROCHLORIDE 40 MG: 20 INJECTION, SOLUTION INFILTRATION; PERINEURAL at 13:59

## 2018-03-21 RX ADMIN — ACETAMINOPHEN 975 MG: 325 TABLET ORAL at 13:30

## 2018-03-21 RX ADMIN — KETOROLAC TROMETHAMINE 15 MG: 30 INJECTION, SOLUTION INTRAMUSCULAR; INTRAVENOUS at 15:17

## 2018-03-21 RX ADMIN — Medication 50 MCG: at 14:55

## 2018-03-21 RX ADMIN — Medication 50 MCG: at 14:42

## 2018-03-21 RX ADMIN — PROPOFOL 125 MG: 10 INJECTION, EMULSION INTRAVENOUS at 13:59

## 2018-03-21 RX ADMIN — DEXAMETHASONE SODIUM PHOSPHATE 4 MG: 4 INJECTION, SOLUTION INTRA-ARTICULAR; INTRALESIONAL; INTRAMUSCULAR; INTRAVENOUS; SOFT TISSUE at 14:03

## 2018-03-21 RX ADMIN — CEFAZOLIN SODIUM 2 G: 2 INJECTION, SOLUTION INTRAVENOUS at 14:04

## 2018-03-21 RX ADMIN — ONDANSETRON 4 MG: 2 INJECTION INTRAMUSCULAR; INTRAVENOUS at 14:07

## 2018-03-21 RX ADMIN — BUPIVACAINE HYDROCHLORIDE AND EPINEPHRINE 20 ML: 2.5; 5 INJECTION, SOLUTION INFILTRATION; PERINEURAL at 15:19

## 2018-03-21 NOTE — DISCHARGE INSTRUCTIONS
[facility]  Same-Day Surgery   Adult Discharge Orders & Instructions   For 24 hours after surgery  1. Get plenty of rest.  A responsible adult must stay with you for at least 24 hours after you leave the hospital.   2. Do not drive or use heavy equipment.  If you have weakness or tingling, don't drive or use heavy equipment until this feeling goes away.  3. Do not drink alcohol.  4. Avoid strenuous or risky activities.  Ask for help when climbing stairs.   5. You may feel lightheaded.  IF so, sit for a few minutes before standing.  Have someone help you get up.   6. If you have nausea (feel sick to your stomach): Drink only clear liquids such as apple juice, ginger ale, broth or 7-Up.  Rest may also help.  Be sure to drink enough fluids.  Move to a regular diet as you feel able.  7. You may have a slight fever. Call the doctor if your fever is over 100 F (37.7 C) (taken under the tongue) or lasts longer than 24 hours.  8. You may have a dry mouth, a sore throat, muscle aches or trouble sleeping.  These should go away after 24 hours.  9. Do not make important or legal decisions.   Call your doctor for any of the followin.  Signs of infection (fever, growing tenderness at the surgery site, a large amount of drainage or bleeding, severe pain, foul-smelling drainage, redness, swelling).    2. It has been over 8 to 10 hours since surgery and you are still not able to urinate (pass water).    3.  Headache for over 24 hours.    4.  Numbness, tingling or weakness the day after surgery (if you had spinal anesthesia).

## 2018-03-21 NOTE — BRIEF OP NOTE
[facility] Brief Operative Note    Pre-operative diagnosis: Left thigh hematoma   Post-operative diagnosis left thigh cystic mass, anterior femoral bone spur   Procedure: Procedure(s):  Excision of left intramuscular thigh mass; removal of anterior femoral bone spur - Wound Class: II-Clean Contaminated   Surgeon: Ray Diaz MD   Assistants(s): none   Estimated blood loss: 20cc    Specimens: 1. Left thigh mass 2. Left anterior femoral bone spur   Findings: Left thigh with about 8 x 6 cm area of thick walled intramuscular mass just over/apparently attached to the bone. Inside this had about 30cc of serosanguinous fluid and I could see a good size bone spur, likely causing chronic irritation and trauma to the surrounding muscle which then encapsulated it     Dict#[id number]

## 2018-03-21 NOTE — ANESTHESIA POSTPROCEDURE EVALUATION
Patient: Kerry Vega    Procedure(s):  Excision of left intramuscular thigh mass; removal of anterior femoral bone spur - Wound Class: II-Clean Contaminated    Diagnosis:Left thigh hematoma  Diagnosis Additional Information: No value filed.    Anesthesia Type:  General, ETT    Note:  Anesthesia Post Evaluation    Patient location during evaluation: PACU  Patient participation: Able to fully participate in evaluation  Level of consciousness: awake and alert  Pain management: adequate  Airway patency: patent  Cardiovascular status: acceptable  Respiratory status: acceptable  Hydration status: acceptable  PONV: none     Anesthetic complications: None    Comments: IV dilaudid low dose, oral oxycodone. Pain in left leg anticipated. Large bone spur and mass removed. Local placed by surgery. Patient awake and vitals stable. Rest, ice and antiinflammatories important.         Last vitals:  Vitals:    03/21/18 1530 03/21/18 1535 03/21/18 1540   BP: 128/68     Resp: 20 (!) 38 10   Temp:      SpO2: 98% 98% 95%         Electronically Signed By: Ryan William MD  March 21, 2018  3:50 PM

## 2018-03-21 NOTE — IP AVS SNAPSHOT
[facility]    [address] [place] [address]    Phone:  [phone]                                       After Visit Summary   3/21/2018    Kerry Vega    MRN: [id number]           After Visit Summary Signature Page     I have received my discharge instructions, and my questions have been answered. I have discussed any challenges I see with this plan with the nurse or doctor.    ..........................................................................................................................................  Patient/Patient Representative Signature      ..........................................................................................................................................  Patient Representative Print Name and Relationship to Patient    ..................................................               ................................................  Date                                            Time    ..........................................................................................................................................  Reviewed by Signature/Title    ...................................................              ..............................................  Date                                                            Time

## 2018-03-21 NOTE — ANESTHESIA CARE TRANSFER NOTE
[facility]    Procedure(s):  Excision of left intramuscular thigh mass; removal of anterior femoral bone spur - Wound Class: II-Clean Contaminated    Diagnosis: Left thigh hematoma  Diagnosis Additional Information: No value filed.    Anesthesia Type:   General, ETT     Note:  Airway :Nasal Cannula  Patient transferred to:PACU        Vitals: (Last set prior to Anesthesia Care Transfer)    CRNA VITALS  3/21/2018 1452 - 3/21/2018 1528      3/21/2018             Pulse: 94    SpO2: 99 %    Resp Rate (observed): 9                Electronically Signed By: HERO Muse CRNA  March 21, 2018  3:28 PM

## 2018-03-21 NOTE — IP AVS SNAPSHOT
MRN:[id number]                      After Visit Summary   3/21/2018    Kerry Vega    MRN: [id number]           Thank you!     Thank you for choosing [facility] for your care. Our goal is always to provide you with excellent care. Hearing back from our patients is one way we can continue to improve our services. Please take a few minutes to complete the written survey that you may receive in the mail after you visit with us. Thank you!        Patient Information     Date Of Birth          1940        About your hospital stay     You were admitted on:  March 21, 2018 You last received care in the:  [facility]    You were discharged on:  March 21, 2018       Who to Call     For medical emergencies, please call 911.  For non-urgent questions about your medical care, please call your primary care provider or clinic, [phone]  For questions related to your surgery, please call your surgery clinic        Attending Provider     Provider Yonis Barbour MD Surgery       Primary Care Provider Office Phone # Fax #    Shadi Cotton [phone]      After Care Instructions     Diet Instructions       Resume pre-procedure diet            Discharge Instructions       Patient to follow up with appointment in 2 weeks            Discharge Instructions       Discharge Instructions    DISCHARGE INSTRUCTIONS  DR. YONIS MORALES  1. You may resume your regular diet when you feel you are ready to. DO NOT drink alcoholic beverages for  24 hours of while you are taking prescription medication.  2. Limit your activities for the first 48 hours. Gradually, increase them as tolerated. You may use stairs.  I encourage you to walk as tolerated.  3. You will have some discomfort at the incision sites. This is expected. This should improve over the next  2-3 days. Ice and pain medication will help with this pain. Use prescribed pain medication as instructed.  4.  Bruising and mild swelling is normal after surgery. The area below and around the incision(s) will be hard and  elevated. This is part of the normal healing process. This will resolve slowly over the next several months.  If you feel the pain is increasing and cannot explain it by increasing activity please call us at [phone]  5. Your wounds may be covered with glue. The glue is water tight and so you can shower the day following surgery. Wait at least 48 hours to allow the skin beneath to seal before submerging in a bath tub or pool. If glue was not used wait 48 hours before bathing.  6. Avoid Aspirin for the first 72 hours after the procedure. This medication may increase the tendency to bleed.  7. Use the following medications (in addition to your normal meds) as shown:  Name of Medicine Dose Frequency Reason  a. Percocet 5 mg 1-2 every 6 hours as needed for pain. This contains 325 mg of Tylenol (acetaminophen)  per tablet. For example, you may take 1 Percocet and 1 Tylenol, or 2 Percocet and no Tylenol,  or 2 Tylenol and no Percocet every 6 hours.  b. Tylenol (acetaminophen) 500 mg every 6 hours as needed for pain. Do not take more than 1000 mg  every 6 hours. (see above)  c. Motrin (ibuprophen) 200-800 mg every 6 hours as needed for pain. Take with food.  8. Notify [facility] at [phone] if:     Your discomfort is not relieved by your pain medication     You have signs of infection such as temperature above 100.5 degrees orally, chills, or  increasing daily discomfort.     Incision site is becoming more red and/or there is purulent drainage.     You have questions or concerns  9. Please call [phone] to schedule a follow up appointment in 2 weeks(s)  10. When taking narcotics (pain medication more than Tylenol (acetaminophen) and Motrin (ibuprophen) it is  important to keep your stools soft to avoid constipation and pain with straining. This is best done by drinking  fluids  (nonalcoholic and non-caffeinated) and taking a stool softener i.e. Metamucil or milk of magnesia.  You may be able to use non-narcotics for pain relief especially by the 3rd post- operative day. [id number] mg  every 6 hours and/or Motrin (ibuprophen) 200-800 mg every 6 hours. Please do not take more than 4 grams  of Tylenol (acetaminophen) per day. Remember your Percocet does have Tylenol (acetaminophen) already  in it. If you have a history of stomach ulcers or stomach problems than do not take the Motrin (ibuprophen).  Please take Motrin (ibuprophen) with food to help protect the stomach.  11. Do not drive or operate heavy machinery for 24 hours after surgery or when taking narcotics. You may  resume driving when feel that you can safely avoid an accident and are not taking narcotics. This is usually  5 to 7 days after surgery. You should not be alone for 24 hours after surgery.  Discharge Owner: Dr Diaz  Date of Origin: 11/06  Revised/Reviewed:8/15            Dressing       Keep dressing clean and dry.  Dressing / incisional care as instructed by RN and or Surgeon    Wrap thigh with ACE for first few days post op to help keep some pressure over the area. May leave off after that. OK to remove for shower/bathing            Ice to affected area       Ice to operative site PRN            No Alcohol       For 24 hours post procedure            No driving or operating machinery        until the day after procedure            Shower       No shower for 24 hours post procedure. May shower Postoperative Day (POD)  1            Weight bearing status - As tolerated                 Your next 10 appointments already scheduled     Apr 02, 2018 12:15 PM CDT   Post Op with Ray [facility] MD   [facility]    [address] [place] [address] [id number] [phone]              Further instructions from your care team       [facility]  "[place]  Same-Day Surgery   Adult Discharge Orders & Instructions   For 24 hours after surgery  1. Get plenty of rest.  A responsible adult must stay with you for at least 24 hours after you leave the hospital.   2. Do not drive or use heavy equipment.  If you have weakness or tingling, don't drive or use heavy equipment until this feeling goes away.  3. Do not drink alcohol.  4. Avoid strenuous or risky activities.  Ask for help when climbing stairs.   5. You may feel lightheaded.  IF so, sit for a few minutes before standing.  Have someone help you get up.   6. If you have nausea (feel sick to your stomach): Drink only clear liquids such as apple juice, ginger ale, broth or 7-Up.  Rest may also help.  Be sure to drink enough fluids.  Move to a regular diet as you feel able.  7. You may have a slight fever. Call the doctor if your fever is over 100 F (37.7 C) (taken under the tongue) or lasts longer than 24 hours.  8. You may have a dry mouth, a sore throat, muscle aches or trouble sleeping.  These should go away after 24 hours.  9. Do not make important or legal decisions.   Call your doctor for any of the followin.  Signs of infection (fever, growing tenderness at the surgery site, a large amount of drainage or bleeding, severe pain, foul-smelling drainage, redness, swelling).    2. It has been over 8 to 10 hours since surgery and you are still not able to urinate (pass water).    3.  Headache for over 24 hours.    4.  Numbness, tingling or weakness the day after surgery (if you had spinal anesthesia).    Pending Results     No orders found from 3/19/2018 to 3/22/2018.            Admission Information     Date & Time Provider Department Dept. Phone    3/21/2018 Ray Diaz MD [facility] [phone]      Your Vitals Were     Blood Pressure Temperature Respirations Height Weight Pulse Oximetry    128/68 97.9  F (36.6  C) (Temporal) 20 1.39 m (4' 6.72\") 43.5 kg (96 lb) 98%    BMI " "(Body Mass Index)                   22.54 kg/m2           Surfly Information     Surfly lets you send messages to your doctor, view your test results, renew your prescriptions, schedule appointments and more. To sign up, go to [email or web address] . Click on \"Log in\" on the left side of the screen, which will take you to the Welcome page. Then click on \"Sign up Now\" on the right side of the page.     You will be asked to enter the access code listed below, as well as some personal information. Please follow the directions to create your username and password.     Your access code is: [email or web address]  Expires: 2018  8:02 PM     Your access code will  in 90 days. If you need help or a new code, please call your [facility] clinic or [phone].        Care EveryWhere ID     This is your Care EveryWhere ID. This could be used by other organizations to access your [facility] medical records  [id number]        Equal Access to Services     AMBER DELGADO : Hadii afshan amadoo Socorona, waaxda luqadaha, qaybta kaalmada adeobdulioyavivienne, cruz mathew . So [facility] [phone].    ATENCIÓN: Si habla español, tiene a cornejo disposición servicios gratuitos de asistencia lingüística. Llame al [phone].    We comply with applicable federal civil rights laws and [place] laws. We do not discriminate on the basis of race, color, national origin, age, disability, sex, sexual orientation, or gender identity.               Review of your medicines      START taking        Dose / Directions    HYDROcodone-acetaminophen 5-325 MG per tablet   Commonly known as:  NORCO   Used for:  Mass of left thigh, Bone spur of left femur        Dose:  1-2 tablet   Take 1-2 tablets by mouth every 4 hours as needed for other (Moderate to Severe Pain)   Quantity:  20 tablet   Refills:  0         CONTINUE these medicines which have NOT CHANGED        Dose / Directions    * ASPIRIN NOT PRESCRIBED   Commonly known as:  " INTENTIONAL        Reported on 4/4/2017   Refills:  0       CALCIUM 600+D HIGH POTENCY 600-400 MG-UNIT per tablet   Used for:  Osteoporosis   Generic drug:  calcium-vitamin D        TAKE 1 TABLET BY MOUTH TWICE DAILY   Quantity:  180 tablet   Refills:  3       cetirizine 10 MG tablet   Commonly known as:  zyrTEC   Used for:  Urticaria        Dose:  10 mg   Take 1 tablet (10 mg) by mouth daily   Quantity:  30 tablet   Refills:  1       cyanocobalamin 1000 MCG Tbcr   Commonly known as:  VITAMIN B-12 ER   Used for:  Vitamin B12 deficiency (dietary) anemia        Dose:  1000 mcg   Take 1,000 mcg by mouth daily   Quantity:  30 tablet   Refills:  2       diclofenac 1 % Gel topical gel   Commonly known as:  VOLTAREN   Used for:  Osteoarthritis of left thumb, Cervical radiculopathy        Apply 4 grams to knees or 2 grams to hands four times daily using enclosed dosing card.   Quantity:  100 g   Refills:  1       * ferrous sulfate 325 (65 FE) MG tablet   Commonly known as:  IRON   Used for:  Iron deficiency anemia, unspecified iron deficiency        Dose:  325 mg   Take 1 tablet (325 mg) by mouth 3 times daily (with meals)   Quantity:  90 tablet   Refills:  3       * ferrous sulfate 325 (65 FE) MG tablet   Commonly known as:  IRON   Used for:  Iron deficiency anemia, unspecified iron deficiency anemia type        Dose:  325 mg   Take 1 tablet (325 mg) by mouth 2 times daily   Quantity:  60 tablet   Refills:  2       naproxen 500 MG tablet   Commonly known as:  NAPROSYN   Used for:  Osteoarthritis of knee, unspecified laterality, unspecified osteoarthritis type        TAKE 1 TABLET BY MOUTH TWICE DAILY WITH FOOD AS NEEDED FOR PAIN   Quantity:  60 tablet   Refills:  3       omeprazole 20 MG CR capsule   Commonly known as:  priLOSEC   Used for:  Gastroesophageal reflux disease without esophagitis        Dose:  20 mg   Take 1 capsule (20 mg) by mouth daily   Quantity:  90 capsule   Refills:  3       * order for DME   Used for:   Urine incontinence        Pull up briefs for incontinence of urine change 2 to 3 times daily   Quantity:  1 Container   Refills:  11       ranitidine 300 MG tablet   Commonly known as:  ZANTAC   Used for:  Gastroesophageal reflux disease without esophagitis        Dose:  300 mg   Take 1 tablet (300 mg) by mouth At Bedtime   Quantity:  90 tablet   Refills:  3       SENEXON-S 8.6-50 MG per tablet   Used for:  Constipation, unspecified constipation type   Generic drug:  senna-docusate        TAKE 1 TO 2 TABLETS BY MOUTH AT BEDTIME AS NEEDED FOR CONSTIPATION, HOLD FOR LOOSE STOOLS   Quantity:  60 tablet   Refills:  3       * Notice:  This list has 4 medication(s) that are the same as other medications prescribed for you. Read the directions carefully, and ask your doctor or other care provider to review them with you.         Where to get your medicines      Some of these will need a paper prescription and others can be bought over the counter. Ask your nurse if you have questions.     Bring a paper prescription for each of these medications     HYDROcodone-acetaminophen 5-325 MG per tablet                Protect others around you: Learn how to safely use, store and throw away your medicines at www.disposemymeds.org.        Information about OPIOIDS     PRESCRIPTION OPIOIDS: WHAT YOU NEED TO KNOW    Prescription opioids can be used to help relieve moderate to severe pain and are often prescribed following a surgery or injury, or for certain health conditions. These medications can be an important part of treatment but also come with serious risks. It is important to work with your health care provider to make sure you are getting the safest, most effective care.    WHAT ARE THE RISKS AND SIDE EFFECTS OF OPIOID USE?  Prescription opioids carry serious risks of addiction and overdose, especially with prolonged use. An opioid overdose, often marked by slowed breathing can cause sudden death. The use of prescription opioids  can have a number of side effects as well, even when taken as directed:      Tolerance - meaning you might need to take more of a medication for the same pain relief    Physical dependence - meaning you have symptoms of withdrawal when a medication is stopped    Increased sensitivity to pain    Constipation    Nausea, vomiting, and dry mouth    Sleepiness and dizziness    Confusion    Depression    Low levels of testosterone that can result in lower sex drive, energy, and strength    Itching and sweating    RISKS ARE GREATER WITH:    History of drug misuse, substance use disorder, or overdose    Mental health conditions (such as depression or anxiety)    Sleep apnea    Older age (65 years or older)    Pregnancy    Avoid alcohol while taking prescription opioids.   Also, unless specifically advised by your health care provider, medications to avoid include:    Benzodiazepines (such as Xanax or Valium)    Muscle relaxants (such as Soma or Flexeril)    Hypnotics (such as Ambien or Lunesta)    Other prescription opioids    KNOW YOUR OPTIONS:  Talk to your health care provider about ways to manage your pain that do not involve prescription opioids. Some of these options may actually work better and have fewer risks and side effects:    Pain relievers such as acetaminophen, ibuprofen, and naproxen    Some medications that are also used for depression or seizures    Physical therapy and exercise    Cognitive behavioral therapy, a psychological, goal-directed approach, in which patients learn how to modify physical, behavioral, and emotional triggers of pain and stress    IF YOU ARE PRESCRIBED OPIOIDS FOR PAIN:    Never take opioids in greater amounts or more often than prescribed    Follow up with your primary health care provider and work together to create a plan on how to manage your pain.    Talk about ways to help manage your pain that do not involve prescription opioids    Talk about all concerns and side  effects    Help prevent misuse and abuse    Never sell or share prescription opioids    Never use another person's prescription opioids    Store prescription opioids in a secure place and out of reach of others (this may include visitors, children, friends, and family)    Visit www.cdc.gov/drugoverdose to learn about risks of opioid abuse and overdose    If you believe you may be struggling with addiction, tell your health care provider and ask for guidance or call [facility]'s National Helpline at 2-804-629-HELP    LEARN MORE / www.cdc.gov/drugoverdose/prescribing/guideline.html    Safely dispose of unused prescription opioids: Find your local drug take-back programs and more information about the importance of safe disposal at www.doseofreality.mn.gov             Medication List: This is a list of all your medications and when to take them. Check marks below indicate your daily home schedule. Keep this list as a reference.      Medications           Morning Afternoon Evening Bedtime As Needed    * ASPIRIN NOT PRESCRIBED   Commonly known as:  INTENTIONAL   Reported on 4/4/2017                                CALCIUM 600+D HIGH POTENCY 600-400 MG-UNIT per tablet   TAKE 1 TABLET BY MOUTH TWICE DAILY   Generic drug:  calcium-vitamin D                                cetirizine 10 MG tablet   Commonly known as:  zyrTEC   Take 1 tablet (10 mg) by mouth daily                                cyanocobalamin 1000 MCG Tbcr   Commonly known as:  VITAMIN B-12 ER   Take 1,000 mcg by mouth daily                                diclofenac 1 % Gel topical gel   Commonly known as:  VOLTAREN   Apply 4 grams to knees or 2 grams to hands four times daily using enclosed dosing card.                                * ferrous sulfate 325 (65 FE) MG tablet   Commonly known as:  IRON   Take 1 tablet (325 mg) by mouth 3 times daily (with meals)                                * ferrous sulfate 325 (65 FE) MG tablet   Commonly known as:  IRON   Take 1  tablet (325 mg) by mouth 2 times daily                                HYDROcodone-acetaminophen 5-325 MG per tablet   Commonly known as:  NORCO   Take 1-2 tablets by mouth every 4 hours as needed for other (Moderate to Severe Pain)                                naproxen 500 MG tablet   Commonly known as:  NAPROSYN   TAKE 1 TABLET BY MOUTH TWICE DAILY WITH FOOD AS NEEDED FOR PAIN                                omeprazole 20 MG CR capsule   Commonly known as:  priLOSEC   Take 1 capsule (20 mg) by mouth daily                                * order for DME   Pull up briefs for incontinence of urine change 2 to 3 times daily                                ranitidine 300 MG tablet   Commonly known as:  ZANTAC   Take 1 tablet (300 mg) by mouth At Bedtime                                SENEXON-S 8.6-50 MG per tablet   TAKE 1 TO 2 TABLETS BY MOUTH AT BEDTIME AS NEEDED FOR CONSTIPATION, HOLD FOR LOOSE STOOLS   Generic drug:  senna-docusate                                * Notice:  This list has 4 medication(s) that are the same as other medications prescribed for you. Read the directions carefully, and ask your doctor or other care provider to review them with you.

## 2018-03-21 NOTE — H&P (VIEW-ONLY)
Patient seen in consultation for leg hematoma by Shadi Cotton    Patient accompanied by son who supplies most of history  HPI:  Patient is a 77 year old female  with complaints of hematoma in leg  The patient noticed the symptoms about few months ago.    She had surgery to fix her broken left femur end of July  The lump in the thigh was able to be felt a few weeks later at [facility]  This was noticed at follow ups with the Orthopedic surgeon  Feels better when using heat. Size seems about the same to perhaps a bit smaller  Also notices another lump more in the groin area on the left. Not painful    Review Of Systems    Skin: negative  Ears/Nose/Throat: negative  Respiratory: No shortness of breath, dyspnea on exertion, cough, or hemoptysis  Cardiovascular: negative  Gastrointestinal: negative  Genitourinary: negative  Musculoskeletal: as above  Neurologic: negative  Hematologic/Lymphatic/Immunologic: negative  Endocrine: negative      Past Medical History:   Diagnosis Date     Anemia      Arthritis     OSTEO       Past Surgical History:   Procedure Laterality Date     COLONOSCOPY  1/29/2013    Procedure: COLONOSCOPY;;  Surgeon: Edgard Prieto MD;  Location:  OR     ESOPHAGOSCOPY, GASTROSCOPY, DUODENOSCOPY (EGD), COMBINED  1/20/2012    Procedure:COMBINED ESOPHAGOSCOPY, GASTROSCOPY, DUODENOSCOPY (EGD); EGD, dysphagia; Surgeon:SHEREE VALLE; Location: OR     ESOPHAGOSCOPY, GASTROSCOPY, DUODENOSCOPY (EGD), COMBINED  7/18/2012    Procedure: COMBINED ESOPHAGOSCOPY, GASTROSCOPY, DUODENOSCOPY (EGD);  EGD for dysphagia rp Tonio;  Surgeon: Rey Bee MD;  Location:  OR     ESOPHAGOSCOPY, GASTROSCOPY, DUODENOSCOPY (EGD), COMBINED  1/29/2013    Procedure: COMBINED ESOPHAGOSCOPY, GASTROSCOPY, DUODENOSCOPY (EGD), BIOPSY SINGLE OR MULTIPLE;  Colonoscopy, EGD, dysphagia;  Surgeon: Edgard Prieto MD;  Location:  OR       Social History     Social History     Marital status:      Spouse name: N/A      Number of children: N/A     Years of education: N/A     Occupational History     Not on file.     Social History Main Topics     Smoking status: Never Smoker     Smokeless tobacco: Never Used     Alcohol use No     Drug use: No     Sexual activity: Not Currently     Partners: Male     Birth control/ protection: None     Other Topics Concern     Not on file     Social History Narrative       Current Outpatient Prescriptions   Medication Sig Dispense Refill     omeprazole (PRILOSEC) 20 MG CR capsule Take 1 capsule (20 mg) by mouth daily 90 capsule 3     mefloquine (LARIAM) 250 MG tablet TAKE 1 TABLET BY MOUTH EVERY 7 DAYS STARTING 1 WEEK BEFORE TRAVEL AND ENDING 3 WEEKS AFTER TRAVEL 12 tablet 0     naproxen (NAPROSYN) 500 MG tablet TAKE 1 TABLET BY MOUTH TWICE DAILY WITH FOOD AS NEEDED FOR PAIN 60 tablet 0     SENEXON-S 8.6-50 MG per tablet TAKE 1 TO 2 TABLETS BY MOUTH AT BEDTIME AS NEEDED FOR CONSTIPATION, HOLD FOR LOOSE STOOLS 60 tablet 3     CALCIUM 600+D HIGH POTENCY 600-400 MG-UNIT per tablet TAKE 1 TABLET BY MOUTH TWICE DAILY 180 tablet 3     diclofenac (VOLTAREN) 1 % GEL topical gel Apply 4 grams to knees or 2 grams to hands four times daily using enclosed dosing card. 100 g 1     ranitidine (ZANTAC) 300 MG tablet Take 1 tablet (300 mg) by mouth At Bedtime 90 tablet 3     furosemide (LASIX) 20 MG tablet Take 1 tablet (20 mg) by mouth daily as needed for leg swelling. 60 tablet 1     cetirizine (ZYRTEC) 10 MG tablet Take 1 tablet (10 mg) by mouth daily 30 tablet 1     methocarbamol (ROBAXIN) 750 MG tablet Take 1 tablet (750 mg) by mouth 3 times daily as needed for muscle spasms 45 tablet 1     alendronate (FOSAMAX) 70 MG tablet Take 1 tablet (70 mg) by mouth every 7 days 12 tablet 3     ferrous sulfate (IRON) 325 (65 FE) MG tablet Take 1 tablet (325 mg) by mouth 3 times daily (with meals) 90 tablet 3     cyanocobalamin 1000 MCG TBCR Take 1,000 mcg by mouth daily 30 tablet 2     ORDER FOR DME Pull up briefs  "for incontinence of urine change 2 to 3 times daily 1 Container 11     ASPIRIN NOT PRESCRIBED (INTENTIONAL) Reported on 4/4/2017         Medications and history reviewed    Physical exam:  Vitals: /70  Pulse 96  Ht 1.39 m (4' 6.72\")  Wt 41.7 kg (92 lb)  BMI 21.6 kg/m2  BMI= Body mass index is 21.6 kg/(m^2).    Constitutional: healthy, alert and no distress  Head: Normocephalic. No masses, lesions, tenderness or abnormalities  Cardiovascular: negative, PMI normal. No lifts, heaves, or thrills. RRR. No murmurs, clicks gallops or rub  Respiratory: negative, Percussion normal. Good diaphragmatic excursion. Lungs clear  Gastrointestinal: Abdomen soft, non-tender. BS normal. No masses, organomegaly  : Normal external genitalia without lesions and female positive for left inguinal hernia. Non tender, reducible  Musculoskeletal: Left thigh with mass anteriorly about 8 x 6 cm. Tender. Somewhat mobile, feels fluid filled. No Bruising or overlaying skin changes  Skin: no suspicious lesions or rashes  Psychiatric: mentation appears normal and affect normal/bright  Hematologic/Lymphatic/Immunologic: Normal cervical lymph nodes  Patient able to get up on table without difficulty.      Imaging shows: imaging personally reviewed  ULTRASOUND LEFT LOWER EXTREMITY VENOUS DUPLEX February 12, 2018 9:53  AM      HISTORY: Lump to left upper thigh with warmth and pain radiating down  thigh. Left leg pain.      FINDINGS: The deep veins in the left lower extremity are compressible  throughout. The deep veins demonstrate normal venous augmentation,  waveforms and color Doppler flow. No evidence of superficial  thrombophlebitis. Complex cystic areas noted along the upper lateral  left thigh measuring 7.7 x 4.3 x 5.3 cm in area of patient's swelling.  Findings could reflect a hematoma if the patient has had recent  injury. Abscess remains in the differential.         IMPRESSION:   1. No evidence of left lower extremity deep vein " thrombosis or  superficial thrombophlebitis.  2. Area of lateral left upper thigh swelling corresponds to a complex  appearing fluid collection possibly a hematoma or abscess. Follow-up  as clinically warranted.    Assessment:     ICD-10-CM    1. Thigh hematoma, left, initial encounter S70.12XA    2. Non-recurrent inguinal hernia of left side without obstruction or gangrene K40.90      Plan: Left thigh mass seems consistent with hematoma given previous surgery and appearance on US. Has not resolved with conservative therapies thus far and has been about 7 months. Discussed options for drainage either with needle or I&D. Given size and possible septations on US I do not think needle will be adequate and then have risk of infecting any remaining hematoma fluid. Recommending operative I&D.  In regards to the left inguinal hernia this seems small, easily reducible and asymptomatic. We have decided to wait on any repair here until becoming symptomatic. Risks of incarceration, obstruction and strangulation discussed and patient and son know what to look for and go to ER.  Will work on scheduling I&D    Ray Diaz MD

## 2018-03-22 NOTE — OP NOTE
Procedure Date: 03/21/2018      DATE OF PROCEDURE:  03/21/2018      STAFF SURGEON:  Ray Diaz MD      ASSISTANT:  None.      PREOPERATIVE DIAGNOSIS:  Left thigh hematoma.      POSTOPERATIVE DIAGNOSIS:  Left thigh cystic mass and anterior femoral bone spur.      NAME OF PROCEDURE:  Excision of left intramuscular thigh mass, removal of anterior femoral bone spur.      INDICATIONS FOR PROCEDURE:  The patient is a 77-year-old female who had a previous fracture of her left femur and underwent operative repair. Sometime postoperatively she seemed to develop a thigh hematoma with kind of a soft swelling and this was initially followed conservatively with the hope that it would resolve. By the time she had seen me in clinic for consultation this had been present for about 7 months. Given the duration and the lack of improvement I had recommended I and D of what had appeared to be by exam and ultrasound a hematoma.  The risks, benefits and alternatives were discussed in clinic and again in the preoperative holding area and consent obtained to proceed.      TYPE OF ANESTHESIA:  General.      COMPLICATIONS:  None.      ESTIMATED BLOOD LOSS:  20 mL      DRAINS:  None.      SPECIMEN:   1.  Left thigh mass.   2.  Left anterior femoral bone spur.      IMPLANTS:  None.      OPERATIVE FINDINGS:  The patient's left thigh had about an 8 x 6 cm area of this intramuscular cystic mass that I ended up opening and drained some serosanguineous fluid from. This appeared very attached to the underlying bone.  Once I had opened this up, it was apparent that at the center was a decent sized and fairly sharp pointed bone spur.  I suspect this was causing chronic irritation to the surrounding muscle and injury that caused this to be encapsulated with the fluid, probably old broken down hematoma.      DESCRIPTION OF PROCEDURE:  Following consent, the patient was brought from the preoperative holding area to the operating suite.  She was laid  supine. She underwent general anesthesia with general endotracheal intubation without difficulty. The left anterior thigh was prepped and draped in the usual fashion. A timeout procedure was performed.  The patient received preoperative antibiotics.  She had some sequentials for DVT prophylaxis.  Following the timeout, I made a small, about a 2 cm incision initially over the palpable fluid-filled mass and dissected through subcutaneous tissues until I reached the anterior fascia of the muscle.  This was divided and muscle fibers exposed. Still not seeing any collection, I then used a hemostat to spread the muscle to get deeper and then proceeded in this intermuscular plane until a cyst wall was encountered.  I made a small nick in this and drained perhaps 30 mL of serosanguineous fluid.  I then worked at freeing this from around the muscle, which was fairly adherent to and appeared muscle fibers were perhaps part of this wall.  I ended up getting the majority freed circumferentially, however, the base appeared to either be densely adherent or maybe part of the underlying femur itself.  I then resected the majority of this cystic wall and passed it off for pathology.  In the middle of this lesion was a good sized bone spur, raised perhaps about 1.5 cm and maybe about 2 cm in length with a sharp point on it. This appeared to be right at the center of this pseudocapsule and I suspect this was causing chronic trauma to the surrounding muscle, which caused this encapsulation.  Therefore, I used a set of rongeurs to debride off this bone spur until the area was fairly flat.  The wound was then washed out and hemostasis was obtained with electrocautery.  The small amount of remaining capsular tissue was then closed over the bone with a running 3-0 Vicryl.  I then used another running 3-0 Vicryl to close the anterior fascia of the muscle and a running 4-0 Monocryl for the skin incision was covered with Dermabond.  I used a  total of about 20 mL of 0.25% Marcaine with epinephrine 1:200,000 to inject along the incision in the area for local anesthetic.  The patient tolerated the procedure well and was then discharged from the operating room to PACU in stable condition with later plans for discharge home.         YONIS MORALES MD             D: 2018   T: 2018   MT: TIFFANY      Name:     MICHAEL AMAYA   MRN:      [id number]        Account:        [id number]   :      1940           Procedure Date: 2018      Document: [id number]

## 2018-03-28 LAB — COPATH REPORT: NORMAL

## 2018-04-02 ENCOUNTER — OFFICE VISIT (OUTPATIENT)
Dept: SURGERY | Facility: CLINIC | Age: 78
End: 2018-04-02
Payer: MEDICARE

## 2018-04-02 VITALS
HEART RATE: 94 BPM | WEIGHT: 96 LBS | BODY MASS INDEX: 22.21 KG/M2 | DIASTOLIC BLOOD PRESSURE: 60 MMHG | HEIGHT: 55 IN | SYSTOLIC BLOOD PRESSURE: 115 MMHG

## 2018-04-02 DIAGNOSIS — S70.12XS THIGH HEMATOMA, LEFT, SEQUELA: Primary | ICD-10-CM

## 2018-04-02 PROCEDURE — 99024 POSTOP FOLLOW-UP VISIT: CPT | Performed by: SURGERY

## 2018-04-02 NOTE — LETTER
"    4/2/2018         RE: Kerry Vega  [address]  [place] [address]        Dear Colleague,    Thank you for referring your patient, Kerry Vega, to the [facility]. Please see a copy of my visit note below.    General Surgery Post Op    Pt returns for follow up visit s/p I&D/resection of left thigh mass on 3/21/18.    Patient has been doing well, no issues with healing. Some pain yet in the area also left hip and near left knee.    Physical exam: Vitals: /60  Pulse 94  Ht 1.372 m (4' 6\")  Wt 43.5 kg (96 lb)  BMI 23.15 kg/m2  BMI= Body mass index is 23.15 kg/(m^2).    Exam:  Constitutional: healthy, alert and no distress  Left thigh incision healing well    Path:  FINAL DIAGNOSIS:   A. Soft tissue, upper intramuscular thigh mass, excision:   - Benign fibrous tissue with reactive changes, cyst wall and organized   fibrinous material, consistent with   hematoma   - No evidence of malignancy     B. Bone, left anterior femur bone spur, excision:   - Benign fragments of dense cortical type bone and fibrous tissue    Assessment:     ICD-10-CM    1. Thigh hematoma, left, sequela S70.12XS      Plan: s/p resection of this thigh mass- appeared to have sharp bone spur causing bleeding and chronic irritation to the muscle that then formed this capsule. Went over pathology with pt. Healing well no concerns. Has some pain near left hip and knee which unlikely related to surgery as is away from incision area. Follow up with me as needed    Ray Diaz MD        Again, thank you for allowing me to participate in the care of your patient.        Sincerely,        Ray Diaz MD    "

## 2018-04-02 NOTE — PROGRESS NOTES
"General Surgery Post Op    Pt returns for follow up visit s/p I&D/resection of left thigh mass on 3/21/18.    Patient has been doing well, no issues with healing. Some pain yet in the area also left hip and near left knee.    Physical exam: Vitals: /60  Pulse 94  Ht 1.372 m (4' 6\")  Wt 43.5 kg (96 lb)  BMI 23.15 kg/m2  BMI= Body mass index is 23.15 kg/(m^2).    Exam:  Constitutional: healthy, alert and no distress  Left thigh incision healing well    Path:  FINAL DIAGNOSIS:   A. Soft tissue, upper intramuscular thigh mass, excision:   - Benign fibrous tissue with reactive changes, cyst wall and organized   fibrinous material, consistent with   hematoma   - No evidence of malignancy     B. Bone, left anterior femur bone spur, excision:   - Benign fragments of dense cortical type bone and fibrous tissue    Assessment:     ICD-10-CM    1. Thigh hematoma, left, sequela S70.12XS      Plan: s/p resection of this thigh mass- appeared to have sharp bone spur causing bleeding and chronic irritation to the muscle that then formed this capsule. Went over pathology with pt. Healing well no concerns. Has some pain near left hip and knee which unlikely related to surgery as is away from incision area. Follow up with me as needed    Ray Diaz MD      "

## 2018-04-02 NOTE — MR AVS SNAPSHOT
"              After Visit Summary   2018    Kerry Vega    MRN: [id number]           Patient Information     Date Of Birth          1940        Visit Information        Provider Department      2018 12:15 PM Ray Diaz MD; [employer] [facility]        Today's Diagnoses     Thigh hematoma, left, sequela    -  1       Follow-ups after your visit        Who to contact     If you have questions or need follow up information about today's clinic visit or your schedule please contact [facility] directly at [phone].  Normal or non-critical lab and imaging results will be communicated to you by MyChart, letter or phone within 4 business days after the clinic has received the results. If you do not hear from us within 7 days, please contact the clinic through [employer] or phone. If you have a critical or abnormal lab result, we will notify you by phone as soon as possible.  Submit refill requests through [employer] or call your pharmacy and they will forward the refill request to us. Please allow 3 business days for your refill to be completed.          Additional Information About Your Visit        MyChart Information     [employer] lets you send messages to your doctor, view your test results, renew your prescriptions, schedule appointments and more. To sign up, go to [email or web address] . Click on \"Log in\" on the left side of the screen, which will take you to the Welcome page. Then click on \"Sign up Now\" on the right side of the page.     You will be asked to enter the access code listed below, as well as some personal information. Please follow the directions to create your username and password.     Your access code is: [email or web address]  Expires: 2018  8:02 PM     Your access code will  in 90 days. If you need help or a new code, please call your [facility] or [phone].        Care EveryWhere ID     This is your " "Care EveryWhere ID. This could be used by other organizations to access your [facility] medical records  [id number]        Your Vitals Were     Pulse Height BMI (Body Mass Index)             94 1.372 m (4' 6\") 23.15 kg/m2          Blood Pressure from Last 3 Encounters:   04/02/18 115/60   03/21/18 137/71   03/08/18 115/60    Weight from Last 3 Encounters:   04/02/18 43.5 kg (96 lb)   03/14/18 43.5 kg (96 lb)   03/08/18 43.5 kg (96 lb)              Today, you had the following     No orders found for display       Primary Care Provider Office Phone # Fax #    Shadi Cotton [phone]       [address]  [place] [address]        Equal Access to Services     [facility]: Hadii afshan guzman hadasho Soomaali, waaxda luqadaha, qaybta kaalmada adeegyada, cruz mathew . So [facility] [phone].    ATENCIÓN: Si habla español, tiene a cornejo disposición servicios gratuitos de asistencia lingüística. Ady al [phone].    We comply with applicable federal civil rights laws and [place] laws. We do not discriminate on the basis of race, color, national origin, age, disability, sex, sexual orientation, or gender identity.            Thank you!     Thank you for choosing [facility]  for your care. Our goal is always to provide you with excellent care. Hearing back from our patients is one way we can continue to improve our services. Please take a few minutes to complete the written survey that you may receive in the mail after your visit with us. Thank you!             Your Updated Medication List - Protect others around you: Learn how to safely use, store and throw away your medicines at www.disposemymeds.org.          This list is accurate as of 4/2/18 12:50 PM.  Always use your most recent med list.                   Brand Name Dispense Instructions for use Diagnosis    * ASPIRIN NOT PRESCRIBED    INTENTIONAL     Reported on 4/4/2017        CALCIUM 600+D HIGH " POTENCY 600-400 MG-UNIT per tablet   Generic drug:  calcium-vitamin D     180 tablet    TAKE 1 TABLET BY MOUTH TWICE DAILY    Osteoporosis       cetirizine 10 MG tablet    zyrTEC    30 tablet    Take 1 tablet (10 mg) by mouth daily    Urticaria       cyanocobalamin 1000 MCG Tbcr    VITAMIN B-12 ER    30 tablet    Take 1,000 mcg by mouth daily    Vitamin B12 deficiency (dietary) anemia       diclofenac 1 % Gel topical gel    VOLTAREN    100 g    Apply 4 grams to knees or 2 grams to hands four times daily using enclosed dosing card.    Osteoarthritis of left thumb, Cervical radiculopathy       * ferrous sulfate 325 (65 FE) MG tablet    IRON    90 tablet    Take 1 tablet (325 mg) by mouth 3 times daily (with meals)    Iron deficiency anemia, unspecified iron deficiency       * ferrous sulfate 325 (65 FE) MG tablet    IRON    60 tablet    Take 1 tablet (325 mg) by mouth 2 times daily    Iron deficiency anemia, unspecified iron deficiency anemia type       HYDROcodone-acetaminophen 5-325 MG per tablet    NORCO    20 tablet    Take 1-2 tablets by mouth every 4 hours as needed for other (Moderate to Severe Pain)    Mass of left thigh, Bone spur of left femur       naproxen 500 MG tablet    NAPROSYN    60 tablet    TAKE 1 TABLET BY MOUTH TWICE DAILY WITH FOOD AS NEEDED FOR PAIN    Osteoarthritis of knee, unspecified laterality, unspecified osteoarthritis type       omeprazole 20 MG CR capsule    priLOSEC    90 capsule    Take 1 capsule (20 mg) by mouth daily    Gastroesophageal reflux disease without esophagitis       * order for DME     1 Container    Pull up briefs for incontinence of urine change 2 to 3 times daily    Urine incontinence       ranitidine 300 MG tablet    ZANTAC    90 tablet    Take 1 tablet (300 mg) by mouth At Bedtime    Gastroesophageal reflux disease without esophagitis       SENEXON-S 8.6-50 MG per tablet   Generic drug:  senna-docusate     60 tablet    TAKE 1 TO 2 TABLETS BY MOUTH AT BEDTIME AS  NEEDED FOR CONSTIPATION, HOLD FOR LOOSE STOOLS    Constipation, unspecified constipation type       * Notice:  This list has 4 medication(s) that are the same as other medications prescribed for you. Read the directions carefully, and ask your doctor or other care provider to review them with you.

## 2018-05-01 ENCOUNTER — OFFICE VISIT (OUTPATIENT)
Dept: FAMILY MEDICINE | Facility: CLINIC | Age: 78
End: 2018-05-01
Payer: MEDICARE

## 2018-05-01 VITALS
WEIGHT: 96 LBS | OXYGEN SATURATION: 100 % | DIASTOLIC BLOOD PRESSURE: 65 MMHG | TEMPERATURE: 98.1 F | HEIGHT: 55 IN | BODY MASS INDEX: 22.21 KG/M2 | HEART RATE: 90 BPM | SYSTOLIC BLOOD PRESSURE: 122 MMHG

## 2018-05-01 DIAGNOSIS — B35.3 TINEA PEDIS OF BOTH FEET: ICD-10-CM

## 2018-05-01 DIAGNOSIS — L97.911 SKIN ULCER OF RIGHT LOWER LEG, LIMITED TO BREAKDOWN OF SKIN (H): Primary | ICD-10-CM

## 2018-05-01 DIAGNOSIS — Z12.31 VISIT FOR SCREENING MAMMOGRAM: ICD-10-CM

## 2018-05-01 DIAGNOSIS — R60.0 LOWER EXTREMITY EDEMA: ICD-10-CM

## 2018-05-01 PROCEDURE — 99214 OFFICE O/P EST MOD 30 MIN: CPT | Performed by: FAMILY MEDICINE

## 2018-05-01 RX ORDER — ECONAZOLE NITRATE 10 MG/G
CREAM TOPICAL DAILY
Qty: 85 G | Refills: 0 | Status: SHIPPED | OUTPATIENT
Start: 2018-05-01 | End: 2018-05-22

## 2018-05-01 ASSESSMENT — PAIN SCALES - GENERAL: PAINLEVEL: MODERATE PAIN (5)

## 2018-05-01 NOTE — PATIENT INSTRUCTIONS
At [facility], we strive to deliver an exceptional experience to you, every time we see you.  If you receive a survey in the mail, please send us back your thoughts. We really do value your feedback.    Based on your medical history, these are the current health maintenance/preventive care services that you are due for (some may have been done at this visit.)  Health Maintenance Due   Topic Date Due     EYE EXAM Q1 YEAR  2017     MAMMO Q2 YR  2017         Suggested websites for health information:  Www.[employer].org : Up to date and easily searchable information on multiple topics.  Www.[employer].gov : medication info, interactive tutorials, watch real surgeries online  Www.familydoctor.org : good info from the Academy of Family Physicians  Www.cdc.gov : public health info, travel advisories, epidemics (H1N1)  Www.aap.org : children's health info, normal development, vaccinations  Www.health.[facility].[place].us : [place] dept of health, public health issues in [place], N1N1    Your care team:                            Family Medicine Internal Medicine   MD Regan Stearns MD Shantel Branch-Fleming, MD Katya Georgiev PA-C Nam Ho, MD Pediatrics   ANTIONE Chin, MD Anna Conner CNP, MD Deborah Mielke, MD Kim Thein, APRN CNP      Clinic hours: Monday - Thursday 7 am-7 pm;  7 am-5 pm.   Urgent care: Monday - Friday 11 am-9 pm; Saturday and  9 am-5 pm.  Pharmacy : Monday -Thursday 8 am-8 pm; Friday 8 am-6 pm; Saturday and  9 am-5 pm.     Clinic: [phone]   Pharmacy: [phone]    Please call [facility] Radiology/[phone] to schedule your mammogram.

## 2018-05-01 NOTE — PROGRESS NOTES
"  SUBJECTIVE:   Kerry Vega is a 78 year old female who presents to clinic today for the following health issues:  She is accompanied by her , who interprets for the patient.     Joint Pain    Onset: 2 weeks    Description:   Location: both knees and lower legs, and lesion on lower right leg  Character: constant    Intensity: mild, severe    Progression of Symptoms: worse    Accompanying Signs & Symptoms:  Other symptoms: swelling and dry toes (toes can also become swollen)    History:   Previous similar pain: YES      Precipitating factors:   Trauma or overuse: no    Alleviating factors:  Improved by:     Therapies Tried and outcome: none    She has taken furosemide in the past for swelling, but this did not help.    Past medical, family, and social histories, medications, and allergies are reviewed and updated in Epic, notable for osteoarthritis mentioned at least as early as 2009 and primary OA of the knees since at least 2011. She had I&D of left thigh hematoma and removal of anterior femoral bone spur on 3/21/18.    ROS:  CONSTITUTIONAL: NEGATIVE for fever, chills, change in weight  ENT/MOUTH: NEGATIVE for ear, mouth and throat problems  RESP: NEGATIVE for significant cough or SOB  CV: NEGATIVE for chest pain, palpitations or peripheral edema  ROS otherwise negative    This document serves as a record of the services and decisions personally performed and made by Dr. Tai. It was created on his behalf by Paige Tolbert, a trained medical scribe. The creation of this document is based the provider's statements to the medical scribe.  Paige Tolbert May 1, 2018 2:52 PM     OBJECTIVE:                                                    /65 (BP Location: Left arm, Patient Position: Chair, Cuff Size: Adult Regular)  Pulse 90  Temp 98.1  F (36.7  C) (Oral)  Ht 1.372 m (4' 6\")  Wt 43.5 kg (96 lb)  SpO2 100%  BMI 23.15 kg/m2   Body mass index is 23.15 kg/(m^2).     GENERAL: healthy, " alert and no distress  EYES: Eyes grossly normal to inspection, PERRL, EOMI, sclerae white and conjunctivae normal  CV: very strong DP and PT pulses bilaterally. Acrocyanosis of the toes which are slightly cool, but she has very brisk capillary refill throughout.   MS: no gross musculoskeletal defects noted, no edema  SKIN: skin ulcer lateral to the pretibial surface of the right leg, about 10 cm proximal to the ankle joint, and it measures approximately 7x7 mm. Diffuse scaliness and scattered patches of hyperkeratotic skin involving the toes and the distal portions of the feet. Onychomycosis in most toenails (consistent with my suspicion for presence of tinea).  NEURO: Normal strength and tone, sensory exam grossly normal, mentation intact, oriented times 3 and cranial nerves 2-12 intact  PSYCH: mentation appears normal, affect normal/bright     Diagnostic Test Results:  none      ASSESSMENT/PLAN:                                                      (L97.911) Skin ulcer of right lower leg, limited to breakdown of skin (H)  (primary encounter diagnosis)  Comment: I think this is associated with brittle skin of the elderly and her superficial leg veins, but I don't think she has venous insufficiency or PAD.   Plan: WOUND CARE REFERRAL        Gauze given and instructed patient to use paper tape to secure gauze over the ulcer until she meets with the wound care nurse.    (B35.3) Tinea pedis of both feet  Comment: The scaly skin of the distal feet and toes is leading to fissuring, and I think it may be contributing to the development of toe ulcers.  Plan: econazole nitrate 1 % cream        I would like the wound care clinic to address the toes/feet as well.    (R60.0) Lower extremity edema  Comment: Intolerant to furosemide (constipation, incontinence).  Plan: order for DME            (Z12.31) Visit for screening mammogram  Comment:   Plan: MA SCREENING DIGITAL BILAT - Future  (s+30)            The information in this  document, created by the medical scribe for me, accurately reflects the services I personally performed and the decisions made by me. I have reviewed and approved this document for accuracy prior to leaving the patient care area. May 1, 2018 2:52 PM   Mic Tai MD

## 2018-05-01 NOTE — MR AVS SNAPSHOT
After Visit Summary   5/1/2018    Kerry Vega    MRN: [id number]           Patient Information     Date Of Birth          1940        Visit Information        Provider Department      5/1/2018 2:40 PM Mic Tai MD [facility]        Today's Diagnoses     Skin ulcer of right lower leg, limited to breakdown of skin (H)    -  1    Tinea pedis of both feet        Lower extremity edema        Visit for screening mammogram          Care Instructions    At [facility], we strive to deliver an exceptional experience to you, every time we see you.  If you receive a survey in the mail, please send us back your thoughts. We really do value your feedback.    Based on your medical history, these are the current health maintenance/preventive care services that you are due for (some may have been done at this visit.)  Health Maintenance Due   Topic Date Due     EYE EXAM Q1 YEAR  06/13/2017     MAMMO Q2 YR  11/07/2017         Suggested websites for health information:  Www.[facility] : Up to date and easily searchable information on multiple topics.  Www.medlineplus.gov : medication info, interactive tutorials, watch real surgeries online  Www.familydoctor.org : good info from the Academy of Family Physicians  Www.cdc.gov : public health info, travel advisories, epidemics (H1N1)  Www.aap.org : children's health info, normal development, vaccinations  Www.health.state.[place].us : [place] dept of health, public health issues in [place], N1N1    Your care team:                            Family Medicine Internal Medicine   MD Regan Stearns MD Shantel Branch-Fleming, MD Katya Georgiev PA-C Nam Ho, MD Pediatrics   ANTIONE Chin, MD Anna Conner CNP, MD Deborah Mielke, MD Kim Thein, APRN CNP      Clinic hours: Monday - Thursday 7 am-7 pm; Fridays 7 am-5 pm.   Urgent care: Monday -  Friday 11 am-9 pm; Saturday and  9 am-5 pm.  Pharmacy : Monday -Thursday 8 am-8 pm; Friday 8 am-6 pm; Saturday and  9 am-5 pm.     Clinic: [phone]   Pharmacy: [phone]    Please call [facility] Rad[facility]/[phone] to schedule your mammogram.           Follow-ups after your visit        Additional Services     WOUND CARE REFERRAL       Your provider has referred you to:   [place]: [facility] [phone]  [email or web address]  [place]: [facility] [phone] FAX REFERRAL TO [phone] [email or web address]  [facility]: [facility] [place] [phone]   [email or web address]   Health: Wound Ostomy - [place] [phone]   [email or web address]    Reason for referral: Wound care      1. [facility] Wound Consult appointment is related to what kind of wound: Venous leg (right)/foot ulcers (bilateral toes)    2. Location of wound: Lower extremity    3. Reason for referral: Assess and treat as indicated    4. Desired treatment if any: Per [facility] nurse     Please be aware that coverage of these services is subject to the terms and limitations of your health insurance plan.  Call member services at your health plan with any benefit or coverage questions.      Please bring the following with you to your appointment:    (1) Any X-Rays, CTs or MRIs which have been performed.  Contact the facility where they were done to arrange for  prior to your scheduled appointment.    (2) List of current medications   (3) This referral request   (4) Any documents/labs given to you for this referral                  Future tests that were ordered for you today     Open Future Orders        Priority Expected Expires Ordered    MA SCREENING  "DIGITAL BILAT - Future  (s+30) Routine  2019            Who to contact     If you have questions or need follow up information about today's clinic visit or your schedule please contact [facility] ELIU directly at [phone].  Normal or non-critical lab and imaging results will be communicated to you by MyChart, letter or phone within 4 business days after the clinic has received the results. If you do not hear from us within 7 days, please contact the clinic through MyChart or phone. If you have a critical or abnormal lab result, we will notify you by phone as soon as possible.  Submit refill requests through [employer] or call your pharmacy and they will forward the refill request to us. Please allow 3 business days for your refill to be completed.          Additional Information About Your Visit        MyChart Information     [employer] lets you send messages to your doctor, view your test results, renew your prescriptions, schedule appointments and more. To sign up, go to [email or web address] . Click on \"Log in\" on the left side of the screen, which will take you to the Welcome page. Then click on \"Sign up Now\" on the right side of the page.     You will be asked to enter the access code listed below, as well as some personal information. Please follow the directions to create your username and password.     Your access code is: [email or web address]  Expires: 2018  3:10 PM     Your access code will  in 90 days. If you need help or a new code, please call your [facility] clinic or [phone].        Care EveryWhere ID     This is your Care EveryWhere ID. This could be used by other organizations to access your [facility] medical records  [id number]        Your Vitals Were     Pulse Temperature Height Pulse Oximetry BMI (Body Mass Index)       90 98.1  F (36.7  C) (Oral) 1.372 m (4' 6\") 100% 23.15 kg/m2        Blood Pressure from Last 3 Encounters:   18 122/65   18 115/60 "   03/21/18 137/71    Weight from Last 3 Encounters:   05/01/18 43.5 kg (96 lb)   04/02/18 43.5 kg (96 lb)   03/14/18 43.5 kg (96 lb)              We Performed the Following     WOUND CARE REFERRAL          Today's Medication Changes          These changes are accurate as of 5/1/18  3:10 PM.  If you have any questions, ask your nurse or doctor.               Start taking these medicines.        Dose/Directions    econazole nitrate 1 % cream   Used for:  Tinea pedis of both feet   Started by:  Mic Tai MD        Apply topically daily for 21 days to scaly skin of feet.   Quantity:  85 g   Refills:  0       order for DME   Used for:  Lower extremity edema   Started by:  Mic Tai MD        Equipment being ordered: compression stockings, Jobst-like, 10-15 mm Hg, knee high, 3 pair per 3 months   Quantity:  6 each   Refills:  3            Where to get your medicines      These medications were sent to [facility]/pharmacy #[id number] - [place], [place] - [address]  [address], [place] [address]     Phone:  [phone]     econazole nitrate 1 % cream         Some of these will need a paper prescription and others can be bought over the counter.  Ask your nurse if you have questions.     Bring a paper prescription for each of these medications     order for DME                Primary Care Provider Office Phone # Fax #    Shadi Cotton [phone]       [address]  [place] [address]        Equal Access to Services     [facility] AH: Hadii aad ku hadasho Soomaali, waaxda luqadaha, qaybta kaalmada adeegyada, waxay idiin hayjames mathew . So [facility] [phone].    ATENCIÓN: Si habla michelle, tiene a cornejo disposición servicios gratuitos de asistencia lingüística. Llame al [phone].    We comply with applicable federal civil rights laws and [place] laws. We do not discriminate on the basis of race, color, national origin, age, disability, sex, sexual orientation,  or gender identity.            Thank you!     Thank you for choosing [facility]  for your care. Our goal is always to provide you with excellent care. Hearing back from our patients is one way we can continue to improve our services. Please take a few minutes to complete the written survey that you may receive in the mail after your visit with us. Thank you!             Your Updated Medication List - Protect others around you: Learn how to safely use, store and throw away your medicines at www.disposemymeds.org.          This list is accurate as of 5/1/18  3:10 PM.  Always use your most recent med list.                   Brand Name Dispense Instructions for use Diagnosis    * ASPIRIN NOT PRESCRIBED    INTENTIONAL     Reported on 4/4/2017        CALCIUM 600+D HIGH POTENCY 600-400 MG-UNIT per tablet   Generic drug:  calcium-vitamin D     180 tablet    TAKE 1 TABLET BY MOUTH TWICE DAILY    Osteoporosis       cetirizine 10 MG tablet    zyrTEC    30 tablet    Take 1 tablet (10 mg) by mouth daily    Urticaria       cyanocobalamin 1000 MCG Tbcr    VITAMIN B-12 ER    30 tablet    Take 1,000 mcg by mouth daily    Vitamin B12 deficiency (dietary) anemia       diclofenac 1 % Gel topical gel    VOLTAREN    100 g    Apply 4 grams to knees or 2 grams to hands four times daily using enclosed dosing card.    Osteoarthritis of left thumb, Cervical radiculopathy       econazole nitrate 1 % cream     85 g    Apply topically daily for 21 days to scaly skin of feet.    Tinea pedis of both feet       ferrous sulfate 325 (65 Fe) MG tablet    IRON    60 tablet    Take 1 tablet (325 mg) by mouth 2 times daily    Iron deficiency anemia, unspecified iron deficiency anemia type       HYDROcodone-acetaminophen 5-325 MG per tablet    NORCO    20 tablet    Take 1-2 tablets by mouth every 4 hours as needed for other (Moderate to Severe Pain)    Mass of left thigh, Bone spur of left femur       naproxen 500 MG tablet    NAPROSYN     60 tablet    TAKE 1 TABLET BY MOUTH TWICE DAILY WITH FOOD AS NEEDED FOR PAIN    Osteoarthritis of knee, unspecified laterality, unspecified osteoarthritis type       omeprazole 20 MG CR capsule    priLOSEC    90 capsule    Take 1 capsule (20 mg) by mouth daily    Gastroesophageal reflux disease without esophagitis       * order for DME     1 Container    Pull up briefs for incontinence of urine change 2 to 3 times daily    Urine incontinence       order for DME     6 each    Equipment being ordered: compression stockings, Jobst-like, 10-15 mm Hg, knee high, 3 pair per 3 months    Lower extremity edema       ranitidine 300 MG tablet    ZANTAC    90 tablet    Take 1 tablet (300 mg) by mouth At Bedtime    Gastroesophageal reflux disease without esophagitis       SENEXON-S 8.6-50 MG per tablet   Generic drug:  senna-docusate     60 tablet    TAKE 1 TO 2 TABLETS BY MOUTH AT BEDTIME AS NEEDED FOR CONSTIPATION, HOLD FOR LOOSE STOOLS    Constipation, unspecified constipation type       * Notice:  This list has 2 medication(s) that are the same as other medications prescribed for you. Read the directions carefully, and ask your doctor or other care provider to review them with you.

## 2018-05-17 ENCOUNTER — OFFICE VISIT (OUTPATIENT)
Dept: PODIATRY | Facility: CLINIC | Age: 78
End: 2018-05-17
Payer: MEDICARE

## 2018-05-17 VITALS
HEIGHT: 55 IN | DIASTOLIC BLOOD PRESSURE: 64 MMHG | HEART RATE: 67 BPM | SYSTOLIC BLOOD PRESSURE: 118 MMHG | OXYGEN SATURATION: 98 % | BODY MASS INDEX: 22.21 KG/M2 | WEIGHT: 96 LBS

## 2018-05-17 DIAGNOSIS — L97.911 ULCER OF RIGHT LOWER EXTREMITY, LIMITED TO BREAKDOWN OF SKIN (H): Primary | ICD-10-CM

## 2018-05-17 DIAGNOSIS — B35.3 TINEA PEDIS OF BOTH FEET: ICD-10-CM

## 2018-05-17 DIAGNOSIS — B35.1 DERMATOPHYTOSIS OF NAIL: ICD-10-CM

## 2018-05-17 DIAGNOSIS — I87.8 VENOUS STASIS: ICD-10-CM

## 2018-05-17 DIAGNOSIS — I73.9 PERIPHERAL VASCULAR DISEASE (H): ICD-10-CM

## 2018-05-17 PROCEDURE — 99203 OFFICE O/P NEW LOW 30 MIN: CPT | Performed by: PODIATRIST

## 2018-05-17 RX ORDER — SILVER SULFADIAZINE 10 MG/G
CREAM TOPICAL 2 TIMES DAILY
Qty: 85 G | Refills: 1 | Status: SHIPPED | OUTPATIENT
Start: 2018-05-17 | End: 2018-05-24

## 2018-05-17 ASSESSMENT — PAIN SCALES - GENERAL: PAINLEVEL: MODERATE PAIN (4)

## 2018-05-17 NOTE — PROGRESS NOTES
Past Medical History:   Diagnosis Date     Anemia      Arthritis     OSTEO     Left knee pain 2/23/2011     Patient Active Problem List   Diagnosis     CARDIOVASCULAR SCREENING; LDL GOAL LESS THAN 160     Right lumbar radiculopathy     Osteoporosis     Advanced directives, counseling/discussion     Abnormal Pap smear     Dysphagia     Health Care Home     Iron deficiency anemia     Sliding hiatal hernia     Osteoarthritis of left thumb     Primary osteoarthritis of both knees     Gastroesophageal reflux disease without esophagitis     Past Surgical History:   Procedure Laterality Date     COLONOSCOPY  1/29/2013    Procedure: COLONOSCOPY;;  Surgeon: Edgard Prieto MD;  Location: [facility] OR     ESOPHAGOSCOPY, GASTROSCOPY, DUODENOSCOPY (EGD), COMBINED  1/20/2012    Procedure:COMBINED ESOPHAGOSCOPY, GASTROSCOPY, DUODENOSCOPY (EGD); EGD, dysphagia; Surgeon:SHEREE VALLE; Location:[facility] OR     ESOPHAGOSCOPY, GASTROSCOPY, DUODENOSCOPY (EGD), COMBINED  7/18/2012    Procedure: COMBINED ESOPHAGOSCOPY, GASTROSCOPY, DUODENOSCOPY (EGD);  EGD for dysphagia rp Tonio;  Surgeon: Rey Bee MD;  Location: [facility] OR     ESOPHAGOSCOPY, GASTROSCOPY, DUODENOSCOPY (EGD), COMBINED  1/29/2013    Procedure: COMBINED ESOPHAGOSCOPY, GASTROSCOPY, DUODENOSCOPY (EGD), BIOPSY SINGLE OR MULTIPLE;  Colonoscopy, EGD, dysphagia;  Surgeon: Edgard Prieto MD;  Location: [facility] OR     INCISION AND DRAINAGE LOWER EXTREMITY, COMBINED Left 3/21/2018    Procedure: COMBINED INCISION AND DRAINAGE LOWER EXTREMITY;  Excision of left intramuscular thigh mass; removal of anterior femoral bone spur;  Surgeon: Ray Diaz MD;  Location: [facility] OR     Social History     Social History     Marital status:      Spouse name: N/A     Number of children: N/A     Years of education: N/A     Occupational History     Not on file.     Social History Main Topics     Smoking status: Never Smoker     Smokeless tobacco: Never Used     Alcohol use No     Drug use:  No     Sexual activity: Not Currently     Partners: Male     Birth control/ protection: None     Other Topics Concern     Not on file     Social History Narrative     Family History   Problem Relation Age of Onset     CANCER No family hx of      DIABETES No family hx of      Hypertension No family hx of      CEREBROVASCULAR DISEASE No family hx of      Thyroid Disease No family hx of      Glaucoma No family hx of      Macular Degeneration No family hx of      Last Basic Metabolic Panel:  Lab Results   Component Value Date     03/21/2018      Lab Results   Component Value Date    POTASSIUM 4.3 03/21/2018     Lab Results   Component Value Date    CHLORIDE 107 03/21/2018     Lab Results   Component Value Date    BIGG 9.1 03/21/2018     Lab Results   Component Value Date    CO2 29 03/21/2018     Lab Results   Component Value Date    BUN 16 03/21/2018     Lab Results   Component Value Date    CR 0.76 03/21/2018     Lab Results   Component Value Date    GLC 91 03/21/2018     Lab Results   Component Value Date    WBC 6.0 03/08/2018     Lab Results   Component Value Date    RBC 3.98 03/08/2018     Lab Results   Component Value Date    HGB 9.7 03/21/2018     Lab Results   Component Value Date    HCT 28.3 03/08/2018     No components found for: MCT  Lab Results   Component Value Date    MCV 71 03/08/2018     Lab Results   Component Value Date    MCH 20.9 03/08/2018     Lab Results   Component Value Date    MCHC 29.3 03/08/2018     Lab Results   Component Value Date    RDW 17.0 03/08/2018     Lab Results   Component Value Date     03/08/2018     SUBJECTIVE FINDINGS:  A 78-year-old female who presents from Mic Tai MD, for a right leg sore and toenails.  Presents with her son.  Relates she has been using the econazole cream on the toenails and does not feel it is working very much.  Relates she has pain in the toenails and cracking on the ends of the toes, mostly the second and third toes on the right foot.   She relates that she has a lesion on her right leg.  She had an old injury there many years ago and if she bumps it, it breaks open and she has been using the econazole cream on that too.  She has got a prescription for compression socks 10-15 15 mmHg and has not picked those up, but they are going to pick those up today.      OBJECTIVE FINDINGS:  DP and PT are 2/4 bilaterally.  CFT is less than 3 seconds bilaterally, although she has some venous congestion of the toes distally.  She has dystrophic, thickened, brittle nails with subungual debris, dystrophy and discoloration bilaterally 1 through 5.  She has cracked, dry, scaly skin on the distal toes 1 through 5 bilaterally.  She has some venous stasis with peripheral edema bilaterally.  She has right anterolateral leg edema with eschar present.  There is no erythema, no odor, no calor.  Pictures in the media tab.  There is some tenderness to palpation in the leg.  She has decreased hair growth bilaterally.      ASSESSMENT AND PLAN:  Onychomycosis with tinea pedis and fissuring on the distal toes.  She has got some vascular disease changes.  She has got a healing ulcer on the right anterolateral leg with eschar present. Diagnosis and treatment options discussed with the patient and son.  I am going to have them clean the eschar with MicroKlenz, apply Silvadene cream daily with a light compression wrap with Kerlix and Coban.  We will discontinue the econazole to the distal toes and start econazole cream twice a day to the distal toes.  We will get ABIs to rule out arterial disease contributing.  Return to clinic and see me in about 1 week.       She has peripheral vascular disease changes.  We will get ABIs for this to rule out any arterial disease.  I advised her to get the compression socks.  MicroKlenz and dressings dispensed and use discussed with him.

## 2018-05-17 NOTE — NURSING NOTE
"Kerry Vega's chief complaint for this visit includes:  Chief Complaint   Patient presents with     Consult     Right leg ulcer     PCP: Shadi Cotton    Referring Provider:  Mic Tai MD  [address] [place] [address]    /64  Pulse 67  Ht 1.372 m (4' 6\")  Wt 43.5 kg (96 lb)  SpO2 98%  BMI 23.15 kg/m2 Moderate Pain (4)           "

## 2018-05-17 NOTE — LETTER
5/17/2018         RE: Kerry Vega  [address]  [place] [address]        Dear Colleague,    Thank you for referring your patient, Kerry Vega, to the [facility]. Please see a copy of my visit note below.    Past Medical History:   Diagnosis Date     Anemia      Arthritis     OSTEO     Left knee pain 2/23/2011     Patient Active Problem List   Diagnosis     CARDIOVASCULAR SCREENING; LDL GOAL LESS THAN 160     Right lumbar radiculopathy     Osteoporosis     Advanced directives, counseling/discussion     Abnormal Pap smear     Dysphagia     Health Care Home     Iron deficiency anemia     Sliding hiatal hernia     Osteoarthritis of left thumb     Primary osteoarthritis of both knees     Gastroesophageal reflux disease without esophagitis     Past Surgical History:   Procedure Laterality Date     COLONOSCOPY  1/29/2013    Procedure: COLONOSCOPY;;  Surgeon: Edgard Prieto MD;  Location: [facility] OR     ESOPHAGOSCOPY, GASTROSCOPY, DUODENOSCOPY (EGD), COMBINED  1/20/2012    Procedure:COMBINED ESOPHAGOSCOPY, GASTROSCOPY, DUODENOSCOPY (EGD); EGD, dysphagia; Surgeon:SHEREE VALLE; Location:[facility] OR     ESOPHAGOSCOPY, GASTROSCOPY, DUODENOSCOPY (EGD), COMBINED  7/18/2012    Procedure: COMBINED ESOPHAGOSCOPY, GASTROSCOPY, DUODENOSCOPY (EGD);  EGD for dysphagia rp Tonio;  Surgeon: Rey Bee MD;  Location: [facility] OR     ESOPHAGOSCOPY, GASTROSCOPY, DUODENOSCOPY (EGD), COMBINED  1/29/2013    Procedure: COMBINED ESOPHAGOSCOPY, GASTROSCOPY, DUODENOSCOPY (EGD), BIOPSY SINGLE OR MULTIPLE;  Colonoscopy, EGD, dysphagia;  Surgeon: Edgard Prieto MD;  Location: [facility] OR     INCISION AND DRAINAGE LOWER EXTREMITY, COMBINED Left 3/21/2018    Procedure: COMBINED INCISION AND DRAINAGE LOWER EXTREMITY;  Excision of left intramuscular thigh mass; removal of anterior femoral bone spur;  Surgeon: Ray Diaz MD;  Location: [facility] OR     Social History     Social History     Marital  status:      Spouse name: N/A     Number of children: N/A     Years of education: N/A     Occupational History     Not on file.     Social History Main Topics     Smoking status: Never Smoker     Smokeless tobacco: Never Used     Alcohol use No     Drug use: No     Sexual activity: Not Currently     Partners: Male     Birth control/ protection: None     Other Topics Concern     Not on file     Social History Narrative     Family History   Problem Relation Age of Onset     CANCER No family hx of      DIABETES No family hx of      Hypertension No family hx of      CEREBROVASCULAR DISEASE No family hx of      Thyroid Disease No family hx of      Glaucoma No family hx of      Macular Degeneration No family hx of      Last Basic Metabolic Panel:  Lab Results   Component Value Date     03/21/2018      Lab Results   Component Value Date    POTASSIUM 4.3 03/21/2018     Lab Results   Component Value Date    CHLORIDE 107 03/21/2018     Lab Results   Component Value Date    BIGG 9.1 03/21/2018     Lab Results   Component Value Date    CO2 29 03/21/2018     Lab Results   Component Value Date    BUN 16 03/21/2018     Lab Results   Component Value Date    CR 0.76 03/21/2018     Lab Results   Component Value Date    GLC 91 03/21/2018     Lab Results   Component Value Date    WBC 6.0 03/08/2018     Lab Results   Component Value Date    RBC 3.98 03/08/2018     Lab Results   Component Value Date    HGB 9.7 03/21/2018     Lab Results   Component Value Date    HCT 28.3 03/08/2018     No components found for: MCT  Lab Results   Component Value Date    MCV 71 03/08/2018     Lab Results   Component Value Date    MCH 20.9 03/08/2018     Lab Results   Component Value Date    MCHC 29.3 03/08/2018     Lab Results   Component Value Date    RDW 17.0 03/08/2018     Lab Results   Component Value Date     03/08/2018     SUBJECTIVE FINDINGS:  A 78-year-old female who presents from [facility], for a right leg sore and  toenails.  Presents with her son.  Relates she has been using the econazole cream on the toenails and does not feel it is working very much.  Relates she has pain in the toenails and cracking on the ends of the toes, mostly the second and third toes on the right foot.  She relates that she has a lesion on her right leg.  She had an old injury there many years ago and if she bumps it, it breaks open and she has been using the econazole cream on that too.  She has got a prescription for compression socks 10-15 15 mmHg and has not picked those up, but they are going to pick those up today.      OBJECTIVE FINDINGS:  DP and PT are 2/4 bilaterally.  CFT is less than 3 seconds bilaterally, although she has some venous congestion of the toes distally.  She has dystrophic, thickened, brittle nails with subungual debris, dystrophy and discoloration bilaterally 1 through 5.  She has cracked, dry, scaly skin on the distal toes 1 through 5 bilaterally.  She has some venous stasis with peripheral edema bilaterally.  She has right anterolateral leg edema with eschar present.  There is no erythema, no odor, no calor.  Pictures in the media tab.  There is some tenderness to palpation in the leg.  She has decreased hair growth bilaterally.      ASSESSMENT AND PLAN:  Onychomycosis with tinea pedis and fissuring on the distal toes.  She has got some vascular disease changes.  She has got a healing ulcer on the right anterolateral leg with eschar present. Diagnosis and treatment options discussed with the patient and son.  I am going to have them clean the eschar with MicroKlenz, apply Silvadene cream daily with a light compression wrap with Kerlix and Coban.  We will discontinue the econazole to the distal toes and start econazole cream twice a day to the distal toes.  We will get ABIs to rule out arterial disease contributing.  Return to clinic and see me in about 1 week.       She has peripheral vascular disease changes.  We will get  ABIs for this to rule out any arterial disease.  I advised her to get the compression socks.  MicroKlenz and dressings dispensed and use discussed with him.         Again, thank you for allowing me to participate in the care of your patient.        Sincerely,        Karlos Ewing DPM

## 2018-05-17 NOTE — PATIENT INSTRUCTIONS
Thanks for coming today.  Ortho/Sports Medicine Clinic  [address] [place] [address]    To schedule future appointments in Ortho Clinic, you may call [phone].    To schedule ordered imaging by your provider:   Call Central Imaging [phone]    To schedule an injection ordered by your provider:  Call Central Imaging Injection scheduling line: [phone]  Struqhart available online at:  [email or web address]    Please call if any further questions or concerns ([phone]).  Clinic hours 8 am to 5 pm.    Return to clinic (call) if symptoms worsen or fail to improve.

## 2018-05-17 NOTE — MR AVS SNAPSHOT
After Visit Summary   2018    Kerry Vega    MRN: [id number]           Patient Information     Date Of Birth          1940        Visit Information        Provider Department      2018 8:30 AM Karlos Ewing DPM [facility]        Today's Diagnoses     Ulcer of right lower extremity, limited to breakdown of skin (H)    -  1    Venous stasis        Dermatophytosis of nail        Tinea pedis of both feet        Peripheral vascular disease (H)           Care Instructions    Thanks for coming today.  Ortho[facility]Sports Medicine Clinic  [address] [place] [address]    To schedule future appointments in Ortho Clinic, you may call [phone].    To schedule ordered imaging by your provider:   Call Central Imaging [phone]    To schedule an injection ordered by your provider:  Call Central Imaging Injection scheduling line: [phone]  [employer] available online at:  [email or web address]    Please call if any further questions or concerns ([phone]).  Clinic hours 8 am to 5 pm.    Return to clinic (call) if symptoms worsen or fail to improve.            Follow-ups after your visit        Who to contact     If you have questions or need follow up information about today's clinic visit or your schedule please contact [facility] directly at [phone].  Normal or non-critical lab and imaging results will be communicated to you by MyChart, letter or phone within 4 business days after the clinic has received the results. If you do not hear from us within 7 days, please contact the clinic through MyChart or phone. If you have a critical or abnormal lab result, we will notify you by phone as soon as possible.  Submit refill requests through [employer] or call your pharmacy and they will forward the refill request to us. Please allow 3 business days for your refill to be completed.          Additional Information About Your  "Visit        [employer] Information     [employer] is an electronic gateway that provides easy, online access to your medical records. With [employer], you can request a clinic appointment, read your test results, renew a prescription or communicate with your care team.     To sign up for [employer] visit the website at [email or web address]   You will be asked to enter the access code listed below, as well as some personal information. Please follow the directions to create your username and password.     Your access code is: [email or web address]  Expires: 2018  3:10 PM     Your access code will  in 90 days. If you need help or a new code, please contact your [facility] or call [phone] for assistance.        Care EveryWhere ID     This is your Care EveryWhere ID. This could be used by other organizations to access your [facility] medical records  [id number]        Your Vitals Were     Pulse Height Pulse Oximetry BMI (Body Mass Index)          67 1.372 m (4' 6\") 98% 23.15 kg/m2         Blood Pressure from Last 3 Encounters:   18 118/64   18 122/65   18 115/60    Weight from Last 3 Encounters:   18 43.5 kg (96 lb)   18 43.5 kg (96 lb)   18 43.5 kg (96 lb)                 Today's Medication Changes          These changes are accurate as of 18 11:59 PM.  If you have any questions, ask your nurse or doctor.               Start taking these medicines.        Dose/Directions    silver sulfADIAZINE 1 % cream   Commonly known as:  SILVADENE   Used for:  Ulcer of right lower extremity, limited to breakdown of skin (H), Dermatophytosis of nail, Tinea pedis of both feet        Apply topically 2 times daily for 7 days   Quantity:  85 g   Refills:  1            Where to get your medicines      These medications were sent to [facility] - [place], [place] - [address], [address]  [address], [address], [facility] [address] "     Phone:  [phone]     silver sulfADIAZINE 1 % cream                Primary Care Provider Office Phone # Fax #    Shadi Cotton [phone] [address]  [facility] [address]        Equal Access to Services     [facility] DANNY : Hadii aad ku hadsheritao Soomaali, waaxda luqadaha, qaybta kaalmada adeegyada, waxay arpanin haybilln adeobdulio trejo la'billminh anderson. So [facility] [phone].    ATENCIÓN: Si habla español, tiene a cornejo disposición servicios gratuitos de asistencia lingüística. Llame al [phone].    We comply with applicable federal civil rights laws and [place] laws. We do not discriminate on the basis of race, color, national origin, age, disability, sex, sexual orientation, or gender identity.            Thank you!     Thank you for choosing [facility]  for your care. Our goal is always to provide you with excellent care. Hearing back from our patients is one way we can continue to improve our services. Please take a few minutes to complete the written survey that you may receive in the mail after your visit with us. Thank you!             Your Updated Medication List - Protect others around you: Learn how to safely use, store and throw away your medicines at www.disposemymeds.org.          This list is accurate as of 5/17/18 11:59 PM.  Always use your most recent med list.                   Brand Name Dispense Instructions for use Diagnosis    * ASPIRIN NOT PRESCRIBED    INTENTIONAL     Reported on 4/4/2017        CALCIUM 600+D HIGH POTENCY 600-400 MG-UNIT per tablet   Generic drug:  calcium-vitamin D     180 tablet    TAKE 1 TABLET BY MOUTH TWICE DAILY    Osteoporosis       cetirizine 10 MG tablet    zyrTEC    30 tablet    Take 1 tablet (10 mg) by mouth daily    Urticaria       cyanocobalamin 1000 MCG Tbcr    VITAMIN B-12 ER    30 tablet    Take 1,000 mcg by mouth daily    Vitamin B12 deficiency (dietary) anemia       diclofenac 1 % Gel topical gel    VOLTAREN    100 g     Apply 4 grams to knees or 2 grams to hands four times daily using enclosed dosing card.    Osteoarthritis of left thumb, Cervical radiculopathy       econazole nitrate 1 % cream     85 g    Apply topically daily for 21 days to scaly skin of feet.    Tinea pedis of both feet       ferrous sulfate 325 (65 Fe) MG tablet    IRON    60 tablet    Take 1 tablet (325 mg) by mouth 2 times daily    Iron deficiency anemia, unspecified iron deficiency anemia type       HYDROcodone-acetaminophen 5-325 MG per tablet    NORCO    20 tablet    Take 1-2 tablets by mouth every 4 hours as needed for other (Moderate to Severe Pain)    Mass of left thigh, Bone spur of left femur       naproxen 500 MG tablet    NAPROSYN    60 tablet    TAKE 1 TABLET BY MOUTH TWICE DAILY WITH FOOD AS NEEDED FOR PAIN    Osteoarthritis of knee, unspecified laterality, unspecified osteoarthritis type       omeprazole 20 MG CR capsule    priLOSEC    90 capsule    Take 1 capsule (20 mg) by mouth daily    Gastroesophageal reflux disease without esophagitis       * order for DME     1 Container    Pull up briefs for incontinence of urine change 2 to 3 times daily    Urine incontinence       ranitidine 300 MG tablet    ZANTAC    90 tablet    Take 1 tablet (300 mg) by mouth At Bedtime    Gastroesophageal reflux disease without esophagitis       SENEXON-S 8.6-50 MG per tablet   Generic drug:  senna-docusate     60 tablet    TAKE 1 TO 2 TABLETS BY MOUTH AT BEDTIME AS NEEDED FOR CONSTIPATION, HOLD FOR LOOSE STOOLS    Constipation, unspecified constipation type       silver sulfADIAZINE 1 % cream    SILVADENE    85 g    Apply topically 2 times daily for 7 days    Ulcer of right lower extremity, limited to breakdown of skin (H), Dermatophytosis of nail, Tinea pedis of both feet       * Notice:  This list has 2 medication(s) that are the same as other medications prescribed for you. Read the directions carefully, and ask your doctor or other care provider to review them  with you.

## 2018-06-03 DIAGNOSIS — D50.9 IRON DEFICIENCY ANEMIA, UNSPECIFIED IRON DEFICIENCY ANEMIA TYPE: ICD-10-CM

## 2018-06-03 NOTE — TELEPHONE ENCOUNTER
Requested Prescriptions   Pending Prescriptions Disp Refills     ferrous sulfate (IRON) 325 (65 Fe) MG tablet [Pharmacy Med Name: FERROUS SULFATE 325 MG TABLET] 60 tablet 2     Sig: TAKE 1 TABLET (325 MG) BY MOUTH 2 TIMES DAILY    There is no refill protocol information for this order        Last Written Prescription Date:  3/8/18  Last Fill Quantity: 60,  # refills: 2   Last Office Visit with [facility], [facility] or [facility] prescribing provider:  5/1/2018     Future Office Visit:

## 2018-06-05 RX ORDER — FERROUS SULFATE 325(65) MG
TABLET ORAL
Qty: 60 TABLET | Refills: 2 | Status: SHIPPED | OUTPATIENT
Start: 2018-06-05 | End: 2018-10-29

## 2018-06-05 NOTE — TELEPHONE ENCOUNTER
Routing refill request to provider for review/approval because:  Drug not on the [facility] refill protocol     Frank Rousseau RN, BSN

## 2018-06-20 DIAGNOSIS — M17.9 OSTEOARTHRITIS OF KNEE, UNSPECIFIED LATERALITY, UNSPECIFIED OSTEOARTHRITIS TYPE: ICD-10-CM

## 2018-06-20 NOTE — TELEPHONE ENCOUNTER
"Pharmacy requesting 90 day supply    Requested Prescriptions   Pending Prescriptions Disp Refills     naproxen (NAPROSYN) 500 MG tablet  Last Written Prescription Date:  02/26/18  Last Fill Quantity: 60,  # refills: 3   Last Office Visit with G, RENATE or [facility] prescribing provider:  05/01/18   Future Office Visit:    60 tablet 3     Sig: TAKE 1 TABLET BY MOUTH TWICE DAILY WITH FOOD AS NEEDED FOR PAIN    NSAID Medications Failed    6/20/2018  9:10 AM       Failed - Patient is age 6-64 years       Passed - Blood pressure under 140/90 in past 12 months    BP Readings from Last 3 Encounters:   05/17/18 118/64   05/01/18 122/65   04/02/18 115/60                Passed - Normal ALT on file in past 12 months    Recent Labs   Lab Test  07/17/17   1505   ALT  18            Passed - Normal AST on file in past 12 months    Recent Labs   Lab Test  07/17/17   1505   AST  13            Passed - Recent (12 mo) or future (30 days) visit within the authorizing provider's specialty    Patient had office visit in the last 12 months or has a visit in the next 30 days with authorizing provider or within the authorizing provider's specialty.  See \"Patient Info\" tab in inbasket, or \"Choose Columns\" in Meds & Orders section of the refill encounter.           Passed - Normal CBC on file in past 12 months    Recent Labs   Lab Test  03/21/18   1303  03/08/18   1816   WBC   --   6.0   RBC   --   3.98   HGB  9.7*  8.3*   HCT   --   28.3*   PLT   --   336       For [facility] ONLY: [id number] = WBC, [id number] = RBC         Passed - No active pregnancy on record       Passed - Normal serum creatinine on file in past 12 months    Recent Labs   Lab Test  03/21/18   1303   CR  0.76            Passed - No positive pregnancy test in past 12 months          "

## 2018-06-21 NOTE — TELEPHONE ENCOUNTER
Routing refill request to provider for review/approval because:  RN unable to fill due to patient's age. Can only refill if ages 6-64, per [facility] refill protocol.   Pharmacy asking for 90 day supply.    Yanet Leon RN  [facility]

## 2018-06-22 RX ORDER — NAPROXEN 500 MG/1
TABLET ORAL
Qty: 180 TABLET | Refills: 0 | Status: SHIPPED | OUTPATIENT
Start: 2018-06-22 | End: 2018-08-03

## 2018-07-09 ENCOUNTER — TELEPHONE (OUTPATIENT)
Dept: URGENT CARE | Facility: URGENT CARE | Age: 78
End: 2018-07-09

## 2018-07-09 DIAGNOSIS — R05.9 COUGH: ICD-10-CM

## 2018-07-09 RX ORDER — CODEINE PHOSPHATE AND GUAIFENESIN 10; 100 MG/5ML; MG/5ML
1 SOLUTION ORAL EVERY 4 HOURS PRN
Qty: 120 ML | Refills: 0
Start: 2018-07-09

## 2018-07-09 NOTE — TELEPHONE ENCOUNTER
Reason for Call:  Other call back    Detailed comments: please call patients  to verify what medication is needed for refill. Thanks.     Phone Number Patient can be reached at: Home number on file [phone] (home)    Best Time: anytime     Can we leave a detailed message on this number? YES    Call taken on 7/9/2018 at 3:39 PM by Yoon Mccracken

## 2018-07-09 NOTE — TELEPHONE ENCOUNTER
guaiFENesin-codeine (ROBITUSSIN AC) 100-10 MG/5ML SOLN solution      Last Written Prescription Date:  01/11/18  Last Fill Quantity: 200ml,   # refills: 0  Last Office Visit: 05/01/18  Future Office visit:       Routing refill request to provider for review/approval because:  Drug not on the [facility], [facility] or [facility] refill protocol or controlled substance

## 2018-07-09 NOTE — TELEPHONE ENCOUNTER
He will like a refills Virtussin AC syrup for cough. This was prescribed during an urgent care visit.   Aleksandra Addison

## 2018-07-09 NOTE — TELEPHONE ENCOUNTER
This was given by  back in March. Refill not appropriate. If patient is having symptoms, she needs to be seen in clinic.    Frank Rousseau RN, BSN

## 2018-07-09 NOTE — TELEPHONE ENCOUNTER
This Virtussin AC syrup for cough medication was given by  . Refill not appropriate. If patient is having symptoms, she needs to be seen in clinic. Please assist with scheduling. If red flag symptoms Antonio BLACKMAN.  Bonnie Islas RN

## 2018-07-10 NOTE — TELEPHONE ENCOUNTER
Called spoke to patient's son, he is notified of RN's note below, he stated he will check with his brother and call the scheduling line back to schedule.  Jerry Waters,  [facility]

## 2018-07-26 ENCOUNTER — TELEPHONE (OUTPATIENT)
Dept: FAMILY MEDICINE | Facility: CLINIC | Age: 78
End: 2018-07-26

## 2018-07-26 ENCOUNTER — OFFICE VISIT (OUTPATIENT)
Dept: FAMILY MEDICINE | Facility: CLINIC | Age: 78
End: 2018-07-26
Payer: MEDICARE

## 2018-07-26 VITALS
DIASTOLIC BLOOD PRESSURE: 68 MMHG | RESPIRATION RATE: 18 BRPM | OXYGEN SATURATION: 99 % | WEIGHT: 95.2 LBS | BODY MASS INDEX: 22.03 KG/M2 | SYSTOLIC BLOOD PRESSURE: 138 MMHG | TEMPERATURE: 97.7 F | HEIGHT: 55 IN | HEART RATE: 72 BPM

## 2018-07-26 DIAGNOSIS — K80.20 CALCULUS OF GALLBLADDER WITHOUT CHOLECYSTITIS WITHOUT OBSTRUCTION: ICD-10-CM

## 2018-07-26 DIAGNOSIS — Z12.31 VISIT FOR SCREENING MAMMOGRAM: ICD-10-CM

## 2018-07-26 DIAGNOSIS — K40.90 LEFT INGUINAL HERNIA: Primary | ICD-10-CM

## 2018-07-26 PROCEDURE — 99214 OFFICE O/P EST MOD 30 MIN: CPT | Performed by: FAMILY MEDICINE

## 2018-07-26 ASSESSMENT — PAIN SCALES - GENERAL: PAINLEVEL: SEVERE PAIN (6)

## 2018-07-26 NOTE — MR AVS SNAPSHOT
After Visit Summary   7/26/2018    Kerry Vega    MRN: [id number]           Patient Information     Date Of Birth          1940        Visit Information        Provider Department      7/26/2018 12:00 PM Mic Tai MD [facility] [place] Catina Mcmanus        Today's Diagnoses     Left inguinal hernia    -  1    Calculus of gallbladder without cholecystitis without obstruction        Visit for screening mammogram          Care Instructions    Please call the [facility]  [phone] to schedule your mammogram (breast cancer screening xray).   Hernia (Adult)    A hernia can happen when there is a weakness or defect in the wall of the abdomen or groin. Intestines or nearby tissues may move from their usual location and push through the weakness in the wall. This can cause a hernia (bulge) you may see or feel.  Causes and risk factors   A hernia may be present at birth. Or it may be caused by the wear and tear of daily living. Certain factors can make a hernia more likely. These can include:    Heavy lifting    Straining, whether from lifting, movement, or constipation    Chronic cough    Injury to the abdominal wall    Excess weight    Pregnancy    Prior surgery    Older age    Family history of hernia  Symptoms  Symptoms of a hernia may come on suddenly. Or they may appear slowly over time. Some common symptoms include:    Bulge in the groin area, around the navel, or in the scrotum (the bulge may get bigger when you stand and go away when you lie down)    Pain or pressure around the bulge    Pain during activities such as lifting, coughing, or sneezing    A feeling of weakness or pressure in the groin    Pain or swelling in the scrotum  Types of hernias  There are different types of hernia. The type you have depends on its location:    Inguinal. This type is in the groin or scrotum. It is more common in men.    Femoral. This type is in the groin, upper thigh  (where the leg bends), or labia. It is more common in women.    Ventral. This type is in the abdominal wall.    Umbilical. This type occurs around the navel (belly button).    Incisional. This type occurs at the site of a previous surgery.  The condition of the hernia can help determine how urgently it needs to be treated.    Reducible. It goes back in by itself, or it can be pushed back in.    Irreducible. It can t be pushed back in.    Incarcerated/strangulated. The intestine is trapped (incarcerated). If this happens, you won t be able to push the bulge back in. If the incarcerated hernia isn t treated, it may become strangulated. This means the area loses blood supply and the tissue may die. This requires emergency surgery. You need treatment right away.  In most cases, a hernia will not heal on its own. Surgery is usually needed to repair the defect in the abdominal wall or groin. You ll be told more about surgery, if needed.  If your symptoms are not severe, treatment may sometimes be delayed. In such cases, regular follow-up visits with the provider will be needed. You ll be asked to keep track of your symptoms and to watch for signs of more serious problems. You may also be given guidelines similar to the home care instructions below.  Home care  To help keep a hernia from getting worse, you may be advised to:    Avoid heavy lifting and straining as directed.    Take steps to prevent constipation, such as eating more fiber and drinking more water. This may help reduce straining that can occur when having a bowel movement. Reducing straining may help keep your symptoms from getting worse.    Maintain a healthy weight or lose excess weight. This can help reduce strain on abdominal muscles and tissues.    Stop smoking. This can help prevent coughing that may also strain abdominal muscles and tissues.  Follow-up care  Follow up with your healthcare provider, or as directed. If imaging tests were done, they will  be reviewed a doctor. You will be told the results and any new findings that may affect your care.  When to seek medical advice  Call your healthcare provider right away if any of these occur:    Hernia hardens, swells, or grows larger    Hernia can no longer be pushed back in    Pain moves to the lower right abdomen (just below the waistline), or spreads to the back  Call 911  Call 911 if any of these occur:    Nausea and vomiting    Severe pain, redness, or tenderness in the area near the hernia    Pain worsens quickly and doesn t get better    Inability to have a bowel movement or pass gas    Fever of 100.4 F (38 C) or higher, or as directed by your healthcare provider    Trouble breathing    Fainting    Rapid heart rate    Vomiting blood    Large amounts of blood in stool  Date Last Reviewed: 6/9/2015 2000-2017 [employer]. [address] [place] [address]. All rights reserved. This information is not intended as a substitute for professional medical care. Always follow your healthcare professional's instructions.        Hernia Repair Surgery  A hernia (or  rupture ) is a weakness or defect in the wall of the abdomen. A hernia will not heal on its own. Surgery is needed to repair the defect in the abdominal wall. If not treated, a hernia can get larger. It can also lead to serious health complications. Fortunately hernia surgery can be done quickly and safely. Below is an overview of hernia repair surgery.  Preparing for surgery  Your healthcare provider will talk with you about getting ready for surgery. Follow all the instructions you re given and be sure to:    Tell your healthcare provider about any medicines, supplements, or herbs you take. This includes both prescription and over-the-counter items. Ask if you should stop taking any of them.    Stop taking aspirin, ibuprofen, naproxen, and other NSAIDs 7 to 14 days before surgery.    Arrange for an adult family member or friend to  give you a ride home after surgery.    Stop smoking. Smoking affects blood flow and can slow healing.    Gently wash the surgical area the night before surgery.    Follow any directions you are given for not eating or drinking before surgery.  The day of surgery  Arrive at the hospital or surgical center at your scheduled time. You ll be asked to change into a patient gown. You ll then be given an IV to provide fluids and medicine. Shortly before surgery, an anesthesiologist or nurse anesthetist will talk with you. He or she will explain the types of anesthesia used to prevent pain during surgery. You will have one or more of the following:    Monitored sedation to make you relaxed and sleepy.    Local anesthesia to numb the surgical site.    Regional anesthesia to numb specific areas of your body.    General anesthesia to let you sleep during surgery.  Fixing the weakness  Surgery treats a hernia by repairing the weakness in the abdominal wall. Most hernias are treated using  tension-free  repairs. This is surgery that uses special mesh materials to repair the weak area. The mesh covers the weak area like a patch. The mesh is made of strong, flexible plastic that stays in the body. Over time, nearby tissues grow into the mesh to strengthen the repair.  After surgery  When the procedure is over, you ll be taken to the recovery area to rest. Your blood pressure and heart rate will be monitored. You ll also have a bandage over the surgical site. To help reduce discomfort, you ll be given pain medicines. You may also be given breathing exercises to keep your lungs clear. Later you ll be asked to get up and walk. This helps prevent blood clots in the legs. You can go home when your healthcare provider says you re ready.     Risks and possible complications of hernia surgery    Bleeding    Infection    Numbness or pain in the groin or leg    Risk the hernia will recur    Damage to the testicles or testicular function    Anesthesia risks    Mesh complications    Inability to urinate    Bowel or bladder injury       Date Last Reviewed: 10/1/2016    1819-3094 [employer]. [address] [place] [address], [place] [address]. All rights reserved. This information is not intended as a substitute for professional medical care. Always follow your healthcare professional's instructions.                Follow-ups after your visit        Additional Services     GENERAL SURG ADULT REFERRAL       Your provider has referred you to:   FMG: [facility] [phone]   [email or web address]  FMG: [facility] [phone]   [email or web address]  FMG: [facility] [phone]   [email or web address]  FMG: [facility] [place] [phone]   [email or web address]  P: General Surgery [facility] [phone]   [email or web address]  [facility] [[email or web address]]: [facility] [phone]   [email or web address]    Please be aware that coverage of these services is subject to the terms and limitations of your health insurance plan.  Call member services at your health plan with any benefit or coverage questions.      Please bring the following with you to your appointment:    (1) Any X-Rays, CTs or MRIs which have been performed.  Contact the facility where they were done to arrange for  prior to your scheduled appointment.   (2) List of current medications   (3) This referral request   (4) Any documents/labs given to you for this referral                  Who to contact     If you have questions or need follow up information about today's clinic visit or your schedule please contact [facility] [place] directly at  "[phone].  Normal or non-critical lab and imaging results will be communicated to you by MyChart, letter or phone within 4 business days after the clinic has received the results. If you do not hear from us within 7 days, please contact the clinic through [employer] or phone. If you have a critical or abnormal lab result, we will notify you by phone as soon as possible.  Submit refill requests through [employer] or call your pharmacy and they will forward the refill request to us. Please allow 3 business days for your refill to be completed.          Additional Information About Your Visit        [employer] Information     [employer] lets you send messages to your doctor, view your test results, renew your prescriptions, schedule appointments and more. To sign up, go to [email or web address] . Click on \"Log in\" on the left side of the screen, which will take you to the Welcome page. Then click on \"Sign up Now\" on the right side of the page.     You will be asked to enter the access code listed below, as well as some personal information. Please follow the directions to create your username and password.     Your access code is: [email or web address]  Expires: 2018  3:10 PM     Your access code will  in 90 days. If you need help or a new code, please call your [facility] [facility] clinic or [phone].        Care EveryWhere ID     This is your Care EveryWhere ID. This could be used by other organizations to access your [facility] medical records  [id number]        Your Vitals Were     Pulse Temperature Respirations Height Pulse Oximetry Breastfeeding?    72 97.7  F (36.5  C) (Oral) 18 1.372 m (4' 6\") 99% No    BMI (Body Mass Index)                   22.95 kg/m2            Blood Pressure from Last 3 Encounters:   18 138/68   18 118/64   18 122/65    Weight from Last 3 Encounters:   18 43.2 kg (95 lb 3.2 oz)   18 43.5 kg (96 lb)   18 43.5 kg (96 lb)              We Performed the Following     " GENERAL SURG ADULT REFERRAL        Primary Care Provider Office Phone # Fax #    Shadi Cotton [phone]       [address]  [facility] [address]        Equal Access to Services     NEHEMIAS DELGADO : Hadjanet afshan guzman ingriso Sosharonaali, waaxda luqadaha, qaybta kaalmada adeegyada, cruz trejo priyanka anderson. So [facility] [phone].    ATENCIÓN: Si habla español, tiene a cornejo disposición servicios gratuitos de asistencia lingüística. Llame al [phone].    We comply with applicable federal civil rights laws and [place] laws. We do not discriminate on the basis of race, color, national origin, age, disability, sex, sexual orientation, or gender identity.            Thank you!     Thank you for choosing [facility]  for your care. Our goal is always to provide you with excellent care. Hearing back from our patients is one way we can continue to improve our services. Please take a few minutes to complete the written survey that you may receive in the mail after your visit with us. Thank you!             Your Updated Medication List - Protect others around you: Learn how to safely use, store and throw away your medicines at www.disposemymeds.org.          This list is accurate as of 7/26/18 12:49 PM.  Always use your most recent med list.                   Brand Name Dispense Instructions for use Diagnosis    * ASPIRIN NOT PRESCRIBED    INTENTIONAL     Reported on 4/4/2017        CALCIUM 600+D HIGH POTENCY 600-400 MG-UNIT per tablet   Generic drug:  calcium-vitamin D     180 tablet    TAKE 1 TABLET BY MOUTH TWICE DAILY    Osteoporosis       cetirizine 10 MG tablet    zyrTEC    30 tablet    Take 1 tablet (10 mg) by mouth daily    Urticaria       cyanocobalamin 1000 MCG Tbcr    VITAMIN B-12 ER    30 tablet    Take 1,000 mcg by mouth daily    Vitamin B12 deficiency (dietary) anemia       diclofenac 1 % Gel topical gel    VOLTAREN    100 g    Apply 4 grams to knees or 2 grams to  hands four times daily using enclosed dosing card.    Osteoarthritis of left thumb, Cervical radiculopathy       ferrous sulfate 325 (65 Fe) MG tablet    IRON    60 tablet    TAKE 1 TABLET (325 MG) BY MOUTH 2 TIMES DAILY    Iron deficiency anemia, unspecified iron deficiency anemia type       HYDROcodone-acetaminophen 5-325 MG per tablet    NORCO    20 tablet    Take 1-2 tablets by mouth every 4 hours as needed for other (Moderate to Severe Pain)    Mass of left thigh, Bone spur of left femur       naproxen 500 MG tablet    NAPROSYN    180 tablet    TAKE 1 TABLET BY MOUTH TWICE DAILY WITH FOOD AS NEEDED FOR PAIN    Osteoarthritis of knee, unspecified laterality, unspecified osteoarthritis type       omeprazole 20 MG CR capsule    priLOSEC    90 capsule    Take 1 capsule (20 mg) by mouth daily    Gastroesophageal reflux disease without esophagitis       * order for DME     1 Container    Pull up briefs for incontinence of urine change 2 to 3 times daily    Urine incontinence       ranitidine 300 MG tablet    ZANTAC    90 tablet    Take 1 tablet (300 mg) by mouth At Bedtime    Gastroesophageal reflux disease without esophagitis       SENEXON-S 8.6-50 MG per tablet   Generic drug:  senna-docusate     60 tablet    TAKE 1 TO 2 TABLETS BY MOUTH AT BEDTIME AS NEEDED FOR CONSTIPATION, HOLD FOR LOOSE STOOLS    Constipation, unspecified constipation type       * Notice:  This list has 2 medication(s) that are the same as other medications prescribed for you. Read the directions carefully, and ask your doctor or other care provider to review them with you.

## 2018-07-26 NOTE — TELEPHONE ENCOUNTER
Reason for Call:  Other     Detailed comments: spouse calling asking if patient needs to be fasting for an appointment today at 12pm today?    Phone Number Patient can be reached at: Home number on file [phone] (home)    Best Time: any    Can we leave a detailed message on this number? YES    Call taken on 7/26/2018 at 10:18 AM by Doreen Aquino

## 2018-07-26 NOTE — PATIENT INSTRUCTIONS
Please call the [facility]  [phone] to schedule your mammogram (breast cancer screening xray).   Hernia (Adult)    A hernia can happen when there is a weakness or defect in the wall of the abdomen or groin. Intestines or nearby tissues may move from their usual location and push through the weakness in the wall. This can cause a hernia (bulge) you may see or feel.  Causes and risk factors   A hernia may be present at birth. Or it may be caused by the wear and tear of daily living. Certain factors can make a hernia more likely. These can include:    Heavy lifting    Straining, whether from lifting, movement, or constipation    Chronic cough    Injury to the abdominal wall    Excess weight    Pregnancy    Prior surgery    Older age    Family history of hernia  Symptoms  Symptoms of a hernia may come on suddenly. Or they may appear slowly over time. Some common symptoms include:    Bulge in the groin area, around the navel, or in the scrotum (the bulge may get bigger when you stand and go away when you lie down)    Pain or pressure around the bulge    Pain during activities such as lifting, coughing, or sneezing    A feeling of weakness or pressure in the groin    Pain or swelling in the scrotum  Types of hernias  There are different types of hernia. The type you have depends on its location:    Inguinal. This type is in the groin or scrotum. It is more common in men.    Femoral. This type is in the groin, upper thigh (where the leg bends), or labia. It is more common in women.    Ventral. This type is in the abdominal wall.    Umbilical. This type occurs around the navel (belly button).    Incisional. This type occurs at the site of a previous surgery.  The condition of the hernia can help determine how urgently it needs to be treated.    Reducible. It goes back in by itself, or it can be pushed back in.    Irreducible. It can t be pushed back in.    Incarcerated/strangulated. The  intestine is trapped (incarcerated). If this happens, you won t be able to push the bulge back in. If the incarcerated hernia isn t treated, it may become strangulated. This means the area loses blood supply and the tissue may die. This requires emergency surgery. You need treatment right away.  In most cases, a hernia will not heal on its own. Surgery is usually needed to repair the defect in the abdominal wall or groin. You ll be told more about surgery, if needed.  If your symptoms are not severe, treatment may sometimes be delayed. In such cases, regular follow-up visits with the provider will be needed. You ll be asked to keep track of your symptoms and to watch for signs of more serious problems. You may also be given guidelines similar to the home care instructions below.  Home care  To help keep a hernia from getting worse, you may be advised to:    Avoid heavy lifting and straining as directed.    Take steps to prevent constipation, such as eating more fiber and drinking more water. This may help reduce straining that can occur when having a bowel movement. Reducing straining may help keep your symptoms from getting worse.    Maintain a healthy weight or lose excess weight. This can help reduce strain on abdominal muscles and tissues.    Stop smoking. This can help prevent coughing that may also strain abdominal muscles and tissues.  Follow-up care  Follow up with your healthcare provider, or as directed. If imaging tests were done, they will be reviewed a doctor. You will be told the results and any new findings that may affect your care.  When to seek medical advice  Call your healthcare provider right away if any of these occur:    Hernia hardens, swells, or grows larger    Hernia can no longer be pushed back in    Pain moves to the lower right abdomen (just below the waistline), or spreads to the back  Call 911  Call 911 if any of these occur:    Nausea and vomiting    Severe pain, redness, or  tenderness in the area near the hernia    Pain worsens quickly and doesn t get better    Inability to have a bowel movement or pass gas    Fever of 100.4 F (38 C) or higher, or as directed by your healthcare provider    Trouble breathing    Fainting    Rapid heart rate    Vomiting blood    Large amounts of blood in stool  Date Last Reviewed: 6/9/2015 2000-2017 [employer]. [address] [place] [address]. All rights reserved. This information is not intended as a substitute for professional medical care. Always follow your healthcare professional's instructions.        Hernia Repair Surgery  A hernia (or  rupture ) is a weakness or defect in the wall of the abdomen. A hernia will not heal on its own. Surgery is needed to repair the defect in the abdominal wall. If not treated, a hernia can get larger. It can also lead to serious health complications. Fortunately hernia surgery can be done quickly and safely. Below is an overview of hernia repair surgery.  Preparing for surgery  Your healthcare provider will talk with you about getting ready for surgery. Follow all the instructions you re given and be sure to:    Tell your healthcare provider about any medicines, supplements, or herbs you take. This includes both prescription and over-the-counter items. Ask if you should stop taking any of them.    Stop taking aspirin, ibuprofen, naproxen, and other NSAIDs 7 to 14 days before surgery.    Arrange for an adult family member or friend to give you a ride home after surgery.    Stop smoking. Smoking affects blood flow and can slow healing.    Gently wash the surgical area the night before surgery.    Follow any directions you are given for not eating or drinking before surgery.  The day of surgery  Arrive at the hospital or surgical center at your scheduled time. You ll be asked to change into a patient gown. You ll then be given an IV to provide fluids and medicine. Shortly before surgery, an  anesthesiologist or nurse anesthetist will talk with you. He or she will explain the types of anesthesia used to prevent pain during surgery. You will have one or more of the following:    Monitored sedation to make you relaxed and sleepy.    Local anesthesia to numb the surgical site.    Regional anesthesia to numb specific areas of your body.    General anesthesia to let you sleep during surgery.  Fixing the weakness  Surgery treats a hernia by repairing the weakness in the abdominal wall. Most hernias are treated using  tension-free  repairs. This is surgery that uses special mesh materials to repair the weak area. The mesh covers the weak area like a patch. The mesh is made of strong, flexible plastic that stays in the body. Over time, nearby tissues grow into the mesh to strengthen the repair.  After surgery  When the procedure is over, you ll be taken to the recovery area to rest. Your blood pressure and heart rate will be monitored. You ll also have a bandage over the surgical site. To help reduce discomfort, you ll be given pain medicines. You may also be given breathing exercises to keep your lungs clear. Later you ll be asked to get up and walk. This helps prevent blood clots in the legs. You can go home when your healthcare provider says you re ready.     Risks and possible complications of hernia surgery    Bleeding    Infection    Numbness or pain in the groin or leg    Risk the hernia will recur    Damage to the testicles or testicular function   Anesthesia risks    Mesh complications    Inability to urinate    Bowel or bladder injury       Date Last Reviewed: 10/1/2016    1486-4454 [employer]. [address] [place] [address], [place] [address]. All rights reserved. This information is not intended as a substitute for professional medical care. Always follow your healthcare professional's instructions.

## 2018-07-26 NOTE — TELEPHONE ENCOUNTER
Patient seen in clinic by provider today before message could be addressed. Closing encounter.     Pratima Persaud RN

## 2018-07-26 NOTE — PROGRESS NOTES
"  SUBJECTIVE:   Kerry Vega is a 78 year old female who presents to clinic today for the following health issues:  She is accompanied by her .     Abdominal Pain - Hernia      Duration: 5-6 month    Description (location/character/radiation): large lump over the LLQ with associated pain that she describes as a moderate aching discomfort       Associated flank pain: yes, worse when eating    Intensity:  6/10    Accompanying signs and symptoms:        Fever/Chills: YES       Gas/Bloating: YES       Nausea/vomitting: YES       Diarrhea: no        Dysuria or Hematuria: no     History (previous similar pain/trauma/previous testing): none    Precipitating or alleviating factors:       Pain worse with eating/BM/urination: yes       Pain relieved by BM: YES    Therapies tried and outcome: none    LMP:  not applicable    Past medical, family, and social histories, medications, and allergies are reviewed and updated in Epic.     ROS:  CONSTITUTIONAL: NEGATIVE for fever, chills, change in weight  ENT/MOUTH: NEGATIVE for ear, mouth and throat problems  RESP: NEGATIVE for significant cough or SOB  CV: NEGATIVE for chest pain, palpitations or peripheral edema  ROS otherwise negative    This document serves as a record of the services and decisions personally performed and made by Dr. Tai. It was created on his behalf by Cori Mas, a trained medical scribe. The creation of this document is based the provider's statements to the medical scribe.  Cori Mas July 26, 2018 12:39 PM     OBJECTIVE:                                                    /68  Pulse 72  Temp 97.7  F (36.5  C) (Oral)  Resp 18  Ht 1.372 m (4' 6\")  Wt 43.2 kg (95 lb 3.2 oz)  SpO2 99%  Breastfeeding? No  BMI 22.95 kg/m2   Body mass index is 22.95 kg/(m^2).     GENERAL: healthy, alert and no distress  EYES: Eyes grossly normal to inspection, PERRL, EOMI, sclerae white and conjunctivae normal  RESP: lungs clear to auscultation - " no crackles or wheezes, no areas of dullness, no tachypnea  CV: Heart regular rate and rhythm without murmur, click or rub. No peripheral edema and peripheral pulses strong  ABDOMEN: soft, non tender except for left suprapubic/inguinal discomfort with palpation over an apparent inguinal or low ventral hernia. Small bowel peristalsis and air movement are palpable there. No abdominal mass. Normal bowel sounds.  MS: no gross musculoskeletal defects noted, no edema  SKIN: no suspicious lesions or rashes  NEURO: Normal strength and tone, sensory exam grossly normal, mentation intact, oriented times 3 and cranial nerves 2-12 intact  PSYCH: mentation appears normal, affect normal/bright     Diagnostic Test Results:  ULTRASOUND ABDOMEN COMPLETE  10/6/2017 9:36 AM      HISTORY: Right upper quadrant pain.     FINDINGS:  Liver is increased in echogenicity without focal lesions.  Gallbladder contains gallstones and is partially decompressed. No  associated gallbladder wall thickening or pericholecystic fluid.  Common bile duct is normal in diameter. Pancreas is normal where  visualized. Spleen is normal. Kidneys are normal in size. There is no  hydronephrosis. Proximal abdominal aorta and IVC are within normal  limits.     IMPRESSION:  Fatty infiltration of the liver. Cholelithiasis without  ultrasound findings to suggest acute cholecystitis. No bile duct  dilatation.     MARGARITO SMITH MD     ASSESSMENT/PLAN:                                                      (K40.90) Left inguinal hernia  (primary encounter diagnosis)  Comment: symptomatic, but not the only abdominal pain complaint  Plan: GENERAL SURG ADULT REFERRAL        Handout provided.     (K80.20) Calculus of gallbladder without cholecystitis without obstruction  Comment: she has had abdominal pain complaints for several months, takes medications for various pains and GI symptoms. I suspect her gallstones are related to her epigastric pain.  Plan: GENERAL SURG ADULT  REFERRAL          (Z12.31) Visit for screening mammogram  Comment: overdue  Plan: patient will call to schedule    The information in this document, created by the medical scribe for me, accurately reflects the services I personally performed and the decisions made by me. I have reviewed and approved this document for accuracy prior to leaving the patient care area. July 26, 2018 12:39 PM   Mic Tai MD

## 2018-08-03 DIAGNOSIS — M17.9 OSTEOARTHRITIS OF KNEE, UNSPECIFIED LATERALITY, UNSPECIFIED OSTEOARTHRITIS TYPE: ICD-10-CM

## 2018-08-03 NOTE — TELEPHONE ENCOUNTER
"Requested Prescriptions   Pending Prescriptions Disp Refills     naproxen (NAPROSYN) 500 MG tablet [Pharmacy Med Name: NAPROXEN 500 MG TABLET] 180 tablet 0    Last Written Prescription Date:  6/22/18  Last Fill Quantity: 180,  # refills: 0   Last Office Visit with G, [facility] or [facility] prescribing provider:  7/26/2018     Future Office Visit:      Sig: TAKE 1 TABLET BY MOUTH TWICE DAILY WITH FOOD AS NEEDED FOR PAIN    NSAID Medications Failed    8/3/2018  3:42 PM       Failed - Normal ALT on file in past 12 months    Recent Labs   Lab Test  07/17/17   1505   ALT  18            Failed - Normal AST on file in past 12 months    Recent Labs   Lab Test  07/17/17   1505   AST  13            Failed - Patient is age 6-64 years       Passed - Blood pressure under 140/90 in past 12 months    BP Readings from Last 3 Encounters:   07/26/18 138/68   05/17/18 118/64   05/01/18 122/65                Passed - Recent (12 mo) or future (30 days) visit within the authorizing provider's specialty    Patient had office visit in the last 12 months or has a visit in the next 30 days with authorizing provider or within the authorizing provider's specialty.  See \"Patient Info\" tab in inbasket, or \"Choose Columns\" in Meds & Orders section of the refill encounter.           Passed - Normal CBC on file in past 12 months    Recent Labs   Lab Test  03/21/18   1303  03/08/18   1816   WBC   --   6.0   RBC   --   3.98   HGB  9.7*  8.3*   HCT   --   28.3*   PLT   --   336       For [facility] ONLY: IBIL200 = WBC, DTUT996 = RBC         Passed - No active pregnancy on record       Passed - Normal serum creatinine on file in past 12 months    Recent Labs   Lab Test  03/21/18   1303   CR  0.76            Passed - No positive pregnancy test in past 12 months          "

## 2018-08-06 ENCOUNTER — OFFICE VISIT (OUTPATIENT)
Dept: SURGERY | Facility: CLINIC | Age: 78
End: 2018-08-06
Payer: MEDICARE

## 2018-08-06 VITALS
HEIGHT: 55 IN | BODY MASS INDEX: 21.98 KG/M2 | HEART RATE: 72 BPM | SYSTOLIC BLOOD PRESSURE: 136 MMHG | WEIGHT: 95 LBS | DIASTOLIC BLOOD PRESSURE: 86 MMHG

## 2018-08-06 DIAGNOSIS — K40.90 NON-RECURRENT INGUINAL HERNIA OF LEFT SIDE WITHOUT OBSTRUCTION OR GANGRENE: Primary | ICD-10-CM

## 2018-08-06 DIAGNOSIS — K80.20 CALCULUS OF GALLBLADDER WITHOUT CHOLECYSTITIS WITHOUT OBSTRUCTION: ICD-10-CM

## 2018-08-06 PROCEDURE — 99213 OFFICE O/P EST LOW 20 MIN: CPT | Performed by: SURGERY

## 2018-08-06 NOTE — LETTER
8/6/2018         RE: Kerry Vega  [address]  [place] [address]        Dear Colleague,    Thank you for referring your patient, Kerry Vega, to the [facility]. Please see a copy of my visit note below.    Patient seen in consultation for left inguinal hernia by Dr Tai    HPI:  Patient is a 78 year old female  with complaints of left inguinal hernia  The patient noticed the symptoms about 6 months ago  Hernia known from my previous visits with her but was not really bothersome at that time  Now getting more pain in the left abdomen after eating  Does say has some constipation    time makes the episode better. Pain generally lasts under a minute maybe 10-15 seconds  Patient has not family history of hernia problems    Review Of Systems    Skin: negative  Ears/Nose/Throat: negative  Respiratory: No shortness of breath, dyspnea on exertion, cough, or hemoptysis  Cardiovascular: negative  Gastrointestinal: constipation  Genitourinary: negative  Musculoskeletal: some pain lateral left leg. Previous hematoma site ok  Neurologic: negative  Hematologic/Lymphatic/Immunologic: negative  Endocrine: negative      Past Medical History:   Diagnosis Date     Anemia      Arthritis     OSTEO     Left knee pain 2/23/2011       Past Surgical History:   Procedure Laterality Date     COLONOSCOPY  1/29/2013    Procedure: COLONOSCOPY;;  Surgeon: Edgard Prieto MD;  Location: [facility]     ESOPHAGOSCOPY, GASTROSCOPY, DUODENOSCOPY (EGD), COMBINED  1/20/2012    Procedure:COMBINED ESOPHAGOSCOPY, GASTROSCOPY, DUODENOSCOPY (EGD); EGD, dysphagia; Surgeon:SHEREE VALLE; Location:[facility] OR     ESOPHAGOSCOPY, GASTROSCOPY, DUODENOSCOPY (EGD), COMBINED  7/18/2012    Procedure: COMBINED ESOPHAGOSCOPY, GASTROSCOPY, DUODENOSCOPY (EGD);  EGD for dysphagia rp Tonio;  Surgeon: Rey Bee MD;  Location: [facility]     ESOPHAGOSCOPY, GASTROSCOPY, DUODENOSCOPY (EGD), COMBINED  1/29/2013     Procedure: COMBINED ESOPHAGOSCOPY, GASTROSCOPY, DUODENOSCOPY (EGD), BIOPSY SINGLE OR MULTIPLE;  Colonoscopy, EGD, dysphagia;  Surgeon: Edgard Prieto MD;  Location: [facility]     INCISION AND DRAINAGE LOWER EXTREMITY, COMBINED Left 3/21/2018    Procedure: COMBINED INCISION AND DRAINAGE LOWER EXTREMITY;  Excision of left intramuscular thigh mass; removal of anterior femoral bone spur;  Surgeon: Ray Diaz MD;  Location: [facility] OR       Social History     Social History     Marital status:      Spouse name: N/A     Number of children: N/A     Years of education: N/A     Occupational History     Not on file.     Social History Main Topics     Smoking status: Never Smoker     Smokeless tobacco: Never Used     Alcohol use No     Drug use: No     Sexual activity: Not Currently     Partners: Male     Birth control/ protection: None     Other Topics Concern     Not on file     Social History Narrative       Current Outpatient Prescriptions   Medication Sig Dispense Refill     ASPIRIN NOT PRESCRIBED (INTENTIONAL) Reported on 4/4/2017       CALCIUM 600+D HIGH POTENCY 600-400 MG-UNIT per tablet TAKE 1 TABLET BY MOUTH TWICE DAILY 180 tablet 3     cetirizine (ZYRTEC) 10 MG tablet Take 1 tablet (10 mg) by mouth daily 30 tablet 1     cyanocobalamin 1000 MCG TBCR Take 1,000 mcg by mouth daily 30 tablet 2     diclofenac (VOLTAREN) 1 % GEL topical gel Apply 4 grams to knees or 2 grams to hands four times daily using enclosed dosing card. 100 g 1     ferrous sulfate (IRON) 325 (65 Fe) MG tablet TAKE 1 TABLET (325 MG) BY MOUTH 2 TIMES DAILY 60 tablet 2     HYDROcodone-acetaminophen (NORCO) 5-325 MG per tablet Take 1-2 tablets by mouth every 4 hours as needed for other (Moderate to Severe Pain) 20 tablet 0     naproxen (NAPROSYN) 500 MG tablet TAKE 1 TABLET BY MOUTH TWICE DAILY WITH FOOD AS NEEDED FOR PAIN 180 tablet 0     omeprazole (PRILOSEC) 20 MG CR capsule Take 1 capsule (20 mg) by mouth daily 90 capsule 3     ORDER  "FOR DME Pull up briefs for incontinence of urine change 2 to 3 times daily 1 Container 11     ranitidine (ZANTAC) 300 MG tablet Take 1 tablet (300 mg) by mouth At Bedtime 90 tablet 3     SENEXON-S 8.6-50 MG per tablet TAKE 1 TO 2 TABLETS BY MOUTH AT BEDTIME AS NEEDED FOR CONSTIPATION, HOLD FOR LOOSE STOOLS 60 tablet 3       Medications and history reviewed    Physical exam:  Vitals: /86  Pulse 72  Ht 1.372 m (4' 6.02\")  Wt 43.1 kg (95 lb)  BMI 22.89 kg/m2  BMI= Body mass index is 22.89 kg/(m^2).    Constitutional: healthy, alert and no distress  Head: Normocephalic. No masses, lesions, tenderness or abnormalities  Cardiovascular: negative, PMI normal. No lifts, heaves, or thrills. RRR. No murmurs, clicks gallops or rub  Respiratory: negative, Percussion normal. Good diaphragmatic excursion. Lungs clear  Gastrointestinal: Abdomen. BS normal. No masses, organomegaly, positive findings: area of tenderness LUQ  : Left inguinal hernia, easily reducible  Musculoskeletal: extremities normal- no gross deformities noted, gait normal and normal muscle tone  Skin: no suspicious lesions or rashes  Psychiatric: mentation appears normal and affect normal/bright  Hematologic/Lymphatic/Immunologic: Normal cervical lymph nodes  Patient able to get up on table without difficulty.    Imaging:  ULTRASOUND ABDOMEN COMPLETE  10/6/2017 9:36 AM      HISTORY: Right upper quadrant pain.     FINDINGS:  Liver is increased in echogenicity without focal lesions.  Gallbladder contains gallstones and is partially decompressed. No  associated gallbladder wall thickening or pericholecystic fluid.  Common bile duct is normal in diameter. Pancreas is normal where  visualized. Spleen is normal. Kidneys are normal in size. There is no  hydronephrosis. Proximal abdominal aorta and IVC are within normal  limits.         IMPRESSION:  Fatty infiltration of the liver. Cholelithiasis without  ultrasound findings to suggest acute cholecystitis. No " bile duct  Dilatation.    Assessment:     ICD-10-CM    1. Non-recurrent inguinal hernia of left side without obstruction or gangrene K40.90    2. Calculus of gallbladder without cholecystitis without obstruction K80.20      Plan: Her pain is not with the hernia nor does it sound like gallbladder as is LUQ and very short in duration. May be related to constipation. Seems like hernia and gallstones are really asymptomatic at this time. Would not want to have risks of surgery without making her symptoms better. Advised using the stool softeners, drinking plenty of water, getting fiber in diet to see if helps the constipation and this pain.   If pain changes to be more RUQ or to be in area of hernia then would consider cholecystectomy or hernia repair for her. Also discussed that I would not want to do both of these at the same time as would put extra infection risk to the hernia mesh from the gallbladder. Patient without other questions or concerns at this time. Follow up as needed    Ray Diaz MD      Again, thank you for allowing me to participate in the care of your patient.        Sincerely,        Ray Diaz MD

## 2018-08-06 NOTE — PROGRESS NOTES
Patient seen in consultation for left inguinal hernia by Dr Tai    HPI:  Patient is a 78 year old female  with complaints of left inguinal hernia  The patient noticed the symptoms about 6 months ago  Hernia known from my previous visits with her but was not really bothersome at that time  Now getting more pain in the left abdomen after eating  Does say has some constipation    time makes the episode better. Pain generally lasts under a minute maybe 10-15 seconds  Patient has not family history of hernia problems    Review Of Systems    Skin: negative  Ears/Nose/Throat: negative  Respiratory: No shortness of breath, dyspnea on exertion, cough, or hemoptysis  Cardiovascular: negative  Gastrointestinal: constipation  Genitourinary: negative  Musculoskeletal: some pain lateral left leg. Previous hematoma site ok  Neurologic: negative  Hematologic/Lymphatic/Immunologic: negative  Endocrine: negative      Past Medical History:   Diagnosis Date     Anemia      Arthritis     OSTEO     Left knee pain 2/23/2011       Past Surgical History:   Procedure Laterality Date     COLONOSCOPY  1/29/2013    Procedure: COLONOSCOPY;;  Surgeon: Edgard Prieto MD;  Location: [facility]     ESOPHAGOSCOPY, GASTROSCOPY, DUODENOSCOPY (EGD), COMBINED  1/20/2012    Procedure:COMBINED ESOPHAGOSCOPY, GASTROSCOPY, DUODENOSCOPY (EGD); EGD, dysphagia; Surgeon:SHEREE VALLE; Location:[facility]     ESOPHAGOSCOPY, GASTROSCOPY, DUODENOSCOPY (EGD), COMBINED  7/18/2012    Procedure: COMBINED ESOPHAGOSCOPY, GASTROSCOPY, DUODENOSCOPY (EGD);  EGD for dysphagia rp Tonio;  Surgeon: Rey Bee MD;  Location: [facility]     ESOPHAGOSCOPY, GASTROSCOPY, DUODENOSCOPY (EGD), COMBINED  1/29/2013    Procedure: COMBINED ESOPHAGOSCOPY, GASTROSCOPY, DUODENOSCOPY (EGD), BIOPSY SINGLE OR MULTIPLE;  Colonoscopy, EGD, dysphagia;  Surgeon: Edgard Prieto MD;  Location: [facility]     INCISION AND DRAINAGE LOWER EXTREMITY, COMBINED Left 3/21/2018    Procedure: COMBINED INCISION  AND DRAINAGE LOWER EXTREMITY;  Excision of left intramuscular thigh mass; removal of anterior femoral bone spur;  Surgeon: Ray Diaz MD;  Location: [facility]       Social History     Social History     Marital status:      Spouse name: N/A     Number of children: N/A     Years of education: N/A     Occupational History     Not on file.     Social History Main Topics     Smoking status: Never Smoker     Smokeless tobacco: Never Used     Alcohol use No     Drug use: No     Sexual activity: Not Currently     Partners: Male     Birth control/ protection: None     Other Topics Concern     Not on file     Social History Narrative       Current Outpatient Prescriptions   Medication Sig Dispense Refill     ASPIRIN NOT PRESCRIBED (INTENTIONAL) Reported on 4/4/2017       CALCIUM 600+D HIGH POTENCY 600-400 MG-UNIT per tablet TAKE 1 TABLET BY MOUTH TWICE DAILY 180 tablet 3     cetirizine (ZYRTEC) 10 MG tablet Take 1 tablet (10 mg) by mouth daily 30 tablet 1     cyanocobalamin 1000 MCG TBCR Take 1,000 mcg by mouth daily 30 tablet 2     diclofenac (VOLTAREN) 1 % GEL topical gel Apply 4 grams to knees or 2 grams to hands four times daily using enclosed dosing card. 100 g 1     ferrous sulfate (IRON) 325 (65 Fe) MG tablet TAKE 1 TABLET (325 MG) BY MOUTH 2 TIMES DAILY 60 tablet 2     HYDROcodone-acetaminophen (NORCO) 5-325 MG per tablet Take 1-2 tablets by mouth every 4 hours as needed for other (Moderate to Severe Pain) 20 tablet 0     naproxen (NAPROSYN) 500 MG tablet TAKE 1 TABLET BY MOUTH TWICE DAILY WITH FOOD AS NEEDED FOR PAIN 180 tablet 0     omeprazole (PRILOSEC) 20 MG CR capsule Take 1 capsule (20 mg) by mouth daily 90 capsule 3     ORDER FOR DME Pull up briefs for incontinence of urine change 2 to 3 times daily 1 Container 11     ranitidine (ZANTAC) 300 MG tablet Take 1 tablet (300 mg) by mouth At Bedtime 90 tablet 3     SENEXON-S 8.6-50 MG per tablet TAKE 1 TO 2 TABLETS BY MOUTH AT BEDTIME AS NEEDED FOR  "CONSTIPATION, HOLD FOR LOOSE STOOLS 60 tablet 3       Medications and history reviewed    Physical exam:  Vitals: /86  Pulse 72  Ht 1.372 m (4' 6.02\")  Wt 43.1 kg (95 lb)  BMI 22.89 kg/m2  BMI= Body mass index is 22.89 kg/(m^2).    Constitutional: healthy, alert and no distress  Head: Normocephalic. No masses, lesions, tenderness or abnormalities  Cardiovascular: negative, PMI normal. No lifts, heaves, or thrills. RRR. No murmurs, clicks gallops or rub  Respiratory: negative, Percussion normal. Good diaphragmatic excursion. Lungs clear  Gastrointestinal: Abdomen. BS normal. No masses, organomegaly, positive findings: area of tenderness LUQ  : Left inguinal hernia, easily reducible  Musculoskeletal: extremities normal- no gross deformities noted, gait normal and normal muscle tone  Skin: no suspicious lesions or rashes  Psychiatric: mentation appears normal and affect normal/bright  Hematologic/Lymphatic/Immunologic: Normal cervical lymph nodes  Patient able to get up on table without difficulty.    Imaging:  ULTRASOUND ABDOMEN COMPLETE  10/6/2017 9:36 AM      HISTORY: Right upper quadrant pain.     FINDINGS:  Liver is increased in echogenicity without focal lesions.  Gallbladder contains gallstones and is partially decompressed. No  associated gallbladder wall thickening or pericholecystic fluid.  Common bile duct is normal in diameter. Pancreas is normal where  visualized. Spleen is normal. Kidneys are normal in size. There is no  hydronephrosis. Proximal abdominal aorta and IVC are within normal  limits.         IMPRESSION:  Fatty infiltration of the liver. Cholelithiasis without  ultrasound findings to suggest acute cholecystitis. No bile duct  Dilatation.    Assessment:     ICD-10-CM    1. Non-recurrent inguinal hernia of left side without obstruction or gangrene K40.90    2. Calculus of gallbladder without cholecystitis without obstruction K80.20      Plan: Her pain is not with the hernia nor does " it sound like gallbladder as is LUQ and very short in duration. May be related to constipation. Seems like hernia and gallstones are really asymptomatic at this time. Would not want to have risks of surgery without making her symptoms better. Advised using the stool softeners, drinking plenty of water, getting fiber in diet to see if helps the constipation and this pain.   If pain changes to be more RUQ or to be in area of hernia then would consider cholecystectomy or hernia repair for her. Also discussed that I would not want to do both of these at the same time as would put extra infection risk to the hernia mesh from the gallbladder. Patient without other questions or concerns at this time. Follow up as needed    Ray Diaz MD

## 2018-08-06 NOTE — MR AVS SNAPSHOT
"              After Visit Summary   2018    Kerry Vega    MRN: [id number]           Patient Information     Date Of Birth          1940        Visit Information        Provider Department      2018 2:00 PM Ray Diaz MD [facility]        Today's Diagnoses     Non-recurrent inguinal hernia of left side without obstruction or gangrene    -  1    Calculus of gallbladder without cholecystitis without obstruction           Follow-ups after your visit        Who to contact     If you have questions or need follow up information about today's clinic visit or your schedule please contact [facility] directly at [phone].  Normal or non-critical lab and imaging results will be communicated to you by MyChart, letter or phone within 4 business days after the clinic has received the results. If you do not hear from us within 7 days, please contact the clinic through MyChart or phone. If you have a critical or abnormal lab result, we will notify you by phone as soon as possible.  Submit refill requests through [employer] or call your pharmacy and they will forward the refill request to us. Please allow 3 business days for your refill to be completed.          Additional Information About Your Visit        MyChart Information     [employer] lets you send messages to your doctor, view your test results, renew your prescriptions, schedule appointments and more. To sign up, go to [email or web address] . Click on \"Log in\" on the left side of the screen, which will take you to the Welcome page. Then click on \"Sign up Now\" on the right side of the page.     You will be asked to enter the access code listed below, as well as some personal information. Please follow the directions to create your username and password.     Your access code is: [email or web address]  Expires: 2018  2:19 PM     Your access code will  in 90 days. If you need help or a new code, please " "call your [facility] or [phone].        Care EveryWhere ID     This is your Care EveryWhere ID. This could be used by other organizations to access your [place] medical records  [id number]        Your Vitals Were     Pulse Height BMI (Body Mass Index)             72 1.372 m (4' 6.02\") 22.89 kg/m2          Blood Pressure from Last 3 Encounters:   08/06/18 136/86   07/26/18 138/68   05/17/18 118/64    Weight from Last 3 Encounters:   08/06/18 43.1 kg (95 lb)   07/26/18 43.2 kg (95 lb 3.2 oz)   05/17/18 43.5 kg (96 lb)              Today, you had the following     No orders found for display       Primary Care Provider Office Phone # Fax #    Shadi Cotton [phone]       [address]  [facility] [address]        Equal Access to Services     [facility] : Hadii aad ku hadasho Soomaali, waaxda luqadaha, qaybta kaalmada adeegyada, waxay idiin haybilln karsten mathew . So [facility] [phone].    ATENCIÓN: Si mignon martinez, tiene a cornejo disposición servicios gratuitos de asistencia lingüística. Llame al [phone].    We comply with applicable federal civil rights laws and [place] laws. We do not discriminate on the basis of race, color, national origin, age, disability, sex, sexual orientation, or gender identity.            Thank you!     Thank you for choosing [facility]  for your care. Our goal is always to provide you with excellent care. Hearing back from our patients is one way we can continue to improve our services. Please take a few minutes to complete the written survey that you may receive in the mail after your visit with us. Thank you!             Your Updated Medication List - Protect others around you: Learn how to safely use, store and throw away your medicines at www.disposemymeds.org.          This list is accurate as of 8/6/18  2:19 PM.  Always use your most recent med list.                   Brand Name Dispense Instructions for use Diagnosis "    * ASPIRIN NOT PRESCRIBED    INTENTIONAL     Reported on 4/4/2017        CALCIUM 600+D HIGH POTENCY 600-400 MG-UNIT per tablet   Generic drug:  calcium-vitamin D     180 tablet    TAKE 1 TABLET BY MOUTH TWICE DAILY    Osteoporosis       cetirizine 10 MG tablet    zyrTEC    30 tablet    Take 1 tablet (10 mg) by mouth daily    Urticaria       cyanocobalamin 1000 MCG Tbcr    VITAMIN B-12 ER    30 tablet    Take 1,000 mcg by mouth daily    Vitamin B12 deficiency (dietary) anemia       diclofenac 1 % Gel topical gel    VOLTAREN    100 g    Apply 4 grams to knees or 2 grams to hands four times daily using enclosed dosing card.    Osteoarthritis of left thumb, Cervical radiculopathy       ferrous sulfate 325 (65 Fe) MG tablet    IRON    60 tablet    TAKE 1 TABLET (325 MG) BY MOUTH 2 TIMES DAILY    Iron deficiency anemia, unspecified iron deficiency anemia type       HYDROcodone-acetaminophen 5-325 MG per tablet    NORCO    20 tablet    Take 1-2 tablets by mouth every 4 hours as needed for other (Moderate to Severe Pain)    Mass of left thigh, Bone spur of left femur       naproxen 500 MG tablet    NAPROSYN    180 tablet    TAKE 1 TABLET BY MOUTH TWICE DAILY WITH FOOD AS NEEDED FOR PAIN    Osteoarthritis of knee, unspecified laterality, unspecified osteoarthritis type       omeprazole 20 MG CR capsule    priLOSEC    90 capsule    Take 1 capsule (20 mg) by mouth daily    Gastroesophageal reflux disease without esophagitis       * order for DME     1 Container    Pull up briefs for incontinence of urine change 2 to 3 times daily    Urine incontinence       ranitidine 300 MG tablet    ZANTAC    90 tablet    Take 1 tablet (300 mg) by mouth At Bedtime    Gastroesophageal reflux disease without esophagitis       SENEXON-S 8.6-50 MG per tablet   Generic drug:  senna-docusate     60 tablet    TAKE 1 TO 2 TABLETS BY MOUTH AT BEDTIME AS NEEDED FOR CONSTIPATION, HOLD FOR LOOSE STOOLS    Constipation, unspecified constipation type        * Notice:  This list has 2 medication(s) that are the same as other medications prescribed for you. Read the directions carefully, and ask your doctor or other care provider to review them with you.

## 2018-08-07 RX ORDER — NAPROXEN 500 MG/1
TABLET ORAL
Qty: 180 TABLET | Refills: 0 | Status: SHIPPED | OUTPATIENT
Start: 2018-08-07 | End: 2018-08-29

## 2018-08-07 NOTE — TELEPHONE ENCOUNTER
Routing refill request to provider for review/approval because:  Labs not current:  See protocol below.    Elba Ag RN, [facility]

## 2018-08-28 ENCOUNTER — TELEPHONE (OUTPATIENT)
Dept: FAMILY MEDICINE | Facility: CLINIC | Age: 78
End: 2018-08-28

## 2018-08-28 DIAGNOSIS — K59.09 OTHER CONSTIPATION: Primary | ICD-10-CM

## 2018-08-28 NOTE — TELEPHONE ENCOUNTER
Reason for Call:  Other symptoms     Detailed comments: JAVIER calling on behalf of his wife Kerry. She is experiencing stomach pain and constipation which she believes is from ferrous sulfate (IRON) 325 (65 Fe) MG tablet and naproxen (NAPROSYN) 500 MG tablet.   Kerry wants to stop taking these.  Please call to advise.  Thank you     Phone Number Patient can be reached at: Home number on file [phone] (home)    Best Time: Any    Can we leave a detailed message on this number? YES    Call taken on 8/28/2018 at 5:55 PM by Rosaura Ruth

## 2018-08-29 RX ORDER — POLYETHYLENE GLYCOL 3350 17 G/17G
1 POWDER, FOR SOLUTION ORAL DAILY
Qty: 510 G | Refills: 11 | Status: SHIPPED | OUTPATIENT
Start: 2018-08-29 | End: 2018-10-29

## 2018-08-29 NOTE — TELEPHONE ENCOUNTER
Please notify patient that she can discontinue the naproxen. Patient can take Tylenol (acetaminophen) OTC to help with pain as needed. Patient should continue with the iron supplement. Miralax has been sent to pharmacy to take daily to help with constipation while taking the iron supplement.    Shadi Cotton MD

## 2018-08-29 NOTE — TELEPHONE ENCOUNTER
Used Italian  # [id number] to interpret Dr. Cotton's medication recommendations below.  Bonnie Islas RN

## 2018-09-06 DIAGNOSIS — D51.8 VITAMIN B12 DEFICIENCY (DIETARY) ANEMIA: ICD-10-CM

## 2018-09-06 NOTE — TELEPHONE ENCOUNTER
Reason for Call:  Medication or medication refill:    Do you use a [facility]?  Name of the pharmacy and phone number for the current request:  [id number] - [place], [place] - [address]  Ph.[phone]    Name of the medication requested: cyanocobalamin (VITAMIN B-12 ER) 1000 MCG TBCR    Can we leave a detailed message on this number? YES    Phone number patient can be reached at: Home number on file [phone] (home)    Best Time: anytime    Call taken on 9/6/2018 at 2:52 PM by Karlos García

## 2018-09-07 NOTE — TELEPHONE ENCOUNTER
"Requested Prescriptions   Pending Prescriptions Disp Refills     cyanocobalamin (VITAMIN B-12 ER) 1000 MCG TBCR  Last Written Prescription Date:  4/3/14  Last Fill Quantity: 30,  # refills: 2   Last office visit: 7/26/2018 with prescribing provider:  Zaire   Future Office Visit:     30 tablet 2     Sig: Take 1,000 mcg by mouth daily    Vitamin Supplements (Adult) Protocol Passed    9/6/2018  2:54 PM       Passed - High dose Vitamin D not ordered       Passed - Recent (12 mo) or future (30 days) visit within the authorizing provider's specialty    Patient had office visit in the last 12 months or has a visit in the next 30 days with authorizing provider or within the authorizing provider's specialty.  See \"Patient Info\" tab in inbasket, or \"Choose Columns\" in Meds & Orders section of the refill encounter.              "

## 2018-09-11 ENCOUNTER — OFFICE VISIT (OUTPATIENT)
Dept: FAMILY MEDICINE | Facility: CLINIC | Age: 78
End: 2018-09-11
Payer: MEDICARE

## 2018-09-11 VITALS
DIASTOLIC BLOOD PRESSURE: 70 MMHG | HEART RATE: 100 BPM | WEIGHT: 97 LBS | TEMPERATURE: 98.2 F | OXYGEN SATURATION: 98 % | HEIGHT: 55 IN | BODY MASS INDEX: 22.45 KG/M2 | SYSTOLIC BLOOD PRESSURE: 128 MMHG

## 2018-09-11 DIAGNOSIS — R53.83 FATIGUE, UNSPECIFIED TYPE: ICD-10-CM

## 2018-09-11 DIAGNOSIS — E55.9 VITAMIN D DEFICIENCY: ICD-10-CM

## 2018-09-11 DIAGNOSIS — K21.9 GASTROESOPHAGEAL REFLUX DISEASE WITHOUT ESOPHAGITIS: ICD-10-CM

## 2018-09-11 DIAGNOSIS — G44.219 EPISODIC TENSION-TYPE HEADACHE, NOT INTRACTABLE: Primary | ICD-10-CM

## 2018-09-11 DIAGNOSIS — Z23 NEED FOR PROPHYLACTIC VACCINATION AND INOCULATION AGAINST INFLUENZA: ICD-10-CM

## 2018-09-11 DIAGNOSIS — Z13.5 SCREENING FOR EYE CONDITION: ICD-10-CM

## 2018-09-11 DIAGNOSIS — M25.522 PAIN IN JOINT INVOLVING UPPER ARM, LEFT: ICD-10-CM

## 2018-09-11 DIAGNOSIS — D50.9 IRON DEFICIENCY ANEMIA, UNSPECIFIED IRON DEFICIENCY ANEMIA TYPE: ICD-10-CM

## 2018-09-11 LAB
ALBUMIN SERPL-MCNC: 3.2 G/DL (ref 3.4–5)
ALP SERPL-CCNC: 76 U/L (ref 40–150)
ALT SERPL W P-5'-P-CCNC: 21 U/L (ref 0–50)
ANION GAP SERPL CALCULATED.3IONS-SCNC: 6 MMOL/L (ref 3–14)
AST SERPL W P-5'-P-CCNC: 16 U/L (ref 0–45)
BASOPHILS # BLD AUTO: 0 10E9/L (ref 0–0.2)
BASOPHILS NFR BLD AUTO: 0.7 %
BILIRUB SERPL-MCNC: 0.4 MG/DL (ref 0.2–1.3)
BUN SERPL-MCNC: 21 MG/DL (ref 7–30)
CALCIUM SERPL-MCNC: 9.2 MG/DL (ref 8.5–10.1)
CHLORIDE SERPL-SCNC: 107 MMOL/L (ref 94–109)
CO2 SERPL-SCNC: 30 MMOL/L (ref 20–32)
CREAT SERPL-MCNC: 0.74 MG/DL (ref 0.52–1.04)
DIFFERENTIAL METHOD BLD: ABNORMAL
EOSINOPHIL # BLD AUTO: 0.1 10E9/L (ref 0–0.7)
EOSINOPHIL NFR BLD AUTO: 1.8 %
ERYTHROCYTE [DISTWIDTH] IN BLOOD BY AUTOMATED COUNT: 15 % (ref 10–15)
FERRITIN SERPL-MCNC: 10 NG/ML (ref 8–252)
GFR SERPL CREATININE-BSD FRML MDRD: 76 ML/MIN/1.7M2
GLUCOSE SERPL-MCNC: 76 MG/DL (ref 70–99)
HCT VFR BLD AUTO: 35.4 % (ref 35–47)
HGB BLD-MCNC: 11.2 G/DL (ref 11.7–15.7)
LYMPHOCYTES # BLD AUTO: 1.6 10E9/L (ref 0.8–5.3)
LYMPHOCYTES NFR BLD AUTO: 36 %
MCH RBC QN AUTO: 26.4 PG (ref 26.5–33)
MCHC RBC AUTO-ENTMCNC: 31.6 G/DL (ref 31.5–36.5)
MCV RBC AUTO: 83 FL (ref 78–100)
MONOCYTES # BLD AUTO: 0.4 10E9/L (ref 0–1.3)
MONOCYTES NFR BLD AUTO: 7.9 %
NEUTROPHILS # BLD AUTO: 2.4 10E9/L (ref 1.6–8.3)
NEUTROPHILS NFR BLD AUTO: 53.6 %
PLATELET # BLD AUTO: 251 10E9/L (ref 150–450)
POTASSIUM SERPL-SCNC: 3.8 MMOL/L (ref 3.4–5.3)
PROT SERPL-MCNC: 6.4 G/DL (ref 6.8–8.8)
RBC # BLD AUTO: 4.25 10E12/L (ref 3.8–5.2)
SODIUM SERPL-SCNC: 143 MMOL/L (ref 133–144)
T4 FREE SERPL-MCNC: 1.02 NG/DL (ref 0.76–1.46)
TSH SERPL DL<=0.005 MIU/L-ACNC: 0.3 MU/L (ref 0.4–4)
VIT B12 SERPL-MCNC: 1204 PG/ML (ref 193–986)
WBC # BLD AUTO: 4.6 10E9/L (ref 4–11)

## 2018-09-11 PROCEDURE — 82728 ASSAY OF FERRITIN: CPT | Performed by: PREVENTIVE MEDICINE

## 2018-09-11 PROCEDURE — 84443 ASSAY THYROID STIM HORMONE: CPT | Performed by: PREVENTIVE MEDICINE

## 2018-09-11 PROCEDURE — 36415 COLL VENOUS BLD VENIPUNCTURE: CPT | Performed by: PREVENTIVE MEDICINE

## 2018-09-11 PROCEDURE — 99214 OFFICE O/P EST MOD 30 MIN: CPT | Performed by: PREVENTIVE MEDICINE

## 2018-09-11 PROCEDURE — G0008 ADMIN INFLUENZA VIRUS VAC: HCPCS | Performed by: PREVENTIVE MEDICINE

## 2018-09-11 PROCEDURE — 82306 VITAMIN D 25 HYDROXY: CPT | Performed by: PREVENTIVE MEDICINE

## 2018-09-11 PROCEDURE — 84439 ASSAY OF FREE THYROXINE: CPT | Performed by: PREVENTIVE MEDICINE

## 2018-09-11 PROCEDURE — 85025 COMPLETE CBC W/AUTO DIFF WBC: CPT | Performed by: PREVENTIVE MEDICINE

## 2018-09-11 PROCEDURE — 82607 VITAMIN B-12: CPT | Performed by: PREVENTIVE MEDICINE

## 2018-09-11 PROCEDURE — 90662 IIV NO PRSV INCREASED AG IM: CPT | Performed by: PREVENTIVE MEDICINE

## 2018-09-11 PROCEDURE — 80053 COMPREHEN METABOLIC PANEL: CPT | Performed by: PREVENTIVE MEDICINE

## 2018-09-11 ASSESSMENT — PAIN SCALES - GENERAL: PAINLEVEL: MODERATE PAIN (5)

## 2018-09-11 NOTE — PATIENT INSTRUCTIONS
At [facility], we strive to deliver an exceptional experience to you, every time we see you.  If you receive a survey in the mail, please send us back your thoughts. We really do value your feedback.    Based on your medical history, these are the current health maintenance/preventive care services that you are due for (some may have been done at this visit.)  Health Maintenance Due   Topic Date Due     EYE EXAM Q1 YEAR  06/13/2017     MAMMO Q2 YR  11/07/2017     INFLUENZA VACCINE (1) 09/01/2018       Suggested websites for health information:  Www.[employer] : Up to date and easily searchable information on multiple topics.  Www.Mailcloud.gov : medication info, interactive tutorials, watch real surgeries online  Www.familydoctor.org : good info from the Academy of Family Physicians  Www.cdc.gov : public health info, travel advisories, epidemics (H1N1)  Www.aap.org : children's health info, normal development, vaccinations  Www.health.[facility].[place] : [place] dept of health, public health issues in [place], N1N1    Your care team:                            Family Medicine Internal Medicine   MD Regan Stearns MD Shantel Branch-Fleming, MD Katya Georgiev PA-C Megan Hill, APRN CNP    Shadi Cotton MD Pediatrics   Jesse Stone, PAMANINDER Higgins, MD Stefanie Benedict APRN CNP   MD Anna Farris MD Deborah Mielke, MD Kim Thein, APRN [facility]      Clinic hours: Monday - Thursday 7 am-7 pm; Fridays 7 am-5 pm.   Urgent care: Monday - Friday 11 am-9 pm; Saturday and Sunday 9 am-5 pm.  Pharmacy : Monday -Thursday 8 am-8 pm; Friday 8 am-6 pm; Saturday and Sunday 9 am-5 pm.     Clinic: [phone]   Pharmacy: [phone]

## 2018-09-11 NOTE — PROGRESS NOTES

## 2018-09-11 NOTE — MR AVS SNAPSHOT
After Visit Summary   9/11/2018    Kerry Vega    MRN: [id number]           Patient Information     Date Of Birth          1940        Visit Information        Provider Department      9/11/2018 11:30 AM Kiana Camarena MD; [facility]        Today's Diagnoses     Episodic tension-type headache, not intractable    -  1    Screening for eye condition        Pain in joint involving upper arm, left        Gastroesophageal reflux disease without esophagitis        Fatigue, unspecified type        Iron deficiency anemia, unspecified iron deficiency anemia type        Vitamin D deficiency          Care Instructions    At [facility], we strive to deliver an exceptional experience to you, every time we see you.  If you receive a survey in the mail, please send us back your thoughts. We really do value your feedback.    Based on your medical history, these are the current health maintenance/preventive care services that you are due for (some may have been done at this visit.)  Health Maintenance Due   Topic Date Due     EYE EXAM Q1 YEAR  06/13/2017     MAMMO Q2 YR  11/07/2017     INFLUENZA VACCINE (1) 09/01/2018       Suggested websites for health information:  Www.[employer].org : Up to date and easily searchable information on multiple topics.  Www.medlineplus.gov : medication info, interactive tutorials, watch real surgeries online  Www.familydoctor.org : good info from the Academy of Family Physicians  Www.cdc.gov : public health info, travel advisories, epidemics (H1N1)  Www.aap.org : children's health info, normal development, vaccinations  Www.health.[facility].[place].us : [place] dept of health, public health issues in [place], N1N1    Your care team:                            Family Medicine Internal Medicine   MD Regan Stearns MD Shantel Branch-Fleming, MD Katya Georgiev PA-C Megan Hill, APRN GABINO Cotton MD Pediatrics   Jesse  ANTIONE Stone, MD Stefanie Benedict APRN CNP   MD Anna Farris MD Deborah Mielke, MD Kim Thein, APRNARAYAN LLOYD      Clinic hours: Monday - Thursday 7 am-7 pm; Fridays 7 am-5 pm.   Urgent care: Monday - Friday 11 am-9 pm; Saturday and Sunday 9 am-5 pm.  Pharmacy : Monday -Thursday 8 am-8 pm; Friday 8 am-6 pm; Saturday and Sunday 9 am-5 pm.     Clinic: [phone]   Pharmacy: [phone]              Follow-ups after your visit        Additional Services     OPTOMETRY REFERRAL       Your provider has referred you to: FMG: [facility] [phone]    [email or web address]    Please be aware that coverage of these services is subject to the terms and limitations of your health insurance plan.  Call member services at your health plan with any benefit or coverage questions.      Please bring the following with you to your appointment:    (1) Any X-Rays, CTs or MRIs which have been performed.  Contact the facility where they were done to arrange for  prior to your scheduled appointment.    (2) List of current medications  (3) This referral request   (4) Any documents/labs given to you for this referral                  Who to contact     If you have questions or need follow up information about today's clinic visit or your schedule please contact [facility] directly at [phone].  Normal or non-critical lab and imaging results will be communicated to you by MyChart, letter or phone within 4 business days after the clinic has received the results. If you do not hear from us within 7 days, please contact the clinic through MyChart or phone. If you have a critical or abnormal lab result, we will notify you by phone as soon as possible.  Submit refill requests through [employer] or call your pharmacy and they will forward the refill request to us. Please allow 3 business days for your  "refill to be completed.          Additional Information About Your Visit        Care EveryWhere ID     This is your Care EveryWhere ID. This could be used by other organizations to access your [place] medical records  [id number]        Your Vitals Were     Pulse Temperature Height Pulse Oximetry Breastfeeding? BMI (Body Mass Index)    100 98.2  F (36.8  C) (Oral) 4' 6\" (1.372 m) 98% No 23.39 kg/m2       Blood Pressure from Last 3 Encounters:   09/11/18 128/70   08/06/18 136/86   07/26/18 138/68    Weight from Last 3 Encounters:   09/11/18 97 lb (44 kg)   08/06/18 95 lb (43.1 kg)   07/26/18 95 lb 3.2 oz (43.2 kg)              We Performed the Following     CBC with platelets differential     Comprehensive metabolic panel     Ferritin     OPTOMETRY REFERRAL     TSH with free T4 reflex     Vitamin B12     Vitamin D Deficiency          Today's Medication Changes          These changes are accurate as of 9/11/18 12:06 PM.  If you have any questions, ask your nurse or doctor.               These medicines have changed or have updated prescriptions.        Dose/Directions    diclofenac 1 % Gel topical gel   Commonly known as:  VOLTAREN   This may have changed:  additional instructions   Used for:  Pain in joint involving upper arm, left   Changed by:  Kiana Camarena MD        Apply 2 grams to left arm upto four times daily using enclosed dosing card.   Quantity:  100 g   Refills:  1            Where to get your medicines      These medications were sent to [id number] - [place], [place] - [address]  [address], [facility] [address]     Phone:  [phone]     diclofenac 1 % Gel topical gel                Primary Care Provider Office Phone # Fax #    Shadi Cotton [phone]       [address]  [facility] [address]        Equal Access to Services     NEHEMIAS DELGADO AH: Amanda Jha, waaxda luqadaha, qaybta kaalmicky denis, cruz trejo " lareyna anderson. So [facility] [phone].    ATENCIÓN: Si belenla michelle, tiene a cornejo disposición servicios gratuitos de asistencia lingüística. Ady al [phone].    We comply with applicable federal civil rights laws and [place] laws. We do not discriminate on the basis of race, color, national origin, age, disability, sex, sexual orientation, or gender identity.            Thank you!     Thank you for choosing [facility]  for your care. Our goal is always to provide you with excellent care. Hearing back from our patients is one way we can continue to improve our services. Please take a few minutes to complete the written survey that you may receive in the mail after your visit with us. Thank you!             Your Updated Medication List - Protect others around you: Learn how to safely use, store and throw away your medicines at www.disposemymeds.org.          This list is accurate as of 9/11/18 12:06 PM.  Always use your most recent med list.                   Brand Name Dispense Instructions for use Diagnosis    * ASPIRIN NOT PRESCRIBED    INTENTIONAL     Reported on 4/4/2017        CALCIUM 600+D HIGH POTENCY 600-400 MG-UNIT per tablet   Generic drug:  calcium carbonate 600 mg-vitamin D 400 units     180 tablet    TAKE 1 TABLET BY MOUTH TWICE DAILY    Osteoporosis       cetirizine 10 MG tablet    zyrTEC    30 tablet    Take 1 tablet (10 mg) by mouth daily    Urticaria       cyanocobalamin 1000 MCG Tbcr    VITAMIN B-12 ER    30 tablet    Take 1,000 mcg by mouth daily    Vitamin B12 deficiency (dietary) anemia       diclofenac 1 % Gel topical gel    VOLTAREN    100 g    Apply 2 grams to left arm upto four times daily using enclosed dosing card.    Pain in joint involving upper arm, left       ferrous sulfate 325 (65 Fe) MG tablet    IRON    60 tablet    TAKE 1 TABLET (325 MG) BY MOUTH 2 TIMES DAILY    Iron deficiency anemia, unspecified iron deficiency anemia type       omeprazole 20 MG CR capsule     priLOSEC    90 capsule    Take 1 capsule (20 mg) by mouth daily    Gastroesophageal reflux disease without esophagitis       * order for DME     1 Container    Pull up briefs for incontinence of urine change 2 to 3 times daily    Urine incontinence       polyethylene glycol powder    MIRALAX    510 g    Take 17 g (1 capful) by mouth daily    Other constipation       ranitidine 300 MG tablet    ZANTAC    90 tablet    Take 1 tablet (300 mg) by mouth At Bedtime    Gastroesophageal reflux disease without esophagitis       SENEXON-S 8.6-50 MG per tablet   Generic drug:  senna-docusate     60 tablet    TAKE 1 TO 2 TABLETS BY MOUTH AT BEDTIME AS NEEDED FOR CONSTIPATION, HOLD FOR LOOSE STOOLS    Constipation, unspecified constipation type       * Notice:  This list has 2 medication(s) that are the same as other medications prescribed for you. Read the directions carefully, and ask your doctor or other care provider to review them with you.

## 2018-09-11 NOTE — PROGRESS NOTES
SUBJECTIVE:   Kerry Vega is a 78 year old female who presents to clinic today for the following health issues:      Eye(s) Problem      Duration: x 1-2 months    Description:  Location: left  Pain: YES  Redness: YES  Discharge: YES    Accompanying signs and symptoms: none    History (Trauma, foreign body exposure,): None    Precipitating or alleviating factors (contact use): None    Therapies tried and outcome: None    Also noticed increased lacrimation and overall decrease in visual acuity     Headaches      Duration: x 2-3 months    Description  Location: all over   Character: unsure  Frequency:  1-2 week  Duration:  30-60 minutes    Intensity:  mild    Accompanying signs and symptoms:    Precipitating or Alleviating factors:  Nausea/vomiting: no  Dizziness: sometimes  Weakness or numbness: no  Visual changes: blurred vision  Fever: no   Sinus or URI symptoms no     History  Head trauma: no   Family history of migraines: no   Previous tests for headaches: no   Neurologist evaluations: no   Able to do daily activities when headache present: no   Wake with headaches: No  Daily pain medication use: no   Any changes in: none    Precipitating or Alleviating factors (light/sound/sleep/caffeine): yes    Therapies tried and outcome: Tylenol    Outcome - effective  Frequent/daily pain medication use: no   Also has neck pain  No emesis   Pain does improve with as needed use of Tylenol     Musculoskeletal problem/pain      Duration: x 2 weeks    Description  Location: left upper arm    Intensity:  4/10    Accompanying signs and symptoms: numbness and tingling    History  Previous similar problem: no   Previous evaluation:  none    Precipitating or alleviating factors:  Trauma or overuse: no   Aggravating factors include: overuse    Therapies tried and outcome: acetaminophen    Feels numb    No exertional pain and no shortness of breath     No skin change    No fever    NO heavy lifting except for laundry basket      Is right hand dominant     Preventing sleep     Problem list and histories reviewed & adjusted, as indicated.  Additional history: as documented    Patient Active Problem List   Diagnosis     CARDIOVASCULAR SCREENING; LDL GOAL LESS THAN 160     Right lumbar radiculopathy     Osteoporosis     Advanced directives, counseling/discussion     Abnormal Pap smear     Dysphagia     Health Care Home     Iron deficiency anemia     Sliding hiatal hernia     Osteoarthritis of left thumb     Primary osteoarthritis of both knees     Gastroesophageal reflux disease without esophagitis     Calculus of gallbladder without cholecystitis without obstruction     Left inguinal hernia     Past Surgical History:   Procedure Laterality Date     COLONOSCOPY  1/29/2013    Procedure: COLONOSCOPY;;  Surgeon: Edgard Prieto MD;  Location: [facility]     ESOPHAGOSCOPY, GASTROSCOPY, DUODENOSCOPY (EGD), COMBINED  1/20/2012    Procedure:COMBINED ESOPHAGOSCOPY, GASTROSCOPY, DUODENOSCOPY (EGD); EGD, dysphagia; Surgeon:SHEREE VALLE; Location:[facility]     ESOPHAGOSCOPY, GASTROSCOPY, DUODENOSCOPY (EGD), COMBINED  7/18/2012    Procedure: COMBINED ESOPHAGOSCOPY, GASTROSCOPY, DUODENOSCOPY (EGD);  EGD for dysphagia rp [place];  Surgeon: Rey Bee MD;  Location: [facility]     ESOPHAGOSCOPY, GASTROSCOPY, DUODENOSCOPY (EGD), COMBINED  1/29/2013    Procedure: COMBINED ESOPHAGOSCOPY, GASTROSCOPY, DUODENOSCOPY (EGD), BIOPSY SINGLE OR MULTIPLE;  Colonoscopy, EGD, dysphagia;  Surgeon: Edgard Prieto MD;  Location: [facility]     INCISION AND DRAINAGE LOWER EXTREMITY, COMBINED Left 3/21/2018    Procedure: COMBINED INCISION AND DRAINAGE LOWER EXTREMITY;  Excision of left intramuscular thigh mass; removal of anterior femoral bone spur;  Surgeon: Ray Diaz MD;  Location: [facility]       Social History   Substance Use Topics     Smoking status: Never Smoker     Smokeless tobacco: Never Used     Alcohol use No     Family History   Problem Relation Age of  Onset     Cancer No family hx of      Diabetes No family hx of      Hypertension No family hx of      Cerebrovascular Disease No family hx of      Thyroid Disease No family hx of      Glaucoma No family hx of      Macular Degeneration No family hx of          Current Outpatient Prescriptions   Medication Sig Dispense Refill     ASPIRIN NOT PRESCRIBED (INTENTIONAL) Reported on 4/4/2017       CALCIUM 600+D HIGH POTENCY 600-400 MG-UNIT per tablet TAKE 1 TABLET BY MOUTH TWICE DAILY 180 tablet 3     cetirizine (ZYRTEC) 10 MG tablet Take 1 tablet (10 mg) by mouth daily 30 tablet 1     cyanocobalamin 1000 MCG TBCR Take 1,000 mcg by mouth daily 30 tablet 2     diclofenac (VOLTAREN) 1 % GEL topical gel Apply 2 grams to left arm upto four times daily using enclosed dosing card. 100 g 1     ferrous sulfate (IRON) 325 (65 Fe) MG tablet TAKE 1 TABLET (325 MG) BY MOUTH 2 TIMES DAILY 60 tablet 2     omeprazole (PRILOSEC) 20 MG CR capsule Take 1 capsule (20 mg) by mouth daily 90 capsule 3     ORDER FOR DME Pull up briefs for incontinence of urine change 2 to 3 times daily 1 Container 11     polyethylene glycol (MIRALAX) powder Take 17 g (1 capful) by mouth daily 510 g 11     ranitidine (ZANTAC) 300 MG tablet Take 1 tablet (300 mg) by mouth At Bedtime 90 tablet 3     SENEXON-S 8.6-50 MG per tablet TAKE 1 TO 2 TABLETS BY MOUTH AT BEDTIME AS NEEDED FOR CONSTIPATION, HOLD FOR LOOSE STOOLS 60 tablet 3     No Known Allergies  BP Readings from Last 3 Encounters:   09/11/18 128/70   08/06/18 136/86   07/26/18 138/68    Wt Readings from Last 3 Encounters:   09/11/18 97 lb (44 kg)   08/06/18 95 lb (43.1 kg)   07/26/18 95 lb 3.2 oz (43.2 kg)                  Labs reviewed in EPIC    Reviewed and updated as needed this visit by clinical staff  Allergies  Meds       Reviewed and updated as needed this visit by Provider         ROS:  Constitutional, neuro, ENT, endocrine, pulmonary, cardiac, gastrointestinal, genitourinary, musculoskeletal,  "integument and psychiatric systems are negative, except as otherwise noted.    OBJECTIVE:                                                    /70  Pulse 100  Temp 98.2  F (36.8  C) (Oral)  Ht 4' 6\" (1.372 m)  Wt 97 lb (44 kg)  SpO2 98%  Breastfeeding? No  BMI 23.39 kg/m2  Body mass index is 23.39 kg/(m^2).  GENERAL APPEARANCE: healthy, alert and no distress  EYES: Bilateral arcus senilus  NECK: trachea midline and normal to palpation  RESP: lungs clear to auscultation - no rales, rhonchi or wheezes  CV: regular rates and rhythm, normal S1 S2, no S3 or S4 and no murmur, click or rub  ABDOMEN: soft, non-tender  MS: extremities normal- no gross deformities noted  SKIN: no suspicious lesions or rashes  NEURO: Normal strength and tone, mentation intact, speech normal, DTR symmetrically normal in lower extremities, gait normal including heel/toe/tandem walking and normal strength throughout  PSYCH: mentation appears normal  Left Upper extremity: Strength and range of motion intact. No skin changes, no erythema, mild tenderness along triceps muscle.     Diagnostic test results:  Diagnostic Test Results:  No results found for this or any previous visit (from the past 24 hour(s)).     ASSESSMENT/PLAN:                                                    1. Episodic tension-type headache, not intractable  -Heating pad  -Can take Acetaminophen 1000 mg every 6 hours as needed     2. Screening for eye condition  -Due for eye exam,last was 2 years ago   - OPTOMETRY REFERRAL    3. Pain in joint involving upper arm, left  - diclofenac (VOLTAREN) 1 % GEL topical gel; Apply 2 grams to left arm upto four times daily using enclosed dosing card.  Dispense: 100 g; Refill: 1    4. 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 Fatigue, unspecified type  - CBC with platelets differential  - " Vitamin B12  - TSH with free T4 reflex  - Comprehensive metabolic panel    6. Iron deficiency anemia, unspecified iron deficiency anemia type  -has been on iron supplements.  -if CBC and Ferritin is normal, then can Hold iron supplements   - Ferritin    7. Vitamin D deficiency  - Vitamin D Deficiency  -Await Vitamin D levels       Follow up with Provider - 2-3 weeks if not better otherwise as scheduled with PCP      Kiana Camarena MD MPH    [facility]

## 2018-09-11 NOTE — TELEPHONE ENCOUNTER
Prescription approved per [facility] Refill Protocol.  Sammie Becker RN

## 2018-09-12 LAB — DEPRECATED CALCIDIOL+CALCIFEROL SERPL-MC: 22 UG/L (ref 20–75)

## 2018-09-18 DIAGNOSIS — M17.9 OSTEOARTHRITIS OF KNEE, UNSPECIFIED LATERALITY, UNSPECIFIED OSTEOARTHRITIS TYPE: ICD-10-CM

## 2018-09-18 RX ORDER — NAPROXEN 500 MG/1
TABLET ORAL
Qty: 180 TABLET | Refills: 0 | Status: SHIPPED | OUTPATIENT
Start: 2018-09-18 | End: 2018-11-27

## 2018-09-18 NOTE — TELEPHONE ENCOUNTER
Patient would like 90 day supply sent.     naproxen (NAPROSYN) 500 MG tablet (Discontinued)     Last Written Prescription Date:  08/07/18  Last Fill Quantity: 180,   # refills: 0  Last Office Visit: 09/11/18-[place]  Future Office visit:       Routing refill request to provider for review/approval because:  Drug not on the [facility], [facility] or [facility] refill protocol or controlled substance

## 2018-09-18 NOTE — TELEPHONE ENCOUNTER
Routing refill request to provider for review/approval because:  Drug not active on patient's medication list    Frank Rousseau RN, BSN

## 2018-09-21 NOTE — PROGRESS NOTES
Please send a letter:    Dear Kerry Vega,    Blood counts are stable and iron stores are in the normal range. You can reduce the iron supplements from twice a day to once a day.  Electrolytes, glucose, kidney function, liver function, Vitamin D, Vitamin B 12, and thyroid functoin are stable for you.  Plan of care and follow up as discussed in clinic.  Please let me know if you have any questions and thank you for choosing [facility].    Regards,    Kiana Camarena MD MPH

## 2018-10-29 ENCOUNTER — OFFICE VISIT (OUTPATIENT)
Dept: OPTOMETRY | Facility: CLINIC | Age: 78
End: 2018-10-29
Payer: MEDICARE

## 2018-10-29 DIAGNOSIS — H02.839 DERMATOCHALASIS OF EYELID: ICD-10-CM

## 2018-10-29 DIAGNOSIS — Z96.1 PSEUDOPHAKIA OF BOTH EYES: ICD-10-CM

## 2018-10-29 DIAGNOSIS — H52.223 REGULAR ASTIGMATISM OF BOTH EYES: ICD-10-CM

## 2018-10-29 DIAGNOSIS — H52.13 MYOPIA OF BOTH EYES: ICD-10-CM

## 2018-10-29 DIAGNOSIS — H10.13 ALLERGIC CONJUNCTIVITIS, BILATERAL: ICD-10-CM

## 2018-10-29 DIAGNOSIS — H52.4 PRESBYOPIA: ICD-10-CM

## 2018-10-29 DIAGNOSIS — H26.493 BILATERAL POSTERIOR CAPSULAR OPACIFICATION: ICD-10-CM

## 2018-10-29 DIAGNOSIS — H17.9 CORNEAL SCARS, BOTH EYES: Primary | ICD-10-CM

## 2018-10-29 PROCEDURE — 92015 DETERMINE REFRACTIVE STATE: CPT | Mod: GY | Performed by: OPTOMETRIST

## 2018-10-29 PROCEDURE — 92014 COMPRE OPH EXAM EST PT 1/>: CPT | Performed by: OPTOMETRIST

## 2018-10-29 RX ORDER — OLOPATADINE HYDROCHLORIDE 2 MG/ML
1 SOLUTION/ DROPS OPHTHALMIC DAILY PRN
Qty: 1 BOTTLE | Refills: 11 | Status: SHIPPED | OUTPATIENT
Start: 2018-10-29 | End: 2018-11-27

## 2018-10-29 ASSESSMENT — REFRACTION_WEARINGRX
OD_SPHERE: -3.00
SPECS_TYPE: SVL
OS_CYLINDER: +0.50
OD_CYLINDER: +1.75
OS_SPHERE: -2.25
OS_AXIS: 170
OS_SPHERE: -2.75
OD_AXIS: 165
OS_CYLINDER: +1.00
OD_AXIS: 180
OS_AXIS: 165
OD_SPHERE: -2.00
OD_CYLINDER: +0.25

## 2018-10-29 ASSESSMENT — VISUAL ACUITY
OD_SC: 20/40
OS_PH_CC: 20/200
OD_SC: 20/100
OS_SC+: -1
METHOD: SNELLEN - LINEAR
OS_SC: 20/100
OD_CC+: -1
OS_CC: 20/100
OD_PH_CC: 20/50-1
OS_SC: 20/120
OD_CC: 20/50

## 2018-10-29 ASSESSMENT — TONOMETRY
OD_IOP_MMHG: 14
IOP_METHOD: TONOPEN
OS_IOP_MMHG: 15

## 2018-10-29 ASSESSMENT — REFRACTION_MANIFEST
OD_CYLINDER: +2.25
OD_ADD: +2.75
OS_AXIS: 165
OS_CYLINDER: +0.50
OS_ADD: +2.75
OD_AXIS: 180
OD_SPHERE: -2.75
OS_SPHERE: -2.25

## 2018-10-29 ASSESSMENT — CONF VISUAL FIELD
OS_SUPERIOR_TEMPORAL_RESTRICTION: 3
OD_SUPERIOR_NASAL_RESTRICTION: 3
OD_SUPERIOR_TEMPORAL_RESTRICTION: 3
OS_SUPERIOR_NASAL_RESTRICTION: 3

## 2018-10-29 ASSESSMENT — SLIT LAMP EXAM - LIDS
COMMENTS: 3+ DERMATOCHALASIS
COMMENTS: 3+ DERMATOCHALASIS

## 2018-10-29 ASSESSMENT — EXTERNAL EXAM - RIGHT EYE: OD_EXAM: NORMAL

## 2018-10-29 ASSESSMENT — CUP TO DISC RATIO: OD_RATIO: 0.4

## 2018-10-29 ASSESSMENT — EXTERNAL EXAM - LEFT EYE: OS_EXAM: NORMAL

## 2018-10-29 NOTE — PATIENT INSTRUCTIONS
Monitor corneal scarring with yearly eye exams.  (patient declines penetrating keratoplasty left eye)    Prescription for Pataday to be used once daily for itchy eyes.  Use as needed.  Patanol, Zaditor, or Optivar- ok substitute- 1 drop both eyes 2 x day as needed.     Referral to Dr. eZnobia Brooks at the [facility] for lid evaluation- [phone].    Eyeglass prescription given.   Polycarbonate lenses only which is the safest most impact resistance available.   It is important that you protect your good eye.  Give the glasses 1-2 weeks to adjust to the new prescription.  You may get headaches or eyestrain as your eyes get used to the new prescription.  Sometimes the symptoms get worse before it gets better.  If any problems after 1-2 weeks schedule an appointment for a recheck.      Dr. Kelly recommended glaucoma work up at last visit 2016- Schedule at [place].    Return in 1 year for a complete eye exam or sooner if needed.    Meng Dominguez, OD    The affects of the dilating drops last for 4- 6 hours.  You will be more sensitive to light and vision will be blurry up close.  Mydriatic sunglasses were given if needed.      Optometry Providers       Clinic Locations                                 Telephone Number   Dr. Lucille Man [facility] [phone]     [facility] Hours:                [facility] Hours:       [facility] Hours:   [address]   [address]     [address] [place] [address]   [place] [address]    [place] [address]  Phone: [phone]                    Phone: [phone]     Phone: [phone]                      Monday 8:00-7:00                          Monday 8:00-7:00                          Monday 8:00-7:00              Tuesday 8:00-6:00                          Tuesday 8:00-7:00                           Tuesday 8:00-7:00              Wednesday 8:00-6:00                  Wednesday 8:00-7:00                   Wednesday 8:00-7:00      Thursday 8:00-6:00                        Thursday 8:00-7:00                         Thursday 8:00-7:00            Friday 8:00-5:00                              Friday 8:00-5:00                              Friday 8:00-5:00    [facility] Hours:   [address] [place] [address]  [phone]    Monday 8:00-7:00  Tuesday 8:00-7:00  Wednesday 8:00-7:00  Thursday 8:00-7:00  Friday 8:00-5:00  Please log on to [email or web address] to order your contact lenses.  The link is found on the [facility] page.  As always, Thank you for trusting us with your health care needs!

## 2018-10-29 NOTE — LETTER
10/29/2018         RE: Kerry Vega  [address]  [place] [address]        Dear Colleague,    Thank you for referring your patient, Kerry Vega, to the [facility]. Please see a copy of my visit note below.    Chief Complaint   Patient presents with     COMPREHENSIVE EYE EXAM     dry and itchy eye x 2 months      Accompanied by   Last Eye Exam: 6-  Dilated Previously: Yes    What are you currently using to see?  glasses       Distance Vision Acuity: Noticed gradual change in left eye    Near Vision Acuity: Satisfied with vision while reading  unaided    Eye Comfort: dry and itchy  Do you use eye drops? : No  Occupation or Hobbies: none    Cataract surgery 20 years ago    Saw Dr. Kelly 2 years ago and he recommended penetrating keratoplasty- the patient does not want to have surgery on her eyes.  Is open to lid surgery though.    Leila Borja Optometric Assistant, A.B.O.C.          Medical, surgical and family histories reviewed and updated 10/29/2018.       OBJECTIVE: See Ophthalmology exam    ASSESSMENT:    ICD-10-CM    1. 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 Pseudophakia of both eyes Z96.1 EYE EXAM (SIMPLE-NONBILLABLE)   3. Bilateral posterior capsular opacification H26.493 EYE EXAM (SIMPLE-NONBILLABLE)   4. Allergic conjunctivitis, bilateral H10.13 EYE EXAM (SIMPLE-NONBILLABLE)     olopatadine HCl (PATADAY) 0.2 % SOLN   5. Dermatochalasis of eyelid H02.839 EYE EXAM (SIMPLE-NONBILLABLE)     OPHTHALMOLOGY ADULT REFERRAL   6. Myopia of both eyes H52.13 REFRACTION   7. Regular astigmatism of both eyes H52.223 REFRACTION   8. Presbyopia H52.4 REFRACTION      PLAN:     Patient Instructions   Monitor corneal scarring with yearly eye exams.  (patient declines penetrating keratoplasty left eye)    Prescription for Pataday to be used once daily for itchy eyes.  Use as needed.  Patanol, Zaditor, or Optivar- ok  substitute- 1 drop both eyes 2 x day as needed.     Referral to Dr. Zenobia Brooks at the [facility] for lid evaluation- [phone].    Eyeglass prescription given.   Polycarbonate lenses only which is the safest most impact resistance available.   It is important that you protect your good eye.  Give the glasses 1-2 weeks to adjust to the new prescription.  You may get headaches or eyestrain as your eyes get used to the new prescription.  Sometimes the symptoms get worse before it gets better.  If any problems after 1-2 weeks schedule an appointment for a recheck.      Dr. Kelly recommended glaucoma work up at last visit 2016- Schedule at [place].    Return in 1 year for a complete eye exam or sooner if needed.    Meng Dominguez, OD           Again, thank you for allowing me to participate in the care of your patient.        Sincerely,        Meng Dominguez, OD

## 2018-10-29 NOTE — MR AVS SNAPSHOT
After Visit Summary   10/29/2018    Kerry Vega    MRN: [id number]           Patient Information     Date Of Birth          1940        Visit Information        Provider Department      10/29/2018 11:45 AM Meng Dominguez, OD; [employer] [facility]        Today's Diagnoses     Corneal scars, both eyes    -  1    Pseudophakia of both eyes        Bilateral posterior capsular opacification        Allergic conjunctivitis, bilateral        Dermatochalasis of eyelid        Myopia of both eyes        Regular astigmatism of both eyes        Presbyopia          Care Instructions    Monitor corneal scarring with yearly eye exams.  (patient declines penetrating keratoplasty left eye)    Prescription for Pataday to be used once daily for itchy eyes.  Use as needed.  Patanol, Zaditor, or Optivar- ok substitute- 1 drop both eyes 2 x day as needed.     Referral to Dr. Zenobia Brooks at the [facility] for lid evaluation- [phone].    Eyeglass prescription given.   Polycarbonate lenses only which is the safest most impact resistance available.   It is important that you protect your good eye.  Give the glasses 1-2 weeks to adjust to the new prescription.  You may get headaches or eyestrain as your eyes get used to the new prescription.  Sometimes the symptoms get worse before it gets better.  If any problems after 1-2 weeks schedule an appointment for a recheck.      Dr. Kelly recommended glaucoma work up at last visit 2016- Schedule at [place].    Return in 1 year for a complete eye exam or sooner if needed.    Meng Dominguez, OD    The affects of the dilating drops last for 4- 6 hours.  You will be more sensitive to light and vision will be blurry up close.  Mydriatic sunglasses were given if needed.      Optometry Providers       Clinic Locations                                 Telephone Number   Dr. Lucille Adler Dr.  Meng [facility] [place]   [place] [phone]     [facility] Hours:                [facility] Hours:       [place] [facility] Hours:   [address]   [address]     [address] [place] [address]   [place] [address]    [place] [address]  Phone: [phone]                    Phone: [phone]     Phone: [phone]                      Monday 8:00-7:00                          Monday 8:00-7:00                          Monday 8:00-7:00              Tuesday 8:00-6:00                          Tuesday 8:00-7:00                          Tuesday 8:00-7:00              Wednesday 8:00-6:00                  Wednesday 8:00-7:00                   Wednesday 8:00-7:00      Thursday 8:00-6:00                        Thursday 8:00-7:00                         Thursday 8:00-7:00            Friday 8:00-5:00                              Friday 8:00-5:00                              Friday 8:00-5:00    [facility] Hours:   [address] [place], [place] [id number] [phone]    Monday 8:00-7:00  Tuesday 8:00-7:00  Wednesday 8:00-7:00  Thursday 8:00-7:00  Friday 8:00-5:00  Please log on to [employer] to order your contact lenses.  The link is found on the [facility] page.  As always, Thank you for trusting us with your health care needs!              Follow-ups after your visit        Additional Services     OPHTHALMOLOGY ADULT REFERRAL       Your provider has referred you to:  [facility]: [facility] [phone]   [email or web address] Referral to Dr. Zenobia Brooks at the [facility] for lid evaluation- [phone].        Please be aware that coverage of these services is subject to the terms and limitations of your health insurance plan.  Call member services at your health plan with any benefit or  coverage questions.      Please bring the following to your appointment:  >>   Any x-rays, CTs or MRIs which have been performed.  Contact the facility where they were done to arrange for  prior to your scheduled appointment.  Any new CT, MRI or other procedures ordered by your specialist must be performed at a [facility] facility or coordinated by your clinic's referral office.    >>   List of current medications   >>   This referral request   >>   Any documents/labs given to you for this referral                  Follow-up notes from your care team     Return in about 1 year (around 10/29/2019) for Annual Visit.      Who to contact     If you have questions or need follow up information about today's clinic visit or your schedule please contact [facility] directly at [phone].  Normal or non-critical lab and imaging results will be communicated to you by MyChart, letter or phone within 4 business days after the clinic has received the results. If you do not hear from us within 7 days, please contact the clinic through MyChart or phone. If you have a critical or abnormal lab result, we will notify you by phone as soon as possible.  Submit refill requests through [employer] or call your pharmacy and they will forward the refill request to us. Please allow 3 business days for your refill to be completed.          Additional Information About Your Visit        Care EveryWhere ID     This is your Care EveryWhere ID. This could be used by other organizations to access your [facility] medical records  [id number]         Blood Pressure from Last 3 Encounters:   09/11/18 128/70   08/06/18 136/86   07/26/18 138/68    Weight from Last 3 Encounters:   09/11/18 44 kg (97 lb)   08/06/18 43.1 kg (95 lb)   07/26/18 43.2 kg (95 lb 3.2 oz)              We Performed the Following     EYE EXAM (SIMPLE-NONBILLABLE)     OPHTHALMOLOGY ADULT REFERRAL     REFRACTION          Today's Medication Changes           These changes are accurate as of 10/29/18  1:40 PM.  If you have any questions, ask your nurse or doctor.               Start taking these medicines.        Dose/Directions    olopatadine HCl 0.2 % Soln   Commonly known as:  PATADAY   Used for:  Allergic conjunctivitis, bilateral   Started by:  Meng Dominguez, OD        Dose:  1 drop   Place 1 drop into both eyes daily as needed (For itchy, water eyes.)   Quantity:  1 Bottle   Refills:  11            Where to get your medicines      These medications were sent to [facility]/pharmacy #[id number] - [facility], [place] - [address]  [address], [facility] [address]     Phone:  [phone]     olopatadine HCl 0.2 % Soln                Primary Care Provider Office Phone # Fax #    Kiana Camarena [phone]       [address]  [facility] [address]        Equal Access to Services     [facility] : Hadii afshan guzman hadasho Soomaali, waaxda luqadaha, qaybta kaalmada adeegyada, cruz mathew . So [facility] [phone].    ATENCIÓN: Si belenla español, tiene a cornejo disposición servicios gratuitos de asistencia lingüística. Llame al [phone].    We comply with applicable federal civil rights laws and [place] laws. We do not discriminate on the basis of race, color, national origin, age, disability, sex, sexual orientation, or gender identity.            Thank you!     Thank you for choosing [facility]  for your care. Our goal is always to provide you with excellent care. Hearing back from our patients is one way we can continue to improve our services. Please take a few minutes to complete the written survey that you may receive in the mail after your visit with us. Thank you!             Your Updated Medication List - Protect others around you: Learn how to safely use, store and throw away your medicines at www.disposemymeds.org.          This list is accurate as of 10/29/18  1:40 PM.  Always use your most  recent med list.                   Brand Name Dispense Instructions for use Diagnosis    * ASPIRIN NOT PRESCRIBED    INTENTIONAL     Reported on 4/4/2017        CALCIUM 600+D HIGH POTENCY 600-400 MG-UNIT per tablet   Generic drug:  calcium carbonate 600 mg-vitamin D 400 units     180 tablet    TAKE 1 TABLET BY MOUTH TWICE DAILY    Osteoporosis       cetirizine 10 MG tablet    zyrTEC    30 tablet    Take 1 tablet (10 mg) by mouth daily    Urticaria       cyanocobalamin 1000 MCG Tbcr    VITAMIN B-12 ER    30 tablet    Take 1,000 mcg by mouth daily    Vitamin B12 deficiency (dietary) anemia       diclofenac 1 % Gel topical gel    VOLTAREN    100 g    Apply 2 grams to left arm upto four times daily using enclosed dosing card.    Pain in joint involving upper arm, left       naproxen 500 MG tablet    NAPROSYN    180 tablet    TAKE 1 TABLET BY MOUTH TWICE DAILY WITH FOOD AS NEEDED FOR PAIN    Osteoarthritis of knee, unspecified laterality, unspecified osteoarthritis type       olopatadine HCl 0.2 % Soln    PATADAY    1 Bottle    Place 1 drop into both eyes daily as needed (For itchy, water eyes.)    Allergic conjunctivitis, bilateral       omeprazole 20 MG CR capsule    priLOSEC    90 capsule    Take 1 capsule (20 mg) by mouth daily    Gastroesophageal reflux disease without esophagitis       * order for DME     1 Container    Pull up briefs for incontinence of urine change 2 to 3 times daily    Urine incontinence       ranitidine 300 MG tablet    ZANTAC    90 tablet    Take 1 tablet (300 mg) by mouth At Bedtime    Gastroesophageal reflux disease without esophagitis       VITAMIN D (CHOLECALCIFEROL) PO      Take by mouth daily        * Notice:  This list has 2 medication(s) that are the same as other medications prescribed for you. Read the directions carefully, and ask your doctor or other care provider to review them with you.

## 2018-10-29 NOTE — PROGRESS NOTES
Chief Complaint   Patient presents with     COMPREHENSIVE EYE EXAM     dry and itchy eye x 2 months      Accompanied by   Last Eye Exam: 6-  Dilated Previously: Yes    What are you currently using to see?  glasses       Distance Vision Acuity: Noticed gradual change in left eye    Near Vision Acuity: Satisfied with vision while reading  unaided    Eye Comfort: dry and itchy  Do you use eye drops? : No  Occupation or Hobbies: none    Cataract surgery 20 years ago    Saw Dr. Kelly 2 years ago and he recommended penetrating keratoplasty- the patient does not want to have surgery on her eyes.  Is open to lid surgery though.    Leila Borja Optometric Assistant, A.B.O.C.          Medical, surgical and family histories reviewed and updated 10/29/2018.       OBJECTIVE: See Ophthalmology exam    ASSESSMENT:    ICD-10-CM    1. 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 Pseudophakia of both eyes Z96.1 EYE EXAM (SIMPLE-NONBILLABLE)   3. Bilateral posterior capsular opacification H26.493 EYE EXAM (SIMPLE-NONBILLABLE)   4. Allergic conjunctivitis, bilateral H10.13 EYE EXAM (SIMPLE-NONBILLABLE)     olopatadine HCl (PATADAY) 0.2 % SOLN   5. Dermatochalasis of eyelid H02.839 EYE EXAM (SIMPLE-NONBILLABLE)     OPHTHALMOLOGY ADULT REFERRAL   6. Myopia of both eyes H52.13 REFRACTION   7. Regular astigmatism of both eyes H52.223 REFRACTION   8. Presbyopia H52.4 REFRACTION      PLAN:     Patient Instructions   Monitor corneal scarring with yearly eye exams.  (patient declines penetrating keratoplasty left eye)    Prescription for Pataday to be used once daily for itchy eyes.  Use as needed.  Patanol, Zaditor, or Optivar- ok substitute- 1 drop both eyes 2 x day as needed.     Referral to Dr. Zenobia Brooks at the [facility] for lid evaluation- [phone].    Eyeglass prescription given.   Polycarbonate lenses only which is the safest most impact resistance available.   It is important  that you protect your good eye.  Give the glasses 1-2 weeks to adjust to the new prescription.  You may get headaches or eyestrain as your eyes get used to the new prescription.  Sometimes the symptoms get worse before it gets better.  If any problems after 1-2 weeks schedule an appointment for a recheck.      Dr. Kelly recommended glaucoma work up at last visit 2016- Schedule at [place].    Return in 1 year for a complete eye exam or sooner if needed.    Meng Dominguez, OD

## 2018-11-27 ENCOUNTER — OFFICE VISIT (OUTPATIENT)
Dept: FAMILY MEDICINE | Facility: CLINIC | Age: 78
End: 2018-11-27
Payer: MEDICARE

## 2018-11-27 VITALS
HEIGHT: 55 IN | BODY MASS INDEX: 23.14 KG/M2 | SYSTOLIC BLOOD PRESSURE: 143 MMHG | HEART RATE: 93 BPM | TEMPERATURE: 97.7 F | WEIGHT: 100 LBS | DIASTOLIC BLOOD PRESSURE: 76 MMHG | OXYGEN SATURATION: 99 %

## 2018-11-27 DIAGNOSIS — Z91.81 PERSONAL HISTORY OF FALL: ICD-10-CM

## 2018-11-27 DIAGNOSIS — Z00.00 ROUTINE HISTORY AND PHYSICAL EXAMINATION OF ADULT: Primary | ICD-10-CM

## 2018-11-27 PROCEDURE — G0438 PPPS, INITIAL VISIT: HCPCS | Performed by: PREVENTIVE MEDICINE

## 2018-11-27 ASSESSMENT — PAIN SCALES - GENERAL: PAINLEVEL: NO PAIN (0)

## 2018-11-27 NOTE — PROGRESS NOTES
"  SUBJECTIVE:   Kerry Vega is a 78 year old female who presents for Preventive Visit.    Are you in the first 12 months of your Medicare Part B coverage?  No    Physical Health:    In general, how would you rate your overall physical health? good    Outside of work, how many days during the week do you exercise? 6-7 days/week    Outside of work, approximately how many minutes a day do you exercise?less than 15 minutes    If you drink alcohol do you typically have >3 drinks per day or >7 drinks per week? No    Do you usually eat at least 4 servings of fruit and vegetables a day, include whole grains & fiber and avoid regularly eating high fat or \"junk\" foods? Yes    Do you have any problems taking medications regularly?  No    Do you have any side effects from medications? none    Needs assistance for the following daily activities: no assistance needed    Which of the following safety concerns are present in your home?  none identified     Hearing impairment: No    In the past 6 months, have you been bothered by leaking of urine? no    Mental Health:    In general, how would you rate your overall mental or emotional health? good  PHQ-2 Score: 0    Do you feel safe in your environment? Yes    Do you have a Health Care Directive? No: Advance Directive information has been provided to the patient     Additional concerns to address?  No    Fall risk:  Fallen 2 or more times in the past year?: yes.   Any fall with injury in the past year?: No    Cognitive Screening: Not done due to very little English    Do you have sleep apnea, excessive snoring or daytime drowsiness?: no        Weakness and body aches x Monday  Fall on Monday 11/19/18, was taking out the garbage and replacing bag, bent down, felt dizzy, and fell, hit right side of body and head on the floor. No emesis, pain, feels weak overall,still has swelling on the right side of the scalp, was not seen in the ER for this. This is the second fall in the " last year.     Reviewed and updated as needed this visit by clinical staff  Tobacco  Allergies  Meds  Problems  Med Hx  Surg Hx         Reviewed and updated as needed this visit by Provider  Allergies  Meds  Problems  Med Hx  Surg Hx        Social History   Substance Use Topics     Smoking status: Never Smoker     Smokeless tobacco: Never Used     Alcohol use No                           Current providers sharing in care for this patient include:   Patient Care Team:  Kiana Camarena MD as PCP - General (Family Practice)    The following health maintenance items are reviewed in Epic and correct as of today:  Health Maintenance   Topic Date Due     FALL RISK ASSESSMENT  07/26/2019     PHQ-2 Q1 YR  07/26/2019     EYE EXAM Q1 YEAR  10/29/2019     LIPID SCREEN Q5 YR FEMALE (SYSTEM ASSIGNED)  01/02/2020     ADVANCE DIRECTIVE PLANNING Q5 YRS  02/26/2020     TETANUS IMMUNIZATION (SYSTEM ASSIGNED)  10/13/2020     DEXA SCAN SCREENING (SYSTEM ASSIGNED)  Completed     PNEUMOCOCCAL  Completed     INFLUENZA VACCINE  Completed     Labs reviewed in EPIC  BP Readings from Last 3 Encounters:   11/27/18 143/76   09/11/18 128/70   08/06/18 136/86    Wt Readings from Last 3 Encounters:   11/27/18 100 lb (45.4 kg)   09/11/18 97 lb (44 kg)   08/06/18 95 lb (43.1 kg)                  Patient Active Problem List   Diagnosis     CARDIOVASCULAR SCREENING; LDL GOAL LESS THAN 160     Right lumbar radiculopathy     Osteoporosis     Advanced directives, counseling/discussion     Abnormal Pap smear     Dysphagia     Health Care Home     Iron deficiency anemia     Sliding hiatal hernia     Osteoarthritis of left thumb     Primary osteoarthritis of both knees     Gastroesophageal reflux disease without esophagitis     Calculus of gallbladder without cholecystitis without obstruction     Left inguinal hernia     Bilateral posterior capsular opacification     Dermatochalasis of eyelid     Past Surgical History:   Procedure Laterality Date      CATARACT IOL, RT/LT       COLONOSCOPY  1/29/2013    Procedure: COLONOSCOPY;;  Surgeon: Edgard Prieto MD;  Location: [facility] OR     ESOPHAGOSCOPY, GASTROSCOPY, DUODENOSCOPY (EGD), COMBINED  1/20/2012    Procedure:COMBINED ESOPHAGOSCOPY, GASTROSCOPY, DUODENOSCOPY (EGD); EGD, dysphagia; Surgeon:SHEREE VALLE; Location:[facility] OR     ESOPHAGOSCOPY, GASTROSCOPY, DUODENOSCOPY (EGD), COMBINED  7/18/2012    Procedure: COMBINED ESOPHAGOSCOPY, GASTROSCOPY, DUODENOSCOPY (EGD);  EGD for dysphagia rp Tonio;  Surgeon: Rey Bee MD;  Location: [facility]     ESOPHAGOSCOPY, GASTROSCOPY, DUODENOSCOPY (EGD), COMBINED  1/29/2013    Procedure: COMBINED ESOPHAGOSCOPY, GASTROSCOPY, DUODENOSCOPY (EGD), BIOPSY SINGLE OR MULTIPLE;  Colonoscopy, EGD, dysphagia;  Surgeon: Edgard Prieto MD;  Location: [facility] OR     INCISION AND DRAINAGE LOWER EXTREMITY, COMBINED Left 3/21/2018    Procedure: COMBINED INCISION AND DRAINAGE LOWER EXTREMITY;  Excision of left intramuscular thigh mass; removal of anterior femoral bone spur;  Surgeon: Ray Diaz MD;  Location: [facility] OR       Social History   Substance Use Topics     Smoking status: Never Smoker     Smokeless tobacco: Never Used     Alcohol use No     No family history on file.      Current Outpatient Prescriptions   Medication Sig Dispense Refill     ASPIRIN NOT PRESCRIBED (INTENTIONAL) Reported on 4/4/2017       CALCIUM 600+D HIGH POTENCY 600-400 MG-UNIT per tablet TAKE 1 TABLET BY MOUTH TWICE DAILY 180 tablet 3     cyanocobalamin (VITAMIN B-12 ER) 1000 MCG TBCR Take 1,000 mcg by mouth daily 30 tablet 2     omeprazole (PRILOSEC) 20 MG CR capsule Take 1 capsule (20 mg) by mouth daily 90 capsule 3     ORDER FOR DME Pull up briefs for incontinence of urine change 2 to 3 times daily 1 Container 11     ranitidine (ZANTAC) 300 MG tablet Take 1 tablet (300 mg) by mouth At Bedtime 90 tablet 3     VITAMIN D, CHOLECALCIFEROL, PO Take by mouth daily       No Known Allergies  Pneumonia  "Vaccine:Adults age 65+ who received Pneumovax (PPSV23) at 65 years or older: Should be given PCV13 > 1 year after their most recent PPSV23    ROS:  Constitutional, HEENT, cardiovascular, pulmonary, gi and gu systems are negative, except as otherwise noted.    OBJECTIVE:   /76  Pulse 93  Temp 97.7  F (36.5  C) (Oral)  Ht 4' 6\" (1.372 m)  Wt 100 lb (45.4 kg)  SpO2 99%  Breastfeeding? No  BMI 24.11 kg/m2 Estimated body mass index is 24.11 kg/(m^2) as calculated from the following:    Height as of this encounter: 4' 6\" (1.372 m).    Weight as of this encounter: 100 lb (45.4 kg).  EXAM:   GENERAL APPEARANCE: healthy, alert and no distress  EYES: Eyes grossly normal to inspection  HENT: ear canals normal, nose and mouth without ulcers or lesions, oropharynx clear and oral mucous membranes moist  NECK: no adenopathy, no asymmetry  RESP: lungs clear to auscultation - no rales, rhonchi or wheezes  BREAST: defer   CV: regular rate and rhythm, normal S1 S2, no S3 or S4, no murmur, click or rub, no peripheral edema and peripheral pulses strong  ABDOMEN: soft, nontender, no rebound or guarding   MS: no musculoskeletal defects are noted and gait is age appropriate without ataxia  SKIN: no suspicious lesions or rashes  NEURO: Normal strength and tone, sensory exam grossly normal, mentation intact and speech normal  PSYCH: mentation appears normal and affect normal/bright    Diagnostic Test Results:  No results found for this or any previous visit (from the past 24 hour(s)).     Office Visit on 09/11/2018   Component Date Value Ref Range Status     WBC 09/11/2018 4.6  4.0 - 11.0 10e9/L Final     RBC Count 09/11/2018 4.25  3.8 - 5.2 10e12/L Final     Hemoglobin 09/11/2018 11.2* 11.7 - 15.7 g/dL Final     Hematocrit 09/11/2018 35.4  35.0 - 47.0 % Final     MCV 09/11/2018 83  78 - 100 fl Final     MCH 09/11/2018 26.4* 26.5 - 33.0 pg Final     MCHC 09/11/2018 31.6  31.5 - 36.5 g/dL Final    Results confirmed by repeat test "     RDW 09/11/2018 15.0  10.0 - 15.0 % Final     Platelet Count 09/11/2018 251  150 - 450 10e9/L Final     % Neutrophils 09/11/2018 53.6  % Final     % Lymphocytes 09/11/2018 36.0  % Final     % Monocytes 09/11/2018 7.9  % Final     % Eosinophils 09/11/2018 1.8  % Final     % Basophils 09/11/2018 0.7  % Final     Absolute Neutrophil 09/11/2018 2.4  1.6 - 8.3 10e9/L Final     Absolute Lymphocytes 09/11/2018 1.6  0.8 - 5.3 10e9/L Final     Absolute Monocytes 09/11/2018 0.4  0.0 - 1.3 10e9/L Final     Absolute Eosinophils 09/11/2018 0.1  0.0 - 0.7 10e9/L Final     Absolute Basophils 09/11/2018 0.0  0.0 - 0.2 10e9/L Final     Diff Method 09/11/2018 Automated Method   Final     Ferritin 09/11/2018 10  8 - 252 ng/mL Final     Vitamin D Deficiency screening 09/11/2018 22  20 - 75 ug/L Final    Comment: Season, race, dietary intake, and treatment affect the concentration of   25-hydroxy-Vitamin D. Values may decrease during winter months and increase   during summer months. Values 20-29 ug/L may indicate Vitamin D insufficiency   and values <20 ug/L may indicate Vitamin D deficiency.  Vitamin D determination is routinely performed by an immunoassay specific for   25 hydroxyvitamin D3.  If an individual is on vitamin D2 (ergocalciferol)   supplementation, please specify 25 OH vitamin D2 and D3 level determination by   LCMSMS test VITD23.       Vitamin B12 09/11/2018 1204* 193 - 986 pg/mL Final     TSH 09/11/2018 0.30* 0.40 - 4.00 mU/L Final     Sodium 09/11/2018 143  133 - 144 mmol/L Final     Potassium 09/11/2018 3.8  3.4 - 5.3 mmol/L Final     Chloride 09/11/2018 107  94 - 109 mmol/L Final     Carbon Dioxide 09/11/2018 30  20 - 32 mmol/L Final     Anion Gap 09/11/2018 6  3 - 14 mmol/L Final     Glucose 09/11/2018 76  70 - 99 mg/dL Final    Fasting specimen     Urea Nitrogen 09/11/2018 21  7 - 30 mg/dL Final     Creatinine 09/11/2018 0.74  0.52 - 1.04 mg/dL Final     GFR Estimate 09/11/2018 76  >60 mL/min/1.7m2 Final    Non  " GFR Calc     GFR Estimate If Black 09/11/2018 >90  >60 mL/min/1.7m2 Final    African American GFR Calc     Calcium 09/11/2018 9.2  8.5 - 10.1 mg/dL Final     Bilirubin Total 09/11/2018 0.4  0.2 - 1.3 mg/dL Final     Albumin 09/11/2018 3.2* 3.4 - 5.0 g/dL Final     Protein Total 09/11/2018 6.4* 6.8 - 8.8 g/dL Final     Alkaline Phosphatase 09/11/2018 76  40 - 150 U/L Final     ALT 09/11/2018 21  0 - 50 U/L Final     AST 09/11/2018 16  0 - 45 U/L Final     T4 Free 09/11/2018 1.02  0.76 - 1.46 ng/dL Final         ASSESSMENT / PLAN:   Kerry was seen today for physical and fatigue.    Diagnoses and all orders for this visit:    Routine history and physical examination of adult  -Labs done 09/18  -Screening guidelines reviewed  -OK to take Tylenol 1000 mg every 8 hours as needed  -Patient stopped iron supplements due to constipation. Last ferritin was 10.     Personal history of fall  -     [facility], AND CHIROPRACTIC REFERRAL; Future  -discussed physical therapy for fall prevention and improving balance         End of Life Planning:  Patient currently has an advanced directive: No.  I have verified the patient's ablity to prepare an advanced directive/make health care decisions.  Literature was provided to assist patient in preparing an advanced directive.    COUNSELING:  Reviewed preventive health counseling, as reflected in patient instructions       Regular exercise       Healthy diet/nutrition       Advanced Planning     BP Readings from Last 1 Encounters:   11/27/18 143/76     Estimated body mass index is 24.11 kg/(m^2) as calculated from the following:    Height as of this encounter: 4' 6\" (1.372 m).    Weight as of this encounter: 100 lb (45.4 kg).    BP Screening:   Last 3 BP Readings:    BP Readings from Last 3 Encounters:   11/27/18 143/76   09/11/18 128/70   08/06/18 136/86       The following was recommended to the patient:  Re-screen BP within a year and recommended lifestyle " modifications       reports that she has never smoked. She has never used smokeless tobacco.      Appropriate preventive services were discussed with this patient, including applicable screening as appropriate for cardiovascular disease, diabetes, osteopenia/osteoporosis, and glaucoma.  As appropriate for age/gender, discussed screening for colorectal cancer, prostate cancer, breast cancer, and cervical cancer. Checklist reviewing preventive services available has been given to the patient.    Reviewed patients plan of care and provided an AVS. The Basic Care Plan (routine screening as documented in Health Maintenance) for Kerry meets the Care Plan requirement. This Care Plan has been established and reviewed with the Patient.    Counseling Resources:  ATP IV Guidelines  Pooled Cohorts Equation Calculator  Breast Cancer Risk Calculator  FRAX Risk Assessment  ICSI Preventive Guidelines  Dietary Guidelines for Americans, 2010  USDA's MyPlate  ASA Prophylaxis  Lung CA Screening    Kiana Camarena MD MPH    [facility]

## 2018-11-27 NOTE — PATIENT INSTRUCTIONS
At [facility], we strive to deliver an exceptional experience to you, every time we see you.  If you receive a survey in the mail, please send us back your thoughts. We really do value your feedback.    Your care team:                            Family Medicine Internal Medicine   MD Regan Stearns MD Shantel Branch-Fleming, MD Katya Georgiev PA-C Megan Hill, APRN CNP    Shadi Cotton MD Pediatrics   Jesse Stone, ANTIONE Higgins, MD Stefanie Benedict APRN CNP   MD Anna Farris MD Deborah Mielke, MD Collette Obrien, APRN [facility]      Clinic hours: Monday - Thursday 7 am-7 pm; Fridays 7 am-5 pm.   Urgent care: Monday - Friday 11 am-9 pm; Saturday and Sunday 9 am-5 pm.  Pharmacy : Monday -Thursday 8 am-8 pm; Friday 8 am-6 pm; Saturday and Sunday 9 am-5 pm.     Clinic: [phone]   Pharmacy: [phone]          Preventive Health Recommendations    See your health care provider every year to    Review health changes.     Discuss preventive care.      Review your medicines if your doctor has prescribed any.      You no longer need a yearly Pap test unless you've had an abnormal Pap test in the past 10 years. If you have vaginal symptoms, such as bleeding or discharge, be sure to talk with your provider about a Pap test.      Every 1 to 2 years, have a mammogram.  If you are over 69, talk with your health care provider about whether or not you want to continue having screening mammograms.      Every 10 years, have a colonoscopy. Or, have a yearly FIT test (stool test). These exams will check for colon cancer.       Have a cholesterol test every 5 years, or more often if your doctor advises it.       Have a diabetes test (fasting glucose) every three years. If you are at risk for diabetes, you should have this test more often.       At age 65, have a bone density scan (DEXA) to check for osteoporosis (brittle bone disease).    Shots:    Get a  flu shot each year.    Get a tetanus shot every 10 years.    Talk to your doctor about your pneumonia vaccines. There are now two you should receive - Pneumovax (PPSV 23) and Prevnar (PCV 13).    Talk to your pharmacist about the shingles vaccine.    Talk to your doctor about the hepatitis B vaccine.    Nutrition:     Eat at least 5 servings of fruits and vegetables each day.      Eat whole-grain bread, whole-wheat pasta and brown rice instead of white grains and rice.      Get adequate Calcium and Vitamin D.     Lifestyle    Exercise at least 150 minutes a week (30 minutes a day, 5 days a week). This will help you control your weight and prevent disease.      Limit alcohol to one drink per day.      No smoking.       Wear sunscreen to prevent skin cancer.       See your dentist twice a year for an exam and cleaning.      See your eye doctor every 1 to 2 years to screen for conditions such as glaucoma, macular degeneration and cataracts.    Personalized Prevention Plan  You are due for the preventive services outlined below.  Your care team is available to assist you in scheduling these services.  If you have already completed any of these items, please share that information with your care team to update in your medical record.  There are no preventive care reminders to display for this patient.

## 2018-11-27 NOTE — MR AVS SNAPSHOT
After Visit Summary   11/27/2018    Kerry Vega    MRN: [id number]           Patient Information     Date Of Birth          1940        Visit Information        Provider Department      11/27/2018 8:25 AM Kiana Camarena MD; LANGUAGE [facility]        Today's Diagnoses     Routine history and physical examination of adult    -  1    Personal history of fall          Care Instructions    At [facility], we strive to deliver an exceptional experience to you, every time we see you.  If you receive a survey in the mail, please send us back your thoughts. We really do value your feedback.    Your care team:                            Family Medicine Internal Medicine   MD Regan Stearns MD Shantel Branch-Fleming, MD Katya Georgiev PA-C Megan Hill, APRNARAYAN Cotton MD Pediatrics   ANTIONE Chin, MD Stefanie Benedict APRN CNP   MD Anna Farris MD Deborah Mielke, MD Kim Thein, APRN CNP      Clinic hours: Monday - Thursday 7 am-7 pm; Fridays 7 am-5 pm.   Urgent care: Monday - Friday 11 am-9 pm; Saturday and Sunday 9 am-5 pm.  Pharmacy : Monday -Thursday 8 am-8 pm; Friday 8 am-6 pm; Saturday and Sunday 9 am-5 pm.     Clinic: [phone]   Pharmacy: [phone]          Preventive Health Recommendations    See your health care provider every year to    Review health changes.     Discuss preventive care.      Review your medicines if your doctor has prescribed any.      You no longer need a yearly Pap test unless you've had an abnormal Pap test in the past 10 years. If you have vaginal symptoms, such as bleeding or discharge, be sure to talk with your provider about a Pap test.      Every 1 to 2 years, have a mammogram.  If you are over 69, talk with your health care provider about whether or not you want to continue having screening mammograms.      Every 10 years,  have a colonoscopy. Or, have a yearly FIT test (stool test). These exams will check for colon cancer.       Have a cholesterol test every 5 years, or more often if your doctor advises it.       Have a diabetes test (fasting glucose) every three years. If you are at risk for diabetes, you should have this test more often.       At age 65, have a bone density scan (DEXA) to check for osteoporosis (brittle bone disease).    Shots:    Get a flu shot each year.    Get a tetanus shot every 10 years.    Talk to your doctor about your pneumonia vaccines. There are now two you should receive - Pneumovax (PPSV 23) and Prevnar (PCV 13).    Talk to your pharmacist about the shingles vaccine.    Talk to your doctor about the hepatitis B vaccine.    Nutrition:     Eat at least 5 servings of fruits and vegetables each day.      Eat whole-grain bread, whole-wheat pasta and brown rice instead of white grains and rice.      Get adequate Calcium and Vitamin D.     Lifestyle    Exercise at least 150 minutes a week (30 minutes a day, 5 days a week). This will help you control your weight and prevent disease.      Limit alcohol to one drink per day.      No smoking.       Wear sunscreen to prevent skin cancer.       See your dentist twice a year for an exam and cleaning.      See your eye doctor every 1 to 2 years to screen for conditions such as glaucoma, macular degeneration and cataracts.    Personalized Prevention Plan  You are due for the preventive services outlined below.  Your care team is available to assist you in scheduling these services.  If you have already completed any of these items, please share that information with your care team to update in your medical record.  There are no preventive care reminders to display for this patient.          Follow-ups after your visit        Additional Services     [facility] PT, HAND, AND CHIROPRACTIC REFERRAL       Physical Therapy, Hand Therapy and Chiropractic Care are available  through:  *[facility]  *Hand Therapy (Occupational Therapy or Physical Therapy)  *[facility]    Call one number to schedule at any of the above locations: [phone].    Physical therapy, Hand therapy and/or Chiropractic care has been recommended by your physician as an excellent treatment option to reduce pain and help people return to normal activities, including sports.  Therapy and/or chiropractic care services are a great complement or alternative to expensive and invasive surgery, injections, or long-term use of prescription medications. The primary goal is to identify the underlying problem and provide you the tools to manage your condition on your own.     Please be aware that coverage of these services is subject to the terms and limitations of your health insurance plan.  Call member services at your health plan with any benefit or coverage questions.      Please bring the following to your appointment:  *Your personal calendar for scheduling future appointments  *Comfortable clothing                  Future tests that were ordered for you today     Open Future Orders        Priority Expected Expires Ordered    [facility] PT, HAND, AND CHIROPRACTIC REFERRAL Routine  11/27/2019 11/27/2018            Who to contact     If you have questions or need follow up information about today's clinic visit or your schedule please contact [facility] directly at [phone].  Normal or non-critical lab and imaging results will be communicated to you by MyChart, letter or phone within 4 business days after the clinic has received the results. If you do not hear from us within 7 days, please contact the clinic through MyChart or phone. If you have a critical or abnormal lab result, we will notify you by phone as soon as possible.  Submit refill requests through [employer] or call your pharmacy and they will forward the refill request to us. Please allow 3  "business days for your refill to be completed.          Additional Information About Your Visit        Care EveryWhere ID     This is your Care EveryWhere ID. This could be used by other organizations to access your [place] medical records  [id number]        Your Vitals Were     Pulse Temperature Height Pulse Oximetry Breastfeeding? BMI (Body Mass Index)    93 97.7  F (36.5  C) (Oral) 4' 6\" (1.372 m) 99% No 24.11 kg/m2       Blood Pressure from Last 3 Encounters:   11/27/18 143/76   09/11/18 128/70   08/06/18 136/86    Weight from Last 3 Encounters:   11/27/18 100 lb (45.4 kg)   09/11/18 97 lb (44 kg)   08/06/18 95 lb (43.1 kg)                 Today's Medication Changes          These changes are accurate as of 11/27/18  8:52 AM.  If you have any questions, ask your nurse or doctor.               Stop taking these medicines if you haven't already. Please contact your care team if you have questions.     cetirizine 10 MG tablet   Commonly known as:  zyrTEC   Stopped by:  Kiana Camarena MD           diclofenac 1 % topical gel   Commonly known as:  VOLTAREN   Stopped by:  Kiana Camarena MD           naproxen 500 MG tablet   Commonly known as:  NAPROSYN   Stopped by:  Kiana Camarena MD           olopatadine 0.2 % ophthalmic solution   Commonly known as:  PATADAY   Stopped by:  Kiana Camarena MD                    Primary Care Provider Office Phone # Fax #    Kiana Camarena [phone]       [address]  [facility] [address]        Equal Access to Services     [facility]: Hadii afshan guzman hadasho Soomaali, waaxda luqadaha, qaybta kaalmada cira, cruz mathew . So [facility] [phone].    ATENCIÓN: Si habla español, tiene a cornejo disposición servicios gratuitos de asistencia lingüística. Llame al [phone].    We comply with applicable federal civil rights laws and [place] laws. We do not discriminate on the basis of race, color, national origin, age, " disability, sex, sexual orientation, or gender identity.            Thank you!     Thank you for choosing [facility]  for your care. Our goal is always to provide you with excellent care. Hearing back from our patients is one way we can continue to improve our services. Please take a few minutes to complete the written survey that you may receive in the mail after your visit with us. Thank you!             Your Updated Medication List - Protect others around you: Learn how to safely use, store and throw away your medicines at www.disposemymeds.org.          This list is accurate as of 11/27/18  8:52 AM.  Always use your most recent med list.                   Brand Name Dispense Instructions for use Diagnosis    * ASPIRIN NOT PRESCRIBED    INTENTIONAL     Reported on 4/4/2017        CALCIUM 600+D HIGH POTENCY 600-400 MG-UNIT per tablet   Generic drug:  calcium carbonate 600 mg-vitamin D 400 units     180 tablet    TAKE 1 TABLET BY MOUTH TWICE DAILY    Osteoporosis       cyanocobalamin 1000 MCG ER tablet    VITAMIN B-12 ER    30 tablet    Take 1,000 mcg by mouth daily    Vitamin B12 deficiency (dietary) anemia       omeprazole 20 MG DR capsule    priLOSEC    90 capsule    Take 1 capsule (20 mg) by mouth daily    Gastroesophageal reflux disease without esophagitis       * order for DME     1 Container    Pull up briefs for incontinence of urine change 2 to 3 times daily    Urine incontinence       ranitidine 300 MG tablet    ZANTAC    90 tablet    Take 1 tablet (300 mg) by mouth At Bedtime    Gastroesophageal reflux disease without esophagitis       VITAMIN D (CHOLECALCIFEROL) PO      Take by mouth daily        * Notice:  This list has 2 medication(s) that are the same as other medications prescribed for you. Read the directions carefully, and ask your doctor or other care provider to review them with you.

## 2018-12-02 DIAGNOSIS — M81.0 OSTEOPOROSIS: ICD-10-CM

## 2018-12-02 NOTE — TELEPHONE ENCOUNTER
"Requested Prescriptions   Pending Prescriptions Disp Refills     calcium carbonate 600 mg-vitamin D 400 units (CALTRATE) 600-400 MG-UNIT per tablet [Pharmacy Med Name: CALCIUM 600-VIT D3 400 TABLET] 180 tablet 1    Last Written Prescription Date:  11/9/17  Last Fill Quantity: 180,  # refills: 3   Last Office Visit with G, P or [facility] prescribing provider:  11/27/2018     Future Office Visit:      Sig: TAKE 1 TABLET BY MOUTH TWICE DAILY    Vitamin Supplements (Adult) Protocol Passed    12/2/2018  3:06 PM       Passed - High dose Vitamin D not ordered       Passed - Recent (12 mo) or future (30 days) visit within the authorizing provider's specialty    Patient had office visit in the last 12 months or has a visit in the next 30 days with authorizing provider or within the authorizing provider's specialty.  See \"Patient Info\" tab in inbasket, or \"Choose Columns\" in Meds & Orders section of the refill encounter.                "

## 2018-12-05 NOTE — TELEPHONE ENCOUNTER
Prescription approved per [facility] Refill Protocol.      Frank Rousseau RN, BSN

## 2018-12-16 DIAGNOSIS — D51.8 VITAMIN B12 DEFICIENCY (DIETARY) ANEMIA: ICD-10-CM

## 2018-12-18 NOTE — TELEPHONE ENCOUNTER
Prescription approved per [facility] Refill Protocol.      Frank Rousseau RN, BSN

## 2018-12-26 ENCOUNTER — PRE VISIT (OUTPATIENT)
Dept: OPHTHALMOLOGY | Facility: CLINIC | Age: 78
End: 2018-12-26

## 2018-12-26 NOTE — TELEPHONE ENCOUNTER
December 26, 2018                 Pre-Visit Documentation: Kerry Vega is a 78 year old female    Chief Complaint   Patient presents with     Previsit     appt w/ Dr Brooks     Dermatochalasis Evaluation     refered by Dr Dominguez           Current Outpatient Medications   Medication Sig Dispense Refill     ASPIRIN NOT PRESCRIBED (INTENTIONAL) Reported on 4/4/2017       calcium carbonate 600 mg-vitamin D 400 units (CALTRATE) 600-400 MG-UNIT per tablet TAKE 1 TABLET BY MOUTH TWICE DAILY 180 tablet 3     [facility] B-12 1000 MCG CR tablet TAKE 1 TABLET (1,000 MCG) BY MOUTH DAILY 30 tablet 2     omeprazole (PRILOSEC) 20 MG CR capsule Take 1 capsule (20 mg) by mouth daily 90 capsule 3     ORDER FOR DME Pull up briefs for incontinence of urine change 2 to 3 times daily 1 Container 11     ranitidine (ZANTAC) 300 MG tablet Take 1 tablet (300 mg) by mouth At Bedtime 90 tablet 3     VITAMIN D, CHOLECALCIFEROL, PO Take by mouth daily         PAST MEDICAL HISTORY:   Past Medical History:   Diagnosis Date     Anemia      Arthritis     OSTEO     Left knee pain 2/23/2011       PAST SURGICAL HISTORY:   Past Surgical History:   Procedure Laterality Date     CATARACT IOL, RT/LT       COLONOSCOPY  1/29/2013    Procedure: COLONOSCOPY;;  Surgeon: Edgard Prieto MD;  Location: [facility] OR     ESOPHAGOSCOPY, GASTROSCOPY, DUODENOSCOPY (EGD), COMBINED  1/20/2012    Procedure:COMBINED ESOPHAGOSCOPY, GASTROSCOPY, DUODENOSCOPY (EGD); EGD, dysphagia; Surgeon:SHEREE VALLE; Location:[facility] OR     ESOPHAGOSCOPY, GASTROSCOPY, DUODENOSCOPY (EGD), COMBINED  7/18/2012    Procedure: COMBINED ESOPHAGOSCOPY, GASTROSCOPY, DUODENOSCOPY (EGD);  EGD for dysphagia rp Tonio;  Surgeon: Rey Bee MD;  Location: [facility] OR     ESOPHAGOSCOPY, GASTROSCOPY, DUODENOSCOPY (EGD), COMBINED  1/29/2013    Procedure: COMBINED ESOPHAGOSCOPY, GASTROSCOPY, DUODENOSCOPY (EGD), BIOPSY SINGLE OR MULTIPLE;  Colonoscopy, EGD, dysphagia;  Surgeon: Edgard Prieto MD;   Location: [facility] OR     INCISION AND DRAINAGE LOWER EXTREMITY, COMBINED Left 3/21/2018    Procedure: COMBINED INCISION AND DRAINAGE LOWER EXTREMITY;  Excision of left intramuscular thigh mass; removal of anterior femoral bone spur;  Surgeon: Ray Diaz MD;  Location: [facility] OR       FAMILY HISTORY: History reviewed. No pertinent family history.    SOCIAL HISTORY:   Social History     Tobacco Use     Smoking status: Never Smoker     Smokeless tobacco: Never Used   Substance Use Topics     Alcohol use: No     Amina ISABEL. OSC

## 2019-02-07 DIAGNOSIS — K21.9 GASTROESOPHAGEAL REFLUX DISEASE WITHOUT ESOPHAGITIS: ICD-10-CM

## 2019-02-07 NOTE — TELEPHONE ENCOUNTER
"Requested Prescriptions   Pending Prescriptions Disp Refills     omeprazole (PRILOSEC) 20 MG DR capsule [Pharmacy Med Name: OMEPRAZOLE DR 20 MG CAPSULE] 90 capsule 3      Last Written Prescription Date:  02/08/18  Last Fill Quantity: 90,  # refills: 3   Last Office Visit with G, P or [facility] prescribing provider:  11/27/18Marylu   Future Office Visit:    Sig: TAKE ONE CAPSULE BY MOUTH EVERY DAY    PPI Protocol Failed - 2/7/2019  2:22 AM       Failed - No diagnosis of osteoporosis on record       Passed - Not on Clopidogrel (unless Pantoprazole ordered)       Passed - Recent (12 mo) or future (30 days) visit within the authorizing provider's specialty    Patient had office visit in the last 12 months or has a visit in the next 30 days with authorizing provider or within the authorizing provider's specialty.  See \"Patient Info\" tab in inbasket, or \"Choose Columns\" in Meds & Orders section of the refill encounter.             Passed - Medication is active on med list       Passed - Patient is age 18 or older       Passed - No active pregnacy on record       Passed - No positive pregnancy test in past 12 months          "

## 2019-03-06 ENCOUNTER — OFFICE VISIT (OUTPATIENT)
Dept: OPHTHALMOLOGY | Facility: CLINIC | Age: 79
End: 2019-03-06
Attending: OPTOMETRIST
Payer: MEDICARE

## 2019-03-06 DIAGNOSIS — H02.834 DERMATOCHALASIS OF BOTH UPPER EYELIDS: Primary | ICD-10-CM

## 2019-03-06 DIAGNOSIS — H02.132 SENILE ECTROPION OF RIGHT LOWER EYELID: ICD-10-CM

## 2019-03-06 DIAGNOSIS — H02.831 DERMATOCHALASIS OF BOTH UPPER EYELIDS: Primary | ICD-10-CM

## 2019-03-06 DIAGNOSIS — H17.9 CORNEAL SCAR WITH OPACITY: ICD-10-CM

## 2019-03-06 DIAGNOSIS — H02.403 INVOLUTIONAL PTOSIS, ACQUIRED, BILATERAL: ICD-10-CM

## 2019-03-06 DIAGNOSIS — H02.135 SENILE ECTROPION OF LEFT LOWER EYELID: ICD-10-CM

## 2019-03-06 PROCEDURE — 92285 EXTERNAL OCULAR PHOTOGRAPHY: CPT | Performed by: OPHTHALMOLOGY

## 2019-03-06 PROCEDURE — 99213 OFFICE O/P EST LOW 20 MIN: CPT | Performed by: OPHTHALMOLOGY

## 2019-03-06 PROCEDURE — 92082 INTERMEDIATE VISUAL FIELD XM: CPT | Performed by: OPHTHALMOLOGY

## 2019-03-06 ASSESSMENT — VISUAL ACUITY
METHOD: SNELLEN - LINEAR
OS_CC: 20/50
OD_CC: 20/50

## 2019-03-06 ASSESSMENT — EXTERNAL EXAM - LEFT EYE: OS_EXAM: NORMAL

## 2019-03-06 ASSESSMENT — SLIT LAMP EXAM - LIDS
COMMENTS: HEAVY DERMATOCHALASIS RESTING ON LASHES, TRUE PTOSIS
COMMENTS: HEAVY DERMATOCHALASIS RESTING ON LASHES, TRUE PTOSIS

## 2019-03-06 NOTE — PROGRESS NOTES
Oculoplastic Clinic New Patient    Patient: Kerry Vega MRN# [id number]   YOB: 1940 Age: 78 year old   Date of Visit: Mar 6, 2019    CC: Droopy eyelids obstructing vision.              HPI:     Chief Complaint(s) and History of Present Illness(es)     Droopy Eye Lid Evaluation     Laterality: right upper lid and left upper lid    Severity: moderate    Onset: chronic    Duration: years    Course: gradually worsening      Kerry Vega is a 78 year old female who has noted gradual onset of droopy eyelids over the past years. The droopy eyelid is interfering with activities of daily living including driving, and reading. The patient denies double vision, variability of the eyelid position. She has a gritty sensation in the right eye and irritation. She reports as a child in [facility] [place] she had some form of eye infection where she didn't open her eyes for one month.    EXAM:     MRD1: 0 right eye and 1 mm left eye   Dermatochalasis with excess skin touching eyelashes   Brow ptosis with brow resting below superior orbital rim   Significant involutional ptosis     VISUAL FIELD:  Right eye untaped:25 degrees Right eye taped:45 degrees  Left eye untaped:35 degrees Left eye taped:55 degrees    Assessment & Plan     Kerry Vega is a 78 year old female with the following diagnoses:   1. Dermatochalasis of both upper eyelids    2. Involutional ptosis, acquired, bilateral    3. Corneal scar with opacity    4. Senile ectropion of right lower eyelid    5. 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 Conservative given ocular surface.     We did discuss there is a significant ocular surface component which may initially worsen post lid surgery.     ANTICOAGULATION:  Aspirin rarely.       PHOTOS  DEMONSTRATE:    Significant dermatochalasis with lids resting on eyelashes and obstructing visual axis  Blepharoptosis  Brow ptosis with thicker brow skin and hairs below the lateral superior orbital rim    Attending Physician Attestation:  Complete documentation of historical and exam elements from today's encounter can be found in the full encounter summary report (not reduplicated in this progress note).  I personally obtained the chief complaint(s) and history of present illness.  I confirmed and edited as necessary the review of systems, past medical/surgical history, family history, social history, and examination findings as documented by others; and I examined the patient myself.  I personally reviewed the relevant tests, images, and reports as documented above.  I formulated and edited as necessary the assessment and plan and discussed the findings and management plan with the patient and family. - Zenobia Brooks MD        Today with Kerry Vega  and her son, I reviewed the indications, risks, benefits, and alternatives of the proposed surgical procedure including, but not limited to, failure obtain the desired result  and need for additional surgery, bleeding, infection, loss of vision, loss of the eye, and the remote possibility of permanent damage to any organ system or death with the use of anesthesia.  I provided multiple opportunities for the questions, answered all questions to the best of my ability, and confirmed that my answers and my discussion were understood.

## 2019-03-06 NOTE — NURSING NOTE
Kerry Vega's goals for this visit include:  Patient presents with:  Droopy Eye Lid Evaluation      She requests these members of her care team be copied on today's visit information:  PCP: Kiana Camarena    [place]erring [place]  [address] [place] [address]            Amina PERALTA COA. OSC

## 2019-03-06 NOTE — LETTER
3/6/2019         RE:  :  MRN: Kerry Vega  1940  [id number]     Dear Dr. Meng Dominguez,    Thank you for asking me to see your patient, Kerry Vega, for an oculoplastic   consultation.  My assessment and plan are below.  For further details, please see my attached clinic note.                HPI:     Chief Complaint(s) and History of Present Illness(es)     Droopy Eye Lid Evaluation     Laterality: right upper lid and left upper lid    Severity: moderate    Onset: chronic    Duration: years    Course: gradually worsening      Kerry Vega is a 78 year old female who has noted gradual onset of droopy eyelids over the past years. The droopy eyelid is interfering with activities of daily living including driving, and reading. The patient denies double vision, variability of the eyelid position. She has a gritty sensation in the right eye and irritation. [place] form of eye infection where she didn't open her eyes for one month.    EXAM:     MRD1: 0 right eye and 1 mm left eye   Dermatochalasis with excess skin touching eyelashes   Brow ptosis with brow resting below superior orbital rim   Significant involutional ptosis     VISUAL FIELD:  Right eye untaped:25 degrees Right eye taped:45 degrees  Left eye untaped:35 degrees Left eye taped:55 degrees    Assessment & Plan     Kerry Vega is a 78 year old female with the following diagnoses:   1. Dermatochalasis of both upper eyelids    2. Involutional ptosis, acquired, bilateral    3. Corneal scar with opacity    4. Senile ectropion of right lower eyelid    5. 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 Conservative given ocular surface.     We did discuss there is a significant ocular  surface component which may initially worsen post lid surgery.     ANTICOAGULATION:  Aspirin rarely.      Again, thank you for allowing me to participate in the care of your patient.      Sincerely,    Zenobia Brooks MD  Department of Ophthalmology and Visual Neurosciences  [facility]    CC: [place]  [address]  [facility] [address]  VIA In Basket

## 2019-04-10 ENCOUNTER — TELEPHONE (OUTPATIENT)
Dept: FAMILY MEDICINE | Facility: CLINIC | Age: 79
End: 2019-04-10

## 2019-04-10 DIAGNOSIS — D51.8 VITAMIN B12 DEFICIENCY (DIETARY) ANEMIA: ICD-10-CM

## 2019-04-10 RX ORDER — LANOLIN ALCOHOL/MO/W.PET/CERES
1000 CREAM (GRAM) TOPICAL DAILY
Qty: 100 TABLET | Refills: 3 | Status: SHIPPED | OUTPATIENT
Start: 2019-04-10 | End: 2020-05-26

## 2019-04-10 NOTE — TELEPHONE ENCOUNTER
Faxed message from pharmacy:    [facility] B-12 1000 MCG CR tablet is not available.  PLease send alternative or call patient.  Thanks

## 2019-04-16 ENCOUNTER — TELEPHONE (OUTPATIENT)
Dept: FAMILY MEDICINE | Facility: CLINIC | Age: 79
End: 2019-04-16

## 2019-04-16 ENCOUNTER — PATIENT OUTREACH (OUTPATIENT)
Dept: CARE COORDINATION | Facility: CLINIC | Age: 79
End: 2019-04-16

## 2019-04-16 NOTE — TELEPHONE ENCOUNTER
Routing to the Care Coordinator pool to see if they have the answer to this?  Ailin Moreno MA/  For Teams Shanta

## 2019-04-16 NOTE — TELEPHONE ENCOUNTER
I have not placed any Home Care orders so not sure what this is about.   Thanks,  Kiana Camarena MD MPH

## 2019-04-16 NOTE — TELEPHONE ENCOUNTER
..Reason for Call:   Home health nurse appointment    Detailed comments: Patient is scheduled to have a home health nurse visit her at home tomorrow 04-17 @ 1 pm and they need to cancel that visit. Patient and spouse did not have the name or phone number of the agency or nurse;     Phone Number Patient can be reached at: Home number on file [phone] (home)    Best Time: anytime      Can we leave a detailed message on this number? YES    Call taken on 4/16/2019 at 2:47 PM by Cassi Petty

## 2019-04-16 NOTE — TELEPHONE ENCOUNTER
Spoke to Care Coordination and they said that the referral order placed in 2017, [facility] in [place], by Dr Cotton is no longer involved. They called the family and they have no idea who ordered the home care or what agency. Routing to Dr Camarena to advise.  Ailin Moreno MA/  For Teams Shanta

## 2019-04-16 NOTE — PROGRESS NOTES
Clinic Care Coordination Contact  Care Team Conversations -Social Work  Received a referral from Care team.     Family called the clinic wanting to cancel Home Care visit for tomorrow. I called family.spoke with a gentleman . They do not know what Home Care agency is coming or who ordered a visit. Chart reviewed. No recent order. Checked with [facility] and the last agency she used ,[facility].   Neither agency has her scheduled.  Unable to cancel Home Care visit.Family is aware.      Kathie Man [facility]  , Clinic Care Coordination  Clinics:  [place],  [place], Sudarshan Johnson  [phone]   4/16/2019   4:22 PM

## 2019-06-20 ENCOUNTER — APPOINTMENT (OUTPATIENT)
Dept: OPTOMETRY | Facility: CLINIC | Age: 79
End: 2019-06-20
Payer: MEDICARE

## 2019-06-20 PROCEDURE — 92340 FIT SPECTACLES MONOFOCAL: CPT | Performed by: OPTOMETRIST

## 2019-10-21 DIAGNOSIS — K21.9 GASTROESOPHAGEAL REFLUX DISEASE WITHOUT ESOPHAGITIS: ICD-10-CM

## 2019-10-21 NOTE — TELEPHONE ENCOUNTER
"Requested Prescriptions   Pending Prescriptions Disp Refills     omeprazole (PRILOSEC) 20 MG DR capsule [Pharmacy Med Name: OMEPRAZOLE DR 20 MG CAPSULE]  Last Written Prescription Date:  02/11/19  Last Fill Quantity: 90,  # refills: 2   Last Office Visit with [facility], [facility] or [facility] prescribing provider:  11/27/18Maryul   Future Office Visit:    90 capsule 1     Sig: TAKE ONE CAPSULE BY MOUTH EVERY DAY       PPI Protocol Failed - 10/21/2019  1:34 AM        Failed - No diagnosis of osteoporosis on record        Passed - Not on Clopidogrel (unless Pantoprazole ordered)        Passed - Recent (12 mo) or future (30 days) visit within the authorizing provider's specialty     Patient has had an office visit with the authorizing provider or a provider within the authorizing providers department within the previous 12 mos or has a future within next 30 days. See \"Patient Info\" tab in inbasket, or \"Choose Columns\" in Meds & Orders section of the refill encounter.              Passed - Medication is active on med list        Passed - Patient is age 18 or older        Passed - No active pregnacy on record        Passed - No positive pregnancy test in past 12 months          "

## 2019-12-04 ENCOUNTER — OFFICE VISIT (OUTPATIENT)
Dept: URGENT CARE | Facility: URGENT CARE | Age: 79
End: 2019-12-04
Payer: MEDICARE

## 2019-12-04 VITALS
BODY MASS INDEX: 23.82 KG/M2 | DIASTOLIC BLOOD PRESSURE: 82 MMHG | WEIGHT: 98.8 LBS | SYSTOLIC BLOOD PRESSURE: 162 MMHG | TEMPERATURE: 97.7 F | OXYGEN SATURATION: 99 % | HEART RATE: 100 BPM

## 2019-12-04 DIAGNOSIS — R10.13 EPIGASTRIC PAIN: ICD-10-CM

## 2019-12-04 DIAGNOSIS — K29.00 ACUTE SUPERFICIAL GASTRITIS WITHOUT HEMORRHAGE: ICD-10-CM

## 2019-12-04 DIAGNOSIS — R30.0 DYSURIA: Primary | ICD-10-CM

## 2019-12-04 LAB
ALBUMIN UR-MCNC: ABNORMAL MG/DL
APPEARANCE UR: CLEAR
BACTERIA #/AREA URNS HPF: ABNORMAL /HPF
BILIRUB UR QL STRIP: ABNORMAL
COLOR UR AUTO: YELLOW
GLUCOSE UR STRIP-MCNC: NEGATIVE MG/DL
HGB UR QL STRIP: NEGATIVE
KETONES UR STRIP-MCNC: 15 MG/DL
LEUKOCYTE ESTERASE UR QL STRIP: NEGATIVE
MUCOUS THREADS #/AREA URNS LPF: PRESENT /LPF
NITRATE UR QL: NEGATIVE
NON-SQ EPI CELLS #/AREA URNS LPF: ABNORMAL /LPF
PH UR STRIP: 5.5 PH (ref 5–7)
RBC #/AREA URNS AUTO: ABNORMAL /HPF
SOURCE: ABNORMAL
SP GR UR STRIP: >1.03 (ref 1–1.03)
UROBILINOGEN UR STRIP-ACNC: 0.2 EU/DL (ref 0.2–1)
WBC #/AREA URNS AUTO: ABNORMAL /HPF

## 2019-12-04 PROCEDURE — 99214 OFFICE O/P EST MOD 30 MIN: CPT | Performed by: NURSE PRACTITIONER

## 2019-12-04 PROCEDURE — 81001 URINALYSIS AUTO W/SCOPE: CPT | Performed by: NURSE PRACTITIONER

## 2019-12-04 ASSESSMENT — ENCOUNTER SYMPTOMS
DYSURIA: 1
APPETITE CHANGE: 0
ACTIVITY CHANGE: 0
DIARRHEA: 0
HEADACHES: 0
CONSTIPATION: 0
CHILLS: 0
ABDOMINAL PAIN: 1
MYALGIAS: 0
FEVER: 0
FATIGUE: 0
ABDOMINAL DISTENTION: 1
NAUSEA: 1
VOMITING: 0

## 2019-12-05 NOTE — PATIENT INSTRUCTIONS
Patient Education     Understanding Gastritis    Gastritis is a painful inflammation of the stomach lining. It has a number of causes. Gastritis and its symptoms can be relieved with treatment. Work with your healthcare provider to find ways to treat your symptoms.  The Stomach  To digest the food you eat, your stomach makes strong acids and enzymes. A healthy stomach has built-in defenses that protect its lining from damage by these acids and enzymes.  When you have gastritis  Acids may damage the stomach lining when the built-in defenses of the stomach don t function as they should. The stomach lining can then become inflamed. When this happens, it is called gastritis.  Causes of gastritis  Gastritis has many causes. They may include:    Aspirin and anti-inflammatory medicines    Tobacco use    Alcohol use    Helicobacter pylori (H. pylori) bacteria    Trauma from injuries, burns, or major surgery    Critical illness or autoimmune disorders  Common symptoms  With gastritis, you may notice one or more of the following:    A burning feeling in your upper belly    Pain that happens after eating certain foods    Gas or a bloated feeling in your stomach    Frequent belching    Nausea with or without vomiting    Loss of appetite    Feeling full quickly    Fatigue  Date Last Reviewed: 7/1/2016 2000-2018 [employer]. [address] [place] [address]. All rights reserved. This information is not intended as a substitute for professional medical care. Always follow your healthcare professional's instructions.           Patient Education     Medicines for GERD  Gastroesophageal reflux disease (GERD) can be treated with medicine. This may be done with a medicine you can buy over the counter. Or it may be done with a medicine that your healthcare provider has to prescribe. In some cases, both types may be used. Your provider will tell you what is best for your symptoms.  Antacids  Antacids work to  weaken the acid in your stomach and can give you quick relief. You can buy many of them with no prescription. Antacids can be high in sodium. This may be a problem if you have high blood pressure. Some antacids also have aluminum. This should be avoided if you have long-term (chronic) kidney disease. So check with your provider first. Take antacids only when you need to, as advised by your provider.  Side effects: Constipation, diarrhea. If you take too much medicine, it can cause calcium to build up.   H-2 blockers  H-2 blockers cause the stomach to make less acid. They are often used both on demand as symptoms occur, and daily to keep symptoms away. Your provider may prescribe them if antacids don t work for you. You can buy some of them over the counter. These come in a lower dosage.  Side effects: Confusion in older adults  Proton-pump inhibitors  These also cause the stomach to make less acid. They reduce stomach acid more than H-2 blockers. They may be used for a short time, or longer to treat certain conditions. You can buy some of them over the counter. Or your provider may prescribe them. They help control GERD symptoms.  Side effects: Belly (abdominal) pain, diarrhea, upset stomach (nausea). Possible other side effects linked to long-term use and high doses.  Prokinetics  These medicines affect the movement of the digestive tract. They may be recommended if your stomach is emptying too slowly. But in most cases they are not recommended for treating GERD.   Side effects: Tiredness, depression, anxiety, problems with physical movement, belly cramps, constipation, diarrhea, a jittery feeling  Medicines to avoid  Don t take aspirin without your provider s approval. And don t take a nonsteroidal anti-inflammatory drug (NSAID), such as ibuprofen. These reduce the protective lining of your stomach. This can lead to more GERD symptoms. Check with your provider or pharmacist before taking a new medicine.   Date  Last Reviewed: 7/1/2016 2000-2018 [employer], "[employer] [address] [place] [address], [place] [address]. All rights reserved. This information is not intended as a substitute for professional medical care. Always follow your healthcare professional's instructions.

## 2019-12-05 NOTE — PROGRESS NOTES
The following medication was given:     MEDICATION: GI cocktail  ROUTE: PO  SITE: Medication was given orally   DOSE: 15 ml/15ml=30ml  LOT #: [id number]  :  [facility]/ [employer]  EXPIRATION DATE:  09/20 04/2022  NDC#: 96004-22-87 /  [id number]  Clinic Administered Medication Documentation    Oral Medication Documentation    Patient was given GI cocktail. Prior to medication administration, verified patients identity using patient s name and date of birth. Please see MAR and medication order for additional information.     Was entire amount of medication used? Yes      Lisa Ruiz, [facility]

## 2019-12-05 NOTE — PROGRESS NOTES
SUBJECTIVE:   Kerry Vega is a 79 year old female presenting with a chief complaint of   Chief Complaint   Patient presents with     Abdominal Pain     Patient complains of abdominal pain and back pain        She is an established patient of [facility].    Abdominal Pain    Location: epigastric   Radiation: None.    Pain character: aching,   Severity: 8 on a scale of 1-10.    Duration: persistent all day    Course of Illness: stable.  Exacerbated by: eating  Relieved by: one dose of Aleve in morning with relief.   Associated Symptoms: anorexia, nausea and bloating.  Female : denies any vaginal bleeding or discharge  Surgical History: gallbladder removal  Denies any recent travel, new foods or new medicaitons    Review of Systems   Constitutional: Negative for activity change, appetite change, chills, fatigue and fever.   Gastrointestinal: Positive for abdominal distention, abdominal pain and nausea. Negative for constipation, diarrhea and vomiting.   Genitourinary: Positive for dysuria. Negative for vaginal bleeding and vaginal discharge.   Musculoskeletal: Negative for myalgias.   Neurological: Negative for headaches.       Past Medical History:   Diagnosis Date     Anemia      Arthritis     OSTEO     Left knee pain 2/23/2011     No family history on file.  Current Outpatient Medications   Medication Sig Dispense Refill     ASPIRIN NOT PRESCRIBED (INTENTIONAL) Reported on 4/4/2017       calcium carbonate 600 mg-vitamin D 400 units (CALTRATE) 600-400 MG-UNIT per tablet TAKE 1 TABLET BY MOUTH TWICE DAILY 180 tablet 3     cyanocobalamin (VITAMIN B-12) 1000 MCG tablet Take 1 tablet (1,000 mcg) by mouth daily 100 tablet 3     omeprazole (PRILOSEC) 20 MG DR capsule TAKE ONE CAPSULE BY MOUTH EVERY DAY 90 capsule 0     ORDER FOR DME Pull up briefs for incontinence of urine change 2 to 3 times daily 1 Container 11     ranitidine (ZANTAC) 300 MG tablet Take 1 tablet (300 mg) by mouth At Bedtime 90 tablet 3      VITAMIN D, CHOLECALCIFEROL, PO Take by mouth daily       Social History     Tobacco Use     Smoking status: Never Smoker     Smokeless tobacco: Never Used   Substance Use Topics     Alcohol use: No       OBJECTIVE  BP (!) 162/82 (BP Location: Left arm, Patient Position: Chair, Cuff Size: Adult Regular)   Pulse 100   Temp 97.7  F (36.5  C) (Oral)   Wt 44.8 kg (98 lb 12.8 oz)   SpO2 99%   BMI 23.82 kg/m      Physical Exam  Constitutional:       General: She is not in acute distress.     Appearance: Normal appearance. She is not ill-appearing, toxic-appearing or diaphoretic.   Neck:      Musculoskeletal: Neck supple.   Cardiovascular:      Rate and Rhythm: Normal rate and regular rhythm.      Pulses: Normal pulses.      Heart sounds: Normal heart sounds. No murmur. No friction rub. No gallop.    Pulmonary:      Effort: Pulmonary effort is normal.      Breath sounds: Normal breath sounds. No wheezing or rhonchi.   Abdominal:      General: Bowel sounds are normal. There is distension.      Palpations: Abdomen is soft. There is no mass.      Tenderness: There is abdominal tenderness. There is no right CVA tenderness, left CVA tenderness, guarding or rebound.      Hernia: No hernia is present.      Comments: epigastric tenderness with mild distention noted   Lymphadenopathy:      Cervical: No cervical adenopathy.   Skin:     General: Skin is warm.      Capillary Refill: Capillary refill takes less than 2 seconds.      Findings: No rash.   Neurological:      General: No focal deficit present.      Mental Status: She is alert and oriented to person, place, and time. Mental status is at baseline.   Psychiatric:         Mood and Affect: Mood normal.         Behavior: Behavior normal.         Labs:  Results for orders placed or performed in visit on 12/04/19 (from the past 24 hour(s))   *UA reflex to Microscopic and Culture ([facility] (except [place] and [place])   Result Value Ref Range    Color Urine  Yellow     Appearance Urine Clear     Glucose Urine Negative NEG^Negative mg/dL    Bilirubin Urine Small (A) NEG^Negative    Ketones Urine 15 (A) NEG^Negative mg/dL    Specific Gravity Urine >1.030 1.003 - 1.035    Blood Urine Negative NEG^Negative    pH Urine 5.5 5.0 - 7.0 pH    Protein Albumin Urine Trace (A) NEG^Negative mg/dL    Urobilinogen Urine 0.2 0.2 - 1.0 EU/dL    Nitrite Urine Negative NEG^Negative    Leukocyte Esterase Urine Negative NEG^Negative    Source Midstream Urine    Urine Microscopic   Result Value Ref Range    WBC Urine 5-10 (A) OTO5^0 - 5 /HPF    RBC Urine O - 2 OTO2^O - 2 /HPF    Squamous Epithelial /LPF Urine Moderate (A) FEW^Few /LPF    Bacteria Urine Many (A) NEG^Negative /HPF    Mucous Urine Present (A) NEG^Negative /LPF         ASSESSMENT:    ICD-10-CM    1. Dysuria R30.0 *UA reflex to Microscopic and Culture ([place] and [facility] (except [place] and [place])     Urine Microscopic   2. Epigastric pain R10.13 lidocaine (XYLOCAINE) 2 % 15 mL, alum & mag hydroxide-simethicone (MYLANTA ES/MAALOX  ES) 15 mL GI Cocktail   3. Acute superficial gastritis without hemorrhage K29.00         Medical Decision Making:    Differential Diagnosis:  Abdominal Pain: GERD/Ulcer    Serious Comorbid Conditions:  Adult:  None    PLAN: Discussed and reviewed urine findings. Clinical presentation most consistent with Gastritis in the setting of untreated GERD, given GI Cocktail in clinic and will treat with Prilosec as previously worked well. Advised patient to monitor diet, avoid spicy, oily foods and soda. Discussed need to remain hydrated with a progressive bland diet and advance diet as tolerated.      Advised that at this time no acute abdominal findings noted, however did discuss  red flag symptoms and when to return for follow up. Follow-up with PCP if not improving.    Radha Fregoso, APRN, CNP      Patient Instructions       Patient Education     Understanding Gastritis    Gastritis is a  painful inflammation of the stomach lining. It has a number of causes. Gastritis and its symptoms can be relieved with treatment. Work with your healthcare provider to find ways to treat your symptoms.  The Stomach  To digest the food you eat, your stomach makes strong acids and enzymes. A healthy stomach has built-in defenses that protect its lining from damage by these acids and enzymes.  When you have gastritis  Acids may damage the stomach lining when the built-in defenses of the stomach don t function as they should. The stomach lining can then become inflamed. When this happens, it is called gastritis.  Causes of gastritis  Gastritis has many causes. They may include:    Aspirin and anti-inflammatory medicines    Tobacco use    Alcohol use    Helicobacter pylori (H. pylori) bacteria    Trauma from injuries, burns, or major surgery    Critical illness or autoimmune disorders  Common symptoms  With gastritis, you may notice one or more of the following:    A burning feeling in your upper belly    Pain that happens after eating certain foods    Gas or a bloated feeling in your stomach    Frequent belching    Nausea with or without vomiting    Loss of appetite    Feeling full quickly    Fatigue  Date Last Reviewed: 7/1/2016 2000-2018 [employer]. [address] [place] [address]. All rights reserved. This information is not intended as a substitute for professional medical care. Always follow your healthcare professional's instructions.           Patient Education     Medicines for GERD  Gastroesophageal reflux disease (GERD) can be treated with medicine. This may be done with a medicine you can buy over the counter. Or it may be done with a medicine that your healthcare provider has to prescribe. In some cases, both types may be used. Your provider will tell you what is best for your symptoms.  Antacids  Antacids work to weaken the acid in your stomach and can give you quick relief. You  can buy many of them with no prescription. Antacids can be high in sodium. This may be a problem if you have high blood pressure. Some antacids also have aluminum. This should be avoided if you have long-term (chronic) kidney disease. So check with your provider first. Take antacids only when you need to, as advised by your provider.  Side effects: Constipation, diarrhea. If you take too much medicine, it can cause calcium to build up.   H-2 blockers  H-2 blockers cause the stomach to make less acid. They are often used both on demand as symptoms occur, and daily to keep symptoms away. Your provider may prescribe them if antacids don t work for you. You can buy some of them over the counter. These come in a lower dosage.  Side effects: Confusion in older adults  Proton-pump inhibitors  These also cause the stomach to make less acid. They reduce stomach acid more than H-2 blockers. They may be used for a short time, or longer to treat certain conditions. You can buy some of them over the counter. Or your provider may prescribe them. They help control GERD symptoms.  Side effects: Belly (abdominal) pain, diarrhea, upset stomach (nausea). Possible other side effects linked to long-term use and high doses.  Prokinetics  These medicines affect the movement of the digestive tract. They may be recommended if your stomach is emptying too slowly. But in most cases they are not recommended for treating GERD.   Side effects: Tiredness, depression, anxiety, problems with physical movement, belly cramps, constipation, diarrhea, a jittery feeling  Medicines to avoid  Don t take aspirin without your provider s approval. And don t take a nonsteroidal anti-inflammatory drug (NSAID), such as ibuprofen. These reduce the protective lining of your stomach. This can lead to more GERD symptoms. Check with your provider or pharmacist before taking a new medicine.   Date Last Reviewed: 7/1/2016 2000-2018 [employer] [address]  [place] [address]. All rights reserved. This information is not intended as a substitute for professional medical care. Always follow your healthcare professional's instructions.

## 2019-12-14 DIAGNOSIS — M81.0 OSTEOPOROSIS: ICD-10-CM

## 2019-12-14 NOTE — LETTER
Kerry Vega  [address] [place] [facility] [id number]          12/20/19      Dear Kerry Vega        At [facility] we care about your health and are committed to providing quality patient care. Regular appointments are a vital part of the care and management of your health and can help prevent many of the complications that can occur.      It has come to our attention that you are due for an office visit. Please call [facility] at [phone] soon to schedule your appointment.    If you have transferred care to another clinic please call to inform us so that we do not continue to send you reminder letters.      Sincerely,      [facility] Care Team

## 2019-12-14 NOTE — TELEPHONE ENCOUNTER
"Requested Prescriptions   Pending Prescriptions Disp Refills     calcium carbonate 600 mg-vitamin D 400 units (CALTRATE) 600-400 MG-UNIT per tablet [Pharmacy Med Name: CALCIUM 600-VIT D3 400 TABLET]  Last Written Prescription Date:  12/4/18  Last Fill Quantity: 180,  # refills: 3   Last Office Visit with G, P or [facility] prescribing provider:  11/27/18   Future Office Visit:      60 tablet 26     Sig: TAKE 1 TABLET BY MOUTH TWICE A DAY       Vitamin Supplements (Adult) Protocol Failed - 12/14/2019  2:11 PM        Failed - Recent (12 mo) or future (30 days) visit within the authorizing provider's specialty     Patient has had an office visit with the authorizing provider or a provider within the authorizing providers department within the previous 12 mos or has a future within next 30 days. See \"Patient Info\" tab in inbasket, or \"Choose Columns\" in Meds & Orders section of the refill encounter.              Passed - High dose Vitamin D not ordered        Passed - Medication is active on med list          "

## 2019-12-16 NOTE — TELEPHONE ENCOUNTER
Routing refill request to provider for review/approval because:  Patient needs to be seen because it has been more than 1 year since last office visit.    Eleanor Browning RN

## 2019-12-18 NOTE — TELEPHONE ENCOUNTER
Called and spoke to the patient and explained an office visit is due. She requests that I call her son Zenobia to schedule this appt. Called and left a voicemail message to schedule this appt.  Ailin Moreno MA  [facility]  2nd Floor  Primary Care

## 2019-12-20 NOTE — TELEPHONE ENCOUNTER
This writer attempted to contact Patient's son on 12/20/19      Reason for call needs an appointment and left message and sent a letter      If patient calls back:   Schedule Office Visit appointment within 2 weeks with PCP, document that pt called and close encounter         Ailin Moreno MA

## 2020-02-02 ENCOUNTER — OFFICE VISIT (OUTPATIENT)
Dept: URGENT CARE | Facility: URGENT CARE | Age: 80
End: 2020-02-02
Payer: MEDICARE

## 2020-02-02 ENCOUNTER — ANCILLARY PROCEDURE (OUTPATIENT)
Dept: GENERAL RADIOLOGY | Facility: CLINIC | Age: 80
End: 2020-02-02
Attending: PHYSICIAN ASSISTANT
Payer: MEDICARE

## 2020-02-02 VITALS
RESPIRATION RATE: 24 BRPM | BODY MASS INDEX: 22.48 KG/M2 | WEIGHT: 93.25 LBS | DIASTOLIC BLOOD PRESSURE: 76 MMHG | OXYGEN SATURATION: 96 % | SYSTOLIC BLOOD PRESSURE: 171 MMHG | HEART RATE: 108 BPM | TEMPERATURE: 98.1 F

## 2020-02-02 DIAGNOSIS — R50.9 FEVER, UNSPECIFIED FEVER CAUSE: ICD-10-CM

## 2020-02-02 DIAGNOSIS — I10 HYPERTENSION, UNSPECIFIED TYPE: ICD-10-CM

## 2020-02-02 DIAGNOSIS — R06.02 SOB (SHORTNESS OF BREATH): ICD-10-CM

## 2020-02-02 DIAGNOSIS — J18.9 PNEUMONIA OF RIGHT LOWER LOBE DUE TO INFECTIOUS ORGANISM: Primary | ICD-10-CM

## 2020-02-02 LAB
FLUAV+FLUBV AG SPEC QL: NEGATIVE
FLUAV+FLUBV AG SPEC QL: NEGATIVE
SPECIMEN SOURCE: NORMAL

## 2020-02-02 PROCEDURE — 87804 INFLUENZA ASSAY W/OPTIC: CPT | Performed by: PHYSICIAN ASSISTANT

## 2020-02-02 PROCEDURE — 71046 X-RAY EXAM CHEST 2 VIEWS: CPT

## 2020-02-02 PROCEDURE — 99213 OFFICE O/P EST LOW 20 MIN: CPT | Performed by: PHYSICIAN ASSISTANT

## 2020-02-02 RX ORDER — AZITHROMYCIN 250 MG/1
TABLET, FILM COATED ORAL
Qty: 6 TABLET | Refills: 0 | Status: SHIPPED | OUTPATIENT
Start: 2020-02-02 | End: 2020-09-18

## 2020-02-02 ASSESSMENT — ENCOUNTER SYMPTOMS
SORE THROAT: 0
WEAKNESS: 0
DIARRHEA: 0
NECK STIFFNESS: 0
ENDOCRINE NEGATIVE: 1
FEVER: 0
EYES NEGATIVE: 1
NAUSEA: 0
MYALGIAS: 0
HEADACHES: 0
CHILLS: 0
COUGH: 1
CARDIOVASCULAR NEGATIVE: 1
VOMITING: 0
SHORTNESS OF BREATH: 1
BRUISES/BLEEDS EASILY: 0
NECK PAIN: 0
PALPITATIONS: 0
BACK PAIN: 0
HEMATOLOGIC/LYMPHATIC NEGATIVE: 1
LIGHT-HEADEDNESS: 0
ARTHRALGIAS: 0
ALLERGIC/IMMUNOLOGIC NEGATIVE: 1
JOINT SWELLING: 0
DIZZINESS: 0
RHINORRHEA: 0
MUSCULOSKELETAL NEGATIVE: 1
WOUND: 0

## 2020-02-02 NOTE — PATIENT INSTRUCTIONS
Patient Education     What Is Pneumonia?    Pneumonia is a serious lung infection. Many cases of pneumonia are caused by bacteria or viruses. Fungi may also cause pneumonia, but this is less common.  You may also get pneumonia after another illness, such as a cold, flu, or bronchitis. Those most at risk include children, older adults, smokers, and people with long-term (chronic) health problems or weak immune systems.  Healthy lungs    Air travels in and out of the lungs through tubes called airways.    The tubes branch into smaller passages called bronchioles. These end in tiny sacs called alveoli.    Blood vessels surrounding the alveoli take oxygen into the bloodstream. At the same time, the alveoli remove carbon dioxide (a waste gas) from the blood. The carbon dioxide is then exhaled.    When you have pneumonia    Pneumonia causes the bronchioles and the alveoli to fill with excess mucus and become inflamed.    Your body s response may be to cough. This can help clear out the fluid.    The fluid (mucus) you cough up may appear green or dark yellow.    The excess mucus may make you feel short of breath.    The inflammation and infection may give you a fever.    What are the symptoms?  Symptoms of pneumonia can come without warning. At first, you may think you have a cold or flu. But symptoms may get worse quickly, turning into pneumonia. Symptoms can be different for bacterial and viral pneumonia. Common symptoms may include:    Severe cough with green or yellow mucus that doesn't get better, or gets worse    Fever and chills    Nausea, vomiting or diarrhea    Shortness of breath with normal daily activities    Increased heart rate    Chest pain or discomfort when breathing in or coughing    Headache    A lot of sweating and clammy skin  Date Last Reviewed: 4/1/2019 2000-2019 [employer]. [address] [place] [address], [place] [address]. All rights reserved. This information is not intended as a  substitute for professional medical care. Always follow your healthcare professional's instructions.

## 2020-02-02 NOTE — PROGRESS NOTES
Chief Complaint:     Chief Complaint   Patient presents with     Cough     Flu-like sx for about one week, since yesterday cough is really out of control per son, chest congestion, some diarrhea today     Shortness of Breath     Chest feels tight, difficulty breathing, wheezning       HPI: Kerry Vega is an 79 year old female who presents with SOB, chest congestion and cough nonproductive, occasional.  Patient is here with her son.  Symptoms began 2  days ago and have rapidly worsened.  There is no wheezing and chest pain.  Patient did not get a flu shot this year.    Recent travel?  no.      ROS:     Review of Systems   Constitutional: Negative for chills and fever.   HENT: Positive for congestion. Negative for ear pain, rhinorrhea and sore throat.    Eyes: Negative.    Respiratory: Positive for cough and shortness of breath.    Cardiovascular: Negative.  Negative for chest pain and palpitations.   Gastrointestinal: Negative for diarrhea, nausea and vomiting.   Endocrine: Negative.    Genitourinary: Negative.    Musculoskeletal: Negative.  Negative for arthralgias, back pain, joint swelling, myalgias, neck pain and neck stiffness.   Skin: Negative.  Negative for rash and wound.   Allergic/Immunologic: Negative.  Negative for immunocompromised state.   Neurological: Negative for dizziness, weakness, light-headedness and headaches.   Hematological: Negative.  Does not bruise/bleed easily.        Respiratory History  occasional episodes of bronchitis       Family History   No family history on file.     Problem history  Patient Active Problem List   Diagnosis     CARDIOVASCULAR SCREENING; LDL GOAL LESS THAN 160     Right lumbar radiculopathy     Osteoporosis     Advanced directives, counseling/discussion     Abnormal Pap smear     Dysphagia     Health Care Home     Iron deficiency anemia     Sliding hiatal hernia     Osteoarthritis of left thumb     Primary osteoarthritis of both knees     Gastroesophageal  reflux disease without esophagitis     Calculus of gallbladder without cholecystitis without obstruction     Left inguinal hernia     Bilateral posterior capsular opacification     Dermatochalasis of eyelid        Allergies  No Known Allergies     Social History  Social History     Socioeconomic History     Marital status:      Spouse name: Not on file     Number of children: Not on file     Years of education: Not on file     Highest education level: Not on file   Occupational History     Not on file   Social Needs     Financial resource strain: Not on file     Food insecurity:     Worry: Not on file     Inability: Not on file     Transportation needs:     Medical: Not on file     Non-medical: Not on file   Tobacco Use     Smoking status: Never Smoker     Smokeless tobacco: Never Used   Substance and Sexual Activity     Alcohol use: No     Drug use: No     Sexual activity: Not Currently     Partners: Male     Birth control/protection: None   Lifestyle     Physical activity:     Days per week: Not on file     Minutes per session: Not on file     Stress: Not on file   Relationships     Social connections:     Talks on phone: Not on file     Gets together: Not on file     Attends Che[employer] service: Not on file     Active member of club or organization: Not on file     Attends meetings of clubs or organizations: Not on file     Relationship status: Not on file     Intimate partner violence:     Fear of current or ex partner: Not on file     Emotionally abused: Not on file     Physically abused: Not on file     Forced sexual activity: Not on file   Other Topics Concern     Parent/sibling w/ CABG, MI or angioplasty before 65F 55M? Not Asked   Social History Narrative     Not on file        Current Meds    Current Outpatient Medications:      azithromycin (ZITHROMAX) 250 MG tablet, Two tablets first day, then one tablet daily for four days., Disp: 6 tablet, Rfl: 0     calcium carbonate 600 mg-vitamin D 400 units  (CALTRATE) 600-400 MG-UNIT per tablet, TAKE 1 TABLET BY MOUTH TWICE DAILY, Disp: 180 tablet, Rfl: 3     cyanocobalamin (VITAMIN B-12) 1000 MCG tablet, Take 1 tablet (1,000 mcg) by mouth daily, Disp: 100 tablet, Rfl: 3     omeprazole (PRILOSEC) 20 MG DR capsule, TAKE ONE CAPSULE BY MOUTH EVERY DAY, Disp: 90 capsule, Rfl: 0     ORDER FOR DME, Pull up briefs for incontinence of urine change 2 to 3 times daily, Disp: 1 Container, Rfl: 11     VITAMIN D, CHOLECALCIFEROL, PO, Take by mouth daily, Disp: , Rfl:      ASPIRIN NOT PRESCRIBED (INTENTIONAL), Reported on 4/4/2017, Disp: , Rfl:      ranitidine (ZANTAC) 300 MG tablet, Take 1 tablet (300 mg) by mouth At Bedtime (Patient not taking: Reported on 2/2/2020), Disp: 90 tablet, Rfl: 3        OBJECTIVE     Vital signs reviewed by Tang Cantor PA-C  BP (!) 171/76 (BP Location: Left arm, Patient Position: Chair, Cuff Size: Adult Small)   Pulse 108   Temp 98.1  F (36.7  C) (Oral)   Resp 24   Wt 42.3 kg (93 lb 4 oz)   SpO2 96%   Breastfeeding No   BMI 22.48 kg/m       Physical Exam  Vitals signs and nursing note reviewed.   Constitutional:       General: She is not in acute distress.     Appearance: She is well-developed. She is not ill-appearing, toxic-appearing or diaphoretic.   HENT:      Head: Normocephalic and atraumatic.      Right Ear: Tympanic membrane and external ear normal. No drainage, swelling or tenderness. Tympanic membrane is not perforated, erythematous, retracted or bulging.      Left Ear: Tympanic membrane and external ear normal. No drainage, swelling or tenderness. Tympanic membrane is not perforated, erythematous, retracted or bulging.      Nose: No mucosal edema, congestion or rhinorrhea.      Right Sinus: No maxillary sinus tenderness or frontal sinus tenderness.      Left Sinus: No maxillary sinus tenderness or frontal sinus tenderness.      Mouth/Throat:      Pharynx: No pharyngeal swelling, oropharyngeal exudate, posterior oropharyngeal  erythema or uvula swelling.      Tonsils: No tonsillar abscesses.   Eyes:      Pupils: Pupils are equal, round, and reactive to light.   Neck:      Musculoskeletal: Full passive range of motion without pain, normal range of motion and neck supple.      Trachea: Trachea normal.   Cardiovascular:      Rate and Rhythm: Normal rate and regular rhythm.      Heart sounds: Normal heart sounds, S1 normal and S2 normal. No murmur. No friction rub. No gallop.    Pulmonary:      Effort: Pulmonary effort is normal. No respiratory distress.      Breath sounds: Normal breath sounds. Decreased air movement present. No transmitted upper airway sounds. No decreased breath sounds, wheezing, rhonchi or rales.   Abdominal:      General: Bowel sounds are normal. There is no distension.      Palpations: Abdomen is soft. Abdomen is not rigid. There is no mass.      Tenderness: There is no abdominal tenderness. There is no guarding or rebound.   Lymphadenopathy:      Cervical: No cervical adenopathy.   Skin:     General: Skin is warm and dry.   Neurological:      Mental Status: She is alert and oriented to person, place, and time.      Cranial Nerves: No cranial nerve deficit.      Deep Tendon Reflexes: Reflexes are normal and symmetric.   Psychiatric:         Behavior: Behavior normal. Behavior is cooperative.         Thought Content: Thought content normal.         Judgment: Judgment normal.           Labs:     Results for orders placed or performed in visit on 02/02/20   Influenza A/B antigen     Status: None   Result Value Ref Range    Influenza A/B Agn Specimen Nasal     Influenza A Negative NEG^Negative    Influenza B Negative NEG^Negative       Medical Decision Making:    Differential Diagnosis:  URI Adult/Peds:  Bronchitis-viral, Influenza, Pneumonia and Viral upper respiratory illness        ASSESSMENT    1. Pneumonia of right lower lobe due to infectious organism (H)    2. Hypertension, unspecified type    3. Fever, unspecified  fever cause    4. SOB (shortness of breath)        PLAN    Patient presents with 2 day(s) chest congestion and cough nonproductive, occasional.    Patient is in no acute distress.    Temp is 98.1 in clinic today, lung sounds were diminished throughout, and O2 sats at 96% on RA.  Imaging to rule out pneumonia shows some infiltrates in the R lower lung per my read.  Rx for Zithromax tonight.  Infleunza was negative.  Rest, Push fluids, vaporizer, elevation of head of bed.  Ibuprofen and or Tylenol for any fever or body aches.  Over the counter cough suppressant- PRN- as discussed.   Patient is hypertensive in clinic today.  If symptoms worsen, recheck immediately otherwise follow up with your PCP in 1 week for BP recheck.  Worrisome symptoms discussed with instructions to go to the ED.  Patient and son verbalized understanding and agreed with this plan.         Tang Cantor PA-C  2/2/2020, 3:51 PM

## 2020-02-05 ENCOUNTER — OFFICE VISIT (OUTPATIENT)
Dept: FAMILY MEDICINE | Facility: CLINIC | Age: 80
End: 2020-02-05
Payer: MEDICARE

## 2020-02-05 VITALS
OXYGEN SATURATION: 99 % | BODY MASS INDEX: 21.52 KG/M2 | TEMPERATURE: 97.8 F | SYSTOLIC BLOOD PRESSURE: 128 MMHG | HEIGHT: 55 IN | RESPIRATION RATE: 16 BRPM | DIASTOLIC BLOOD PRESSURE: 67 MMHG | WEIGHT: 93 LBS | HEART RATE: 100 BPM

## 2020-02-05 DIAGNOSIS — Z13.220 LIPID SCREENING: ICD-10-CM

## 2020-02-05 DIAGNOSIS — K21.9 GASTROESOPHAGEAL REFLUX DISEASE WITHOUT ESOPHAGITIS: ICD-10-CM

## 2020-02-05 DIAGNOSIS — J20.9 ACUTE BRONCHITIS, UNSPECIFIED ORGANISM: ICD-10-CM

## 2020-02-05 DIAGNOSIS — R53.83 FATIGUE, UNSPECIFIED TYPE: ICD-10-CM

## 2020-02-05 DIAGNOSIS — Z00.00 ENCOUNTER FOR MEDICARE ANNUAL WELLNESS EXAM: Primary | ICD-10-CM

## 2020-02-05 LAB
BASOPHILS # BLD AUTO: 0.1 10E9/L (ref 0–0.2)
BASOPHILS NFR BLD AUTO: 0.9 %
DIFFERENTIAL METHOD BLD: ABNORMAL
EOSINOPHIL # BLD AUTO: 1.1 10E9/L (ref 0–0.7)
EOSINOPHIL NFR BLD AUTO: 19.3 %
ERYTHROCYTE [DISTWIDTH] IN BLOOD BY AUTOMATED COUNT: 17.8 % (ref 10–15)
HCT VFR BLD AUTO: 35.3 % (ref 35–47)
HGB BLD-MCNC: 10.8 G/DL (ref 11.7–15.7)
LYMPHOCYTES # BLD AUTO: 1.3 10E9/L (ref 0.8–5.3)
LYMPHOCYTES NFR BLD AUTO: 24.2 %
MCH RBC QN AUTO: 21.6 PG (ref 26.5–33)
MCHC RBC AUTO-ENTMCNC: 30.6 G/DL (ref 31.5–36.5)
MCV RBC AUTO: 71 FL (ref 78–100)
MONOCYTES # BLD AUTO: 0.4 10E9/L (ref 0–1.3)
MONOCYTES NFR BLD AUTO: 7.8 %
NEUTROPHILS # BLD AUTO: 2.6 10E9/L (ref 1.6–8.3)
NEUTROPHILS NFR BLD AUTO: 47.8 %
PLATELET # BLD AUTO: 314 10E9/L (ref 150–450)
RBC # BLD AUTO: 4.99 10E12/L (ref 3.8–5.2)
WBC # BLD AUTO: 5.5 10E9/L (ref 4–11)

## 2020-02-05 PROCEDURE — G0008 ADMIN INFLUENZA VIRUS VAC: HCPCS | Performed by: PREVENTIVE MEDICINE

## 2020-02-05 PROCEDURE — 80061 LIPID PANEL: CPT | Performed by: PREVENTIVE MEDICINE

## 2020-02-05 PROCEDURE — G0439 PPPS, SUBSEQ VISIT: HCPCS | Performed by: PREVENTIVE MEDICINE

## 2020-02-05 PROCEDURE — 82607 VITAMIN B-12: CPT | Performed by: PREVENTIVE MEDICINE

## 2020-02-05 PROCEDURE — 80050 GENERAL HEALTH PANEL: CPT | Performed by: PREVENTIVE MEDICINE

## 2020-02-05 PROCEDURE — 84439 ASSAY OF FREE THYROXINE: CPT | Performed by: PREVENTIVE MEDICINE

## 2020-02-05 PROCEDURE — 99213 OFFICE O/P EST LOW 20 MIN: CPT | Mod: 25 | Performed by: PREVENTIVE MEDICINE

## 2020-02-05 PROCEDURE — 36415 COLL VENOUS BLD VENIPUNCTURE: CPT | Performed by: PREVENTIVE MEDICINE

## 2020-02-05 PROCEDURE — 90662 IIV NO PRSV INCREASED AG IM: CPT | Performed by: PREVENTIVE MEDICINE

## 2020-02-05 RX ORDER — BENZONATATE 200 MG/1
200 CAPSULE ORAL 3 TIMES DAILY PRN
Qty: 30 CAPSULE | Refills: 0 | Status: SHIPPED | OUTPATIENT
Start: 2020-02-05 | End: 2020-09-18

## 2020-02-05 RX ORDER — CODEINE PHOSPHATE AND GUAIFENESIN 10; 100 MG/5ML; MG/5ML
1 SOLUTION ORAL EVERY 6 HOURS PRN
Qty: 100 ML | Refills: 0 | Status: SHIPPED | OUTPATIENT
Start: 2020-02-05 | End: 2020-09-18

## 2020-02-05 ASSESSMENT — PAIN SCALES - GENERAL: PAINLEVEL: NO PAIN (0)

## 2020-02-05 ASSESSMENT — MIFFLIN-ST. JEOR: SCORE: 723.35

## 2020-02-06 LAB
ALBUMIN SERPL-MCNC: 3.7 G/DL (ref 3.4–5)
ALP SERPL-CCNC: 81 U/L (ref 40–150)
ALT SERPL W P-5'-P-CCNC: 19 U/L (ref 0–50)
ANION GAP SERPL CALCULATED.3IONS-SCNC: 5 MMOL/L (ref 3–14)
AST SERPL W P-5'-P-CCNC: 18 U/L (ref 0–45)
BILIRUB SERPL-MCNC: 0.3 MG/DL (ref 0.2–1.3)
BUN SERPL-MCNC: 20 MG/DL (ref 7–30)
CALCIUM SERPL-MCNC: 8.9 MG/DL (ref 8.5–10.1)
CHLORIDE SERPL-SCNC: 106 MMOL/L (ref 94–109)
CHOLEST SERPL-MCNC: 216 MG/DL
CO2 SERPL-SCNC: 28 MMOL/L (ref 20–32)
CREAT SERPL-MCNC: 0.82 MG/DL (ref 0.52–1.04)
GFR SERPL CREATININE-BSD FRML MDRD: 67 ML/MIN/{1.73_M2}
GLUCOSE SERPL-MCNC: 108 MG/DL (ref 70–99)
HDLC SERPL-MCNC: 104 MG/DL
LDLC SERPL CALC-MCNC: 92 MG/DL
NONHDLC SERPL-MCNC: 112 MG/DL
POTASSIUM SERPL-SCNC: 3.7 MMOL/L (ref 3.4–5.3)
PROT SERPL-MCNC: 7.9 G/DL (ref 6.8–8.8)
SODIUM SERPL-SCNC: 139 MMOL/L (ref 133–144)
T4 FREE SERPL-MCNC: 1.3 NG/DL (ref 0.76–1.46)
TRIGL SERPL-MCNC: 101 MG/DL
TSH SERPL DL<=0.005 MIU/L-ACNC: 0.32 MU/L (ref 0.4–4)
VIT B12 SERPL-MCNC: 2898 PG/ML (ref 193–986)

## 2020-02-06 NOTE — PATIENT INSTRUCTIONS
Patient Education   Personalized Prevention Plan  You are due for the preventive services outlined below.  Your care team is available to assist you in scheduling these services.  If you have already completed any of these items, please share that information with your care team to update in your medical record.  Health Maintenance Due   Topic Date Due     Zoster (Shingles) Vaccine (2 of 3) 07/25/2007     Flu Vaccine (1) 09/01/2019     Eye Exam  10/29/2019     Annual Wellness Visit  11/27/2019     FALL RISK ASSESSMENT  11/27/2019     PHQ-2  01/01/2020     Cholesterol Lab  01/02/2020     Discuss Advance Care Planning  02/26/2020       At [facility], we strive to deliver an exceptional experience to you, every time we see you. If you receive a survey, please complete it as we do value your feedback.  If you have MyChart, you can expect to receive results automatically within 24 hours of their completion.  Your provider will send a note interpreting your results as well.   If you do not have MyChart, you should receive your results in about a week by mail.    Your care team:                            Family Medicine Internal Medicine   MD Regan Stearns MD Shantel Branch-Fleming, MD Katya Georgiev PA-C Megan Hill, APRN CNP    Shadi Cotton MD Pediatrics   Jesse Stone PAMANINDER Higgins, MD Stefanie Benedict APRN CNP   MD Anna Farris MD Deborah Mielke, MD Kim Thein, APRN [facility]      Clinic hours: Monday - Thursday 7 am-7 pm; Fridays 7 am-5 pm.   Urgent care: Monday - Friday 11 am-9 pm; Saturday and Sunday 9 am-5 pm.  Pharmacy : Monday -Thursday 8 am-8 pm; Friday 8 am-6 pm; Saturday and Sunday 9 am-5 pm.     Clinic: [phone]   Pharmacy: [phone]

## 2020-02-06 NOTE — NURSING NOTE
Prior to immunization administration, verified patients identity using patient s name and date of birth. Please see Immunization Activity for additional information.     Screening Questionnaire for Adult Immunization    Are you sick today?   No   Do you have allergies to medications, food, a vaccine component or latex?   No   Have you ever had a serious reaction after receiving a vaccination?   No   Do you have a long-term health problem with heart, lung, kidney, or metabolic disease (e.g., diabetes), asthma, a blood disorder, no spleen, complement component deficiency, a cochlear implant, or a spinal fluid leak?  Are you on long-term aspirin therapy?   No   Do you have cancer, leukemia, HIV/AIDS, or any other immune system problem?   No   Do you have a parent, brother, or sister with an immune system problem?   No   In the past 3 months, have you taken medications that affect  your immune system, such as prednisone, other steroids, or anticancer drugs; drugs for the treatment of rheumatoid arthritis, Crohn s disease, or psoriasis; or have you had radiation treatments?   No   Have you had a seizure, or a brain or other nervous system problem?   No   During the past year, have you received a transfusion of blood or blood    products, or been given immune (gamma) globulin or antiviral drug?   No   For women: Are you pregnant or is there a chance you could become       pregnant during the next month?   No   Have you received any vaccinations in the past 4 weeks?   No     Immunization questionnaire answers were all negative.        Per orders of Dr. Camarena, injection of Flu vaccine given by Muna Cobb. Patient instructed to remain in clinic for 15 minutes afterwards, and to report any adverse reaction to me immediately.       Screening performed by Muna Cobb on 2/6/2020 at 8:12 AM.

## 2020-02-06 NOTE — TELEPHONE ENCOUNTER
"Requested Prescriptions   Pending Prescriptions Disp Refills     omeprazole (PRILOSEC) 20 MG DR capsule [Pharmacy Med Name: OMEPRAZOLE DR 20 MG CAPSULE] 90 capsule 0     Sig: TAKE 1 CAPSULE BY MOUTH EVERY DAY         Last Written Prescription Date:  10/23/19  Last Fill Quantity: 90,  # refills: 0   Last Office Visit with [facility], [facility] or [facility] prescribing provider:  2/5/20   Future Office Visit:         PPI Protocol Failed - 2/5/2020  7:46 PM        Failed - No diagnosis of osteoporosis on record        Passed - Not on Clopidogrel (unless Pantoprazole ordered)        Passed - Recent (12 mo) or future (30 days) visit within the authorizing provider's specialty     Patient has had an office visit with the authorizing provider or a provider within the authorizing providers department within the previous 12 mos or has a future within next 30 days. See \"Patient Info\" tab in inbasket, or \"Choose Columns\" in Meds & Orders section of the refill encounter.              Passed - Medication is active on med list        Passed - Patient is age 18 or older        Passed - No active pregnacy on record        Passed - No positive pregnancy test in past 12 months              Lazaro Faarax  [facility]  "

## 2020-02-06 NOTE — PROGRESS NOTES
"  SUBJECTIVE:   Kerry Vega is a 79 year old female who presents for Preventive Visit.    Are you in the first 12 months of your Medicare Part B coverage?  No    Physical Health:    In general, how would you rate your overall physical health? good    Outside of work, how many days during the week do you exercise? 4-5 days/week    Outside of work, approximately how many minutes a day do you exercise?45-60 minutes    If you drink alcohol do you typically have >3 drinks per day or >7 drinks per week? Not Applicable    Do you usually eat at least 4 servings of fruit and vegetables a day, include whole grains & fiber and avoid regularly eating high fat or \"junk\" foods? NO    Do you have any problems taking medications regularly?  No    Do you have any side effects from medications? none    Needs assistance for the following daily activities: transportation    Which of the following safety concerns are present in your home?  lack of grab bars in the bathroom     Hearing impairment: No    In the past 6 months, have you been bothered by leaking of urine? no    Mental Health:    In general, how would you rate your overall mental or emotional health? good  PHQ-2 Score:      Do you feel safe in your environment? Yes    Have you ever done Advance Care Planning? (For example, a Health Directive, POLST, or a discussion with a medical provider or your loved ones about your wishes): No, advance care planning information given to patient to review.  Patient declined advance care planning discussion at this time.    Additional concerns to address?  No    Fall risk:  Fallen 2 or more times in the past year?: No  Any fall with injury in the past year?: No    Cognitive Screening:   Deferred due to language barrier     Mini-CogTM Copyright POP [employer]not[employer]. Licensed by the author for use in [facility]; reprinted with permission (david@.[email or web address]). All rights reserved.      Do you have sleep apnea, excessive snoring or daytime " drowsiness?: no        Cough:  -seen in Urgent Care on 2/2/2020  -Influenza negative  -Chest X ray did not show any definite infiltrate  -does have a large hiatal hernia  -Is on Azithromycin  -cough is not better  -preventing sleep at night  -has lost weight since 11/2018  -decreased appetite       Reviewed and updated as needed this visit by clinical staff  Tobacco  Allergies  Meds  Problems  Med Hx  Surg Hx  Fam Hx  Soc Hx          Reviewed and updated as needed this visit by Provider  Tobacco  Allergies  Meds  Problems  Med Hx  Surg Hx  Fam Hx        Social History     Tobacco Use     Smoking status: Never Smoker     Smokeless tobacco: Never Used   Substance Use Topics     Alcohol use: No                           Current providers sharing in care for this patient include:   Patient Care Team:  Kiana Camarena MD as PCP - General (Family Practice)  Shadi Cotton MD as Assigned PCP    The following health maintenance items are reviewed in Epic and correct as of today:  Health Maintenance   Topic Date Due     ZOSTER IMMUNIZATION (2 of 3) 07/25/2007     INFLUENZA VACCINE (1) 09/01/2019     EYE EXAM  10/29/2019     MEDICARE ANNUAL WELLNESS VISIT  11/27/2019     FALL RISK ASSESSMENT  11/27/2019     PHQ-2  01/01/2020     LIPID  01/02/2020     ADVANCE CARE PLANNING  02/26/2020     DTAP/TDAP/TD IMMUNIZATION (2 - Td) 10/13/2020     DEXA  Completed     PNEUMOCOCCAL IMMUNIZATION 65+ LOW/MEDIUM RISK  Completed     IPV IMMUNIZATION  Aged Out     MENINGITIS IMMUNIZATION  Aged Out     Lab work is in process  Labs reviewed in EPIC  BP Readings from Last 3 Encounters:   02/05/20 128/67   02/02/20 (!) 171/76   12/04/19 (!) 162/82    Wt Readings from Last 3 Encounters:   02/05/20 42.2 kg (93 lb)   02/02/20 42.3 kg (93 lb 4 oz)   12/04/19 44.8 kg (98 lb 12.8 oz)                  Patient Active Problem List   Diagnosis     CARDIOVASCULAR SCREENING; LDL GOAL LESS THAN 160     Right lumbar radiculopathy     Osteoporosis      Advanced directives, counseling/discussion     Abnormal Pap smear     Dysphagia     Health Care Home     Iron deficiency anemia     Sliding hiatal hernia     Osteoarthritis of left thumb     Primary osteoarthritis of both knees     Gastroesophageal reflux disease without esophagitis     Calculus of gallbladder without cholecystitis without obstruction     Left inguinal hernia     Bilateral posterior capsular opacification     Dermatochalasis of eyelid     Past Surgical History:   Procedure Laterality Date     CATARACT IOL, RT/LT       COLONOSCOPY  1/29/2013    Procedure: COLONOSCOPY;;  Surgeon: Edgard Prieto MD;  Location: [facility] OR     ESOPHAGOSCOPY, GASTROSCOPY, DUODENOSCOPY (EGD), COMBINED  1/20/2012    Procedure:COMBINED ESOPHAGOSCOPY, GASTROSCOPY, DUODENOSCOPY (EGD); EGD, dysphagia; Surgeon:SHEREE VALLE; Location:[facility] OR     ESOPHAGOSCOPY, GASTROSCOPY, DUODENOSCOPY (EGD), COMBINED  7/18/2012    Procedure: COMBINED ESOPHAGOSCOPY, GASTROSCOPY, DUODENOSCOPY (EGD);  EGD for dysphagia rp Tonio;  Surgeon: Rey Bee MD;  Location: [facility]     ESOPHAGOSCOPY, GASTROSCOPY, DUODENOSCOPY (EGD), COMBINED  1/29/2013    Procedure: COMBINED ESOPHAGOSCOPY, GASTROSCOPY, DUODENOSCOPY (EGD), BIOPSY SINGLE OR MULTIPLE;  Colonoscopy, EGD, dysphagia;  Surgeon: Edgard Prieto MD;  Location: [facility] OR     INCISION AND DRAINAGE LOWER EXTREMITY, COMBINED Left 3/21/2018    Procedure: COMBINED INCISION AND DRAINAGE LOWER EXTREMITY;  Excision of left intramuscular thigh mass; removal of anterior femoral bone spur;  Surgeon: Ray Diaz MD;  Location: [facility] OR       Social History     Tobacco Use     Smoking status: Never Smoker     Smokeless tobacco: Never Used   Substance Use Topics     Alcohol use: No     Family History   Family history unknown: Yes         Current Outpatient Medications   Medication Sig Dispense Refill     azithromycin (ZITHROMAX) 250 MG tablet Two tablets first day, then one tablet daily for four days.  "6 tablet 0     benzonatate (TESSALON) 200 MG capsule Take 1 capsule (200 mg) by mouth 3 times daily as needed for cough 30 capsule 0     calcium carbonate 600 mg-vitamin D 400 units (CALTRATE) 600-400 MG-UNIT per tablet TAKE 1 TABLET BY MOUTH TWICE DAILY 180 tablet 3     cyanocobalamin (VITAMIN B-12) 1000 MCG tablet Take 1 tablet (1,000 mcg) by mouth daily 100 tablet 3     guaiFENesin-codeine (ROBITUSSIN AC) 100-10 MG/5ML solution Take 5 mLs by mouth every 6 hours as needed for cough 100 mL 0     omeprazole (PRILOSEC) 20 MG DR capsule TAKE ONE CAPSULE BY MOUTH EVERY DAY 90 capsule 0     VITAMIN D, CHOLECALCIFEROL, PO Take by mouth daily       ASPIRIN NOT PRESCRIBED (INTENTIONAL) Reported on 4/4/2017       ORDER FOR DME Pull up briefs for incontinence of urine change 2 to 3 times daily (Patient not taking: Reported on 2/5/2020) 1 Container 11     ranitidine (ZANTAC) 300 MG tablet Take 1 tablet (300 mg) by mouth At Bedtime (Patient not taking: Reported on 2/2/2020) 90 tablet 3     No Known Allergies  Pneumonia Vaccine:Adults age 65+ who received Pneumovax (PPSV23) at 65 years or older: Should be given PCV13 > 1 year after their most recent PPSV23  Mammogram Screening: Patient over age 75, has elected to stop mammography screening.    ROS:  Constitutional, HEENT, cardiovascular, pulmonary, gi and gu systems are negative, except as otherwise noted.    OBJECTIVE:   /67 (BP Location: Left arm, Patient Position: Sitting, Cuff Size: Adult Regular)   Pulse 100   Temp 97.8  F (36.6  C) (Oral)   Resp 16   Ht 1.38 m (4' 6.33\")   Wt 42.2 kg (93 lb)   LMP  (LMP Unknown)   SpO2 99%   Breastfeeding No   BMI 22.15 kg/m   Estimated body mass index is 22.15 kg/m  as calculated from the following:    Height as of this encounter: 1.38 m (4' 6.33\").    Weight as of this encounter: 42.2 kg (93 lb).  EXAM:   GENERAL APPEARANCE: elderly female, intermittent cough+   HENT: no pharyngeal exudates or pus points   NECK:  trachea " "midline and normal to palpation  RESP: decreased air entry at bases   CV: regular rates and rhythm, normal S1 S2  ABDOMEN: soft, non-tender and no rebound or guarding, left inguinal hernia++  MS: extremities normal- no gross deformities noted and peripheral pulses normal  SKIN: no suspicious lesions or rashes  NEURO: Normal strength and tone, mentation intact and speech normal  PSYCH: mentation appears normal      Diagnostic Test Results:  Labs reviewed in Epic  No results found for this or any previous visit (from the past 24 hour(s)).    ASSESSMENT / PLAN:   Kerry was seen today for physical.    Diagnoses and all orders for this visit:    Encounter for Medicare annual wellness exam    Gastroesophageal reflux disease without esophagitis  -has a large hiatal hernia  -symptoms controlled with Omeprazole  -patient does have osteoporosis, understands increased risk of fractures with proton pump inhibitor     Acute bronchitis, unspecified organism  -     benzonatate (TESSALON) 200 MG capsule; Take 1 capsule (200 mg) by mouth 3 times daily as needed for cough  -     guaiFENesin-codeine (ROBITUSSIN AC) 100-10 MG/5ML solution; Take 5 mLs by mouth every 6 hours as needed for cough  -Chest X ray done 3 days ago     Fatigue, unspecified type  -     CBC with platelets differential  -     Comprehensive metabolic panel  -     TSH with free T4 reflex  -     Vitamin B12    Lipid screening  -     Lipid panel reflex to direct LDL Fasting    Other orders  -     HC FLU VACCINE, INCREASED ANTIGEN, PRESV FREE [20375]    COUNSELING:  Reviewed preventive health counseling, as reflected in patient instructions       Healthy diet/nutrition       Fall risk prevention       Immunizations    Vaccinated for: Influenza          Estimated body mass index is 22.15 kg/m  as calculated from the following:    Height as of this encounter: 1.38 m (4' 6.33\").    Weight as of this encounter: 42.2 kg (93 lb).    Weight management plan noted, stable and " monitoring     reports that she has never smoked. She has never used smokeless tobacco.      Appropriate preventive services were discussed with this patient, including applicable screening as appropriate for cardiovascular disease, diabetes, osteopenia/osteoporosis, and glaucoma.  As appropriate for age/gender, discussed screening for colorectal cancer, prostate cancer, breast cancer, and cervical cancer. Checklist reviewing preventive services available has been given to the patient.    Reviewed patients plan of care and provided an AVS. The Basic Care Plan (routine screening as documented in Health Maintenance) for Kerry meets the Care Plan requirement. This Care Plan has been established and reviewed with the Patient.    Counseling Resources:  ATP IV Guidelines  Pooled Cohorts Equation Calculator  Breast Cancer Risk Calculator  FRAX Risk Assessment  ICSI Preventive Guidelines  Dietary Guidelines for Americans, 2010  USDA's MyPlate  ASA Prophylaxis  Lung CA Screening    Kiana Camarena MD MPH    [facility]

## 2020-02-07 NOTE — TELEPHONE ENCOUNTER
Routing refill request to provider for review/approval because:  Osteoporosis is in the problem list.    Elba Ag RN, [facility] Triage

## 2020-02-08 NOTE — RESULT ENCOUNTER NOTE
Please send a letter:    Dear Kerry Vega,    Vitamin B 12 levels are high, avoid over the counter Vitamin B 12 supplements.  Electrolytes, glucose, kidney function, liver function, and free thyroid hormone are in a normal range.  Cholesterol is at goal.  Basic blood count is showing anemia, stable for you.   Plan of care and follow up as discussed in clinic.     Regards,    Kiana Camarena MD MPH     - When auscultating her posterior chest this morning she is very stiff when trying to sit her up  - Patient on Remeron for sleep which was started in the nursing home a couple weeks ago as per son; today concern for EPS and stopped medication  - f/u PT maricruz trinh

## 2020-04-17 ENCOUNTER — TRANSFERRED RECORDS (OUTPATIENT)
Dept: HEALTH INFORMATION MANAGEMENT | Facility: CLINIC | Age: 80
End: 2020-04-17

## 2020-05-22 DIAGNOSIS — D51.8 VITAMIN B12 DEFICIENCY (DIETARY) ANEMIA: ICD-10-CM

## 2020-05-26 RX ORDER — LANOLIN ALCOHOL/MO/W.PET/CERES
CREAM (GRAM) TOPICAL
Qty: 90 TABLET | Refills: 2 | Status: SHIPPED | OUTPATIENT
Start: 2020-05-26

## 2020-05-26 NOTE — TELEPHONE ENCOUNTER
Prescription approved per G Refill Protocol.    Eleanor Sewell RN  [facility]

## 2020-09-16 DIAGNOSIS — R79.89 ELEVATED VITAMIN B12 LEVEL: ICD-10-CM

## 2020-09-16 DIAGNOSIS — D50.9 IRON DEFICIENCY ANEMIA, UNSPECIFIED IRON DEFICIENCY ANEMIA TYPE: ICD-10-CM

## 2020-09-16 DIAGNOSIS — R94.6 THYROID FUNCTION TEST ABNORMAL: ICD-10-CM

## 2020-09-16 DIAGNOSIS — K21.9 GASTROESOPHAGEAL REFLUX DISEASE WITHOUT ESOPHAGITIS: ICD-10-CM

## 2020-09-16 LAB
ANION GAP SERPL CALCULATED.3IONS-SCNC: 4 MMOL/L (ref 3–14)
BASOPHILS # BLD AUTO: 0 10E9/L (ref 0–0.2)
BASOPHILS NFR BLD AUTO: 1 %
BUN SERPL-MCNC: 25 MG/DL (ref 7–30)
CALCIUM SERPL-MCNC: 8.8 MG/DL (ref 8.5–10.1)
CHLORIDE SERPL-SCNC: 109 MMOL/L (ref 94–109)
CO2 SERPL-SCNC: 27 MMOL/L (ref 20–32)
CREAT SERPL-MCNC: 0.76 MG/DL (ref 0.52–1.04)
DIFFERENTIAL METHOD BLD: ABNORMAL
EOSINOPHIL # BLD AUTO: 0.1 10E9/L (ref 0–0.7)
EOSINOPHIL NFR BLD AUTO: 3.4 %
ERYTHROCYTE [DISTWIDTH] IN BLOOD BY AUTOMATED COUNT: 18.4 % (ref 10–15)
GFR SERPL CREATININE-BSD FRML MDRD: 74 ML/MIN/{1.73_M2}
GLUCOSE SERPL-MCNC: 84 MG/DL (ref 70–99)
HCT VFR BLD AUTO: 28.4 % (ref 35–47)
HGB BLD-MCNC: 8 G/DL (ref 11.7–15.7)
LYMPHOCYTES # BLD AUTO: 1.4 10E9/L (ref 0.8–5.3)
LYMPHOCYTES NFR BLD AUTO: 37.1 %
MCH RBC QN AUTO: 19.7 PG (ref 26.5–33)
MCHC RBC AUTO-ENTMCNC: 28.2 G/DL (ref 31.5–36.5)
MCV RBC AUTO: 70 FL (ref 78–100)
MONOCYTES # BLD AUTO: 0.3 10E9/L (ref 0–1.3)
MONOCYTES NFR BLD AUTO: 7.3 %
NEUTROPHILS # BLD AUTO: 2 10E9/L (ref 1.6–8.3)
NEUTROPHILS NFR BLD AUTO: 51.2 %
PLATELET # BLD AUTO: 259 10E9/L (ref 150–450)
POTASSIUM SERPL-SCNC: 4.3 MMOL/L (ref 3.4–5.3)
RBC # BLD AUTO: 4.07 10E12/L (ref 3.8–5.2)
SODIUM SERPL-SCNC: 140 MMOL/L (ref 133–144)
TSH SERPL DL<=0.005 MIU/L-ACNC: 0.66 MU/L (ref 0.4–4)
VIT B12 SERPL-MCNC: 328 PG/ML (ref 193–986)
WBC # BLD AUTO: 3.8 10E9/L (ref 4–11)

## 2020-09-16 PROCEDURE — 82607 VITAMIN B-12: CPT | Performed by: PREVENTIVE MEDICINE

## 2020-09-16 PROCEDURE — 85025 COMPLETE CBC W/AUTO DIFF WBC: CPT | Performed by: PREVENTIVE MEDICINE

## 2020-09-16 PROCEDURE — 36415 COLL VENOUS BLD VENIPUNCTURE: CPT | Performed by: PREVENTIVE MEDICINE

## 2020-09-16 PROCEDURE — 84443 ASSAY THYROID STIM HORMONE: CPT | Performed by: PREVENTIVE MEDICINE

## 2020-09-16 PROCEDURE — 80048 BASIC METABOLIC PNL TOTAL CA: CPT | Performed by: PREVENTIVE MEDICINE

## 2020-09-17 NOTE — RESULT ENCOUNTER NOTE
Noted, will discuss with patient tomorrow during clinic visit on 9/18/2020. Past history of iron deficiency anemia.   Kiana Camarena MD MPH

## 2020-09-18 ENCOUNTER — OFFICE VISIT (OUTPATIENT)
Dept: FAMILY MEDICINE | Facility: CLINIC | Age: 80
End: 2020-09-18
Payer: MEDICARE

## 2020-09-18 VITALS
DIASTOLIC BLOOD PRESSURE: 65 MMHG | WEIGHT: 93 LBS | BODY MASS INDEX: 22.15 KG/M2 | OXYGEN SATURATION: 100 % | HEART RATE: 93 BPM | TEMPERATURE: 98.1 F | SYSTOLIC BLOOD PRESSURE: 107 MMHG

## 2020-09-18 DIAGNOSIS — Z23 NEED FOR PROPHYLACTIC VACCINATION AND INOCULATION AGAINST INFLUENZA: ICD-10-CM

## 2020-09-18 DIAGNOSIS — K40.90 LEFT INGUINAL HERNIA: ICD-10-CM

## 2020-09-18 DIAGNOSIS — K21.9 GASTROESOPHAGEAL REFLUX DISEASE WITHOUT ESOPHAGITIS: ICD-10-CM

## 2020-09-18 DIAGNOSIS — M81.0 OSTEOPOROSIS WITHOUT CURRENT PATHOLOGICAL FRACTURE, UNSPECIFIED OSTEOPOROSIS TYPE: ICD-10-CM

## 2020-09-18 DIAGNOSIS — D51.8 VITAMIN B12 DEFICIENCY (DIETARY) ANEMIA: ICD-10-CM

## 2020-09-18 DIAGNOSIS — D50.9 IRON DEFICIENCY ANEMIA, UNSPECIFIED IRON DEFICIENCY ANEMIA TYPE: Primary | ICD-10-CM

## 2020-09-18 PROCEDURE — G0008 ADMIN INFLUENZA VIRUS VAC: HCPCS | Performed by: PREVENTIVE MEDICINE

## 2020-09-18 PROCEDURE — 99214 OFFICE O/P EST MOD 30 MIN: CPT | Mod: 25 | Performed by: PREVENTIVE MEDICINE

## 2020-09-18 PROCEDURE — 90662 IIV NO PRSV INCREASED AG IM: CPT | Performed by: PREVENTIVE MEDICINE

## 2020-09-18 RX ORDER — FAMOTIDINE 20 MG/1
20 TABLET, FILM COATED ORAL 2 TIMES DAILY
Qty: 180 TABLET | Refills: 0 | Status: SHIPPED | OUTPATIENT
Start: 2020-09-18 | End: 2021-03-04

## 2020-09-18 RX ORDER — CHOLECALCIFEROL (VITAMIN D3) 125 MCG
CAPSULE ORAL
Qty: 90 TABLET | Refills: 0 | Status: SHIPPED | OUTPATIENT
Start: 2020-09-18 | End: 2022-02-16

## 2020-09-18 ASSESSMENT — PAIN SCALES - GENERAL: PAINLEVEL: NO PAIN (0)

## 2020-09-18 NOTE — PROGRESS NOTES
"  SUBJECTIVE:   Kerry Vega is a 80 year old female who presents for Preventive Visit.    {Split Bill scripting  The purpose of this visit is to discuss your medical history and prevent health problems before you are sick. You may be responsible for a co-pay, coinsurance, or deductible if your visit today includes services such as checking on a sore throat, having an x-ray or lab test, or treating and evaluating a new or existing condition :526002}  Patient has been advised of split billing requirements and indicates understanding: {Yes and No:230114}  Are you in the first 12 months of your Medicare Part B coverage?  { :682200::\"No\"}    Physical Health:    In general, how would you rate your overall physical health? { :949370}    Outside of work, how many days during the week do you exercise? { :679919}    Outside of work, approximately how many minutes a day do you exercise?{ :135071}    If you drink alcohol do you typically have >3 drinks per day or >7 drinks per week? { :022252}    Do you usually eat at least 4 servings of fruit and vegetables a day, include whole grains & fiber and avoid regularly eating high fat or \"junk\" foods? { :122969::\"Yes\"}    Do you have any problems taking medications regularly?  { :243387::\"No\"}    Do you have any side effects from medications? { :688504}    Needs assistance for the following daily activities: { :270635}    Which of the following safety concerns are present in your home?  { :427896::\"none identified\"}     Hearing impairment: { :488572}    In the past 6 months, have you been bothered by leaking of urine? { :427138}    Mental Health:    In general, how would you rate your overall mental or emotional health? { :381130}  PHQ-2 Score:      Do you feel safe in your environment? { :983849}    Have you ever done Advance Care Planning? (For example, a Health Directive, POLST, or a discussion with a medical provider or your loved ones about your wishes): { " ":613409}    Additional concerns to address?  {If YES, notify patient they may be responsible for a copay, coinsurance or deductible if the visit today includes services such as checking on a sore throat, having an x-ray or lab test, or treating and evaluating a new or existing condition :825549::\"No\"}    Fall risk:  { :592697}  {If any of the above assessments are answered yes, consider ordering appropriate referrals (Optional):084855::\"click delete button to remove this line now\"}  Cognitive Screening: { :760346}    {Do you have sleep apnea, excessive snoring or daytime drowsiness? (Optional):726400}    {Outside tests to abstract? :990160}    {additional problems to add (Optional):991916}    Reviewed and updated as needed this visit by clinical staff         Reviewed and updated as needed this visit by Provider        Social History     Tobacco Use     Smoking status: Never Smoker     Smokeless tobacco: Never Used   Substance Use Topics     Alcohol use: No                           Current providers sharing in care for this patient include: {Rooming staff:  Please update Care Team in Rooming Activity, refresh this note and then delete this statement}  Patient Care Team:  Kiana Camarena MD as PCP - General (Family Practice)  Kiana Camarena MD as Assigned PCP    The following health maintenance items are reviewed in Epic and correct as of today:  Health Maintenance   Topic Date Due     ZOSTER IMMUNIZATION (2 of 3) 07/25/2007     EYE EXAM  10/29/2019     INFLUENZA VACCINE (1) 09/01/2020     DTAP/TDAP/TD IMMUNIZATION (3 - Td) 10/13/2020     MEDICARE ANNUAL WELLNESS VISIT  02/05/2021     FALL RISK ASSESSMENT  02/05/2021     ADVANCE CARE PLANNING  02/06/2025     DEXA  Completed     PHQ-2  Completed     PNEUMOCOCCAL IMMUNIZATION 65+ LOW/MEDIUM RISK  Completed     IPV IMMUNIZATION  Aged Out     MENINGITIS IMMUNIZATION  Aged Out     HEPATITIS B IMMUNIZATION  Aged Out     {Chronicprobdata (Optional):942237}  {Decision " "Support (Optional):972316}    ROS:  {ROS COMP:155720}    OBJECTIVE:   LMP  (LMP Unknown)  Estimated body mass index is 22.15 kg/m  as calculated from the following:    Height as of 20: 1.38 m (4' 6.33\").    Weight as of 20: 42.2 kg (93 lb).  EXAM:   {Exam :597863}    {Diagnostic Test Results (Optional):671970::\"Diagnostic Test Results:\",\"Labs reviewed in Epic\"}    ASSESSMENT / PLAN:   {Diag Picklist:891660}    Patient has been advised of split billing requirements and indicates understanding: {YES / NO:285432::\"Yes\"}    COUNSELING:  {Medicare Counselin}    Estimated body mass index is 22.15 kg/m  as calculated from the following:    Height as of 20: 1.38 m (4' 6.33\").    Weight as of 20: 42.2 kg (93 lb).    {Weight Management Plan (ACO) Complete if BMI is abnormal-  Ages 18-64  BMI >24.9.  Age 65+ with BMI <23 or >30 (Optional):523310}    She reports that she has never smoked. She has never used smokeless tobacco.    Appropriate preventive services were discussed with this patient, including applicable screening as appropriate for cardiovascular disease, diabetes, osteopenia/osteoporosis, and glaucoma.  As appropriate for age/gender, discussed screening for colorectal cancer, prostate cancer, breast cancer, and cervical cancer. Checklist reviewing preventive services available has been given to the patient.    Reviewed patients plan of care and provided an AVS. The {CarePlan:599456} for Kerry meets the Care Plan requirement. This Care Plan has been established and reviewed with the {PATIENT, FAMILY MEMBER, CAREGIVER:029140}.    Counseling Resources:  ATP IV Guidelines  Pooled Cohorts Equation Calculator  Breast Cancer Risk Calculator  BRCA-Related Cancer Risk Assessment: FHS-7 Tool  FRAX Risk Assessment  ICSI Preventive Guidelines  Dietary Guidelines for Americans,   USDA's MyPlate  ASA Prophylaxis  Lung CA Screening    Kiana Camarena MD  [facility]  "

## 2020-09-18 NOTE — NURSING NOTE
"Chief Complaint   Patient presents with     Wellness Visit       Initial /65   Pulse 93   Temp 98.1  F (36.7  C) (Oral)   Wt 42.2 kg (93 lb)   LMP  (LMP Unknown)   SpO2 100%   BMI 22.15 kg/m   Estimated body mass index is 22.15 kg/m  as calculated from the following:    Height as of 2/5/20: 1.38 m (4' 6.33\").    Weight as of this encounter: 42.2 kg (93 lb).  Medication Reconciliation: complete  Hallie Bansal, [facility]  "

## 2020-09-18 NOTE — PATIENT INSTRUCTIONS
Patient Education   Personalized Prevention Plan  You are due for the preventive services outlined below.  Your care team is available to assist you in scheduling these services.  If you have already completed any of these items, please share that information with your care team to update in your medical record.  Health Maintenance Due   Topic Date Due     Zoster (Shingles) Vaccine (2 of 3) 07/25/2007     Eye Exam  10/29/2019     Flu Vaccine (1) 09/01/2020

## 2020-09-18 NOTE — PROGRESS NOTES
Kerry Vega is a 80 year old female who is being evaluated in clinic today.        Subjective     Kerry Vega is a 80 year old female who presents for a clinic visit today for the following health issues:    Patient was scheduled for a Medicare Wellness visit, however, this was already done 2/2020 so changed to office visit     HPI      Vitamin B 12 elevated:  -levels improved  -some tingling in the legs  -labs discussed with the patient and family member     Iron deficiency anemia:  -not on supplements due to constipation from iron supplements  -no melena  -no rectal bleeding  -No severe abdominal pain  -no emesis  -no fever or chills  -no night sweats   -has had anemia or some time  -Endoscopy in 2013 and colonoscopy in 2013  -no dysphagia or odynophagia     Hiatal hernia:  -2013 had endoscopy   -has not been able to eat  -No emesis  -No melena  -Some dysphagia  -no odynophagia     Some leg swelling:  -towards the end of the day  -Always on her feet  -Active the whole day  -has not used any compression socks       Review of Systems   Constitutional, HEENT, cardiovascular, pulmonary, gi and gu systems are negative, except as otherwise noted.       Objective        /65   Pulse 93   Temp 98.1  F (36.7  C) (Oral)   Wt 42.2 kg (93 lb)   LMP  (LMP Unknown)   SpO2 100%   BMI 22.15 kg/m        GENERAL APPEARANCE: frail, pale+  EYES: Eyes grossly normal to inspection  NECK: no adenopathy and trachea midline and normal to palpation  RESP: lungs clear to auscultation - no rales, rhonchi or wheezes  CV: regular rates and rhythm, normal S1 S2, no S3 or S4 and no murmur, click or rub  ABDOMEN: soft, non-tender and no rebound or guarding, left inguinal hernia+ no marked tenderness, not able to reduce  MS: trace pedal edema bilaterally   SKIN: no suspicious lesions or rashes  NEURO: Normal strength and tone, mentation intact and speech normal  PSYCH: mentation appears normal        No results found for  any visits on 09/18/20.     Orders Only on 09/16/2020   Component Date Value Ref Range Status     TSH 09/16/2020 0.66  0.40 - 4.00 mU/L Final     WBC 09/16/2020 3.8* 4.0 - 11.0 10e9/L Final     RBC Count 09/16/2020 4.07  3.8 - 5.2 10e12/L Final     Hemoglobin 09/16/2020 8.0* 11.7 - 15.7 g/dL Final     Hematocrit 09/16/2020 28.4* 35.0 - 47.0 % Final     MCV 09/16/2020 70* 78 - 100 fl Final     MCH 09/16/2020 19.7* 26.5 - 33.0 pg Final     MCHC 09/16/2020 28.2* 31.5 - 36.5 g/dL Final    Results confirmed by repeat test     RDW 09/16/2020 18.4* 10.0 - 15.0 % Final     Platelet Count 09/16/2020 259  150 - 450 10e9/L Final     % Neutrophils 09/16/2020 51.2  % Final     % Lymphocytes 09/16/2020 37.1  % Final     % Monocytes 09/16/2020 7.3  % Final     % Eosinophils 09/16/2020 3.4  % Final     % Basophils 09/16/2020 1.0  % Final     Absolute Neutrophil 09/16/2020 2.0  1.6 - 8.3 10e9/L Final     Absolute Lymphocytes 09/16/2020 1.4  0.8 - 5.3 10e9/L Final     Absolute Monocytes 09/16/2020 0.3  0.0 - 1.3 10e9/L Final     Absolute Eosinophils 09/16/2020 0.1  0.0 - 0.7 10e9/L Final     Absolute Basophils 09/16/2020 0.0  0.0 - 0.2 10e9/L Final     Diff Method 09/16/2020 Automated Method   Final     Sodium 09/16/2020 140  133 - 144 mmol/L Final     Potassium 09/16/2020 4.3  3.4 - 5.3 mmol/L Final     Chloride 09/16/2020 109  94 - 109 mmol/L Final     Carbon Dioxide 09/16/2020 27  20 - 32 mmol/L Final     Anion Gap 09/16/2020 4  3 - 14 mmol/L Final     Glucose 09/16/2020 84  70 - 99 mg/dL Final    Fasting specimen     Urea Nitrogen 09/16/2020 25  7 - 30 mg/dL Final     Creatinine 09/16/2020 0.76  0.52 - 1.04 mg/dL Final     GFR Estimate 09/16/2020 74  >60 mL/min/[1.73_m2] Final    Comment: Non  GFR Calc  Starting 12/18/2018, serum creatinine based estimated GFR (eGFR) will be   calculated using the Chronic Kidney Disease Epidemiology Collaboration   (CKD-EPI) equation.       GFR Estimate If Black 09/16/2020 85  >60  mL/min/[1.73_m2] Final    Comment:  GFR Calc  Starting 12/18/2018, serum creatinine based estimated GFR (eGFR) will be   calculated using the Chronic Kidney Disease Epidemiology Collaboration   (CKD-EPI) equation.       Calcium 09/16/2020 8.8  8.5 - 10.1 mg/dL Final     Vitamin B12 09/16/2020 328  193 - 986 pg/mL Final             Assessment/Plan:    Assessment & Plan     Kerry was seen today for wellness visit and imm/inj.    Diagnoses and all orders for this visit:    Iron deficiency anemia, unspecified iron deficiency anemia type  -     CBC with platelets differential; Future  -     Ferritin; Future  -     Iron and iron binding capacity; Future  -Hemoglobin down to 8  -had stopped Iron supplements due to constipation  -discussed using Ferrous Gluconate instead 324 mg 2-3 times a day  -recheck levels in 4 weeks, if not better then consider Iron infusions, future labs are in the system     Gastroesophageal reflux disease without esophagitis  -     famotidine (PEPCID) 20 MG tablet; Take 1 tablet (20 mg) by mouth 2 times daily  -     omeprazole (PRILOSEC) 20 MG DR capsule; TAKE 1 CAPSULE BY MOUTH EVERY DAY  -     lactase (LACTAID) 3000 UNIT tablet; One tablet orally with first bite of food containing dairy  -Ranitidine no longer on the market  -started Famotidine  -Omeprazole as needed, risks of bone loss reviewed     Osteoporosis without current pathological fracture, unspecified osteoporosis type  -     calcium carbonate 600 mg-vitamin D 400 units (CALTRATE) 600-400 MG-UNIT per tablet; Take 1 tablet by mouth 2 times daily  -     vitamin D3 (CHOLECALCIFEROL) 25 mcg (02290 units) capsule; Take 1 capsule (250 mcg) by mouth daily  -has been on Bisphosphonate for over 5 years in the past, hence defer additional at this time  -weight bearing exercises     Vitamin B12 deficiency (dietary) anemia  -previous levels were elevated at over 2000  -improved with most recent recheck    Left inguinal  hernia  -stable    Need for prophylactic vaccination and inoculation against influenza  -     FLUZONE HIGH DOSE 65+  [63646]  -     Vaccine Administration, Initial [70438]      Return in about 4 weeks (around 10/16/2020) for  Routine Visit.    Kiana Camarena MD MPH    [facility]

## 2021-01-05 ENCOUNTER — PATIENT OUTREACH (OUTPATIENT)
Dept: GERIATRIC MEDICINE | Facility: CLINIC | Age: 81
End: 2021-01-05

## 2021-01-05 NOTE — PROGRESS NOTES
[facility] Care Coordination Contact    Member became effective with  Gary on 01/01/21 with [facility].  Previous Health Plan: [employer] M[employer]O  Previous Care System: [employer]  Previous care coordinators name and number: Rosa Casas Type: N/A  Last MMIS Entry: Date 04/17/20 and Type 01 TEL SCREEN    UTF received: No: Requested on 01/05/2021 - emailed enrollment at [facility] requesting records.    Sunni Goel  Care Management Specialist  [facility]  [phone]

## 2021-01-05 NOTE — LETTER
January 5, 2021    Important [employer] Information    MICHAEL AMAYA  [address]  [place] [address]    Your New Care Coordinator  Dear Michael,  My name is Brooke Villareal RN and I am your new Care Coordinator. You may reach me by calling [phone]. I will be in touch with you shortly to address any questions you may have.   I have also been in contact with your previous care coordinator, to ensure a smooth transition.  Questions?  Call me at [phone] Monday-Friday between 8am and 5pm. TTY/TDD: 711. I look forward to working with you as a [employer]  member.  Sincerely,    Brooke Villareal RN    E-mail:  [email or web address]  Phone: [phone]    Care Manager  [facility]        cc: member records                                      Civil Rights Notice  Discrimination is against the law. [employer] does not discriminate on the basis of any of the following:    Race    Color    National Origin    Creed    Protestant    Age    Public Assistance Status    Receipt of Health Care Services    Disability (including physical or mental impairment)    Sex (including sex stereotypes and gender identity)    Marital Status    Political Beliefs    Medical Condition    Genetic Information    Sexual Orientation    Claims Experience    Medical History    Health Status    Auxiliary Aids and Services:  [employer] provides auxiliary aids and services, like qualified interpreters or information in accessible formats, free of charge and in a timely manner, to ensure an equal opportunity to participate in our health care programs. Contact [employer] Customer Service at [employer]/contact medicaid or call [phone] (toll free); TTY:711 or at [email or web address].    Language Assistance Services:  [employer] provides translated documents and spoken language interpreting, free of charge and in a timely manner, when language assistance services are necessary to ensure limited English speakers have  meaningful access to our information and services. Contact [employer] at [phone] (toll free); TTY: 711 or [email or web address].     Civil Rights Complaints  You have the right to file a discrimination complaint if you believe you were treated in a discriminatory way by [employer]. You may contact any of the following four agencies directly to file a discrimination complaint.    U.S. Department of Health and Human Services  Office for Civil Rights (OCR)  You have the right to file a complaint with the OCR, a federal agency, if you believe you have been discriminated against because of any of the following:    Race    Disability    Color    Sex    National Origin    Age      Contact the OCR directly to file a complaint:         Director         U.S. Department of Health and Human Services  Office for Civil Rights         [address] [place] [address] [address] [address]         [phone] (Voice)         [phone] (TDD)         Complaint Portal - [email or web address].[email or web address].gov/ocr/portal/lobby.jsf     [place] Department of Human Rights (MDHR)  In [place], you have the right to file a complaint with the MD if you believe you have been discriminated against because of any of the following:      Race    Color    National Origin    Cheondoism    Creed    Sex    Sexual Orientation    Marital Status    Public Assistance Status    Disability    Contact the MD directly to file a complaint:         [place] Department of Human Rights         [address] [address]         [phone] (voice)          [phone] (toll free)         711 or [phone] ([place] Relay)         [phone] (Fax)         [email or web address]. (Email)     [place] Department of Human Services (DHS)  You have the right to file a complaint with DHS if you believe you have been discriminated against in our health care programs because of any of the  following:    Race    Color    National Origin    Creed    Church    Age    Public Assistance Status    Receipt of Health Care Services    Disability (including physical or mental impairment)    Sex (including sex stereotypes and gender identity)    Marital Status    Political Beliefs    Medical Condition    Genetic Information    Sexual Orientation    Claims Experience    Medical History    Health Status    Complaints must be in writing and filed within 180 days of the date you discovered the alleged discrimination. The complaint must contain your name and address and describe the discrimination you are complaining about. After we get your complaint, we will review it and notify you in writing about whether we have authority to investigate. If we do, we will investigate the complaint.      [facility] will notify you in writing of the investigation s outcome. You have a right to appeal the outcome if you disagree with the decision. To appeal, you must send a written request to have [facility] review the investigation outcome period. Be brief and state why you disagree with the decision. Include additional information you think is important.      If you file a complaint in this way, the people who work for the agency named in the complaint cannot retaliate against you. This means they cannot punish you in any way for filing a complaint. Filing a complaint in this way does not stop you from seeking out other legal or administration actions.     Contact [facility] directly to file a discrimination complaint:        Civil Rights Coordinator        [place] Department of Human Services        Equal Opportunity and Access Division        [address] [address]        [place] [address] [phone] (voice) or use your preferred relay service     [employer] Complaint Notice   You have the right to file a complaint with [employer] if you believe you have been discriminated against because of any of the following:       Medical  condition    Health status    Receipt of health care services    Claims experience    Medical history    Genetic information    Disability (including mental or physical impairment)    Marital status    Age    Sex (including sex stereotypes and gender identity)    Sexual orientation    National origin    Race    Color    Buddhism    Creed    Public assistance status    Political beliefs    You can file a complaint and ask for help in filing a complaint in person or by mail, phone, fax, or email at:     [employer] Civil Rights Coordinator  [employer]  [address], [place] [address] [phone] (voice and fax) or TTY:674  Email: [email or web address]    American Indians can continue to use [place] and [facility] ([facility]) clinics. We will not require prior approval or impose any conditions for you to get services at these clinics. For elders age 65 years and older this includes Elderly Waiver (EW) services accessed through the [place]. If a doctor or other provider in a [place] or [place] clinic refers you to a provider in our network, we will not require you to see your primary care provider prior to the referral.    For accessible formats of this publication or assistance with equal or access to our services, visit [employer]/contactmedicaid, or call [phone] (toll free) or use your preferred relay service.

## 2021-01-06 NOTE — PROGRESS NOTES
[facility] Care Coordination Contact      [facility] Health Plan or Product Change    CC received notification that member's health plan or health plan product changed from [employer] MSC+ to [employer] MSHO effective 1/1/21.    Called member and introduced self as member s new CC. Confirmed with member that the welcome letter with writer's name and contact information has been received.  Reviewed LTCC/Health Risk Assessment (HRA) and POC with member. No changes noted.  Transitional HRA completed. Care Plan Summary updated and reflects current services.  Required referral authorization information communicated to CMS: Not applicable  Writer reviewed the following with member:  ER visits: No  Hospitalizations: No  TCU stays: No  Significant health status changes: None  Falls/Injuries: No  ADL/IADL changes: No  Changes in services: No    Follow-Up Plan: Member informed of future contact, plan to f/u with member with at next regularly scheduled contact.  Contact information shared with member and family, encouraged member to call with any questions or concerns.  Brooke Villareal RN  [facility]  [phone]

## 2021-03-09 ENCOUNTER — PATIENT OUTREACH (OUTPATIENT)
Dept: GERIATRIC MEDICINE | Facility: CLINIC | Age: 81
End: 2021-03-09

## 2021-03-09 NOTE — LETTER
March 9, 2021    Important [employer] Information    MICHAEL AMAYA  [address]  [place] [address]  I'm Here to Help  Dear Michael,  My name is Brooke Villareal RN, and I am your [employer] Care Coordinator. You indicated you are not interested in meeting with me to complete a health risk assessment.   As a Care Coordinator, I am here to help. My role is to make sure that your health plan is working for you. I am available to:     Review your health care needs with you over the phone or in-person     Provide support for and information about covered services or supplies to help keep you safe and healthy in your home    Answer questions about your insurance     Help you find a provider, such as a doctor or dentist, to meet your unique needs    Enclosed is a Self-Risk Assessment form that we ask you to fill out so we can get to know you a little bit better. Once completed, please send back in the self-addressed stamped envelope.     Questions?  Call me at [phone] Monday-Friday between 8am and 5pm. TTY/TDD: 711. If you d like, a friend or family member may call for you.   You may also call [employer] Customer Service at [phone] or [phone] (toll free) from 8 a.m. - 8 p.m. Central, seven days a week. Access to representatives may be limited at times. TTY/TDD: 711.  Sincerely,    Brooke Villareal RN    E-mail:  [email or web address]  Phone: [phone]    Care Manager  [facility]           cc: member records                Civil Rights Notice  Discrimination is against the law. [employer] does not discriminate on the basis of any of the following:    Race    Color    National Origin    Creed    Hindu    Age    Public Assistance Status    Receipt of Health Care Services    Disability (including physical or mental impairment)    Sex (including sex stereotypes and gender identity)    Marital Status    Political Beliefs    Medical Condition    Genetic Information    Sexual Orientation    Claims  Experience    Medical History    Health Status    Auxiliary Aids and Services:  [employer] provides auxiliary aids and services, like qualified interpreters or information in accessible formats, free of charge and in a timely manner, to ensure an equal opportunity to participate in our health care programs. Contact [employer] Customer Service at [employer]/contact medicaid or call [phone] (toll free); TTY:711 or at [employer]/contactmedicaid.    Language Assistance Services:  [employer] provides translated documents and spoken language interpreting, free of charge and in a timely manner, when language assistance services are necessary to ensure limited English speakers have meaningful access to our information and services. Contact [employer] at [phone] (toll free); TTY: 717 or [employer]/contactmedicaid.     Civil Rights Complaints  You have the right to file a discrimination complaint if you believe you were treated in a discriminatory way by [employer]. You may contact any of the following four agencies directly to file a discrimination complaint.    U.S. Department of Health and Human Services  Office for Civil Rights (OCR)  You have the right to file a complaint with the OCR, a federal agency, if you believe you have been discriminated against because of any of the following:    Race    Disability    Color    Sex    National Origin    Age      Contact the OCR directly to file a complaint:         Director         U.S. Department of Health and Human Services  Office for Civil Rights         [address] [place] [address]         [phone] (Voice)         [phone] (TDD)         Complaint Portal - https://ocrportal.hhs.gov/ocr/portal/lobby.jsf     [place] Department of Human Rights (MDHR)  In [place], you have the right to file a complaint with the [facility] if you believe you have been discriminated against because of any of the following:       Race    Color    National Origin    Oriental orthodox    Creed    Sex    Sexual Orientation    Marital Status    Public Assistance Status    Disability    Contact the MDHR directly to file a complaint:         [place] Department of Human Rights         [facility], [address] [place] [address]         [phone] (voice)          [phone] (toll free)         711 or [phone] ([place] Relay)         [phone] (Fax)         [email or web address] (Email)     [place] Department of Human Services (DHS)  You have the right to file a complaint with [facility] if you believe you have been discriminated against in our health care programs because of any of the following:    Race    Color    National Origin    Creed    Oriental orthodox    Age    Public Assistance Status    Receipt of Health Care Services    Disability (including physical or mental impairment)    Sex (including sex stereotypes and gender identity)    Marital Status    Political Beliefs    Medical Condition    Genetic Information    Sexual Orientation    Claims Experience    Medical History    Health Status    Complaints must be in writing and filed within 180 days of the date you discovered the alleged discrimination. The complaint must contain your name and address and describe the discrimination you are complaining about. After we get your complaint, we will review it and notify you in writing about whether we have authority to investigate. If we do, we will investigate the complaint.      [facility] will notify you in writing of the investigation s outcome. You have a right to appeal the outcome if you disagree with the decision. To appeal, you must send a written request to have [facility] review the investigation outcome period. Be brief and state why you disagree with the decision. Include additional information you think is important.      If you file a complaint in this way, the people who work for the agency named in the complaint cannot retaliate  against you. This means they cannot punish you in any way for filing a complaint. Filing a complaint in this way does not stop you from seeking out other legal or administration actions.     Contact [facility] directly to file a discrimination complaint:        Civil Rights Coordinator        [place] Department of Human Services        Equal Opportunity and Access Division        [address] [address]        [place] [address] [phone] (voice) or use your preferred relay service     [employer] Complaint Notice   You have the right to file a complaint with [employer] if you believe you have been discriminated against because of any of the following:       Medical condition    Health status    Receipt of health care services    Claims experience    Medical history    Genetic information    Disability (including mental or physical impairment)    Marital status    Age    Sex (including sex stereotypes and gender identity)    Sexual orientation    National origin    Race    Color    Uatsdin    Creed    Public assistance status    Political beliefs    You can file a complaint and ask for help in filing a complaint in person or by mail, phone, fax, or email at:     [employer] Civil Rights Coordinator  [employer] [facility]  [address], [place] [address] [phone] (voice and fax) or TTY:835  Email: [email or web address]    American Indians can continue to use [place] and [facility] ([facility]) clinics. We will not require prior approval or impose any conditions for you to get services at these clinics. For elders age 65 years and older this includes Elderly Waiver (EW) services accessed through the [place]. If a doctor or other provider in a [place] or IHS clinic refers you to a provider in our network, we will not require you to see your primary care provider prior to the referral.    For accessible formats of this publication or assistance with equal or access to our services, visit  [email or web address], or call [phone] (toll free) or use your preferred relay service.

## 2021-03-09 NOTE — PROGRESS NOTES
[facility] Care Coordination Contact      [facility] Refusal Telephone Assessment    Member refused home visit HRA on 3/9/21 per sonZenobia.    ER visits: No  Hospitalizations: No  Health concerns: None reported.  Falls/Injuries: No  ADL/IADL Dependencies: None reported.        Member currently receiving the following home care services:  None   Member currently receiving the following community resources: None   Informal support(s): Family     Advanced Care Planning discussion, complete code section.    [facility] sponsored benefits: Shared information re: Silver Sneakers/gym memberships, ASA, Calcium +D.    Follow-Up Plan: Member informed of future contact, plan to f/u with member with a 6 month telephone assessment and offer a home visit.  Contact information shared with member and family, encouraged member to call with any questions or concerns at any time.    Brooke Villareal RN  [facility]  [phone]

## 2021-03-11 NOTE — PROGRESS NOTES
"[facility] Care Coordination Contact    Per CC, mailed client a \"Refusal of Home Visit\" letter.    Mailed member [employer] Self Report Health Assessment with self addressed return envelope.     Siri Padilla  Case Management Specialist  [facility]  [phone]    "

## 2021-04-13 ENCOUNTER — ANCILLARY PROCEDURE (OUTPATIENT)
Dept: GENERAL RADIOLOGY | Facility: CLINIC | Age: 81
End: 2021-04-13
Attending: NURSE PRACTITIONER
Payer: COMMERCIAL

## 2021-04-13 ENCOUNTER — OFFICE VISIT (OUTPATIENT)
Dept: URGENT CARE | Facility: URGENT CARE | Age: 81
End: 2021-04-13
Payer: COMMERCIAL

## 2021-04-13 VITALS
HEART RATE: 90 BPM | SYSTOLIC BLOOD PRESSURE: 151 MMHG | WEIGHT: 97 LBS | OXYGEN SATURATION: 99 % | BODY MASS INDEX: 23.1 KG/M2 | RESPIRATION RATE: 18 BRPM | DIASTOLIC BLOOD PRESSURE: 68 MMHG | TEMPERATURE: 97.6 F

## 2021-04-13 DIAGNOSIS — B35.1 ONYCHOMYCOSIS: ICD-10-CM

## 2021-04-13 DIAGNOSIS — M79.622 PAIN OF LEFT UPPER ARM: Primary | ICD-10-CM

## 2021-04-13 DIAGNOSIS — B07.0 PLANTAR WARTS: ICD-10-CM

## 2021-04-13 PROCEDURE — 99214 OFFICE O/P EST MOD 30 MIN: CPT | Performed by: NURSE PRACTITIONER

## 2021-04-13 PROCEDURE — 73060 X-RAY EXAM OF HUMERUS: CPT | Mod: LT | Performed by: RADIOLOGY

## 2021-04-13 RX ORDER — CAPSAICIN 0.025 %
1 CREAM (GRAM) TOPICAL 3 TIMES DAILY
Qty: 50 G | Refills: 0 | Status: SHIPPED | OUTPATIENT
Start: 2021-04-13 | End: 2021-04-14

## 2021-04-13 ASSESSMENT — ENCOUNTER SYMPTOMS
NAUSEA: 0
SORE THROAT: 0
VOMITING: 0
HEADACHES: 0
FEVER: 0
SHORTNESS OF BREATH: 0
RHINORRHEA: 0
DIARRHEA: 0
CHILLS: 0
COUGH: 0

## 2021-04-13 ASSESSMENT — PAIN SCALES - GENERAL: PAINLEVEL: SEVERE PAIN (6)

## 2021-04-13 NOTE — PROGRESS NOTES
ASSESSMENT:      ICD-10-CM    1. Pain of left upper arm  M79.622 XR Humerus Left G/E 2 Views     capsaicin (ZOSTRIX) 0.025 % external cream   2. Onychomycosis  B35.1 Efinaconazole 10 % SOLN   3. Plantar warts  B07.0 salicylic acid (MEDIPLAST) 40 % miscellaneous        PLAN:  Patient educational/instructional material provided including reasons for follow-up    The patient indicates understanding of these issues and agrees with the plan.    SUBJECTIVE:   Kerry Vega is a 80 year old female presenting with a chief complaint of   Chief Complaint   Patient presents with     Pain     Pain in the left arm and left leg has been ongoing for the past two days- states that it feels like it is locked up- hurts to move- patient had simular pain before her femur snapped in her left leg.     Foot Problems     possible plantar warts- pain in the great toe on both feet- hurts to walk       She is an established patient of [facility].    HPI  Kerry Vega is presenting to urgent care with pain on the left upper arm.  The pain started 2 days ago.  The pain is worse with movement.  There is restriction on the use of the left arm due to pain.  She reports similar pain on the left femur many years ago.  She denies any injury.  Oliva is also complaining of plantar warts on sole of left foot.   She also has fungal infections on the toe nails.  She has had it for a long time.      Review of Systems   Constitutional: Negative for chills and fever.   HENT: Negative for congestion, ear pain, rhinorrhea and sore throat.    Respiratory: Negative for cough and shortness of breath.    Gastrointestinal: Negative for diarrhea, nausea and vomiting.   Musculoskeletal:        Left upper arm pain.   Skin:        Fungal infection of toenails.      Plantar warts on the left foot.   Neurological: Negative for headaches.   All other systems reviewed and are negative.      Past Medical History:   Diagnosis Date     Anemia      Arthritis      OSTEO     Left knee pain 2/23/2011     Family History   Family history unknown: Yes     Current Outpatient Medications   Medication Sig Dispense Refill     calcium carbonate 600 mg-vitamin D 400 units (CALTRATE) 600-400 MG-UNIT per tablet Take 1 tablet by mouth 2 times daily 180 tablet 3     capsaicin (ZOSTRIX) 0.025 % external cream Apply 1 g topically 3 times daily for 14 days 50 g 0     cyanocobalamin (VITAMIN B-12) 1000 MCG tablet TAKE 1 TABLET BY MOUTH EVERY DAY 90 tablet 2     Efinaconazole 10 % SOLN Externally apply topically once for 1 dose Apply once daily for 1 year 8 mL 3     famotidine (PEPCID) 20 MG tablet TAKE 1 TABLET BY MOUTH TWICE A  tablet 1     omeprazole (PRILOSEC) 20 MG DR capsule TAKE 1 CAPSULE BY MOUTH EVERY DAY 90 capsule 1     salicylic acid (MEDIPLAST) 40 % miscellaneous Cut and apply on the wart every 48 hours for 4 weeks, until the wart is cleared 25 each 0     VITAMIN D, CHOLECALCIFEROL, PO Take by mouth daily       vitamin D3 (CHOLECALCIFEROL) 25 mcg (51113 units) capsule Take 1 capsule (250 mcg) by mouth daily 90 capsule 3     ASPIRIN NOT PRESCRIBED (INTENTIONAL) Reported on 4/4/2017       lactase (LACTAID) 3000 UNIT tablet One tablet orally with first bite of food containing dairy 90 tablet 0     ORDER FOR DME Pull up briefs for incontinence of urine change 2 to 3 times daily 1 Container 11     Social History     Tobacco Use     Smoking status: Never Smoker     Smokeless tobacco: Never Used   Substance Use Topics     Alcohol use: No       OBJECTIVE  BP (!) 151/68   Pulse 90   Temp 97.6  F (36.4  C) (Tympanic)   Resp 18   Wt 44 kg (97 lb)   LMP  (LMP Unknown)   SpO2 99%   BMI 23.10 kg/m      Physical Exam  Vitals signs and nursing note reviewed.   Constitutional:       General: She is not in acute distress.     Appearance: She is well-developed. She is not diaphoretic.   HENT:      Head: Normocephalic and atraumatic.      Right Ear: Tympanic membrane and external ear  normal.      Left Ear: Tympanic membrane and external ear normal.   Eyes:      Pupils: Pupils are equal, round, and reactive to light.   Neck:      Musculoskeletal: Normal range of motion and neck supple.   Pulmonary:      Effort: Pulmonary effort is normal. No respiratory distress.      Breath sounds: Normal breath sounds.   Lymphadenopathy:      Cervical: No cervical adenopathy.   Skin:     General: Skin is warm and dry.      Comments: Examination of the rash on the soles of left foot reveals  non-erythematous well-defined raised papule with whitish to grayish surfaces.    There is yellowing and thickening of toe nails on both feet.   Neurological:      Mental Status: She is alert.      Cranial Nerves: No cranial nerve deficit.             Patient Instructions     Patient Education     Iliotibial Band Stretch (Flexibility)     1. Stand next to a chair. Hold onto the chair with your right hand for support. Cross your right leg behind your left leg.  2. Lean your right hip toward the right. Feel the stretch at the outside of your hip.  3. Hold for 30 to 60 seconds. Then relax.  4. Repeat 2 times, or as instructed.  5. Switch sides and repeat.  6. Do this 3 times a day, or as instructed.     Tip: Don t bend forward or twist at the waist.   [employer] last reviewed this educational content on 3/1/2020    8870-4420 [employer]. All rights reserved. This information is not intended as a substitute for professional medical care. Always follow your healthcare professional's instructions.           Patient Education     Muscle Strain in the Extremities  A muscle strain is a stretching and tearing of muscle fibers. This causes pain, especially when you move that muscle. There may also be some swelling and bruising.  Home care    Keep the hurt area raised above heart level to reduce pain and swelling. This is especially important during the first 48 hours.    Apply an ice pack over the injured area for 15 to 20  minutes every 3 to 6 hours. You should do this for the first 24 to 48 hours. You can make an ice pack by filling a plastic bag that seals at the top with ice cubes and then wrapping it with a thin towel. Be careful not to injure your skin with the ice treatments. Ice should never be applied directly to skin. Continue the use of ice packs for relief of pain and swelling as needed. After 48 hours, apply heat (warm shower or warm bath) for 15 to 20 minutes several times a day, or alternate ice and heat.    You may use over-the-counter pain medicine to control pain, unless another medicine was prescribed. If you have chronic liver or kidney disease or ever had a stomach ulcer or gastrointestinal bleeding, talk with your healthcare provider before using these medicines.    For leg strains: If crutches have been recommended, don t put full weight on the hurt leg until you can do so without pain. You can return to sports when you are able to hop and run on the injured leg without pain.  Follow-up care  Follow up with your healthcare provider, or as advised.  When to seek medical advice  Call your healthcare provider right away if any of these occur:    The toes of the injured leg become swollen, cold, blue, numb, or tingly    Pain or swelling increases  [employer] last reviewed this educational content on 5/1/2018 2000-2021 [employer]. All rights reserved. This information is not intended as a substitute for professional medical care. Always follow your healthcare professional's instructions.           Patient Education     Fungal Skin Infection (Tinea)  A fungal infection occurs when too much fungus grows on or in the body. Fungus normally lives on the skin in small amounts and does not cause harm. But when too much grows on the skin, it causes an infection. This is also known as tinea. Fungal skin infections are common and not usually serious.   The infection often starts as a small red area the size of a  pea. The skin may turn dry and flaky. The area may itch. As the fungus grows, it spreads out in a red [place]. Because of how it looks, fungal skin infection is often called ringworm, but it is not caused by a worm. Fungal skin infections can occur on many parts of the body. They can grow on the head, chest, arms, buttocks or legs. On the feet, fungal infection is known as  athlete s foot.  It causes itchy, sometimes painful sores between the toes and the bottom or sides of the feet. In the groin, the rash is called  jock itch.    People with weak immune systems can get a fungal infection more easily. This includes people with diabetes or HIV, or who are being treated for cancer. In these cases, the fungal infection can spread and cause severe illness. Fungal infections are also more common in people who are overweight.   In most cases, treatment is done with antifungal cream or ointment. If the infection is on your scalp, you will need to take oral medicine. To confirm the diagnosis of a fungal infection, the healthcare provider may take a small scraping of the skin to be tested in a lab.   Common fungal infections are treated with creams on the skin or oral medicine.  Home care  Follow all instructions when using antifungal cream or ointment on your skin.   General care:    If you were prescribed an oral medicine, read the patient information. Talk with your healthcare provider about the risks and side effects.    Let your skin dry completely after bathing. Carefully dry your feet and between your toes.    Dress in loose cotton clothing.    Don t scratch the affected area. This can delay healing and may spread the infection. It can also cause a bacterial infection.    Keep your skin clean, but don t wash the skin too much. This can irritate your skin.    Keep in mind that it may take a week before the fungus starts to go away. It can take 2 to 4 weeks to fully clear. To prevent it from coming back, use the medicine  until the rash is all gone.  Follow-up care  Follow up with your healthcare provider if the rash does not get better after 10 days of treatment. Also follow up if the rash spreads to other parts of your body.   When to seek medical advice  Call your healthcare provider right away if any of these occur:    Fever of 100.4 F (38 C) or higher, or as directed by your healthcare provider    Redness or swelling that gets worse    Pain that gets worse    Foul-smelling fluid leaking from the skin  [employer] last reviewed this educational content on 8/1/2019 2000-2021 [employer]. All rights reserved. This information is not intended as a substitute for professional medical care. Always follow your healthcare professional's instructions.           Patient Education     Plantar Warts  Warts are common skin growths that can appear anywhere on the body. Warts on the soles of the feet are called plantar warts. These warts are not a serious health problem. They usually go away without treatment. But plantar warts can be painful when you stand or walk. If this is the case, special cushions can help relieve pressure and pain. [employer] carry these cushions and you can buy them without a prescription. If cushions don't work and the pain interferes with walking, the wart can be removed.  General care    Your healthcare provider may remove the plantar wart:  ? With prescription medicines. These may be placed directly on the wart at each office visit. Or you may be sent home with the medicine.  ? With a blade, or by freezing (cryotherapy), burning (electrocautery), or laser treatment.    You may be instructed to treat the wart yourself at home using an over-the-counter wart-removal medicine (such as 40% salicylic acid). Apply the medicine to the wart every day as directed. Don't apply the medicine to the healthy skin around the wart. In between applications, remove the dead wart tissue using the type of file suggested  by your healthcare provider. You will likely need to repeat this process for several weeks to remove the entire wart.    Warts can spread from your foot to other parts of your body and to other people. Don't scratch or pick at the wart. Wash your hands well before and after touching your warts. If your child has warts, be certain to teach him or her proper hand washing techniques as well.    While many home remedies are suggested for warts, it's best to check with your healthcare provider before trying them.    Warts often come back, even after successful treatment. Return promptly for treatment of any new warts.  Follow-up care  Follow up with your healthcare provider, or as advised.  When to seek medical advice    Signs of infection (red streaks, pus, smelly or colored discharge, or fever) appear    You have heavy bleeding or bleeding that won t stop with light pressure    The wart doesn t go away after several weeks of self-care    New warts appear on feet, hands, or face  [employer] last reviewed this educational content on 5/1/2018 2000-2021 [employer]. All rights reserved. This information is not intended as a substitute for professional medical care. Always follow your healthcare professional's instructions.

## 2021-04-13 NOTE — PATIENT INSTRUCTIONS
Patient Education     Iliotibial Band Stretch (Flexibility)     1. Stand next to a chair. Hold onto the chair with your right hand for support. Cross your right leg behind your left leg.  2. Lean your right hip toward the right. Feel the stretch at the outside of your hip.  3. Hold for 30 to 60 seconds. Then relax.  4. Repeat 2 times, or as instructed.  5. Switch sides and repeat.  6. Do this 3 times a day, or as instructed.     Tip: Don t bend forward or twist at the waist.   [employer] last reviewed this educational content on 3/1/2020    8687-7107 [employer]. All rights reserved. This information is not intended as a substitute for professional medical care. Always follow your healthcare professional's instructions.           Patient Education     Muscle Strain in the Extremities  A muscle strain is a stretching and tearing of muscle fibers. This causes pain, especially when you move that muscle. There may also be some swelling and bruising.  Home care    Keep the hurt area raised above heart level to reduce pain and swelling. This is especially important during the first 48 hours.    Apply an ice pack over the injured area for 15 to 20 minutes every 3 to 6 hours. You should do this for the first 24 to 48 hours. You can make an ice pack by filling a plastic bag that seals at the top with ice cubes and then wrapping it with a thin towel. Be careful not to injure your skin with the ice treatments. Ice should never be applied directly to skin. Continue the use of ice packs for relief of pain and swelling as needed. After 48 hours, apply heat (warm shower or warm bath) for 15 to 20 minutes several times a day, or alternate ice and heat.    You may use over-the-counter pain medicine to control pain, unless another medicine was prescribed. If you have chronic liver or kidney disease or ever had a stomach ulcer or gastrointestinal bleeding, talk with your healthcare provider before using these  medicines.    For leg strains: If crutches have been recommended, don t put full weight on the hurt leg until you can do so without pain. You can return to sports when you are able to hop and run on the injured leg without pain.  Follow-up care  Follow up with your healthcare provider, or as advised.  When to seek medical advice  Call your healthcare provider right away if any of these occur:    The toes of the injured leg become swollen, cold, blue, numb, or tingly    Pain or swelling increases  [employer] last reviewed this educational content on 5/1/2018 2000-2021 [employer]. All rights reserved. This information is not intended as a substitute for professional medical care. Always follow your healthcare professional's instructions.           Patient Education     Fungal Skin Infection (Tinea)  A fungal infection occurs when too much fungus grows on or in the body. Fungus normally lives on the skin in small amounts and does not cause harm. But when too much grows on the skin, it causes an infection. This is also known as tinea. Fungal skin infections are common and not usually serious.   The infection often starts as a small red area the size of a pea. The skin may turn dry and flaky. The area may itch. As the fungus grows, it spreads out in a red [place]. Because of how it looks, fungal skin infection is often called ringworm, but it is not caused by a worm. Fungal skin infections can occur on many parts of the body. They can grow on the head, chest, arms, buttocks or legs. On the feet, fungal infection is known as  athlete s foot.  It causes itchy, sometimes painful sores between the toes and the bottom or sides of the feet. In the groin, the rash is called  jock itch.    People with weak immune systems can get a fungal infection more easily. This includes people with diabetes or HIV, or who are being treated for cancer. In these cases, the fungal infection can spread and cause severe illness.  Fungal infections are also more common in people who are overweight.   In most cases, treatment is done with antifungal cream or ointment. If the infection is on your scalp, you will need to take oral medicine. To confirm the diagnosis of a fungal infection, the healthcare provider may take a small scraping of the skin to be tested in a lab.   Common fungal infections are treated with creams on the skin or oral medicine.  Home care  Follow all instructions when using antifungal cream or ointment on your skin.   General care:    If you were prescribed an oral medicine, read the patient information. Talk with your healthcare provider about the risks and side effects.    Let your skin dry completely after bathing. Carefully dry your feet and between your toes.    Dress in loose cotton clothing.    Don t scratch the affected area. This can delay healing and may spread the infection. It can also cause a bacterial infection.    Keep your skin clean, but don t wash the skin too much. This can irritate your skin.    Keep in mind that it may take a week before the fungus starts to go away. It can take 2 to 4 weeks to fully clear. To prevent it from coming back, use the medicine until the rash is all gone.  Follow-up care  Follow up with your healthcare provider if the rash does not get better after 10 days of treatment. Also follow up if the rash spreads to other parts of your body.   When to seek medical advice  Call your healthcare provider right away if any of these occur:    Fever of 100.4 F (38 C) or higher, or as directed by your healthcare provider    Redness or swelling that gets worse    Pain that gets worse    Foul-smelling fluid leaking from the skin  [employer] last reviewed this educational content on 8/1/2019 2000-2021 [employer]. All rights reserved. This information is not intended as a substitute for professional medical care. Always follow your healthcare professional's instructions.            Patient Education     Plantar Warts  Warts are common skin growths that can appear anywhere on the body. Warts on the soles of the feet are called plantar warts. These warts are not a serious health problem. They usually go away without treatment. But plantar warts can be painful when you stand or walk. If this is the case, special cushions can help relieve pressure and pain. Drugstores carry these cushions and you can buy them without a prescription. If cushions don't work and the pain interferes with walking, the wart can be removed.  General care    Your healthcare provider may remove the plantar wart:  ? With prescription medicines. These may be placed directly on the wart at each office visit. Or you may be sent home with the medicine.  ? With a blade, or by freezing (cryotherapy), burning (electrocautery), or laser treatment.    You may be instructed to treat the wart yourself at home using an over-the-counter wart-removal medicine (such as 40% salicylic acid). Apply the medicine to the wart every day as directed. Don't apply the medicine to the healthy skin around the wart. In between applications, remove the dead wart tissue using the type of file suggested by your healthcare provider. You will likely need to repeat this process for several weeks to remove the entire wart.    Warts can spread from your foot to other parts of your body and to other people. Don't scratch or pick at the wart. Wash your hands well before and after touching your warts. If your child has warts, be certain to teach him or her proper hand washing techniques as well.    While many home remedies are suggested for warts, it's best to check with your healthcare provider before trying them.    Warts often come back, even after successful treatment. Return promptly for treatment of any new warts.  Follow-up care  Follow up with your healthcare provider, or as advised.  When to seek medical advice    Signs of infection (red streaks,  pus, smelly or colored discharge, or fever) appear    You have heavy bleeding or bleeding that won t stop with light pressure    The wart doesn t go away after several weeks of self-care    New warts appear on feet, hands, or face  [employer] last reviewed this educational content on 5/1/2018 2000-2021 [employer]. All rights reserved. This information is not intended as a substitute for professional medical care. Always follow your healthcare professional's instructions.

## 2021-04-14 ENCOUNTER — TELEPHONE (OUTPATIENT)
Dept: FAMILY MEDICINE | Facility: CLINIC | Age: 81
End: 2021-04-14

## 2021-04-14 DIAGNOSIS — M79.622 PAIN OF LEFT UPPER ARM: ICD-10-CM

## 2021-04-14 DIAGNOSIS — B35.1 ONYCHOMYCOSIS: Primary | ICD-10-CM

## 2021-04-14 RX ORDER — CICLOPIROX 80 MG/ML
SOLUTION TOPICAL
Refills: 0 | OUTPATIENT
Start: 2021-04-14

## 2021-04-14 RX ORDER — CAPSAICIN 0.025 %
1 CREAM (GRAM) TOPICAL 3 TIMES DAILY
Qty: 50 G | Refills: 0 | Status: SHIPPED | OUTPATIENT
Start: 2021-04-14

## 2021-04-14 NOTE — TELEPHONE ENCOUNTER
Routing refill request to provider for review/approval because:  Drug not on the [facility] refill protocol     Karissa ZHAON, RN

## 2021-04-14 NOTE — TELEPHONE ENCOUNTER
Glenny Torres provider ordered Efinaconazole 10 % SOLN $1500 from pharmacy they suggested alternative below routing to provider to review and advise,     ciclopirox (PENLAC) 8 % external solution [Pharmacy Med Name: CICLOPIROX 8% SOLUTION]  0 4/14/2021  No   Request refused: OTHER   Sig: Please specify directions, refills and quantity   Class: E-Prescribe   Order: [id number]   Renewals    Renewal provider: Miracle Zhang APRN CNP          Can call Se back [phone] if problem order the alternative medication. Please sent to [facility]       Jocy Donaldson RN, BSN, CMSRN  [facility]

## 2021-04-14 NOTE — TELEPHONE ENCOUNTER
This was not prescribed by me, please send to prescribing provider.  Thank you,  Kiana Camarena MD MPH

## 2021-04-15 RX ORDER — CICLOPIROX 80 MG/ML
SOLUTION TOPICAL
Qty: 6 ML | Refills: 0 | Status: SHIPPED | OUTPATIENT
Start: 2021-04-15

## 2021-04-15 NOTE — CONFIDENTIAL NOTE
Topical medication generally does not work for fungal nail infections. I have sent the script in but unlikely to solve the problem.    Thank you,    Kiana Camarena MD MPH

## 2021-04-15 NOTE — TELEPHONE ENCOUNTER
This writer attempted to contact [facility] on 04/15/21      Reason for call provider and left message.      If patient calls back:   Registered Nurse called. Follow Triage Call workflow, Catina Ag, YEHUDA

## 2021-04-15 NOTE — TELEPHONE ENCOUNTER
Pt's son Chao (preferred name: Zenobia) returned call to RN.  See Consent to Communicate form giving authorization to discuss PHI with Chao, scanned on 6/20/19.  RN confirmed that the requested alternative has been sent to the pharmacy. He states he received notification from the pharmacy as well, and has no additional questions.    Valeria Segura, PAVELN, RN

## 2021-04-19 DIAGNOSIS — B35.1 ONYCHOMYCOSIS: ICD-10-CM

## 2021-04-19 DIAGNOSIS — B35.1 ONYCHOMYCOSIS: Primary | ICD-10-CM

## 2021-04-19 NOTE — TELEPHONE ENCOUNTER
I called mobile on file. Son (Zenobia) answer phone. I asked to get patient's consent to talk to him regarding patient.   Son states she has authorization on file for him to speak on her behave.     Notified him of provider's message regarding med.  Son states he did talked to a nurse 2 days ago and the medication already been taking care.     Santana Yeager, CMA

## 2021-04-20 RX ORDER — CICLOPIROX 80 MG/ML
SOLUTION TOPICAL
Refills: 0 | OUTPATIENT
Start: 2021-04-20

## 2021-07-06 ENCOUNTER — OFFICE VISIT (OUTPATIENT)
Dept: URGENT CARE | Facility: URGENT CARE | Age: 81
End: 2021-07-06
Payer: COMMERCIAL

## 2021-07-06 VITALS
TEMPERATURE: 98.6 F | SYSTOLIC BLOOD PRESSURE: 154 MMHG | OXYGEN SATURATION: 98 % | HEART RATE: 87 BPM | BODY MASS INDEX: 22.44 KG/M2 | DIASTOLIC BLOOD PRESSURE: 69 MMHG | WEIGHT: 94.2 LBS

## 2021-07-06 DIAGNOSIS — K21.00 GASTROESOPHAGEAL REFLUX DISEASE WITH ESOPHAGITIS, UNSPECIFIED WHETHER HEMORRHAGE: Primary | ICD-10-CM

## 2021-07-06 DIAGNOSIS — K59.00 CONSTIPATION, UNSPECIFIED CONSTIPATION TYPE: ICD-10-CM

## 2021-07-06 PROCEDURE — 99214 OFFICE O/P EST MOD 30 MIN: CPT | Performed by: NURSE PRACTITIONER

## 2021-07-06 RX ORDER — FAMOTIDINE 20 MG/1
20 TABLET, FILM COATED ORAL 2 TIMES DAILY
Qty: 60 TABLET | Refills: 0 | Status: SHIPPED | OUTPATIENT
Start: 2021-07-06 | End: 2021-08-05

## 2021-07-06 RX ORDER — AMOXICILLIN 250 MG
1 CAPSULE ORAL DAILY
Qty: 30 TABLET | Refills: 3 | Status: SHIPPED | OUTPATIENT
Start: 2021-07-06 | End: 2022-02-16

## 2021-07-06 ASSESSMENT — ENCOUNTER SYMPTOMS
CHILLS: 0
CONSTIPATION: 1
SORE THROAT: 0
ABDOMINAL DISTENTION: 0
DIARRHEA: 0
BACK PAIN: 1
FEVER: 0
SHORTNESS OF BREATH: 0
DYSURIA: 0
ABDOMINAL PAIN: 1
CHEST TIGHTNESS: 0
NAUSEA: 1
VOMITING: 1

## 2021-07-06 ASSESSMENT — PAIN SCALES - GENERAL: PAINLEVEL: MILD PAIN (2)

## 2021-07-06 NOTE — MR AVS SNAPSHOT
After Visit Summary   9/20/2017    Kerry Vega    MRN: [id number]           Patient Information     Date Of Birth          1940        Visit Information        Provider Department      9/20/2017 3:40 PM Shadi Cotton MD [facility]        Today's Diagnoses     Primary osteoarthritis of both knees    -  1    Osteoarthritis of left thumb        Cervical radiculopathy        Gastroesophageal reflux disease without esophagitis        Encounter for immunization          Care Instructions    How to contact your care team providers:    [facility]  [phone]  Pharmacy [phone]    ANTIONE Zelaya Dr., Dr., PA-C, Dr., PA-C    Team RN: Edgardo PARTIDA and Cira GARCIA    Clinic hours  M-Th 7am-7pm   Fri 7am-5pm.   Urgent care M-F 11am-9pm,   Sat/sun 9am-5pm.  Pharmacy M-F 8:00am-8pm Sat/sun 9am-5pm.     All password changes, disabled accounts, or ID changes in [email or web address] will be done by our Access Services Department.   If you need help with your account or password, call: [phone]. Clinic staff no longer has the ability to change passwords.                         Follow-ups after your visit        Additional Services     ORTHO  REFERRAL       [facility] is referring you to the Orthopedic  Services at [facility].       The  Representative will assist you in the coordination of your Orthopedic and Musculoskeletal Care as prescribed by your physician.    The  Representative will call you within 1 business day to help schedule your appointment, or you may contact the  Representative at:    All areas ~ [phone] with Dr. Navid Dalton.     Type of Referral : Surgical / Specialist       Timeframe requested: Routine    Coverage of these  "services is subject to the terms and limitations of your health insurance plan.  Please call member services at your health plan with any benefit or coverage questions.      If X-rays, CT or MRI's have been performed, please contact the facility where they were done to arrange for , prior to your scheduled appointment.  Please bring this referral request to your appointment and present it to your specialist.                  Who to contact     If you have questions or need follow up information about today's clinic visit or your schedule please contact [facility] directly at [phone].  Normal or non-critical lab and imaging results will be communicated to you by [employer], letter or phone within 4 business days after the clinic has received the results. If you do not hear from us within 7 days, please contact the clinic through [employer] or phone. If you have a critical or abnormal lab result, we will notify you by phone as soon as possible.  Submit refill requests through Peak or call your pharmacy and they will forward the refill request to us. Please allow 3 business days for your refill to be completed.          Additional Information About Your Visit        [employer] Information     Peak lets you send messages to your doctor, view your test results, renew your prescriptions, schedule appointments and more. To sign up, go to [email or web address] . Click on \"Log in\" on the left side of the screen, which will take you to the Welcome page. Then click on \"Sign up Now\" on the right side of the page.     You will be asked to enter the access code listed below, as well as some personal information. Please follow the directions to create your username and password.     Your access code is: [email or web address]  Expires: 2017  4:15 PM     Your access code will  in 90 days. If you need help or a new code, please call your [facility] or [phone].        Care EveryWhere ID     " "This is your Care EveryWhere ID. This could be used by other organizations to access your [facility] medical records  [id number]        Your Vitals Were     Pulse Temperature Height Pulse Oximetry BMI (Body Mass Index)       93 98.2  F (36.8  C) (Oral) 4' 6.75\" (1.391 m) 98% 22.47 kg/m2        Blood Pressure from Last 3 Encounters:   09/20/17 105/59   07/17/17 113/69   05/09/17 111/52    Weight from Last 3 Encounters:   09/20/17 95 lb 12.8 oz (43.5 kg)   07/17/17 96 lb 6.4 oz (43.7 kg)   05/17/17 98 lb 3.2 oz (44.5 kg)              We Performed the Following     ADMIN 1st VACCINE     FLU VACCINE, INCREASED ANTIGEN, PRESV FREE     ORTHO  REFERRAL          Today's Medication Changes          These changes are accurate as of: 9/20/17  4:17 PM.  If you have any questions, ask your nurse or doctor.               Start taking these medicines.        Dose/Directions    ranitidine 300 MG tablet   Commonly known as:  ZANTAC   Used for:  Gastroesophageal reflux disease without esophagitis   Started by:  Shadi Cotton MD        Dose:  300 mg   Take 1 tablet (300 mg) by mouth At Bedtime   Quantity:  90 tablet   Refills:  3            Where to get your medicines      [facility] [address] [place] [address] [address] [facility] [address] [facility]  [address] [facility] [address]    Hours:  24-hours Phone:  [phone]     diclofenac 1 % Gel topical gel    ranitidine 300 MG tablet                Primary Care Provider Office Phone # Fax #    Shadi Cotton [phone] [address]  [facility] [place] [address]        Equal Access to Services     AMBER DELGADO AH: Hadii afshan amadoo Sosharonaali, waaxda luqadaha, qaybta kaalmada adeegyada, cruz anderson. So [facility] [phone].    ATENCIÓN: Si habla español, tiene a cornejo disposición servicios gratuitos de asistencia lingüística. Ady al [phone].    We comply with " applicable federal civil rights laws and [place] laws. We do not discriminate on the basis of race, color, national origin, age, disability sex, sexual orientation or gender identity.            Thank you!     Thank you for choosing [facility]  for your care. Our goal is always to provide you with excellent care. Hearing back from our patients is one way we can continue to improve our services. Please take a few minutes to complete the written survey that you may receive in the mail after your visit with us. Thank you!             Your Updated Medication List - Protect others around you: Learn how to safely use, store and throw away your medicines at www.disposemymeds.org.          This list is accurate as of: 9/20/17  4:17 PM.  Always use your most recent med list.                   Brand Name Dispense Instructions for use Diagnosis    alendronate 70 MG tablet    FOSAMAX    12 tablet    Take 1 tablet (70 mg) by mouth every 7 days    Osteoporosis       * ASPIRIN NOT PRESCRIBED    INTENTIONAL     Reported on 4/4/2017        calcium-vitamin D 600-400 MG-UNIT per tablet    CALTRATE    100 tablet    Take 1 tablet by mouth 2 times daily    Osteoporosis       cetirizine 10 MG tablet    zyrTEC    30 tablet    Take 1 tablet (10 mg) by mouth daily    Urticaria       cyanocobalamin 1000 MCG Tbcr    VITAMIN B-12 ER    30 tablet    Take 1,000 mcg by mouth daily    Vitamin B12 deficiency (dietary) anemia       diclofenac 1 % Gel topical gel    VOLTAREN    100 g    Apply 4 grams to knees or 2 grams to hands four times daily using enclosed dosing card.    Osteoarthritis of left thumb, Cervical radiculopathy       ferrous sulfate 325 (65 FE) MG tablet    IRON    90 tablet    Take 1 tablet (325 mg) by mouth 3 times daily (with meals)    Iron deficiency anemia, unspecified iron deficiency       furosemide 20 MG tablet    LASIX    60 tablet    Take 1 tablet (20 mg) by mouth daily as needed for leg swelling.     Swelling of lower extremity       methocarbamol 750 MG tablet    ROBAXIN    45 tablet    Take 1 tablet (750 mg) by mouth 3 times daily as needed for muscle spasms    Upper back pain on left side       * order for DME     1 Container    Pull up briefs for incontinence of urine change 2 to 3 times daily    Urine incontinence       ranitidine 300 MG tablet    ZANTAC    90 tablet    Take 1 tablet (300 mg) by mouth At Bedtime    Gastroesophageal reflux disease without esophagitis       * Notice:  This list has 2 medication(s) that are the same as other medications prescribed for you. Read the directions carefully, and ask your doctor or other care provider to review them with you.       Detail Level: Detailed Render Number Of Lesions Treated: no Prep Text (Optional): Prior to removal the treatment areas were prepped in the usual fashion. Acne Type: Comedonal Lesions Post-Care Instructions: I reviewed with the patient in detail post-care instructions. Patient is to keep the treatment areas dry overnight, and then apply bacitracin twice daily until healed. Patient may apply hydrogen peroxide soaks to remove any crusting. Extraction Method: physical extraction Consent was obtained and risks were reviewed including but not limited to scarring, infection, bleeding, scabbing, incomplete removal, and allergy to anesthesia.

## 2021-07-07 NOTE — PROGRESS NOTES
SUBJECTIVE:   Sarah Vega is a 81 year old female presenting with a chief complaint of   Chief Complaint   Patient presents with     Abdominal Pain     gas, acid reflux, worsened last night may due to eating a lot of rice, tried omeprazole, tums, and gas X     She is an established patient of [facility].    Abdominal Pain    Location: epigastric   Radiation: back.    Pain character: pressure   Severity: 9 on a scale of 1-10.    Duration: 1 hour(s); pain occurred last night. Currently denies epigastric or back pain.  Course of Illness: slowly improving.  Exacerbated by: eating- specifically rice.  Relieved by: GAS- X.  Associated Symptoms: nausea, vomiting, constipation, bloating and belching.  Surgical History: Hysterectomy    Review of Systems   Constitutional: Negative for chills and fever.   HENT: Negative for sore throat.    Respiratory: Negative for chest tightness and shortness of breath.    Gastrointestinal: Positive for abdominal pain, constipation, nausea and vomiting. Negative for abdominal distention and diarrhea.        Reports constipation has been an on-going problem, stopped taking Iron tablets a few days ago to see if this will help decrease constipation symptoms.   Genitourinary: Negative for dysuria.   Musculoskeletal: Positive for back pain.        Last night when epigastric pain was at its worse pain started to radiate to back; states this symptom has resolved completely.     Past Medical History:   Diagnosis Date     Anemia      Arthritis     OSTEO     Left knee pain 2/23/2011     Family History   Family history unknown: Yes     Current Outpatient Medications   Medication Sig Dispense Refill     ASPIRIN NOT PRESCRIBED (INTENTIONAL) Reported on 4/4/2017       calcium carbonate 600 mg-vitamin D 400 units (CALTRATE) 600-400 MG-UNIT per tablet Take 1 tablet by mouth 2 times daily 180 tablet 3     capsaicin (ZOSTRIX) 0.025 % external cream Apply 1 g topically 3 times daily 50 g 0     ciclopirox  (PENLAC) 8 % external solution Apply to adjacent skin and affected nails daily.  Remove with alcohol every 7 days, then repeat. 6 mL 0     cyanocobalamin (VITAMIN B-12) 1000 MCG tablet TAKE 1 TABLET BY MOUTH EVERY DAY 90 tablet 2     Efinaconazole 10 % SOLN Externally apply topically daily Apply once daily to cover the toe nails and the nailfolds 8 mL 3     famotidine (PEPCID) 20 MG tablet Take 1 tablet (20 mg) by mouth 2 times daily 60 tablet 0     famotidine (PEPCID) 20 MG tablet TAKE 1 TABLET BY MOUTH TWICE A  tablet 1     lactase (LACTAID) 3000 UNIT tablet One tablet orally with first bite of food containing dairy 90 tablet 0     omeprazole (PRILOSEC) 20 MG DR capsule Take 1 capsule (20 mg) by mouth daily 30 capsule 0     omeprazole (PRILOSEC) 20 MG DR capsule TAKE 1 CAPSULE BY MOUTH EVERY DAY 90 capsule 1     ORDER FOR DME Pull up briefs for incontinence of urine change 2 to 3 times daily 1 Container 11     salicylic acid (MEDIPLAST) 40 % miscellaneous Cut and apply on the wart every 48 hours for 4 weeks, until the wart is cleared 25 each 0     senna-docusate (SENOKOT-S/PERICOLACE) 8.6-50 MG tablet Take 1 tablet by mouth daily 30 tablet 3     VITAMIN D, CHOLECALCIFEROL, PO Take by mouth daily       vitamin D3 (CHOLECALCIFEROL) 25 mcg (44733 units) capsule Take 1 capsule (250 mcg) by mouth daily 90 capsule 3     Social History     Tobacco Use     Smoking status: Never Smoker     Smokeless tobacco: Never Used   Substance Use Topics     Alcohol use: No       OBJECTIVE  BP (!) 154/69 (BP Location: Right arm, Patient Position: Chair, Cuff Size: Adult Regular)   Pulse 87   Temp 98.6  F (37  C) (Oral)   Wt 42.7 kg (94 lb 3.2 oz)   LMP  (LMP Unknown)   SpO2 98%   BMI 22.44 kg/m      Physical Exam  Vitals signs and nursing note reviewed.   Constitutional:       General: She is not in acute distress.     Appearance: Normal appearance. She is not ill-appearing or toxic-appearing.   HENT:      Head:  Normocephalic and atraumatic.   Eyes:      General: No scleral icterus.     Conjunctiva/sclera: Conjunctivae normal.   Cardiovascular:      Rate and Rhythm: Normal rate.      Heart sounds: Normal heart sounds, S1 normal and S2 normal. Heart sounds not distant. No murmur. No friction rub. No gallop. No S3 or S4 sounds.    Pulmonary:      Effort: Pulmonary effort is normal. No tachypnea, bradypnea, accessory muscle usage, prolonged expiration, respiratory distress or retractions.      Breath sounds: Normal breath sounds and air entry. No stridor or decreased air movement.   Abdominal:      General: Bowel sounds are normal. There is no distension or abdominal bruit.      Palpations: Abdomen is soft.      Comments: Diffuse tenderness on the epigastric area   Skin:     General: Skin is warm and dry.      Capillary Refill: Capillary refill takes less than 2 seconds.   Neurological:      General: No focal deficit present.      Mental Status: She is alert and oriented to person, place, and time.      Gait: Gait normal.   Psychiatric:         Mood and Affect: Mood normal.         Behavior: Behavior normal. Behavior is cooperative.         Thought Content: Thought content normal.       ASSESSMENT:      ICD-10-CM    1. Gastroesophageal reflux disease with esophagitis, unspecified whether hemorrhage  K21.00 omeprazole (PRILOSEC) 20 MG DR capsule     famotidine (PEPCID) 20 MG tablet   2. Constipation, unspecified constipation type  K59.00 senna-docusate (SENOKOT-S/PERICOLACE) 8.6-50 MG tablet        Medical Decision Making:    Differential Diagnosis:  Abdominal Pain: Constipation and GERD/Ulcer    Serious Comorbid Conditions:  Adult:  None    PLAN:    ABD Pain:  Omeprazole/PPI, Fluids, time, rest and Antacid  A high fiber diet with plenty of fluids . Senna once  daily can be used to keep bowels regular if needed.      Followup:    If not improving or if condition worsens, follow up with your Primary Care Provider    Patient  Instructions     Patient Education     GERD (Adult)    The esophagus is a tube that carries food from the mouth to the stomach. A valve (the LES, lower esophageal sphincter) at the lower end of the esophagus prevents stomach acid from flowing upward. When this valve doesn't work properly, stomach contents may repeatedly flow back up (reflux) into the esophagus. This is called gastroesophageal reflux disease (GERD). GERD can irritate the esophagus. It can cause problems with pain, swallowing or breathing. In severe cases, GERD can cause recurrent pneumonia (from aspiration or breathing in particles) or other serious problems.  Symptoms of reflux include burning, pressure or sharp pain in the upper abdomen or mid to lower chest. The pain can spread to the neck, back, or shoulder. There may be belching, an acid taste in the back of the throat, chronic cough, or sore throat, or hoarseness. GERD symptoms often occur during the day after a big meal. They can also occur at night when lying down.   Home care  Lifestyle changes can help reduce symptoms. If needed, your healthcare provider may prescribe medicines. Symptoms often improve with treatment, but if treatment is stopped, the symptoms often return after a few months. So most persons with GERD will need to continue treatment or get treatment on and off.  Lifestyle changes    Limit or avoid fatty, fried, and spicy foods, as well as coffee, chocolate, mint, and foods with high acid content such as tomatoes and citrus fruit and juices (orange, grapefruit, lemon).    Don t eat large meals, especially at night. Frequent, smaller meals are best. Don't lie down right after eating. And don t eat anything 3 hours before going to bed.    Don't drink alcohol or smoke. As much as possible, stay away from second hand smoke.    If you are overweight, losing weight will reduce symptoms.     Don't wear tight clothing around your stomach area.    If your symptoms occur during sleep,  "use a foam wedge to elevate your upper body (not just your head.) Or, place 4\" blocks under the head of your bed. Or use 2 bed risers under your bedframe.  Medicines  If needed, medicines can help relieve the symptoms of GERD and prevent damage to the esophagus. Discuss a medicine plan with your healthcare provider. This may include one or more of the following medicines:    Antacids to help neutralize the normal acids in your stomach.    Acid blockers (Histamine or H2 blockers) to decrease acid production.    Acid inhibitors (proton pump inhibitors PPIs) to decrease acid production in a different way than the blockers. They may work better, but can take a little longer to take effect.  Take an antacid 30 to 60 minutes after eating and at bedtime, but not at the same time as an acid blocker.  Try not to take medicines such as ibuprofen and aspirin. If you are taking aspirin for your heart or other medical reasons, talk to your healthcare provider about stopping it.  Follow-up care  Follow up with your healthcare provider or as advised by our staff.  When to seek medical advice  Call your healthcare provider if any of the following occur:    Stomach pain gets worse or moves to the lower right abdomen (appendix area)    Chest pain appears or gets worse, or spreads to the back, neck, shoulder, or arm    An over-the-counter trial of medicine doesn't relieve your symptoms    Weight loss that can't be explained    Trouble or pain swallowing    Frequent vomiting (can t keep down liquids)    Blood in the stool or vomit (red or black in color)    Feeling weak or dizzy    Fever of 100.4 F (38 C) or higher, or as directed by your healthcare provider  [employer] last reviewed this educational content on 3/1/2018    2499-7734 [employer]. All rights reserved. This information is not intended as a substitute for professional medical care. Always follow your healthcare professional's instructions.                 "

## 2021-07-07 NOTE — PATIENT INSTRUCTIONS
Patient Education     GERD (Adult)    The esophagus is a tube that carries food from the mouth to the stomach. A valve (the LES, lower esophageal sphincter) at the lower end of the esophagus prevents stomach acid from flowing upward. When this valve doesn't work properly, stomach contents may repeatedly flow back up (reflux) into the esophagus. This is called gastroesophageal reflux disease (GERD). GERD can irritate the esophagus. It can cause problems with pain, swallowing or breathing. In severe cases, GERD can cause recurrent pneumonia (from aspiration or breathing in particles) or other serious problems.  Symptoms of reflux include burning, pressure or sharp pain in the upper abdomen or mid to lower chest. The pain can spread to the neck, back, or shoulder. There may be belching, an acid taste in the back of the throat, chronic cough, or sore throat, or hoarseness. GERD symptoms often occur during the day after a big meal. They can also occur at night when lying down.   Home care  Lifestyle changes can help reduce symptoms. If needed, your healthcare provider may prescribe medicines. Symptoms often improve with treatment, but if treatment is stopped, the symptoms often return after a few months. So most persons with GERD will need to continue treatment or get treatment on and off.  Lifestyle changes    Limit or avoid fatty, fried, and spicy foods, as well as coffee, chocolate, mint, and foods with high acid content such as tomatoes and citrus fruit and juices (orange, grapefruit, lemon).    Don t eat large meals, especially at night. Frequent, smaller meals are best. Don't lie down right after eating. And don t eat anything 3 hours before going to bed.    Don't drink alcohol or smoke. As much as possible, stay away from second hand smoke.    If you are overweight, losing weight will reduce symptoms.     Don't wear tight clothing around your stomach area.    If your symptoms occur during sleep, use a foam wedge  "to elevate your upper body (not just your head.) Or, place 4\" blocks under the head of your bed. Or use 2 bed risers under your bedframe.  Medicines  If needed, medicines can help relieve the symptoms of GERD and prevent damage to the esophagus. Discuss a medicine plan with your healthcare provider. This may include one or more of the following medicines:    Antacids to help neutralize the normal acids in your stomach.    Acid blockers (Histamine or H2 blockers) to decrease acid production.    Acid inhibitors (proton pump inhibitors PPIs) to decrease acid production in a different way than the blockers. They may work better, but can take a little longer to take effect.  Take an antacid 30 to 60 minutes after eating and at bedtime, but not at the same time as an acid blocker.  Try not to take medicines such as ibuprofen and aspirin. If you are taking aspirin for your heart or other medical reasons, talk to your healthcare provider about stopping it.  Follow-up care  Follow up with your healthcare provider or as advised by our staff.  When to seek medical advice  Call your healthcare provider if any of the following occur:    Stomach pain gets worse or moves to the lower right abdomen (appendix area)    Chest pain appears or gets worse, or spreads to the back, neck, shoulder, or arm    An over-the-counter trial of medicine doesn't relieve your symptoms    Weight loss that can't be explained    Trouble or pain swallowing    Frequent vomiting (can t keep down liquids)    Blood in the stool or vomit (red or black in color)    Feeling weak or dizzy    Fever of 100.4 F (38 C) or higher, or as directed by your healthcare provider  [employer] last reviewed this educational content on 3/1/2018    3395-2835 [employer]. All rights reserved. This information is not intended as a substitute for professional medical care. Always follow your healthcare professional's instructions.           "

## 2021-08-17 ENCOUNTER — PATIENT OUTREACH (OUTPATIENT)
Dept: GERIATRIC MEDICINE | Facility: CLINIC | Age: 81
End: 2021-08-17

## 2021-09-27 NOTE — PROGRESS NOTES
[facility] Care Coordination Contact      [facility] Six-Month Telephone Assessment    6 month telephone assessment completed on 8/17/21 with sonZenobia.    ER visits: No  Hospitalizations: No  TCU stays: No  Significant health status changes: None reported.  Falls/Injuries: No  ADL/IADL changes: No  Changes in services: No    Goals: See POC in chart for goal progress documentation.      Will see member in 6 months for an annual health risk assessment.   Encouraged member to call CC with any questions or concerns in the meantime.     Brooke Villareal RN  [facility]  [phone]

## 2022-02-16 ENCOUNTER — VIRTUAL VISIT (OUTPATIENT)
Dept: FAMILY MEDICINE | Facility: CLINIC | Age: 82
End: 2022-02-16
Payer: COMMERCIAL

## 2022-02-16 DIAGNOSIS — R14.2 FLATULENCE, ERUCTATION AND GAS PAIN: ICD-10-CM

## 2022-02-16 DIAGNOSIS — R14.3 FLATULENCE, ERUCTATION AND GAS PAIN: ICD-10-CM

## 2022-02-16 DIAGNOSIS — K21.9 GASTROESOPHAGEAL REFLUX DISEASE WITHOUT ESOPHAGITIS: Primary | ICD-10-CM

## 2022-02-16 DIAGNOSIS — R14.1 FLATULENCE, ERUCTATION AND GAS PAIN: ICD-10-CM

## 2022-02-16 DIAGNOSIS — D50.9 IRON DEFICIENCY ANEMIA, UNSPECIFIED IRON DEFICIENCY ANEMIA TYPE: ICD-10-CM

## 2022-02-16 DIAGNOSIS — D51.8 VITAMIN B12 DEFICIENCY (DIETARY) ANEMIA: ICD-10-CM

## 2022-02-16 DIAGNOSIS — K59.00 CONSTIPATION, UNSPECIFIED CONSTIPATION TYPE: ICD-10-CM

## 2022-02-16 PROCEDURE — 99214 OFFICE O/P EST MOD 30 MIN: CPT | Mod: 95 | Performed by: PREVENTIVE MEDICINE

## 2022-02-16 RX ORDER — SIMETHICONE 125 MG
125 TABLET,CHEWABLE ORAL 2 TIMES DAILY PRN
Qty: 180 TABLET | Refills: 3 | Status: SHIPPED | OUTPATIENT
Start: 2022-02-16

## 2022-02-16 RX ORDER — AMOXICILLIN 250 MG
1 CAPSULE ORAL DAILY PRN
Qty: 90 TABLET | Refills: 0 | Status: SHIPPED | OUTPATIENT
Start: 2022-02-16 | End: 2022-08-22

## 2022-02-16 RX ORDER — CHOLECALCIFEROL (VITAMIN D3) 125 MCG
CAPSULE ORAL
Qty: 90 TABLET | Refills: 3 | Status: SHIPPED | OUTPATIENT
Start: 2022-02-16 | End: 2022-08-09

## 2022-02-16 NOTE — PATIENT INSTRUCTIONS
At [facility], we strive to deliver an exceptional experience to you, every time we see you. If you receive a survey, please complete it as we do value your feedback.  If you have MyChart, you can expect to receive results automatically within 24 hours of their completion.  Your provider will send a note interpreting your results as well.   If you do not have MyChart, you should receive your results in about a week by mail.    Your care team:                            Family Medicine Internal Medicine   MD Regan Stearns MD Shantel Branch-Fleming, MD Srinivasa Vaka, MD Katya Belousova, PAMANINDER Miranda, APRN CNP    Shadi Cotton, MD Pediatrics   Jesse Stone, PAMANINDER Higgins, CNP MD Stefanie Murrell APRN CNP   MD Anna Farris MD Deborah Mielke, MD Collette Obrien, APRN [facility]      Clinic hours: Monday - Thursday 7 am-6 pm; Fridays 7 am-5 pm.   Urgent care: Monday - Friday 10 am- 8 pm; Saturday and Sunday 9 am-5 pm.    Clinic: [phone]       [facility]: Monday - Thursday 8 am - 7 pm; Friday 8 am - 6 pm  [facility]: [phone]     Use [email or web address] for 24/7 diagnosis and treatment of dozens of conditions.

## 2022-02-16 NOTE — PROGRESS NOTES
Sarah is a 81 year old who is being evaluated via a billable telephone visit.      What phone number would you like to be contacted at? [phone]  How would you like to obtain your AVS? Mail a copy    Assessment & Plan     Gastroesophageal reflux disease without esophagitis  -understands risk of bone loss with long term use of PPI   - lactase (LACTAID) 3000 UNIT tablet  Dispense: 90 tablet; Refill: 3  - omeprazole (PRILOSEC) 20 MG DR capsule  Dispense: 90 capsule; Refill: 3  - CBC with Platelets & Differential  - Comprehensive metabolic panel    Vitamin B12 deficiency (dietary) anemia  -await labs   - CBC with Platelets & Differential  - Vitamin B12    Iron deficiency anemia, unspecified iron deficiency anemia type  - CBC with Platelets & Differential  - Comprehensive metabolic panel  - Ferritin  -had not been on iron supplements due to constipation  -due for labs  -last hemoglobin was low at 8 (9/2020). No labs since then, due for recheck.  -May need iron infusions if still low       Flatulence, eructation and gas pain  -good results with Simethicone in the past   - simethicone (MYLICON) 125 MG chewable tablet  Dispense: 180 tablet; Refill: 3    Constipation, unspecified constipation type  - senna-docusate (SENOKOT-S/PERICOLACE) 8.6-50 MG tablet  Dispense: 90 tablet; Refill: 0  -high fiber diet       Ordering of each unique test  Prescription drug management  30 minutes spent on the date of the encounter doing chart review, history and exam, documentation and further activities per the note        Return in about 1 week (around 2/23/2022) for labs.    Kiana Camarena MD MPH    [facility]    Kalani Smith is a 81 year old who presents for the following health issues:    HPI     Fall Risk: None     Covid booster:Done 11/22/21.     Flu shot: not done     Medication Followup of Omeprazole & Lactaid    Taking Medication as prescribed: yes    Side Effects:  None    Medication Helping  Symptoms:  Yes    More symptoms with certain foods    Also has constipation, takes Senokot every couple of days     Does not prefer powder hence defer Miralax       Gas X has helped with symptoms and would like this medication for gas as needed.     Iron deficiency anemia:  -not on supplements due to constipation from iron supplements  -no melena  -no rectal bleeding  -No severe abdominal pain  -no emesis  -no fever or chills  -no night sweats   -has had anemia or some time  -Endoscopy in 2013 and colonoscopy in 2013  -no dysphagia or odynophagia     92 pounds weight    No falls in the last one year      Hiatal hernia:  -2013 had endoscopy   -No emesis  -No melena  -no odynophagia     Review of Systems   Constitutional, HEENT, cardiovascular, pulmonary, gi and gu systems are negative, except as otherwise noted.      Objective           Vitals:  No vitals were obtained today due to virtual visit.    Physical Exam   healthy, alert and no distress  PSYCH: Alert and oriented times 3; coherent speech, normal   rate and volume, able to articulate logical thoughts, able   to abstract reason, no tangential thoughts, no hallucinations   or delusions  Her affect is normal  RESP: No cough, no audible wheezing, able to talk in full sentences  Remainder of exam unable to be completed due to telephone visits        Phone call duration: 8 minutes

## 2022-02-17 ENCOUNTER — PATIENT OUTREACH (OUTPATIENT)
Dept: GERIATRIC MEDICINE | Facility: CLINIC | Age: 82
End: 2022-02-17
Payer: COMMERCIAL

## 2022-02-17 NOTE — LETTER
February 18, 2022    Important [employer] Information    SARAH AMAYA  [address]  [place] [address]  I'm Here to Help  Dear Sarah,  My name is Brooke Villareal RN, and I am your [employer] Care Coordinator. When we talked, you told me that you are not interested in meeting with me to complete a health risk assessment.  As a Care Coordinator, I am here to help. My role is to make sure that your health plan is working for you. I am available to:     Review your health care needs with you over the phone or in-person     Provide support for and information about covered services or supplies to help keep you safe and healthy in your home    Answer questions about your insurance     Help you find a provider, such as a doctor or dentist, to meet your unique needs    I have included a copy of a Health Risk Assessment. Please fill it out and return it in the included envelope.  I have also included a copy of a document that provides you with more information about my role as your Care Coordinator and how I can help you with your medical, social and everyday needs.    Questions?  Call me at [phone] Monday-Friday between 8am and 5pm. TTY: 711. If you d like, a friend or family member may call for you.   You may also call [facility] Customer Service at [phone] or [phone] (toll free) from 8 a.m. - 8 p.m. Central, seven days a week. Access to representatives may be limited at times. TTY: 711.  Sincerely,    Brokoe Villareal RN    E-mail:  [email or web address]  Phone: [phone]    Care Manager  [facility]    cc: member records                                                                                          CB5 ([facility]) (5-2020)    Civil Rights Notice  Discrimination is against the law. [employer] does not discriminate on the basis of any of the following:    Race    Color    National Origin    Creed    Pentecostal    Age    Public Assistance Status    Receipt of Health Care  Services    Disability (including physical or mental impairment)    Sex (including sex stereotypes and gender identity)    Marital Status    Political Beliefs    Medical Condition    Genetic Information    Sexual Orientation    Claims Experience    Medical History    Health Status    Auxiliary Aids and Services:  [employer] provides auxiliary aids and services, like qualified interpreters or information in accessible formats, free of charge and in a timely manner, to ensure an equal opportunity to participate in our health care programs. Contact [employer] at [employer]/contact medicaid or call [phone] (toll free); TTY:636 or at [employer]/contactmedicaid.    Language Assistance Services:  [employer] provides translated documents and spoken language interpreting, free of charge and in a timely manner, when language assistance services are necessary to ensure limited English speakers have meaningful access to our information and services. Contact [employer] at [phone] (toll free); TTY: 371 or [email or web address].     Civil Rights Complaints  You have the right to file a discrimination complaint if you believe you were treated in a discriminatory way by [employer]. You may contact any of the following four agencies directly to file a discrimination complaint.          U.S. Department of Health and Human Services  Office for Civil Rights (OCR)  You have the right to file a complaint with the OCR, a federal agency, if you believe you have been discriminated against because of any of the following:    Race    Disability    Color    Sex    National Origin    Age    Episcopalian (in some cases)    Contact the OCR directly to file a complaint:         Director         U.S. Department of Health and Human Services  Office for Civil Rights         [address] [place] [address]         Customer Response Center: Toll-free: [phone]          TDD: [phone]          Email: [email or web address]    [place] Department of Human Rights (MDHR)  In [place], you have the right to file a complaint with the MDHR if you believe you have been discriminated against because of any of the following:      Race    Color    National Origin    Quaker    Creed    Sex    Sexual Orientation    Marital Status    Public Assistance Status    Disability    Contact the MDHR directly to file a complaint:         [place] Department of Human Rights         [address] [address]         [phone] (voice)          [phone] (toll free)         889 or [phone] ([place] Relay)         [phone] (Fax)         [email or web address] (Email)     [place] Department of Human Services (DHS)  You have the right to file a complaint with [facility] if you believe you have been discriminated against in our health care programs because of any of the following:    Race    Color    National Origin    Creed    Quaker    Age    Public Assistance Status    Receipt of Health Care Services    Disability (including physical or mental impairment)    Sex (including sex stereotypes and gender identity)    Marital Status    Political Beliefs    Medical Condition    Genetic Information    Sexual Orientation    Claims Experience    Medical History    Health Status    Complaints must be in writing and filed within 180 days of the date you discovered the alleged discrimination. The complaint must contain your name and address and describe the discrimination you are complaining about. After we get your complaint, we will review it and notify you in writing about whether we have authority to investigate. If we do, we will investigate the complaint.      [facility] will notify you in writing of the investigation s outcome. You have a right to appeal the outcome if you disagree with the decision. To appeal, you must send a written request to have [facility] review the investigation outcome. Be  brief and state why you disagree with the decision. Include additional information you think is important.      If you file a complaint in this way, the people who work for the agency named in the complaint cannot retaliate against you. This means they cannot punish you in any way for filing a complaint. Filing a complaint in this way does not stop you from seeking out other legal or administration actions.     Contact DHS directly to file a discrimination complaint:        Civil Rights Coordinator        [facility]        Equal Opportunity and Access Division        [address] [address]        [place] [address] [phone] (voice) or use your preferred relay service     [employer] Complaint Notice   You have the right to file a complaint with [employer] if you believe you have been discriminated against because of any of the following:       Medical condition    Health status    Receipt of health care services    Claims experience    Medical history    Genetic information    Disability (including mental or physical impairment)    Marital status    Age    Sex (including sex stereotypes and gender identity)    Sexual orientation    National origin    Race    Color    Baptist    Creed    Public assistance status    Political beliefs    You can file a complaint and ask for help in filing a complaint in person or by mail, phone, fax, or email at:     [employer] Civil Rights Coordinator  [facility]  [address], [place] [address] [phone] (voice and fax) or TTS:466  Email: [email or web address]              American Indians can begin or continue to use [facility] ([facility]) clinics. We will not require prior approval or impose any conditions for you to get services at these clinics. For elders age 65 years and older this includes Elderly Waiver (EW) services accessed through the [facility]. If a doctor or other provider in a [facility]  or [facility] clinic refers you to a provider in our network, we will not require you to see your primary care provider prior to the referral.

## 2022-02-17 NOTE — PROGRESS NOTES
[facility] Care Coordination Contact      [facility] Refusal Telephone Assessment    Member refused home visit HRA on 2/17/22 .    ER visits: No  Hospitalizations: No  Health concerns: None reported.  Falls/Injuries: No  ADL/IADL Dependencies: None reported.        Member currently receiving the following home care services:  None   Member currently receiving the following community resources: None   Informal support(s):  Family    Advanced Care Planning discussion, complete code section.    [facility] Health Plan sponsored benefits: Shared information re: Silver Sneakers/gym memberships, ASA, Calcium +D.    Follow-Up Plan: Member informed of future contact, plan to f/u with member with a 6 month telephone assessment and offer a home visit.  Contact information shared with member and family, encouraged member to call with any questions or concerns at any time.    Brooke Villareal RN  [facility]  [phone]

## 2022-02-18 NOTE — PROGRESS NOTES
"[facility] Care Coordination Contact    Per CC, mailed client a \"Refusal of Home Visit\" letter.    Mailed member [employer] Self Report Health Assessment with self addressed return envelope.     Siri Padilla  Case Management Specialist  [facility]  [phone]    " Patient wanted dr mahmood to know he plans to cancel his appt of lab and follow up for now.  He will be having back surgery next week he will call back to follow up as soon as he is \"back on his feet\"

## 2022-04-04 NOTE — TELEPHONE ENCOUNTER
"  Reason for Disposition    Side (flank) or lower back pain present    Additional Information    Negative: Shock suspected (e.g., cold/pale/clammy skin, too weak to stand, low BP, rapid pulse)    Negative: Sounds like a life-threatening emergency to the triager    Negative: Followed a genital area injury    Negative: Followed a genital area injury (penis, scrotum)    Negative: Vaginal discharge    Negative: Pus (white, yellow) or bloody discharge from end of penis    Negative: [1] Taking antibiotic for urinary tract infection (UTI) AND [2] female    Negative: [1] Taking antibiotic for urinary tract infection (UTI) AND [2] male    Negative: [1] Discomfort (pain, burning or stinging) when passing urine AND [2] pregnant    Negative: [1] Discomfort (pain, burning or stinging) when passing urine AND [2] postpartum < 1 month    Negative: [1] Discomfort (pain, burning or stinging) when passing urine AND [2] female    Negative: [1] Discomfort (pain, burning or stinging) when passing urine AND [2] male    Negative: Pain or itching in the vulvar area    Negative: Pain in scrotum is main symptom    Negative: Blood in the urine is main symptom    Negative: Symptoms arising from use of a urinary catheter (Coronel or Coude)    Negative: [1] Unable to urinate (or only a few drops) > 4 hours AND     [2] bladder feels very full (e.g., palpable bladder or strong urge to urinate)    Negative: [1] Decreased urination and [2] drinking very little AND [2] dehydration suspected (e.g., dark urine, no urine > 12 hours, very dry mouth, very lightheaded)    Negative: Patient sounds very sick or weak to the triager    Negative: Fever > 100.5 F (38.1 C)    Answer Assessment - Initial Assessment Questions  1. SYMPTOM: \"What's the main symptom you're concerned about?\" (e.g., frequency, incontinence)      Back pain and pain with urination  2. ONSET: \"When did the  ________  start?\"      One week  3. PAIN: \"Is there any pain?\" If so, ask: \"How bad is " "it?\" (Scale: 1-10; mild, moderate, severe)      moderate  4. CAUSE: \"What do you think is causing the symptoms?\"      UTI  5. OTHER SYMPTOMS: \"Do you have any other symptoms?\" (e.g., fever, flank pain, blood in urine, pain with urination)      She has had a fever in the evening  6. PREGNANCY: \"Is there any chance you are pregnant?\" \"When was your last menstrual period?\"     N/a  Suggested that she should be seen today and they want to wait until tomorrow to see a female provider.    Protocols used: URINARY SYMPTOMS-ADULT-AH    " General

## 2022-06-27 ENCOUNTER — PATIENT OUTREACH (OUTPATIENT)
Dept: GERIATRIC MEDICINE | Facility: CLINIC | Age: 82
End: 2022-06-27

## 2022-06-27 NOTE — PROGRESS NOTES
TRANSITIONS OF CARE (QUINN) LOG   QUINN tasks should be completed by the CC within one (1) business day of notification of each transition. Follow up contact with member is required after return to their usual care setting.  Note:  If CC finds out about the transitions fifteen (15) days or more after the member has returned to their usual care setting, no QUINN log is needed. However, the CC should check in with the member to discuss the transition process, any changes needed to the care plan and document it in a case note.    Member Name:  Sarah Vega MCO Name:  Mikea MCO/Health Plan Member ID#: [id number]   Product: [facility] Care Coordinator Contact:  Brooke Villareal RN Agency/County/Care System: [facility]   Transition Communication Actions from Care Management Contact   Transition #1   Notification Date: 6/27/22 Transition Date:   6/25/22 Transition From: Home     Is this the member s usual care setting?               yes Transition To: Hospital, [facility] [place]   Transition Type:  Unplanned  Reason for Admission/Comments:  6/27/22- Member was seen in the ED at [facility] after a fall and was then sent to [facility] and admitted 6/25/22. Member was dx with a fracture of R femur and had ORIF completed 6/26/22.  Contact member/responsible party to offer assistance with transition Date completed: 6/27/22 with Ali, son. He reports they are looking at TCU/rehab placement within the next couple of days.    Notes from conversation with the member/responsible party, provider, discharging and receiving facility (as applicable):   6/27/22- CC contacted [facility] SW/CCDelphine, and left a message requesting a call back.  6/29/22- CC left another message for [facility] SW/CC dept requesting a call back.  6/30/22- CC left a message for Angelika at the [facility] CC dept requesting a call back from the CC working with member.   Shared CC contact info, care  plan/services with receiving setting--Date completed: 6/27/22, 6/29/22, 6/30/22  Name & Title of receiving setting contact: Delphine (ext. [phone])   Notified PCP of transition--Date completed:  6/27/22     via  EMR  Name of PCP: Kiana Camarena     Transition #2   Notification Date: 7/6/22 Transition Date: 7/1/22   Transition From: [facility]     Is this the member s usual care setting?               no Transition To: Rehabiliation Facility, [facility] in [place]   Transition Type:  Planned  Reason for Admission/Comments:  7/6/22 - CC notified via PAS and [employer] that member was discharged from [facility] and [facility] to [facility] [place] on 7/1/22.  Contact member/responsible party to offer assistance with transition Date completed: 7/6/22 - f/u with Zenobia (son)    Notes from conversation with the member/responsible party, provider, discharging and receiving facility (as applicable):   7/6/22- LM for JANETTE Bennett at [facility], requesting a call back. Received a call back and reviewed member's POC (no services). Informed that son has spoke about possible PCA services due to her change in condition. Sabrina reports they are looking at a couple of weeks of therapy but will keep CC updated on any discharge plans.  7/14/22- CC notified by JANETTE Bennett at [facility], that discharge is planned for Thursday 7/21/22. Care Coordinator received update from OT- member is still requiring assistance with her neck brace, dressing, bathing, and grooming at this time. Plan to complete LTCC w/ PCA assessment after discharge per family request on 7/25/22 pending discharge.   Shared CC contact info, care plan/services with receiving setting--Date completed: 7/6/22  Name & Title of receiving setting contact: Sabrina [phone] (JANETTE on 2nd floor)   Notified PCP of transition--Date completed:  7/6/22     via  EMR  Name of PCP: Kiana Camarena      Transition #3   Notification Date: 7/21/22  Transition Date: 7/21/22 Transition From: [facility]     Is this the member s usual care setting?               no Transition To: Home   Transition Type:  Planned  Reason for Admission/Comments:  7/21/22- member discharged home from [facility] to home.    Contact member/responsible party to offer assistance with transition Date completed: 7/21/22    Notes from conversation with the member/responsible party, provider, discharging and receiving facility (as applicable): See below     Shared CC contact info, care plan/services with receiving setting--Date completed: 7/21/22   Name & Title of receiving setting contact: Ali- son   Notified PCP of transition--Date completed:  7/21/22    via  EMR  Name of PCP: Kiana Camarena      *RETURN TO USUAL CARE SETTING: *Complete tasks below when the member is discharging TO their usual care setting within one (1) business day of notification..      For situations where the Care Coordinator is notified of the discharge prior to the date of discharge, the Care Coordinator must follow up with the member or designated representative to confirm that discharge actually occurred and discuss required QUINN tasks as outlined in the QUINN Instructions.  (This includes situations where it may be a  new  usual care setting for the member. (i.e., a community member who decides upon permanent nursing home placement following hospitalization and rehab).    Discuss with Member/Responsible Party:    Check  Yes  - if the member, family member and/or SNF/facility staff manages the following:    If  No  provide explanation in the comments section.          Date completed: 7/21/22 Communicated with member or their designated representative about the following:  care transition process; about changes to the member s health status; plan of care updates; education about transitions and how to prevent unplanned transitions/readmissions    Four Pillars for Optimal Transition:    Check  Yes  - if the  member, family member and/or SNF/facility staff manages the following:    If  No  provide explanation in the comments section.          [x]  Yes     []  No Does the member have a follow-up appointment scheduled with primary care or specialist? (Mental health hospitalizations--the appt. should be w/in 7 days)              For mental health hospitalizations:  []  Yes     []  No     Does the member have a follow-up appointment scheduled with a mental health practitioner within 7 days of discharge?  [x]  Yes     []  No     Has a medication review been completed with member? If no, refer to PCP, home care nurse, MTM, pharmacist  [x]  Yes     []  No     Can the member manage their medications or is there a system in place to manage medications (e.g. home care set-up)?         [x]  Yes     []  No     Can the member verbalize warning signs and symptoms to watch for and how to respond?  [x]  Yes     []  No     Does the member have a copy of and understand their discharge instructions?  If no, assist to obtain copy of discharge instructions, review discharge instructions, and assist to contact PCP to discuss questions about their recent hospitalization.  [x]  Yes     []  No     Does the member have adequate food, housing and transportation?  If no, add goal and discuss additional supports available to the member                                                                                                                                                                                 [x]  Yes     []  No     Is the member safe in their home?  If no, document needs and support provided                                                                                                                                                                          []  Yes     [x]  No     Are there any concerns of vulnerability, abuse, or neglect?  If yes, document concerns and actions taken by Care Coordinator as a mandated                                                                                                                                                                               [x]  Yes     []  No     Does the member use a Personal Health Care Record?  Check  Yes  if visit summary, discharge summary, and/or healthcare summary are being used as a PHR.                                                                                                                                                                                  [x]  Yes     []  No     Have you reviewed the discharge summary with the member? If  No  provide explanation in comments.  [x]  Yes     []  No     Have you updated the member s care plan/support plan? Add new diagnosis, medications, treatments, goals & interventions, as applicable. If No, provide explanation in comments.    Comments:         Notes from conversation with the member/responsible party, provider, discharging and receiving facility (as applicable): CC contacted adult son Zenobia, and reviewed discharge summary.  Member has a follow-up appointment with PCP in 7 days: No: Offered Assistance with setting up a follow up appointment   Member has had a change in condition: Yes: member is requiring more assist with ADLs and IADLs.  Home visit needed: Yes: for LTCC/PCA assessment and is scheduled for 7/25/22 at 11am.  Care plan reviewed and updated.  The following home based services None were resumed.  New referrals placed: Yes: RN Therapies: PT/OT  Transition log completed.   PCP notified of transition back to home via EMR.    Brooke Villareal RN  [facility]  [phone]

## 2022-07-04 ENCOUNTER — MEDICAL CORRESPONDENCE (OUTPATIENT)
Dept: HEALTH INFORMATION MANAGEMENT | Facility: CLINIC | Age: 82
End: 2022-07-04

## 2022-07-18 ENCOUNTER — MEDICAL CORRESPONDENCE (OUTPATIENT)
Dept: HEALTH INFORMATION MANAGEMENT | Facility: CLINIC | Age: 82
End: 2022-07-18

## 2022-07-19 ENCOUNTER — MEDICAL CORRESPONDENCE (OUTPATIENT)
Dept: FAMILY MEDICINE | Facility: CLINIC | Age: 82
End: 2022-07-19

## 2022-07-23 ENCOUNTER — TELEPHONE (OUTPATIENT)
Dept: FAMILY MEDICINE | Facility: CLINIC | Age: 82
End: 2022-07-23

## 2022-07-23 NOTE — TELEPHONE ENCOUNTER
Reason for Call: Request for an order or referral:    Order or referral being requested: Start of care for home care today looking verbal orders:  1 time a week for  1 week,  2 times a week for 3 weeks, and then 1 time a week 2 weeks.      Date needed: at your convenience    Has the patient been seen by the PCP for this problem? YES    Additional comments: none    Phone number Patient can be reached at:  Other phone number:  [phone]    Best Time:  anytime    Can we leave a detailed message on this number?  YES    Call taken on 7/23/2022 at 3:13 PM by Lin Dominguez

## 2022-07-25 ENCOUNTER — PATIENT OUTREACH (OUTPATIENT)
Dept: GERIATRIC MEDICINE | Facility: CLINIC | Age: 82
End: 2022-07-25

## 2022-07-25 DIAGNOSIS — Z53.9 DIAGNOSIS NOT YET DEFINED: Primary | ICD-10-CM
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
## 2022-07-25 PROBLEM — S72.141D CLOSED INTERTROCHANTERIC FRACTURE OF RIGHT FEMUR WITH ROUTINE HEALING: Status: ACTIVE | Noted: 2022-06-25
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
## 2022-07-25 PROBLEM — S22.059D: Status: ACTIVE | Noted: 2022-06-25

## 2022-07-25 PROBLEM — S12.100D: Status: ACTIVE | Noted: 2022-06-25

## 2022-07-25 PROCEDURE — G0180 MD CERTIFICATION HHA PATIENT: HCPCS | Performed by: PREVENTIVE MEDICINE

## 2022-07-25 SDOH — ECONOMIC STABILITY: FOOD INSECURITY: WITHIN THE PAST 12 MONTHS, YOU WORRIED THAT YOUR FOOD WOULD RUN OUT BEFORE YOU GOT MONEY TO BUY MORE.: NEVER TRUE

## 2022-07-25 SDOH — ECONOMIC STABILITY: FOOD INSECURITY: WITHIN THE PAST 12 MONTHS, THE FOOD YOU BOUGHT JUST DIDN'T LAST AND YOU DIDN'T HAVE MONEY TO GET MORE.: NEVER TRUE

## 2022-07-25 SDOH — ECONOMIC STABILITY: INCOME INSECURITY: IN THE LAST 12 MONTHS, WAS THERE A TIME WHEN YOU WERE NOT ABLE TO PAY THE MORTGAGE OR RENT ON TIME?: NO

## 2022-07-25 SDOH — ECONOMIC STABILITY: TRANSPORTATION INSECURITY
IN THE PAST 12 MONTHS, HAS THE LACK OF TRANSPORTATION KEPT YOU FROM MEDICAL APPOINTMENTS OR FROM GETTING MEDICATIONS?: NO

## 2022-07-25 SDOH — ECONOMIC STABILITY: TRANSPORTATION INSECURITY
IN THE PAST 12 MONTHS, HAS LACK OF TRANSPORTATION KEPT YOU FROM MEETINGS, WORK, OR FROM GETTING THINGS NEEDED FOR DAILY LIVING?: NO

## 2022-07-25 ASSESSMENT — LIFESTYLE VARIABLES
SKIP TO QUESTIONS 9-10: 1
AUDIT-C TOTAL SCORE: 0
HOW OFTEN DO YOU HAVE A DRINK CONTAINING ALCOHOL: NEVER
HOW OFTEN DO YOU HAVE SIX OR MORE DRINKS ON ONE OCCASION: NEVER
HOW MANY STANDARD DRINKS CONTAINING ALCOHOL DO YOU HAVE ON A TYPICAL DAY: PATIENT DOES NOT DRINK

## 2022-07-25 ASSESSMENT — ACTIVITIES OF DAILY LIVING (ADL): DEPENDENT_IADLS:: CLEANING;COOKING;LAUNDRY;SHOPPING;MEAL PREPARATION;INCONTINENCE;TRANSPORTATION;MEDICATION MANAGEMENT

## 2022-07-25 ASSESSMENT — SOCIAL DETERMINANTS OF HEALTH (SDOH): HOW HARD IS IT FOR YOU TO PAY FOR THE VERY BASICS LIKE FOOD, HOUSING, MEDICAL CARE, AND HEATING?: NOT HARD AT ALL

## 2022-07-25 NOTE — PROGRESS NOTES
[facility] Care Coordination Contact    [facility] Initial Assessment     Home visit for Initial Health Risk Assessment with Sarah Martindash completed on July 25, 2022    Type of residence:: Private home - stairs  Current living arrangement:: I live in a private home with family     Assessment completed with:: Family, Other (responsible party/son)    Current Care Plan  Member currently receiving the following home care services: Skilled Nursing, Physicial Therapy   Member currently receiving the following community resources: Home Care for SNV and PT per son, DME      Medication Review  Medication reconciliation completed in Epic: Yes  Medication set-up & administration: Independent-does not set up.  Self-administers medications.  Medication Risk Assessment Medication (1 or more, place referral to MTM): Recent falls within past year  MTM Referral Placed: No: representative declined    Mental/Behavioral Health   Depression Screening:   PHQ-2 Total Score (Adult) - Positive if 3 or more points; Administer PHQ-9 if positive: 0       Mental health DX:: No        No current  services-will place referral for n/a    Falls Assessment:   Fallen 2 or more times in the past year?: No (fall x1 6/24/22)   Any fall with injury in the past year?: Yes (fall 6/24/22 resulting in fx.)    ADL/IADL Dependencies:   Dependent ADLs:: Ambulation-walker, Positioning, Transfers, Toileting, Incontinence, Grooming, Dressing, Bathing  Dependent IADLs:: Cleaning, Cooking, Laundry, Shopping, Meal Preparation, Incontinence, Transportation, Medication Management    [facility] Health Plan sponsored benefits: Shared information re: Silver Sneakers/gym memberships, ASA, Calcium +D.    PCA Assessment completed at visit: Yes Initial PCA Assessment indicated 25 units per day of PCA.     Elderly Waiver Eligibility: Yes-will open to EW    Care Plan & Recommendations: Member will benefit from PCA services with total of 6.25 hours per day,  incontinence supplies, nutritional supplement boost breeze.     See CC for detailed assessment information.    Follow-Up Plan: Member informed of future contact, plan to f/u with member with a 6 month telephone assessment.  Contact information shared with member and family, encouraged member to call with any questions or concerns at any time.    [place] care continuum providers: Please see Snapshot and Care Management Flowsheets for Specific details of care plan.    This CC note routed to PCP.     Quiana Shrestha RN  [facility]  [phone]

## 2022-07-27 ENCOUNTER — MEDICAL CORRESPONDENCE (OUTPATIENT)
Dept: FAMILY MEDICINE | Facility: CLINIC | Age: 82
End: 2022-07-27

## 2022-08-01 ENCOUNTER — PATIENT OUTREACH (OUTPATIENT)
Dept: GERIATRIC MEDICINE | Facility: CLINIC | Age: 82
End: 2022-08-01

## 2022-08-01 NOTE — PROGRESS NOTES
[facility] Care Coordination Contact    Received after visit chart from care coordinator.  Completed following tasks: Mailed copy of care plan to client, Mailed copy of POC signature sheet for member to sign and return in SASE , Updated services in Database Sent the following resources/forms: HCD & wksht  and Mailed [employer] Safe Medication Disposal    and Provider Signature - No POC Shared:  Member indicates that they do not want their POC shared with any EW providers.   PCA agency TBD. Will submit auth request at that time.   Umu Man  Care Management Specialist  [facility]  [phone]

## 2022-08-01 NOTE — LETTER
August 1, 2022    Important [employer] Information    SARAH AMAYA  [address]  [place] [address]  Your Care Plan  Dear Sarah,  When we spoke recently, I promised to send you a Care Plan. The plan enclosed is a summary of our discussion. It includes the steps we agreed would help you meet your health goals. In addition, I can help you with:  [id number]-[id number]  This program is available to members who need a ride to medical and dental visits. To schedule a ride, call [phone] or [phone] (toll free). TTY: 711. You can call 8 a.m. to 8 p.m. Seven days a week. Access to a representative may be limited at times.   One Pass  One Pass is your no-cost, complete, fitness solution for your mind and body. To learn more visit [employer]/fitness or call One Pass, toll-free [phone] (TTY: 715) 8 a.m. to 9 p.m. Monday-Friday.  Health Care Directive   This form helps you outline your health care wishes. You can request a form from me and I will answer any questions you have before you discuss it with your doctor.   Annual Physical  Take a key step on your path to good health and set up an annual physical at your clinic.  Questions?  Call me at [phone] Monday-Friday between 8am and 5pm.  TTY: 710. As we discussed, I plan to be in touch with you again in 6 months to follow up via phone.  Sincerely,    Brooke Villareal RN    E-mail:  [email or web address]  Phone: [phone]    Care Manager  [facility]    cc: member records                                                                                             CB5 ([facility]) (5-2020)    Civil Rights Notice  Discrimination is against the law. [employer] does not discriminate on the basis of any of the following:    Race    Color    National Origin    Creed    Mormonism    Age    Public Assistance Status    Receipt of Health Care Services    Disability (including physical or mental impairment)    Sex (including sex stereotypes and gender  identity)    Marital Status    Political Beliefs    Medical Condition    Genetic Information    Sexual Orientation    Claims Experience    Medical History    Health Status    Auxiliary Aids and Services:  [employer] provides auxiliary aids and services, like qualified interpreters or information in accessible formats, free of charge and in a timely manner, to ensure an equal opportunity to participate in our health care programs. Contact [employer] at [employer]/contact medicaid or call [phone] (toll free); TTY:715 or at [employer]/contactmedicaid.    Language Assistance Services:  [employer] provides translated documents and spoken language interpreting, free of charge and in a timely manner, when language assistance services are necessary to ensure limited English speakers have meaningful access to our information and services. Contact [employer] at [phone] (toll free); TTY: 793 or [employer]/contactmedicaid.     Civil Rights Complaints  You have the right to file a discrimination complaint if you believe you were treated in a discriminatory way by [employer]. You may contact any of the following four agencies directly to file a discrimination complaint.    U.S. Department of Health and Human Services  Office for Civil Rights (OCR)  You have the right to file a complaint with the OCR, a federal agency, if you believe you have been discriminated against because of any of the following:    Race    Disability    Color    Sex    National Origin    Age    Buddhism (in some cases)    Contact the OCR directly to file a complaint:         Director         U.S. Department of Health and Human Services  Office for Civil Rights         [address] [place] [address], [place] [address]         Customer Response Center: Toll-free: [phone]          TDD: [phone]         Email: [email or web address]    [place] Department of Human Rights (MD)  In [place], you have the right to file a  complaint with the MDHR if you believe you have been discriminated against because of any of the following:      Race    Color    National Origin    Gnosticism    Creed    Sex    Sexual Orientation    Marital Status    Public Assistance Status    Disability    Contact the MDHR directly to file a complaint:         [facility] of Human Rights         [address] [address]         [phone] (voice)          [phone] (toll free)         711 or [phone] ([place] Relay)         [phone] (Fax)         [email or web address] (Email)     [place] Department of Human Services (DHS)  You have the right to file a complaint with [facility] if you believe you have been discriminated against in our health care programs because of any of the following:    Race    Color    National Origin    Creed    Gnosticism    Age    Public Assistance Status    Receipt of Health Care Services    Disability (including physical or mental impairment)    Sex (including sex stereotypes and gender identity)    Marital Status    Political Beliefs    Medical Condition    Genetic Information    Sexual Orientation    Claims Experience    Medical History    Health Status    Complaints must be in writing and filed within 180 days of the date you discovered the alleged discrimination. The complaint must contain your name and address and describe the discrimination you are complaining about. After we get your complaint, we will review it and notify you in writing about whether we have authority to investigate. If we do, we will investigate the complaint.      [facility] will notify you in writing of the investigation s outcome. You have a right to appeal the outcome if you disagree with the decision. To appeal, you must send a written request to have [facility] review the investigation outcome. Be brief and state why you disagree with the decision. Include additional information you think is important.      If you  file a complaint in this way, the people who work for the agency named in the complaint cannot retaliate against you. This means they cannot punish you in any way for filing a complaint. Filing a complaint in this way does not stop you from seeking out other legal or administration actions.     Contact DHS directly to file a discrimination complaint:        Civil Rights Coordinator        [place] Department of Human Services        Equal Opportunity and Access Division        [address] [address]        [place] [address] [phone] (voice) or use your preferred relay service     [facility] Complaint Notice   You have the right to file a complaint with [employer] if you believe you have been discriminated against because of any of the following:       Medical condition    Health status    Receipt of health care services    Claims experience    Medical history    Genetic information    Disability (including mental or physical impairment)    Marital status    Age    Sex (including sex stereotypes and gender identity)    Sexual orientation    National origin    Race    Color    Baptist    Creed    Public assistance status    Political beliefs    You can file a complaint and ask for help in filing a complaint in person or by mail, phone, fax, or email at:     [facility] Civil Rights Coordinator  [facility]  [address], [place] [address] [phone] (voice and fax) or TTO:453  Email: [email or web address]    American Indians can begin or continue to use [facility] and  Health Services (IHS) clinics. We will not require prior approval or impose any conditions for you to get services at these clinics. For elders age 65 years and older this includes Elderly Waiver (EW) services accessed through the [facility]. If a doctor or other provider in a [facility] or [facility] clinic refers you to a provider in our network, we will not require you to see your primary care provider prior to the  referral.

## 2022-08-02 ENCOUNTER — MEDICAL CORRESPONDENCE (OUTPATIENT)
Dept: FAMILY MEDICINE | Facility: CLINIC | Age: 82
End: 2022-08-02

## 2022-08-03 ENCOUNTER — TELEPHONE (OUTPATIENT)
Dept: FAMILY MEDICINE | Facility: CLINIC | Age: 82
End: 2022-08-03

## 2022-08-03 NOTE — TELEPHONE ENCOUNTER
The Home Care/Assisted Living/Nursing Facility is calling regarding an established patient.  Has the patient seen Home Care in the past or is currently residing in Assisted Living or Nursing Facility? Yes.     Brit Occupational Therapist calling from [facility] requesting the following orders that are within the Home Care, Assisted Living or Nursing Home Eval and Treatment standing order and can be signed as standing order signature required by RN.    Preferred Call Back Number: [phone]    PT/OT/Speech Therapy: OT visits: 1 x wk x 1 wk; 0 x wk x 1 wk (skip one week); 1 x wk x 2 wks.  Self cares, meal preps, household tasks, adaptive equipment, and arm exercises.    Any additional Orders:  Are there any orders requested, not stated above, that are outside of the standing order and must be routed to a licensed practitioner for approval?  No    Writer has verified Requestor will send fax to have orders signed.      Yanet Leon RN  [facility]

## 2022-08-05 ENCOUNTER — MEDICAL CORRESPONDENCE (OUTPATIENT)
Dept: HEALTH INFORMATION MANAGEMENT | Facility: CLINIC | Age: 82
End: 2022-08-05

## 2022-08-09 ENCOUNTER — VIRTUAL VISIT (OUTPATIENT)
Dept: FAMILY MEDICINE | Facility: CLINIC | Age: 82
End: 2022-08-09
Payer: COMMERCIAL

## 2022-08-09 DIAGNOSIS — S06.5XAA SDH (SUBDURAL HEMATOMA) (H): Primary | ICD-10-CM

## 2022-08-09 DIAGNOSIS — D50.9 IRON DEFICIENCY ANEMIA, UNSPECIFIED IRON DEFICIENCY ANEMIA TYPE: ICD-10-CM

## 2022-08-09 DIAGNOSIS — K21.9 GASTROESOPHAGEAL REFLUX DISEASE WITHOUT ESOPHAGITIS: ICD-10-CM

## 2022-08-09 DIAGNOSIS — S22.059D CLOSED FRACTURE OF FIFTH THORACIC VERTEBRA WITH ROUTINE HEALING, UNSPECIFIED FRACTURE MORPHOLOGY, SUBSEQUENT ENCOUNTER: ICD-10-CM

## 2022-08-09 PROCEDURE — 99214 OFFICE O/P EST MOD 30 MIN: CPT | Mod: 95 | Performed by: PREVENTIVE MEDICINE

## 2022-08-09 RX ORDER — CHOLECALCIFEROL (VITAMIN D3) 125 MCG
CAPSULE ORAL
Qty: 90 TABLET | Refills: 3 | Status: SHIPPED | OUTPATIENT
Start: 2022-08-09

## 2022-08-09 RX ORDER — CHOLECALCIFEROL (VITAMIN D3) 50 MCG
1 TABLET ORAL DAILY
Qty: 90 TABLET | Refills: 3 | Status: SHIPPED | OUTPATIENT
Start: 2022-08-09 | End: 2023-07-27

## 2022-08-09 NOTE — PROGRESS NOTES
Sarah is a 82 year old who is being evaluated via a billable telephone visit.      What phone number would you like to be contacted at? [phone]  How would you like to obtain your AVS? Mail a copy    Assessment & Plan     SDH (subdural hematoma) (H)  -history of mechanical fall  -initially seen at [facility] and then transferred to [facility]  -repeat head CT was stable  -non operative management done   - CBC with Platelets & Differential  - Comprehensive metabolic panel  -She underwent closed reduction and intramedullary femoral rodding using Synthes Trochanteric Fixation Nail on 6/25    Gastroesophageal reflux disease without esophagitis  - CBC with Platelets & Differential  - Comprehensive metabolic panel  - lactase (LACTAID) 3000 UNIT tablet  Dispense: 90 tablet; Refill: 3  - omeprazole (PRILOSEC) 20 MG DR capsule  Dispense: 90 capsule; Refill: 3    Closed fracture of fifth thoracic vertebra with routine healing, unspecified fracture morphology, subsequent encounter  -has follow up with Spine 8/23/22  -Using Aspen  brace   - vitamin D3 (CHOLECALCIFEROL) 50 mcg (2000 units) tablet  Dispense: 90 tablet; Refill: 3  -continue with PT and OT     Iron deficiency anemia, unspecified iron deficiency anemia type  -prior history  -post op blood loss anemia and required blood transfusion.   -last hemoglobin was 7.6 (6/29/22)   - CBC with Platelets & Differential  - Comprehensive metabolic panel  - Ferritin  - Vitamin B12      Review of external notes as documented elsewhere in note  Ordering of each unique test  Prescription drug management  35 minutes spent on the date of the encounter doing chart review, history and exam, documentation and further activities per the note        Return in about 1 week (around 8/16/2022) for labs.    Kiana Camarena MD MPH    [facility]    Kalani Smith is a 82 year old, spoke with son, presenting for the following health  "issues:  Recheck Medication and Fall      History of Present Illness       Reason for visit:  Follow up fpr medication    She eats 2-3 servings of fruits and vegetables daily.She consumes 1 sweetened beverage(s) daily.She exercises with enough effort to increase her heart rate 10 to 19 minutes per day.  She exercises with enough effort to increase her heart rate 7 days per week.   She is taking medications regularly.       Pain History:  When did you first notice your pain? - Acute Pain   Have you seen anyone else for your pain? No  Where in your body do you have pain? Back Pain  Onset/Duration: 6 weeks ago  Description:   Location of pain: upper back both  Character of pain: sharp, stabbing, fullness and intermittent  Pain radiation: none  New numbness or weakness in legs, not attributed to pain: YES- weakness  Intensity: Currently 3/10, At its worst 7/10  Progression of Symptoms: improving and intermittent  History:   Specific cause: fall   Pain interferes with job: N/A  History of back problems: no prior back problems  Any previous MRI or X-rays: Yes--at [facility].  Date 06/24/22  Sees a specialist for back pain: No  Alleviating factors:   Improved by: surgery    Precipitating factors:  Worsened by: Lifting, Bending and Standing  Therapies tried and outcome: Physical Therapy      \"Admission Date: 6/25/2022  Discharge Date: 7/1/2022    She will be discharged to TCU.    DISCHARGE DIAGNOSES:  Principal Problem:  Fall  Active Problems:  Closed fracture of second cervical vertebra with routine healing  Closed fracture of fifth thoracic vertebra with routine healing  Iron deficiency anemia  Closed intertrochanteric fracture of right femur with routine healing  Fall at home, initial encounter  SDH (subdural hematoma) (HCC)\"    8/23/22 has follow up with Spine, has been using a brace.  Also had a SDH and cleared the next day. No severe headaches or vision changes   Feeling better+  Spent " 3 weeks in rehab  Improving daily  A lot more mobile  Using a walker  Appetite not as good but slowly better   No falls since being back home  PT (2 times a week) and OT (once a week) coming by   Had Orthopedics follow up already   Done with Aspirin (taken total of 4 weeks)   Pain is controlled+ may be a little worse if prolonged sitting.   Left leg has been more painful.   Tylenol+ and this manages the pain, no longer needing Oxycodone   Lidocaine patches not being used anymore  Blood pressure 91 systolic today          Review of Systems   Constitutional, HEENT, cardiovascular, pulmonary, gi and gu systems are negative, except as otherwise noted.      Objective           Vitals:  No vitals were obtained today due to virtual visit.    Physical Exam   healthy, alert and no distress  PSYCH: Alert and oriented times 3; coherent speech, normal   rate and volume, able to articulate logical thoughts, able   to abstract reason, no tangential thoughts, no hallucinations   or delusions  Her affect is normal  RESP: No cough, no audible wheezing, able to talk in full sentences  Remainder of exam unable to be completed due to telephone visits    No results found for this or any previous visit (from the past 24 hour(s)).          Phone call duration: 15 minutes    .  ..

## 2022-08-10 ENCOUNTER — TELEPHONE (OUTPATIENT)
Dept: FAMILY MEDICINE | Facility: CLINIC | Age: 82
End: 2022-08-10

## 2022-08-10 DIAGNOSIS — S22.059D CLOSED FRACTURE OF FIFTH THORACIC VERTEBRA WITH ROUTINE HEALING, UNSPECIFIED FRACTURE MORPHOLOGY, SUBSEQUENT ENCOUNTER: Primary | ICD-10-CM

## 2022-08-10 NOTE — TELEPHONE ENCOUNTER
Lidocaine patches are not covered by Insurance. Can buy over the counter Lidocaine patches 4% and use as needed, OK to cut the patches.    Thank you,  Kiana Camarena MD MPH

## 2022-08-10 NOTE — TELEPHONE ENCOUNTER
Reason for call:  Medication   If this is a refill request, has the caller requested the refill from the pharmacy already? No  Will the patient be using a [facility]? No  Name of the pharmacy and phone number for the [facility] [employer]     Name of the medication requested: lidocaine patches     Other request: n/a    Phone number to reach patient:  Cell number on file:    Telephone Information:   Mobile [phone]       Best Time:  any    Can we leave a detailed message on this number?  YES    Travel screening: Not Applicable

## 2022-08-10 NOTE — TELEPHONE ENCOUNTER
Per provider documentation from virtual visit yesterday:    Lidocaine patches not being used anymore      See message and request from patient's son, below.  Medication not on current med list.      Routing to provider to review and advise.      Yanet Leon RN  [facility]

## 2022-08-11 NOTE — TELEPHONE ENCOUNTER
Routing to  to contact patient's son and read message from provider verbatim.  Any questions, ok to route to RNs.  Angelika Ramsey RN  [facility]

## 2022-08-12 ENCOUNTER — LAB (OUTPATIENT)
Dept: LAB | Facility: CLINIC | Age: 82
End: 2022-08-12
Payer: COMMERCIAL

## 2022-08-12 DIAGNOSIS — D51.8 VITAMIN B12 DEFICIENCY (DIETARY) ANEMIA: ICD-10-CM

## 2022-08-12 DIAGNOSIS — K21.9 GASTROESOPHAGEAL REFLUX DISEASE WITHOUT ESOPHAGITIS: ICD-10-CM

## 2022-08-12 DIAGNOSIS — S06.5XAA SDH (SUBDURAL HEMATOMA) (H): ICD-10-CM

## 2022-08-12 DIAGNOSIS — D50.9 IRON DEFICIENCY ANEMIA, UNSPECIFIED IRON DEFICIENCY ANEMIA TYPE: ICD-10-CM

## 2022-08-12 LAB
ALBUMIN SERPL-MCNC: 3.7 G/DL (ref 3.4–5)
ALP SERPL-CCNC: 109 U/L (ref 40–150)
ALT SERPL W P-5'-P-CCNC: 15 U/L (ref 0–50)
ANION GAP SERPL CALCULATED.3IONS-SCNC: 8 MMOL/L (ref 3–14)
AST SERPL W P-5'-P-CCNC: 15 U/L (ref 0–45)
BASOPHILS # BLD AUTO: 0.1 10E3/UL (ref 0–0.2)
BASOPHILS NFR BLD AUTO: 1 %
BILIRUB SERPL-MCNC: 0.3 MG/DL (ref 0.2–1.3)
BUN SERPL-MCNC: 27 MG/DL (ref 7–30)
CALCIUM SERPL-MCNC: 9.7 MG/DL (ref 8.5–10.1)
CHLORIDE BLD-SCNC: 105 MMOL/L (ref 94–109)
CO2 SERPL-SCNC: 27 MMOL/L (ref 20–32)
CREAT SERPL-MCNC: 0.86 MG/DL (ref 0.52–1.04)
EOSINOPHIL # BLD AUTO: 0.1 10E3/UL (ref 0–0.7)
EOSINOPHIL NFR BLD AUTO: 1 %
ERYTHROCYTE [DISTWIDTH] IN BLOOD BY AUTOMATED COUNT: 24 % (ref 10–15)
FERRITIN SERPL-MCNC: 12 NG/ML (ref 8–252)
GFR SERPL CREATININE-BSD FRML MDRD: 67 ML/MIN/1.73M2
GLUCOSE BLD-MCNC: 79 MG/DL (ref 70–99)
HCT VFR BLD AUTO: 33.1 % (ref 35–47)
HGB BLD-MCNC: 9.7 G/DL (ref 11.7–15.7)
IMM GRANULOCYTES # BLD: 0 10E3/UL
IMM GRANULOCYTES NFR BLD: 0 %
LYMPHOCYTES # BLD AUTO: 2 10E3/UL (ref 0.8–5.3)
LYMPHOCYTES NFR BLD AUTO: 34 %
MCH RBC QN AUTO: 22.8 PG (ref 26.5–33)
MCHC RBC AUTO-ENTMCNC: 29.3 G/DL (ref 31.5–36.5)
MCV RBC AUTO: 78 FL (ref 78–100)
MONOCYTES # BLD AUTO: 0.4 10E3/UL (ref 0–1.3)
MONOCYTES NFR BLD AUTO: 7 %
NEUTROPHILS # BLD AUTO: 3.4 10E3/UL (ref 1.6–8.3)
NEUTROPHILS NFR BLD AUTO: 57 %
NRBC # BLD AUTO: 0 10E3/UL
NRBC BLD AUTO-RTO: 0 /100
PLATELET # BLD AUTO: 309 10E3/UL (ref 150–450)
POTASSIUM BLD-SCNC: 4.3 MMOL/L (ref 3.4–5.3)
PROT SERPL-MCNC: 7.3 G/DL (ref 6.8–8.8)
RBC # BLD AUTO: 4.25 10E6/UL (ref 3.8–5.2)
SODIUM SERPL-SCNC: 140 MMOL/L (ref 133–144)
VIT B12 SERPL-MCNC: 293 PG/ML (ref 232–1245)
WBC # BLD AUTO: 6 10E3/UL (ref 4–11)

## 2022-08-12 PROCEDURE — 82607 VITAMIN B-12: CPT

## 2022-08-12 PROCEDURE — 80053 COMPREHEN METABOLIC PANEL: CPT

## 2022-08-12 PROCEDURE — 36415 COLL VENOUS BLD VENIPUNCTURE: CPT

## 2022-08-12 PROCEDURE — 85025 COMPLETE CBC W/AUTO DIFF WBC: CPT

## 2022-08-12 PROCEDURE — 82728 ASSAY OF FERRITIN: CPT

## 2022-08-12 NOTE — LETTER
August 23, 2022      Sarah Vega  [address]  [place] [address]        Dear ,    We are writing to inform you of your test results.    Vitamin B 12 levels are low, should be over 400. Please take over the counter Vitamin B 12 1000 MCG daily for at least 3 months.   Iron stores are in a normal range.   Electrolytes, glucose, kidney function and liver function tests are normal.   Blood counts (Hemoglobin) are improved from the hospital and at baseline for you.     Resulted Orders   Comprehensive metabolic panel   Result Value Ref Range    Sodium 140 133 - 144 mmol/L    Potassium 4.3 3.4 - 5.3 mmol/L    Chloride 105 94 - 109 mmol/L    Carbon Dioxide (CO2) 27 20 - 32 mmol/L    Anion Gap 8 3 - 14 mmol/L    Urea Nitrogen 27 7 - 30 mg/dL    Creatinine 0.86 0.52 - 1.04 mg/dL    Calcium 9.7 8.5 - 10.1 mg/dL    Glucose 79 70 - 99 mg/dL    Alkaline Phosphatase 109 40 - 150 U/L    AST 15 0 - 45 U/L    ALT 15 0 - 50 U/L    Protein Total 7.3 6.8 - 8.8 g/dL    Albumin 3.7 3.4 - 5.0 g/dL    Bilirubin Total 0.3 0.2 - 1.3 mg/dL    GFR Estimate 67 >60 mL/min/1.73m2      Comment:      Effective December 21, 2021 eGFRcr in adults is calculated using the 2021 CKD-EPI creatinine equation which includes age and gender (Mariel russo al., NE, DOI: 10.1056/ILHJxs1750392)   Ferritin   Result Value Ref Range    Ferritin 12 8 - 252 ng/mL   Vitamin B12   Result Value Ref Range    Vitamin B12 293 232 - 1,245 pg/mL   CBC with platelets and differential   Result Value Ref Range    WBC Count 6.0 4.0 - 11.0 10e3/uL    RBC Count 4.25 3.80 - 5.20 10e6/uL    Hemoglobin 9.7 (L) 11.7 - 15.7 g/dL    Hematocrit 33.1 (L) 35.0 - 47.0 %    MCV 78 78 - 100 fL    MCH 22.8 (L) 26.5 - 33.0 pg    MCHC 29.3 (L) 31.5 - 36.5 g/dL    RDW 24.0 (H) 10.0 - 15.0 %    Platelet Count 309 150 - 450 10e3/uL    % Neutrophils 57 %    % Lymphocytes 34 %    % Monocytes 7 %    % Eosinophils 1 %    % Basophils 1 %    % Immature Granulocytes 0 %    NRBCs  per 100 WBC 0 <1 /100    Absolute Neutrophils 3.4 1.6 - 8.3 10e3/uL    Absolute Lymphocytes 2.0 0.8 - 5.3 10e3/uL    Absolute Monocytes 0.4 0.0 - 1.3 10e3/uL    Absolute Eosinophils 0.1 0.0 - 0.7 10e3/uL    Absolute Basophils 0.1 0.0 - 0.2 10e3/uL    Absolute Immature Granulocytes 0.0 <=0.4 10e3/uL    Absolute NRBCs 0.0 10e3/uL       If you have any questions or concerns, please call the clinic at the number listed above.       Sincerely,      Kiana Camarena MD

## 2022-08-18 ENCOUNTER — PATIENT OUTREACH (OUTPATIENT)
Dept: GERIATRIC MEDICINE | Facility: CLINIC | Age: 82
End: 2022-08-18

## 2022-08-18 NOTE — PROGRESS NOTES
CC updated program tasks and targets for [employer] launch.    Brooke Villareal RN/BSN  [facility]  [phone]

## 2022-08-23 NOTE — RESULT ENCOUNTER NOTE
Please CALL the patient:    Dear Sarah Vega,    Vitamin B 12 levels are low, should be over 400. Please take over the counter Vitamin B 12 1000 MCG daily for at least 3 months.  Iron stores are in a normal range.  Electrolytes, glucose, kidney function and liver function tests are normal.  Blood counts (Hemoglobin) are improved from the hospital and at baseline for you.     Regards,    Kiana Camarena MD MPH

## 2022-08-24 ENCOUNTER — TELEPHONE (OUTPATIENT)
Dept: FAMILY MEDICINE | Facility: CLINIC | Age: 82
End: 2022-08-24

## 2022-08-24 NOTE — TELEPHONE ENCOUNTER
This writer attempted to contact Sarah on 08/24/22      Reason for call lab results and unable to leave message. Attempted to call primary phone number x2, phone was picked up and disconnected by recipent x2.       If patient calls back:   Relay message below, (read verbatim), document that pt called and close encounter      Angelika Ramsey RN    ----- Message -----  From: Kiana Camarena MD  Sent: 8/23/2022   9:45 AM CDT  To: [facility]    Please CALL the patient:    Dear Sarah Vega,    Vitamin B 12 levels are low, should be over 400. Please take over the counter Vitamin B 12 1000 MCG daily for at least 3 months.  Iron stores are in a normal range.  Electrolytes, glucose, kidney function and liver function tests are normal.  Blood counts (Hemoglobin) are improved from the hospital and at baseline for you.     Regards,    Kiana Camarena MD MPH

## 2022-08-25 NOTE — TELEPHONE ENCOUNTER
Patient's son Zenobia is calling back to the clinic in regards to message below.     Consent to communicate on file with Zenobia (Chao).    Zenobia was relayed provider message below. Zenobia verbalized understanding.     No further questions or concerns at this time.     Johanna Lawton RN, BSN  [facility]

## 2022-08-25 NOTE — TELEPHONE ENCOUNTER
Patient's son Paulino answered the phone.  No Khmer  in the [employer]  system.  Writer attempted to inform the son of patient's results.  Phone got disconnected twice.    Catina Mariscal RN

## 2022-09-06 ENCOUNTER — TELEPHONE (OUTPATIENT)
Dept: FAMILY MEDICINE | Facility: CLINIC | Age: 82
End: 2022-09-06

## 2022-09-06 DIAGNOSIS — M79.622 PAIN OF LEFT UPPER ARM: ICD-10-CM

## 2022-09-06 DIAGNOSIS — S22.059D CLOSED FRACTURE OF FIFTH THORACIC VERTEBRA WITH ROUTINE HEALING, UNSPECIFIED FRACTURE MORPHOLOGY, SUBSEQUENT ENCOUNTER: Primary | ICD-10-CM

## 2022-09-06 RX ORDER — METHOCARBAMOL 500 MG/1
250 TABLET, FILM COATED ORAL 2 TIMES DAILY PRN
Qty: 30 TABLET | Refills: 0 | Status: SHIPPED | OUTPATIENT
Start: 2022-09-06 | End: 2022-11-01

## 2022-09-06 NOTE — TELEPHONE ENCOUNTER
Robaxin is usually not used in elderly patients as it can cause dizziness, drowsiness and increase risk of falls. I will send in a short term prescription only. Try and use only at night and hold onto something when getting up from a sitting to a standing position.    Thank you,  Kiana Camarena MD MPH

## 2022-09-06 NOTE — TELEPHONE ENCOUNTER
Margarette Sutton home care nurse calling to request a refill for a medication that was prescribed by TCU     Reason for call:  Medication   If this is a refill request, has the caller requested the refill from the pharmacy already? No  Will the patient be using a [facility]? No  [facility] the [facility]     Name of the medication requested: Robaxin     Other request: patient is requesting this medication as she was given Oxycodone for pain and does not want to take it     Phone number to reach patient:  Cell number on file:    Telephone Information:   Mobile [phone]       Best Time:  anytime    Can we leave a detailed message on this number?  Not Applicable    Travel screening: Not Applicable

## 2022-09-07 ENCOUNTER — TELEPHONE (OUTPATIENT)
Dept: FAMILY MEDICINE | Facility: CLINIC | Age: 82
End: 2022-09-07

## 2022-09-07 NOTE — TELEPHONE ENCOUNTER
Returned call to JUSTEN Pfeiffer and  for [facility]. No answer. Left detailed voicemail message on identified phone line.  Relayed provider message, verbatim, as noted below.  Noted prescription was sent to [facility] pharmacy yesterday for patient.  Call back with any additional questions or if need to communicate refill to anyone else other than to Margarette.      Yanet Leon RN  [facility]

## 2022-09-07 NOTE — TELEPHONE ENCOUNTER
Please let patient know that insurance does not cover Robaxin. It is a generic medication and they can self pay, also use a coupon from [employer].    Thank you,  Kiana Camarena MD MPH

## 2022-09-08 NOTE — TELEPHONE ENCOUNTER
Called and spoke to the daughter and gave her Dr Camarena's message. Daughter understands and will contact the pharmacy regarding the [employer] coupon.  Ailin Moreno [facility]  2nd Floor  Primary Care

## 2022-09-13 ENCOUNTER — MEDICAL CORRESPONDENCE (OUTPATIENT)
Dept: HEALTH INFORMATION MANAGEMENT | Facility: CLINIC | Age: 82
End: 2022-09-13

## 2022-09-24 DIAGNOSIS — M81.0 AGE-RELATED OSTEOPOROSIS WITHOUT CURRENT PATHOLOGICAL FRACTURE: ICD-10-CM

## 2022-09-28 ENCOUNTER — TRANSFERRED RECORDS (OUTPATIENT)
Dept: HEALTH INFORMATION MANAGEMENT | Facility: CLINIC | Age: 82
End: 2022-09-28

## 2022-10-31 DIAGNOSIS — M79.622 PAIN OF LEFT UPPER ARM: ICD-10-CM

## 2022-10-31 DIAGNOSIS — S22.059D CLOSED FRACTURE OF FIFTH THORACIC VERTEBRA WITH ROUTINE HEALING, UNSPECIFIED FRACTURE MORPHOLOGY, SUBSEQUENT ENCOUNTER: ICD-10-CM

## 2022-11-01 RX ORDER — METHOCARBAMOL 500 MG/1
250 TABLET, FILM COATED ORAL 2 TIMES DAILY PRN
Qty: 30 TABLET | Refills: 0 | Status: SHIPPED | OUTPATIENT
Start: 2022-11-01 | End: 2023-09-26

## 2023-01-30 ENCOUNTER — PATIENT OUTREACH (OUTPATIENT)
Dept: GERIATRIC MEDICINE | Facility: CLINIC | Age: 83
End: 2023-01-30
Payer: COMMERCIAL

## 2023-01-30 NOTE — PROGRESS NOTES
[facility] Care Coordination Contact      [facility] Six-Month Telephone Assessment    6 month telephone assessment completed on 1/30/23 with sonZenobia.    ER visits: No  Hospitalizations: No  TCU stays: No  Significant health status changes: None reported.  Falls/Injuries: No  ADL/IADL changes: No  Changes in services: No, current POC working well.      Goals: See POC in chart for goal progress documentation.      Will see member in 6 months for an annual health risk assessment.   Encouraged member to call CC with any questions or concerns in the meantime.     Brooke Villareal RN  [facility]  [phone]

## 2023-06-21 ENCOUNTER — PATIENT OUTREACH (OUTPATIENT)
Dept: GERIATRIC MEDICINE | Facility: CLINIC | Age: 83
End: 2023-06-21
Payer: COMMERCIAL

## 2023-06-21 SDOH — ECONOMIC STABILITY: INCOME INSECURITY: IN THE LAST 12 MONTHS, WAS THERE A TIME WHEN YOU WERE NOT ABLE TO PAY THE MORTGAGE OR RENT ON TIME?: NO

## 2023-06-21 ASSESSMENT — LIFESTYLE VARIABLES
SKIP TO QUESTIONS 9-10: 1
HOW MANY STANDARD DRINKS CONTAINING ALCOHOL DO YOU HAVE ON A TYPICAL DAY: PATIENT DOES NOT DRINK
HOW OFTEN DO YOU HAVE A DRINK CONTAINING ALCOHOL: NEVER
HOW OFTEN DO YOU HAVE SIX OR MORE DRINKS ON ONE OCCASION: NEVER
AUDIT-C TOTAL SCORE: 0

## 2023-06-21 ASSESSMENT — ACTIVITIES OF DAILY LIVING (ADL)
DEPENDENT_IADLS:: CLEANING;COOKING;LAUNDRY;SHOPPING;MEAL PREPARATION;TRANSPORTATION;MEDICATION MANAGEMENT;MONEY MANAGEMENT

## 2023-06-21 NOTE — PROGRESS NOTES
[facility] Care Coordination Contact    [facility] Home Visit Assessment     Home visit for Health Risk Assessment with Sarah Vega completed on June 21, 2023    Type of residence:: Private home - stairs  Current living arrangement:: I live in a private home with family     Assessment completed with:: Patient, Children    Current Care Plan  Member currently receiving the following home care services: None    Member currently receiving the following community resources: DME, PCA    Medication Review  Medication reconciliation completed in Epic: Yes  Medication set-up & administration: Family/informal caregiver sets up weekly.  Family caregiver administers medications.  Medication Risk Assessment Medication (1 or more, place referral to MTM): N/A: No risk factors identified  MTM Referral Placed: No: No risk factors idenified    Mental/Behavioral Health   Depression Screening:   PHQ-2 Total Score (Adult) - Positive if 3 or more points; Administer PHQ-9 if positive: 0       Mental health DX:: No        Falls Assessment:   Fallen 2 or more times in the past year?: No   Any fall with injury in the past year?: Yes    ADL/IADL Dependencies:   Dependent ADLs:: Independent  Dependent IADLs:: Cleaning, Cooking, Laundry, Shopping, Meal Preparation, Transportation, Medication Management, Money Management    [facility] sponsored benefits: Shared information re: Silver Sneakers/gym memberships, ASA, Calcium +D.    PCA Assessment completed at visit: Yes Annual PCA assessment indicated 0 units per day of PCA. This is a decrease from the  previous assessment.     Elderly Waiver Eligibility: Yes-will continue on EW    Care Plan & Recommendations: Member no longer qualifies for PCA services. CC is authorizing ICLS services 10hrs/week as she continues to require day to day assistance due to her cognitive status and weakness but doesn't necessarily needs assistance with ADLs at this time. She will continue to  receive incontinence products and nutritional supplements PRN.    See [facility]CC for detailed assessment information.    Follow-Up Plan: Member informed of future contact, plan to f/u with member with a 6 month telephone assessment.  Contact information shared with member and family, encouraged member to call with any questions or concerns at any time.    [facility] care continuum providers: Please see Snapshot and Care Management Flowsheets for Specific details of care plan.    This CC note routed to PCP.    Brooke Villareal RN  [facility]  [phone]

## 2023-07-05 ENCOUNTER — PATIENT OUTREACH (OUTPATIENT)
Dept: GERIATRIC MEDICINE | Facility: CLINIC | Age: 83
End: 2023-07-05
Payer: COMMERCIAL

## 2023-07-05 NOTE — LETTER
July 5, 2023    Important [employer] Information    SARAH AMAYA  [address]  [place] [address]  Your Care Plan  Dear Sarah,  When we spoke recently, I promised to send you a Care Plan. The plan enclosed is a summary of our discussion. It includes the steps we agreed would help you meet your health goals. In addition, I can help you with:  [id number]-[id number]  This program is available to members who need a ride to medical and dental visits. To schedule a ride, call [phone] or [phone] (toll free). TTY: 711. You can call Monday - Thursday 8 a.m. to 5 p.m. and Fridays 9 a.m. to 5 p.m.  One Pass  One Pass is your no-cost, complete, fitness solution for your mind and body. To learn more visit [employer]fitness or call One Pass, toll-free 1 [phone] (TTY: 711) 8 a.m. to 9 p.m. Monday-Friday.  Health Care Directive   This form helps you outline your health care wishes. You can request a form from me and I will answer any questions you have before you discuss it with your doctor.   Annual Physical  Take a key step on your path to good health and set up an annual physical at your clinic.  Questions?  Call me at [phone] Monday-Friday between 8am and 5pm.  TTY: 711. As we discussed, I plan to be in touch with you again in 6 months to follow up via phone.  Sincerely,    Brooke Villareal RN    E-mail:  [email or web address]  Phone: [phone]    Care Manager  [facility]    cc: member records

## 2023-07-05 NOTE — PROGRESS NOTES
[facility] Care Coordination Contact    Received after visit chart from care coordinator.  Completed following tasks: Mailed copy of care plan to client, Updated services in Database, Submitted referrals/auths for ICLS and Mailed Safe Medication Disposal    and Provider Signature - No POC Shared:  Member indicates that they do not want their POC shared with any EW providers.    Umu Man  Care Management Specialist  [facility]  [phone]

## 2023-07-27 DIAGNOSIS — S22.059D CLOSED FRACTURE OF FIFTH THORACIC VERTEBRA WITH ROUTINE HEALING, UNSPECIFIED FRACTURE MORPHOLOGY, SUBSEQUENT ENCOUNTER: ICD-10-CM

## 2023-07-27 RX ORDER — CHOLECALCIFEROL (VITAMIN D3) 50 MCG
TABLET ORAL
Qty: 90 TABLET | Refills: 0 | Status: SHIPPED | OUTPATIENT
Start: 2023-07-27 | End: 2023-11-01

## 2023-07-27 NOTE — TELEPHONE ENCOUNTER
Pending Prescriptions:                       Disp   Refills    vitamin D3 (CHOLECALCIFEROL) 50 mcg (2000*90 tab*3            Sig: TAKE 1 TABLET BY MOUTH EVERY DAY    Medication is being filled for 1 time indy refill only due to:  Patient is due for medication/    Please call and help schedule.  Thank you!

## 2023-07-27 NOTE — TELEPHONE ENCOUNTER
Called and spoke to the patient's son and he states that they have been buying this over the counter and will call the pharmacy.  Ailin Moreno MA  [facility]   Primary Care

## 2023-09-22 ENCOUNTER — ANCILLARY PROCEDURE (OUTPATIENT)
Dept: GENERAL RADIOLOGY | Facility: CLINIC | Age: 83
End: 2023-09-22
Attending: PHYSICIAN ASSISTANT
Payer: COMMERCIAL

## 2023-09-22 ENCOUNTER — OFFICE VISIT (OUTPATIENT)
Dept: URGENT CARE | Facility: URGENT CARE | Age: 83
End: 2023-09-22
Payer: COMMERCIAL

## 2023-09-22 VITALS
SYSTOLIC BLOOD PRESSURE: 164 MMHG | WEIGHT: 89.7 LBS | HEART RATE: 86 BPM | BODY MASS INDEX: 21.36 KG/M2 | RESPIRATION RATE: 24 BRPM | TEMPERATURE: 97.8 F | DIASTOLIC BLOOD PRESSURE: 76 MMHG | OXYGEN SATURATION: 96 %

## 2023-09-22 DIAGNOSIS — M79.652 PAIN OF LEFT THIGH: Primary | ICD-10-CM

## 2023-09-22 DIAGNOSIS — M54.50 ACUTE BILATERAL LOW BACK PAIN WITHOUT SCIATICA: ICD-10-CM

## 2023-09-22 DIAGNOSIS — M79.652 PAIN OF LEFT THIGH: ICD-10-CM

## 2023-09-22 PROCEDURE — 99215 OFFICE O/P EST HI 40 MIN: CPT | Performed by: PHYSICIAN ASSISTANT

## 2023-09-22 PROCEDURE — 72100 X-RAY EXAM L-S SPINE 2/3 VWS: CPT | Mod: TC | Performed by: PREVENTIVE MEDICINE

## 2023-09-22 PROCEDURE — 73552 X-RAY EXAM OF FEMUR 2/>: CPT | Mod: TC | Performed by: RADIOLOGY

## 2023-09-22 ASSESSMENT — ENCOUNTER SYMPTOMS
VOMITING: 0
SORE THROAT: 0
NAUSEA: 0
EYES NEGATIVE: 1
ALLERGIC/IMMUNOLOGIC NEGATIVE: 1
CARDIOVASCULAR NEGATIVE: 1
NECK STIFFNESS: 0
DIZZINESS: 0
COUGH: 0
JOINT SWELLING: 0
HEADACHES: 0
SHORTNESS OF BREATH: 0
BACK PAIN: 1
WEAKNESS: 0
PALPITATIONS: 0
CHILLS: 0
RHINORRHEA: 0
NECK PAIN: 0
ENDOCRINE NEGATIVE: 1
ARTHRALGIAS: 0
LIGHT-HEADEDNESS: 0
DIARRHEA: 0
FEVER: 0
WOUND: 0
RESPIRATORY NEGATIVE: 1
MYALGIAS: 1

## 2023-09-22 NOTE — PROGRESS NOTES
Chief Complaint:     Chief Complaint   Patient presents with    Urgent Care    Back Pain     Last couple days c/o back problem (lower back), it got bad last night and was in pain, had couple tylenols last night so can sleep, 6 years ago she broke her left femur-don't know if it from that, for the last couple days didn't have bowel movement either     Constipation       Assesnazia Smith was seen today for urgent care, back pain and constipation.    Diagnoses and all orders for this visit:    Pain of left thigh  -     XR Femur Left 2 Views; Future    Acute bilateral low back pain without sciatica  -     XR Lumbar Spine 2/3 Views; Future         Plan    XR of the lumbar spine was negative for any acute fracture.  There is a lot of dilated bowel per my read.    XR of the L femur shows healed proximal femur fracture with nail in place.    Unclear cause of her symptoms at this time.  Push fluids, and start stool softener and Miralax.  Ice the area.  Tylenol for pain.  Stretching exercises.  Chiropractic may help.  Worrisome symptoms discussed with instructions to go to the ED.  Patient will follow up with PCP if symptoms have not resolved in 2 weeks.  Patient and son verbalized understanding and agreed with this plan.    42 minutes was spent in the care of this patient including chart review, HPI, ROS, PE, review of plan, and placing of orders.           HPI: Sarah LORD [id number] year old female presents with a 2 day history of back pain.  Son is present for this visit and provides additional information.  She does not recall any acute injury.  The pain is in the bilateral lumbar area and does not radiate, though she is also complaining of some L thigh pain.  The pain has worsened a bit.  The patient does have a past history of back problems.       There is no history of disturbance of bowel or bladder dysfunction associated with this present problem.      There is no associated sciatica in the legs. There is no  paresthesia in the legs. The patient does not have a history of weakness in the legs.    ROS:      Review of Systems   Constitutional:  Negative for chills and fever.   HENT:  Negative for congestion, ear pain, rhinorrhea and sore throat.    Eyes: Negative.    Respiratory: Negative.  Negative for cough and shortness of breath.    Cardiovascular: Negative.  Negative for chest pain and palpitations.   Gastrointestinal:  Negative for diarrhea, nausea and vomiting.   Endocrine: Negative.    Genitourinary: Negative.    Musculoskeletal:  Positive for back pain and myalgias. Negative for arthralgias, joint swelling, neck pain and neck stiffness.   Skin: Negative.  Negative for rash and wound.   Allergic/Immunologic: Negative.  Negative for immunocompromised state.   Neurological:  Negative for dizziness, weakness, light-headedness and headaches.        Family History   Family History   Family history unknown: Yes        Problem history  Patient Active Problem List   Diagnosis    CARDIOVASCULAR SCREENING; LDL GOAL LESS THAN 160    Right lumbar radiculopathy    Osteoporosis    Advanced directives, counseling/discussion    Abnormal Pap smear    Dysphagia    Health Care Home    Iron deficiency anemia    Sliding hiatal hernia    Osteoarthritis of left thumb    Primary osteoarthritis of both knees    Gastroesophageal reflux disease without esophagitis    Calculus of gallbladder without cholecystitis without obstruction    Left inguinal hernia    Bilateral posterior capsular opacification    Dermatochalasis of eyelid    Closed intertrochanteric fracture of right femur with routine healing    Closed fracture of fifth thoracic vertebra with routine healing    Closed fracture of second cervical vertebra with routine healing    Fall at home, initial encounter    SDH (subdural hematoma) (H)        Allergies  No Known Allergies     Social History  Social History     Socioeconomic History    Marital status:      Spouse name: Not  on file    Number of children: Not on file    Years of education: Not on file    Highest education level: Not on file   Occupational History    Not on file   Tobacco Use    Smoking status: Never    Smokeless tobacco: Never   Substance and Sexual Activity    Alcohol use: No    Drug use: No    Sexual activity: Not Currently     Partners: Male     Birth control/protection: None   Other Topics Concern    Parent/sibling w/ CABG, MI or angioplasty before 65F 55M? Not Asked   Social History Narrative    Not on file     Social Determinants of Health     Financial Resource Strain: Low Risk  (7/25/2022)    Overall Financial Resource Strain (CARDIA)     Difficulty of Paying Living Expenses: Not hard at all   Food Insecurity: No Food Insecurity (7/25/2022)    Hunger Vital Sign     Worried About Running Out of Food in the Last Year: Never true     Ran Out of Food in the Last Year: Never true   Transportation Needs: No Transportation Needs (6/21/2023)    PRAPARE - Transportation     Lack of Transportation (Medical): No     Lack of Transportation (Non-Medical): No   Physical Activity: Not on file   Stress: Not on file   Social Connections: Not on file   Interpersonal Safety: Not on file   Housing Stability: Unknown (6/21/2023)    Housing Stability Vital Sign     Unable to Pay for Housing in the Last Year: No     Number of Places Lived in the Last Year: Not on file     Unstable Housing in the Last Year: No        Current Meds    Current Outpatient Medications:     CALCIUM CARB-CHOLECALCIFEROL 600-400 MG-UNIT per tablet, TAKE 1 TABLET BY MOUTH TWICE A DAY, Disp: 60 tablet, Rfl: 11    calcium carbonate 600 mg-vitamin D 400 units (CALTRATE) 600-400 MG-UNIT per tablet, Take 1 tablet by mouth 2 times daily, Disp: 180 tablet, Rfl: 3    capsaicin (ZOSTRIX) 0.025 % external cream, Apply 1 g topically 3 times daily, Disp: 50 g, Rfl: 0    ciclopirox (PENLAC) 8 % external solution, Apply to adjacent skin and affected nails daily.  Remove with  alcohol every 7 days, then repeat., Disp: 6 mL, Rfl: 0    cyanocobalamin (VITAMIN B-12) 1000 MCG tablet, TAKE 1 TABLET BY MOUTH EVERY DAY, Disp: 90 tablet, Rfl: 2    famotidine (PEPCID) 20 MG tablet, TAKE 1 TABLET BY MOUTH TWICE A DAY, Disp: 180 tablet, Rfl: 1    lactase (LACTAID) 3000 UNIT tablet, One tablet orally with first bite of food containing dairy, Disp: 90 tablet, Rfl: 3    methocarbamol (ROBAXIN) 500 MG tablet, TAKE 0.5 TABLETS (250 MG) BY MOUTH 2 TIMES DAILY AS NEEDED FOR MUSCLE SPASMS, Disp: 30 tablet, Rfl: 0    omeprazole (PRILOSEC) 20 MG DR capsule, TAKE 1 CAPSULE BY MOUTH EVERY DAY, Disp: 90 capsule, Rfl: 3    ORDER FOR DME, Pull up briefs for incontinence of urine change 2 to 3 times daily, Disp: 1 Container, Rfl: 11    salicylic acid (MEDIPLAST) 40 % miscellaneous, Cut and apply on the wart every 48 hours for 4 weeks, until the wart is cleared, Disp: 25 each, Rfl: 0    SENEXON-S 8.6-50 MG tablet, TAKE 1 TABLET BY MOUTH DAILY AS NEEDED FOR CONSTIPATION., Disp: 90 tablet, Rfl: 0    simethicone (MYLICON) 125 MG chewable tablet, Take 1 tablet (125 mg) by mouth 2 times daily as needed for intestinal gas, Disp: 180 tablet, Rfl: 3    VITAMIN D, CHOLECALCIFEROL, PO, Take by mouth daily, Disp: , Rfl:     vitamin D3 (CHOLECALCIFEROL) 25 mcg (31290 units) capsule, Take 1 capsule (250 mcg) by mouth daily, Disp: 90 capsule, Rfl: 3    vitamin D3 (CHOLECALCIFEROL) 50 mcg (2000 units) tablet, TAKE 1 TABLET BY MOUTH EVERY DAY, Disp: 90 tablet, Rfl: 0        Physical Exam:     Vital signs reviewed by Tang Cantor PA-C  BP (!) 164/76 (BP Location: Left arm, Patient Position: Sitting, Cuff Size: Adult Regular)   Pulse 86   Temp 97.8  F (36.6  C) (Tympanic)   Resp 24   Wt 40.7 kg (89 lb 11.2 oz)   LMP  (LMP Unknown)   SpO2 96%   BMI 21.36 kg/m       PEFR:    Physical Exam  Vitals and nursing note reviewed.   Constitutional:       General: She is not in acute distress.     Appearance: She is well-developed.  She is not ill-appearing, toxic-appearing or diaphoretic.   HENT:      Head: Normocephalic and atraumatic.      Right Ear: Tympanic membrane and external ear normal. No drainage, swelling or tenderness. Tympanic membrane is not perforated, erythematous, retracted or bulging.      Left Ear: Tympanic membrane and external ear normal. No drainage, swelling or tenderness. Tympanic membrane is not perforated, erythematous, retracted or bulging.      Nose: No mucosal edema, congestion or rhinorrhea.      Right Sinus: No maxillary sinus tenderness or frontal sinus tenderness.      Left Sinus: No maxillary sinus tenderness or frontal sinus tenderness.      Mouth/Throat:      Pharynx: No pharyngeal swelling, oropharyngeal exudate, posterior oropharyngeal erythema or uvula swelling.      Tonsils: No tonsillar abscesses.   Eyes:      Pupils: Pupils are equal, round, and reactive to light.   Neck:      Trachea: Trachea normal.   Cardiovascular:      Rate and Rhythm: Normal rate and regular rhythm.      Heart sounds: Normal heart sounds, S1 normal and S2 normal. No murmur heard.     No friction rub. No gallop.   Pulmonary:      Effort: Pulmonary effort is normal. No respiratory distress.      Breath sounds: Normal breath sounds. No decreased breath sounds, wheezing, rhonchi or rales.   Abdominal:      General: Bowel sounds are normal. There is no distension.      Palpations: Abdomen is soft. Abdomen is not rigid. There is no mass.      Tenderness: There is no abdominal tenderness. There is no guarding or rebound.   Musculoskeletal:      Cervical back: Full passive range of motion without pain, normal range of motion and neck supple.      Lumbar back: Tenderness present. No swelling, deformity or spasms. Decreased range of motion. Negative right straight leg raise test and negative left straight leg raise test.      Left upper leg: Tenderness and bony tenderness present. No swelling or edema.        Legs:    Lymphadenopathy:       Cervical: No cervical adenopathy.   Skin:     General: Skin is warm and dry.   Neurological:      Mental Status: She is alert and oriented to person, place, and time.      Cranial Nerves: No cranial nerve deficit.      Deep Tendon Reflexes: Reflexes are normal and symmetric.   Psychiatric:         Behavior: Behavior normal. Behavior is cooperative.         Thought Content: Thought content normal.         Judgment: Judgment normal.               Tang Cantor PA-C  9/22/2023, 5:22 PM

## 2023-09-25 ENCOUNTER — TELEPHONE (OUTPATIENT)
Dept: FAMILY MEDICINE | Facility: CLINIC | Age: 83
End: 2023-09-25
Payer: COMMERCIAL

## 2023-09-25 NOTE — TELEPHONE ENCOUNTER
Patient's son, Zenobia (Chao) calling to follow up on urgent care visit on 9/22/23. Consent to communicate is on file for Zenobia.     He states patient developed back pain last Wednesday, 9/20/23.   He states patient has to sleep with her HOB elevated or be up in the recliner. She states it is really painful to lay in bed.     He reports she has been taking ibuprofen every 8 hours and using ice, which helps a little.     He is asking for home health care for patient to start PT.     RN scheduled patient for visit with Dr. Camarena to discuss on 9/26/23.     Zenobia denies further questions at this time.    Valeria Weathers, PAVELN, RN  [facility]  Nurse Triage, Family Practice

## 2023-09-26 ENCOUNTER — TELEPHONE (OUTPATIENT)
Dept: FAMILY MEDICINE | Facility: CLINIC | Age: 83
End: 2023-09-26

## 2023-09-26 ENCOUNTER — OFFICE VISIT (OUTPATIENT)
Dept: FAMILY MEDICINE | Facility: CLINIC | Age: 83
End: 2023-09-26
Payer: COMMERCIAL

## 2023-09-26 VITALS
RESPIRATION RATE: 16 BRPM | HEART RATE: 89 BPM | OXYGEN SATURATION: 99 % | SYSTOLIC BLOOD PRESSURE: 175 MMHG | HEIGHT: 55 IN | TEMPERATURE: 97.7 F | WEIGHT: 90.2 LBS | BODY MASS INDEX: 20.87 KG/M2 | DIASTOLIC BLOOD PRESSURE: 84 MMHG

## 2023-09-26 DIAGNOSIS — M81.0 AGE-RELATED OSTEOPOROSIS WITHOUT CURRENT PATHOLOGICAL FRACTURE: ICD-10-CM

## 2023-09-26 DIAGNOSIS — E55.9 VITAMIN D DEFICIENCY: ICD-10-CM

## 2023-09-26 DIAGNOSIS — S06.5XAA SDH (SUBDURAL HEMATOMA) (H): ICD-10-CM

## 2023-09-26 DIAGNOSIS — S22.059D CLOSED FRACTURE OF FIFTH THORACIC VERTEBRA WITH ROUTINE HEALING, UNSPECIFIED FRACTURE MORPHOLOGY, SUBSEQUENT ENCOUNTER: Primary | ICD-10-CM

## 2023-09-26 DIAGNOSIS — M17.0 PRIMARY OSTEOARTHRITIS OF BOTH KNEES: ICD-10-CM

## 2023-09-26 DIAGNOSIS — D50.9 IRON DEFICIENCY ANEMIA, UNSPECIFIED IRON DEFICIENCY ANEMIA TYPE: ICD-10-CM

## 2023-09-26 DIAGNOSIS — Z23 ENCOUNTER FOR IMMUNIZATION: ICD-10-CM

## 2023-09-26 DIAGNOSIS — D51.8 VITAMIN B12 DEFICIENCY (DIETARY) ANEMIA: ICD-10-CM

## 2023-09-26 DIAGNOSIS — K59.00 CONSTIPATION, UNSPECIFIED CONSTIPATION TYPE: ICD-10-CM

## 2023-09-26 LAB
BASOPHILS # BLD AUTO: 0 10E3/UL (ref 0–0.2)
BASOPHILS NFR BLD AUTO: 1 %
EOSINOPHIL # BLD AUTO: 0.1 10E3/UL (ref 0–0.7)
EOSINOPHIL NFR BLD AUTO: 3 %
ERYTHROCYTE [DISTWIDTH] IN BLOOD BY AUTOMATED COUNT: 17.4 % (ref 10–15)
FOLATE SERPL-MCNC: 13.1 NG/ML (ref 4.6–34.8)
HCT VFR BLD AUTO: 30.7 % (ref 35–47)
HGB BLD-MCNC: 9.1 G/DL (ref 11.7–15.7)
IMM GRANULOCYTES # BLD: 0 10E3/UL
IMM GRANULOCYTES NFR BLD: 0 %
LYMPHOCYTES # BLD AUTO: 1.4 10E3/UL (ref 0.8–5.3)
LYMPHOCYTES NFR BLD AUTO: 29 %
MCH RBC QN AUTO: 20.9 PG (ref 26.5–33)
MCHC RBC AUTO-ENTMCNC: 29.6 G/DL (ref 31.5–36.5)
MCV RBC AUTO: 70 FL (ref 78–100)
MONOCYTES # BLD AUTO: 0.5 10E3/UL (ref 0–1.3)
MONOCYTES NFR BLD AUTO: 9 %
NEUTROPHILS # BLD AUTO: 2.8 10E3/UL (ref 1.6–8.3)
NEUTROPHILS NFR BLD AUTO: 58 %
PLATELET # BLD AUTO: 289 10E3/UL (ref 150–450)
RBC # BLD AUTO: 4.36 10E6/UL (ref 3.8–5.2)
WBC # BLD AUTO: 4.8 10E3/UL (ref 4–11)

## 2023-09-26 PROCEDURE — 90662 IIV NO PRSV INCREASED AG IM: CPT | Performed by: PREVENTIVE MEDICINE

## 2023-09-26 PROCEDURE — 36415 COLL VENOUS BLD VENIPUNCTURE: CPT | Performed by: PREVENTIVE MEDICINE

## 2023-09-26 PROCEDURE — 82746 ASSAY OF FOLIC ACID SERUM: CPT | Performed by: PREVENTIVE MEDICINE

## 2023-09-26 PROCEDURE — G0008 ADMIN INFLUENZA VIRUS VAC: HCPCS | Performed by: PREVENTIVE MEDICINE

## 2023-09-26 PROCEDURE — 80053 COMPREHEN METABOLIC PANEL: CPT | Performed by: PREVENTIVE MEDICINE

## 2023-09-26 PROCEDURE — 82306 VITAMIN D 25 HYDROXY: CPT | Performed by: PREVENTIVE MEDICINE

## 2023-09-26 PROCEDURE — 85025 COMPLETE CBC W/AUTO DIFF WBC: CPT | Performed by: PREVENTIVE MEDICINE

## 2023-09-26 PROCEDURE — 82607 VITAMIN B-12: CPT | Performed by: PREVENTIVE MEDICINE

## 2023-09-26 PROCEDURE — 99214 OFFICE O/P EST MOD 30 MIN: CPT | Mod: 25 | Performed by: PREVENTIVE MEDICINE

## 2023-09-26 RX ORDER — ACETAMINOPHEN 500 MG
500-1000 TABLET ORAL 3 TIMES DAILY
Qty: 180 TABLET | Refills: 3 | Status: SHIPPED | OUTPATIENT
Start: 2023-09-26

## 2023-09-26 RX ORDER — METHOCARBAMOL 500 MG/1
250 TABLET, FILM COATED ORAL 2 TIMES DAILY PRN
Qty: 30 TABLET | Refills: 1 | Status: SHIPPED | OUTPATIENT
Start: 2023-09-26 | End: 2023-09-26

## 2023-09-26 RX ORDER — TIZANIDINE 2 MG/1
2 TABLET ORAL
Qty: 30 TABLET | Refills: 1 | Status: SHIPPED | OUTPATIENT
Start: 2023-09-26 | End: 2023-10-19

## 2023-09-26 RX ORDER — CELECOXIB 100 MG/1
100 CAPSULE ORAL DAILY
Qty: 90 CAPSULE | Refills: 1 | Status: SHIPPED | OUTPATIENT
Start: 2023-09-26 | End: 2024-03-22

## 2023-09-26 ASSESSMENT — ENCOUNTER SYMPTOMS: BACK PAIN: 1

## 2023-09-26 NOTE — TELEPHONE ENCOUNTER
RN spoke with son (CTC on file) to relay change in medication. Son verbalized understanding.     Gregoria Cody RN

## 2023-09-26 NOTE — PROGRESS NOTES
Assessment & Plan     Closed fracture of fifth thoracic vertebra with routine healing, unspecified fracture morphology, subsequent encounter  -Recent x-rays did not show any acute fractures.  Chronic appearing wedge compression deformity of T12 was noted  - acetaminophen (TYLENOL) 500 MG tablet  Dispense: 180 tablet; Refill: 3  - celecoxib (CELEBREX) 100 MG capsule  Dispense: 90 capsule; Refill: 1  - methocarbamol (ROBAXIN) 500 MG tablet  Dispense: 30 tablet; Refill: 1  - Home Care Referral  - CBC with platelets and differential  - Comprehensive metabolic panel (BMP + Alb, Alk Phos, ALT, AST, Total. Bili, TP)  -Will benefit from home care evaluation to improve balance and mobility    Age-related osteoporosis without current pathological fracture  - acetaminophen (TYLENOL) 500 MG tablet  Dispense: 180 tablet; Refill: 3    Iron deficiency anemia, unspecified iron deficiency anemia type  - CBC with platelets and differential  - Comprehensive metabolic panel (BMP + Alb, Alk Phos, ALT, AST, Total. Bili, TP)  --prior history  -post op blood loss anemia and required blood transfusion.    Vitamin B12 deficiency (dietary) anemia  -Check levels today  - Vitamin B12  - Folate    SDH (subdural hematoma) (H)  -History of mechanical fall which led to a subdural hematoma  -No further concerns for bleeding  -No new headaches or vision changes    Constipation, unspecified constipation type  -Has been using senna as needed for constipation  -Discussed adding Metamucil or Citrucel daily  -Limited food intake    Encounter for immunization  - INFLUENZA VACCINE 65+ (FLUZONE HD)    Primary osteoarthritis of both knees  - celecoxib (CELEBREX) 100 MG capsule  Dispense: 90 capsule; Refill: 1    Vitamin D deficiency  -Has had low vitamin D levels in the past  - Vitamin D Deficiency      Ordering of each unique test  Prescription drug management  35 minutes spent by me on the date of the encounter doing chart review, history and exam,  documentation and further activities per the note       Kiana Camarena MD MPH    [facility] LATHA Smith is a 83 year old, presenting for the following health issues:  Back Pain        9/26/2023     2:02 PM   Additional Questions   Roomed by abby   Accompanied by Ali- son         9/26/2023     2:02 PM   Patient Reported Additional Medications   Patient reports taking the following new medications no       History of Present Illness       Back Pain:  She presents for follow up of back pain. Patient's back pain is a new problem.    Original cause of back pain: turning/bending  First noticed back pain: in the last week  Patient feels back pain: constantlyLocation of back pain:  Right lower back, left lower back and left side of waist  Description of back pain: sharp, shooting and stabbing  Back pain spreads: nowhere    Since patient first noticed back pain, pain is: always present, but gets better and worse  Does back pain interfere with her job:  Not applicable  On a scale of 1-10 (10 being the worst), patient describes pain as:  8  What makes back pain worse: lying down   Acupuncture: not tried  Acetaminophen: not helpful  Activity or exercise: not helpful  Chiropractor:  Not tried  Cold: helpful  Heat: helpful  Massage: not helpful  Muscle relaxants: not tried  NSAIDS: helpful  Opioids: not tried  Physical Therapy: not tried  Rest: helpful  Steroid Injection: not tried  Stretching: not tried  Surgery: not tried  TENS unit: not tried  Topical pain relievers: not tried  Other healthcare providers patient is seeing for back pain: None    She eats 0-1 servings of fruits and vegetables daily.She consumes 0 sweetened beverage(s) daily.She exercises with enough effort to increase her heart rate 9 or less minutes per day.  She exercises with enough effort to increase her heart rate 3 or less days per week.   She is taking medications regularly.     Has had urine leakage+  Helping her    When she bends down she may leak some urine  This impacts her ability to say her prayers  Uses pads.  Usees adult diapers at times.   Has been taking over the counter medication  Pain increased with turning and laying in bed  Less pain when moving around  Unable to sleep due to pain  Has to sleep in a recliner  No falls  Lives with family   Abdominal pain when eats certain foods.   Constipation+ Senna has been used.   Has been taking magnesium supplements   Omeprazole as needed  Not on Miralax.   Low appetite+  Does not like Ensure flavor.       EXAM: XR FEMUR LEFT 2 VIEWS  LOCATION: [facility]  DATE: 9/22/2023     INDICATION:  Pain of left thigh  COMPARISON: None available.                                                                      IMPRESSION: Long antegrade intramedullary nail fixation across a healed proximal femoral fracture. No evidence of hardware failure. Mild degenerative arthritis of the left hip and knee. No acute fracture.    EXAM: XR LUMBAR SPINE 2/3 VIEWS  LOCATION: [facility]  DATE: 9/22/2023     INDICATION: Low back pain worsening for the past 2-3 days.  COMPARISON: None.                                                                      IMPRESSION: Osteopenic-appearing bones. Five lumbar-type vertebral body numbering convention. Stepwise degenerative grade 1 L1-L2, L2-L3, L3-L4, and L5-S1 grade 1/2 anterolisthesis. Chronic-appearing wedge compression deformity of T12. Otherwise   preserved vertebral body heights. No substantial intervertebral disc height loss given patient age. Multilevel facet hypertrophy most pronounced in the lower lumbar spine. Abnormally dilated loops of presumably large bowel measuring up to 52 mm. Could be   pathologic depending on the clinical context. CT of the abdomen and pelvis with intravenous contrast would be the next imaging modality. No bowel wall pneumatosis.      Review of Systems  "  Musculoskeletal:  Positive for back pain.      Constitutional, HEENT, cardiovascular, pulmonary, gi and gu systems are negative, except as otherwise noted.      Objective    BP (!) 175/84 (BP Location: Left arm, Patient Position: Sitting, Cuff Size: Adult Regular)   Pulse 89   Temp 97.7  F (36.5  C) (Oral)   Resp 16   Ht 1.346 m (4' 5\")   Wt 40.9 kg (90 lb 3.2 oz)   LMP  (LMP Unknown)   SpO2 99%   BMI 22.58 kg/m    Body mass index is 22.58 kg/m .  Physical Exam   GENERAL APPEARANCE: elderly and frail, ambulating with assistance from family member   EYES: Eyes grossly normal to inspection and conjunctivae and sclerae normal  RESP: lungs clear to auscultation - no rales, rhonchi or wheezes  CV: regular rates and rhythm, normal S1 S2  ABDOMEN: soft, non-tender and no rebound or guarding   MS: extremities normal- no gross deformities noted  SKIN: no suspicious lesions or rashes  NEURO: Normal strength and tone, mentation intact and speech normal  PSYCH: mentation appears normal  Low back: tender over the spinous processes. No focal weakness in the lower extremities.               "

## 2023-09-26 NOTE — TELEPHONE ENCOUNTER
METHOCARBAMOL 500 MG TABLET     ALTERNATIVE REQUESTED - DRUG NOT COVERED PLEASE DO PA OR PRESCRIBE TIZANIDINE     SUGGESTED ALTERNATIVE- TIZANIDINE HCL 4 MG TAB

## 2023-09-27 ENCOUNTER — TELEPHONE (OUTPATIENT)
Dept: FAMILY MEDICINE | Facility: CLINIC | Age: 83
End: 2023-09-27
Payer: COMMERCIAL

## 2023-09-27 LAB
ALBUMIN SERPL BCG-MCNC: 4.2 G/DL (ref 3.5–5.2)
ALP SERPL-CCNC: 72 U/L (ref 35–104)
ALT SERPL W P-5'-P-CCNC: 17 U/L (ref 0–50)
ANION GAP SERPL CALCULATED.3IONS-SCNC: 9 MMOL/L (ref 7–15)
AST SERPL W P-5'-P-CCNC: 28 U/L (ref 0–45)
BILIRUB SERPL-MCNC: 0.2 MG/DL
BUN SERPL-MCNC: 21.5 MG/DL (ref 8–23)
CALCIUM SERPL-MCNC: 9.4 MG/DL (ref 8.8–10.2)
CHLORIDE SERPL-SCNC: 105 MMOL/L (ref 98–107)
CREAT SERPL-MCNC: 0.92 MG/DL (ref 0.51–0.95)
DEPRECATED HCO3 PLAS-SCNC: 26 MMOL/L (ref 22–29)
EGFRCR SERPLBLD CKD-EPI 2021: 61 ML/MIN/1.73M2
GLUCOSE SERPL-MCNC: 70 MG/DL (ref 70–99)
POTASSIUM SERPL-SCNC: 4.1 MMOL/L (ref 3.4–5.3)
PROT SERPL-MCNC: 7.4 G/DL (ref 6.4–8.3)
SODIUM SERPL-SCNC: 140 MMOL/L (ref 135–145)
VIT B12 SERPL-MCNC: >4000 PG/ML (ref 232–1245)
VIT D+METAB SERPL-MCNC: 72 NG/ML (ref 20–50)

## 2023-09-27 NOTE — RESULT ENCOUNTER NOTE
Please CALL patient:    Dear Sarah Vega,    Vitamin B 12 levels are elevated. OK to take B 12 supplement every other day instead of daily.  Vitamin D levels also elevated. HOLD Vitamin D supplements for 2-3 months.  Blood counts are low but stable for you.  Electrolytes, glucose, kidney function and liver function tests are normal.  Folic acid levels is normal.     Please let me know if you have any questions and thank you for choosing [facility].    Regards,    Kiana Camarena MD MPH

## 2023-09-27 NOTE — TELEPHONE ENCOUNTER
Received the following from provider re: [facility] HHC denial:        HHC Agencies attempted:    - [facility] (9/27/23): is covered by patient's insurance, PT start of care wouldn't be until later next week, however they are able to accept patient for HHC.     Our office will get a call for delay start of care once [facility] has patient on their schedule. [facility] is able to view patient's chart in Epic - no need to send over new HHC referral. No further action needed by PCP office per [facility] intake.      Routing to provider as JOHN, [facility] will be accepting patient for HHC       JUVENTINO Damon, RN  [facility]

## 2023-09-28 ENCOUNTER — TELEPHONE (OUTPATIENT)
Dept: FAMILY MEDICINE | Facility: CLINIC | Age: 83
End: 2023-09-28
Payer: COMMERCIAL

## 2023-09-28 NOTE — TELEPHONE ENCOUNTER
RN called regarding lab results. Pt answered phone, but gave phone to other adult in household (daughter) to relay message to. Daughter verbalized understanding and repeated instructions.     Gregoria Cody RN     ----- Message from Kiana Camarena MD sent at 9/27/2023  5:58 PM CDT -----  Please CALL patient:    Dear Sarah Vega,    Vitamin B 12 levels are elevated. OK to take B 12 supplement every other day instead of daily.  Vitamin D levels also elevated. HOLD Vitamin D supplements for 2-3 months.  Blood counts are low but stable for you.  Electrolytes, glucose, kidney function and liver function tests are normal.  Folic acid levels is normal.     Please let me know if you have any questions and thank you for choosing [facility].    Regards,    Kiana Camarena MD MPH

## 2023-09-28 NOTE — TELEPHONE ENCOUNTER
[facility] calling to get ok from PCP to delay start of care for physical therapy until Oct 3 due to staffing.     Routing to provider to advise.  Karissa Esquivel BSN, RN

## 2023-10-02 ENCOUNTER — PATIENT OUTREACH (OUTPATIENT)
Dept: GERIATRIC MEDICINE | Facility: CLINIC | Age: 83
End: 2023-10-02
Payer: COMMERCIAL

## 2023-10-02 NOTE — PROGRESS NOTES
[facility] Care Coordination Contact  CC received notification of Emergency Room visit.  ER visit occurred on 9/30/23 at [facility]  with Dx of back pain.    CC contacted adult son , Zenobia,  and reviewed discharge summary.  Member has a follow-up appointment with PCP: No: Offered Assistance with setting up a follow up appointment. Member already had a follow up appt last week on 9/22/23 following a UC visit for the back pain- no change since that visit.  Member has had a change in condition: No  New referrals placed: No- PCP already placed a home care order for PT eval/treat and they are planning to eval on 10/3/23 prior to this ED visit.  Home Visit Needed: No  Care plan reviewed and updated.  PCP notified of ED visit via EMR.    Brooke Villareal RN  [facility]  [phone]

## 2023-10-03 ENCOUNTER — TELEPHONE (OUTPATIENT)
Dept: FAMILY MEDICINE | Facility: CLINIC | Age: 83
End: 2023-10-03
Payer: COMMERCIAL

## 2023-10-03 ENCOUNTER — MEDICAL CORRESPONDENCE (OUTPATIENT)
Dept: HEALTH INFORMATION MANAGEMENT | Facility: CLINIC | Age: 83
End: 2023-10-03

## 2023-10-03 DIAGNOSIS — S22.059D CLOSED FRACTURE OF FIFTH THORACIC VERTEBRA WITH ROUTINE HEALING, UNSPECIFIED FRACTURE MORPHOLOGY, SUBSEQUENT ENCOUNTER: Primary | ICD-10-CM

## 2023-10-03 DIAGNOSIS — M17.0 PRIMARY OSTEOARTHRITIS OF BOTH KNEES: ICD-10-CM

## 2023-10-03 DIAGNOSIS — M54.50 LUMBAR BACK PAIN: Primary | ICD-10-CM

## 2023-10-03 NOTE — TELEPHONE ENCOUNTER
Called pt's son and relayed message from provider. He understands. He already scheduled CT and he would like DME mailed to pt's home. Signed DME mailed to pt's home.

## 2023-10-03 NOTE — TELEPHONE ENCOUNTER
General Call    Contacts         Type Contact Phone/Fax    10/03/2023 02:14 PM CDT Phone (Incoming) Zenobia Vega  (Emergency Contact) [phone]        Reason for Call:  Son has medical questions.      What are your questions or concerns:  Patients son has question if his mother needs an MRI as the home health nurse read the X-Ray report and said there was nothing that could be done.  [facility] [phone] she saw the patient this morning 10/03/2023.  He is also wondering if she would qualify for a medical reclining chair?      Date of last appointment with provider: 09/26/2023    Okay to leave a detailed message?: Yes at Other phone number:  [place]: [phone]

## 2023-10-03 NOTE — TELEPHONE ENCOUNTER
Patient's son, Zenobia, called to clinic to send message to PCP regarding ongoing back pain patient is having. Signed consent to communicate is on file to speak with Zenobia (legal name is Chao).    Patient first was seen in UC on 9/22 and then by PCP on 9/26 for back pain. Pain is pretty severe for patient and none of the recommended treatments or therapies are helping patient. Is in almost constant pain, especially whey laying down or laying flat. Can't do much at home, has hard time walking. Complaining of pain all the time. Not sleeping well unless is utilizing Tizanidine, however, this is not always helping with sleep.   Due to severity of pain, son took patient to the ER at [facility] on 9/30/23. They told patient they couldn't do much and to reach back out to PCP. Told patient can take another Tizanidine earlier in the day instead of just at bedtime, prescribed Lidocaine patches. Son noted that ER was reluctant to order any further imaging, noted that insurance may not cover as didn't have a sufficient diagnosis for an MRI and deferred back to PCP for this.    Son is asking for order for an MRI for patient. Feels this back pain can't be due to muscle issue as it should have resolved or gotten better if that were the case. Son notes that his sister is a physical therapist and evaluated patient herself and felt all the muscles in patient back, etc were soft and relaxed, did not feel tight or hard like would expect with muscular cause.    Son asking if provider won't order MRI, what is next best steps then because patient  is in a lot of pain every day and nothing at home seems to be helping.    No openings with PCP until Friday, including virtual visit slots, for scheduling ER follow up and further discussion. Son is asking for assistance and plan sooner than this.      Routing to provider to review and advise.      Yanet Leon RN  Clinical Triage/Primary Care  [facility]

## 2023-10-03 NOTE — TELEPHONE ENCOUNTER
Called patients son (consent to communicate on file) regarding provider message below. Patients son will call CT to schedule an appointment now.    No other questions or concerns.    Maya Bah RN

## 2023-10-03 NOTE — TELEPHONE ENCOUNTER
I have ordered a CT scan of the low back, please see other Telephone encounter associated with this concern.    I ordered a medical reclining chair, print out placed in TC basket.  However, family will need to check with insurance if this is covered and which supply place they would like to use.     Thank you,  Kiana Camarena MD MPH       DME (Durable Medical Equipment) Orders and Documentation  Orders Placed This Encounter   Procedures    Miscellaneous DME Order        The patient was assessed and it was determined the patient is in need of the following listed DME Supplies/Equipment. Please complete supporting documentation below to demonstrate medical necessity.

## 2023-10-03 NOTE — TELEPHONE ENCOUNTER
I ordered a CT scan of the low back to try and see if there is a new fracture that is causing persistent pain.  Please call [phone] to scheduled this.  Did not look like MRI was being covered.       Thank you,  Kiana Camarena MD MPH

## 2023-10-04 ENCOUNTER — ANCILLARY PROCEDURE (OUTPATIENT)
Dept: CT IMAGING | Facility: CLINIC | Age: 83
End: 2023-10-04
Attending: PREVENTIVE MEDICINE
Payer: COMMERCIAL

## 2023-10-04 ENCOUNTER — TELEPHONE (OUTPATIENT)
Dept: FAMILY MEDICINE | Facility: CLINIC | Age: 83
End: 2023-10-04

## 2023-10-04 DIAGNOSIS — M54.50 LUMBAR BACK PAIN: ICD-10-CM

## 2023-10-04 PROCEDURE — 72131 CT LUMBAR SPINE W/O DYE: CPT | Mod: GC | Performed by: RADIOLOGY

## 2023-10-04 NOTE — TELEPHONE ENCOUNTER
Home Care is calling regarding an established patient with  [facility].        10/4/2023     8:21 AM   Home Care Information   Current following provider Dr. Camarena    Name/Phone Number YEHUDA Oneil- [phone]. OK to leave detailed voicemail   Home Care agency [facility]     Requesting orders from: Kiana Camarena  Provider is following patient: Yes  Is this a 60-day recertification request?  No    Orders Requested    Physical Therapy  Request for initial certification (first set of orders)   Frequency:  2x/wk for 3 wks then 1 x wk for 1 wk       Information was gathered and will be sent to provider for review.  RN will contact Home Care with information after provider review.  Confirmed ok to leave a detailed message with call back.  Contact information confirmed and updated as needed.    Maya Bah, RN    Home Care RN calling also to report blood pressure yesterday was 162/60 - no symptoms.

## 2023-10-04 NOTE — TELEPHONE ENCOUNTER
Left detailed message on [employer] relaying provider's verbal approval for requested HHC orders below. Also left clinic call back number if any further questions/concerns.      Nicolasa Jernigan, PAVELN, RN  [facility]

## 2023-10-05 ENCOUNTER — MEDICAL CORRESPONDENCE (OUTPATIENT)
Dept: HEALTH INFORMATION MANAGEMENT | Facility: CLINIC | Age: 83
End: 2023-10-05
Payer: COMMERCIAL

## 2023-10-05 ENCOUNTER — TELEPHONE (OUTPATIENT)
Dept: FAMILY MEDICINE | Facility: CLINIC | Age: 83
End: 2023-10-05
Payer: COMMERCIAL

## 2023-10-05 DIAGNOSIS — M47.16 LUMBAR SPONDYLOSIS WITH MYELOPATHY: ICD-10-CM

## 2023-10-05 DIAGNOSIS — M43.9 COMPRESSION DEFORMITY OF VERTEBRA: Primary | ICD-10-CM

## 2023-10-05 DIAGNOSIS — M54.50 LUMBAR BACK PAIN: ICD-10-CM

## 2023-10-05 NOTE — TELEPHONE ENCOUNTER
Patient's son, Zenobia (legal name is Chao) called. Consent to communicate on file.     Was following up on CT scan results. Notified him that results are not back yet and are currently being read. Zenobia is requesting clinic call him once results come back.     Also was requesting to see if provider is able recommend any brace/belt that would help with patient's back pain. Reports that she has continued to take Tizanidine, Acetaminophen, and Celebrex as prescribed and is currently rating pain 7/10 today. Reports that it was painful for patient to lay down yesterday for CT scan. Reports that it is hard for patient to lay flat. Reports pain on right side of back that wraps around to patient's ribs.     Also [facility] while she was also out at home visiting patient. She wanted to update provider of patient's vitals at home. BP after walking and sitting for 2-3 minutes was: 184/97, HR: 90. RN had JUSTEN Abad recheck vitals. Recheck BP was: 174/93, HR: 89.     JUSTEN Abad also requesting a call back if provider has any updates. Phone number is [phone]. Voicemail is confidential, is okay to leave a detailed message.     Routing to provider to review and advise.    JUVENTINO Doty, RN   [facility]

## 2023-10-06 RX ORDER — HYDROCODONE BITARTRATE AND ACETAMINOPHEN 5; 325 MG/1; MG/1
1 TABLET ORAL 2 TIMES DAILY PRN
Qty: 20 TABLET | Refills: 0 | Status: SHIPPED | OUTPATIENT
Start: 2023-10-06 | End: 2024-07-10

## 2023-10-06 NOTE — TELEPHONE ENCOUNTER
The CT scan is showing worsened changes especially with the compression deformity at T12.   There is also significant arthritis in the spine.  She will need to see a Spine specialist. May benefit from injections directly into the spine.   I have placed a referral:      Referral Details    Referred By  Referred To   Kiana Camarena MD [address] [place] [facility] [address]   Phone: [phone]   Fax: [phone]    Diagnoses: Compression deformity of vertebra   Lumbar back pain   Lumbar spondylosis with myelopathy   Order: Spine  Referral       Comment: Please be aware that coverage of these services is subject to the terms and limitations of your health insurance plan.  Call member services at your health plan with any benefit or coverage questions.   [facility] will call you to coordinate your care as prescribed by your provider. If you don't hear from a representative within 2 business days, please call [phone].     If the pain is not manageable, we can use short term opioid medications till evaluated by the Spine doctor.   Would family like to proceed with this? The risks include constipation, dizziness and falls.  They can try an over the counter back brace but don't always help.    Please let me know if there are any questions.    Thank you,  Kiana Camarena MD MPH

## 2023-10-06 NOTE — TELEPHONE ENCOUNTER
This writer attempted to contact patient's son, Chao Crawley) on 10/06/23      Reason for call relay message from provider below and left message.      If patient's son calls back:  Please relay message below and route response to Dr. Camarena. Also, please follow up with Dr. Camarena about BP readings and if any changes are needed at this time. In reviewing patient's chart, it does not look like patient takes any medications to manage BP.        Valeria Weathers RN

## 2023-10-06 NOTE — TELEPHONE ENCOUNTER
I sent in a prescription for Norco.  Only take for severe pain.  It is very important to take a stool softener with these opioid medications as risk of constipation as high.     I will defer blood pressure medication at this time.  I would like to have the blood pressure checked when the patient is not in pain.     If the blood pressure readings increase to over 200 systolic please let me know immediately.    Thank you,  Kiana Camarena MD MPH

## 2023-10-06 NOTE — TELEPHONE ENCOUNTER
"Pt spoke with Zenobia pt's son (CTC on file), to relay provider message below. Zenobia states he will call to and schedule appointment with spine specialist.     They would like to try short term opioid medications until pt can get in with spine doctor. Pharmacy pended.     Routing to provider to send prescription to pharmacy and to address PT reports of BP during session.    Gregoria Cody RN      \"Also spoke with JUSTEN Abad on phone from [facility] while she was also out at home visiting patient. She wanted to update provider of patient's vitals at home. BP after walking and sitting for 2-3 minutes was: 184/97, HR: 90. RN had JUSTEN Abad recheck vitals. Recheck BP was: 174/93, HR: 89.      JUSTEN Abad also requesting a call back if provider has any updates. Phone number is [phone]. Voicemail is confidential, is okay to leave a detailed message.\"    "

## 2023-10-06 NOTE — TELEPHONE ENCOUNTER
"RN called patient's son, Chao and relayed message from provider below. He verbalized understanding and states he did schedule patient for November 2 with spine specialist. He states he is open to taking her to any location. He is asking if there is any way to get patient in sooner with them? He states patient is on the wait list to be notified of a sooner opening. RN notes the referral is \"priority 1-2 weeks\". He is asking if Dr. Camarena can do anything else to help get her seen sooner by the spine specialist?    RN also contacted Jenniffer PT with [facility] and relayed message from Dr. Camarena below regarding BP medication and BP. Jenniffer verbalized understanding and denies further questions or concerns at this time.     PAVEL HallN, RN  [facility]  Nurse Triage, Family Practice    "

## 2023-10-10 NOTE — TELEPHONE ENCOUNTER
Family can see a Spine clinic outside of [place] such as [facility], [facility] etc if they are in network for them. This may be faster than with [place].    Thank you,    Kiana Camarena MD MPH

## 2023-10-10 NOTE — TELEPHONE ENCOUNTER
Called Zenobia (son, CTC on file) back and relayed provider's message, Zenobia verbalized understanding. No further questions or concerns.      PAVEL DamonN, RN  [facility]

## 2023-10-13 DIAGNOSIS — Z53.9 DIAGNOSIS NOT YET DEFINED: Primary | ICD-10-CM

## 2023-10-13 PROCEDURE — G0180 MD CERTIFICATION HHA PATIENT: HCPCS | Performed by: PREVENTIVE MEDICINE

## 2023-10-17 ENCOUNTER — MEDICAL CORRESPONDENCE (OUTPATIENT)
Dept: HEALTH INFORMATION MANAGEMENT | Facility: CLINIC | Age: 83
End: 2023-10-17
Payer: COMMERCIAL

## 2023-10-18 DIAGNOSIS — S22.059D CLOSED FRACTURE OF FIFTH THORACIC VERTEBRA WITH ROUTINE HEALING, UNSPECIFIED FRACTURE MORPHOLOGY, SUBSEQUENT ENCOUNTER: ICD-10-CM

## 2023-10-19 RX ORDER — TIZANIDINE 2 MG/1
2 TABLET ORAL
Qty: 30 TABLET | Refills: 1 | Status: SHIPPED | OUTPATIENT
Start: 2023-10-19 | End: 2023-11-21

## 2023-10-29 DIAGNOSIS — K21.9 GASTROESOPHAGEAL REFLUX DISEASE WITHOUT ESOPHAGITIS: ICD-10-CM

## 2023-11-01 DIAGNOSIS — S22.059D CLOSED FRACTURE OF FIFTH THORACIC VERTEBRA WITH ROUTINE HEALING, UNSPECIFIED FRACTURE MORPHOLOGY, SUBSEQUENT ENCOUNTER: ICD-10-CM

## 2023-11-01 RX ORDER — CHOLECALCIFEROL (VITAMIN D3) 50 MCG
TABLET ORAL
Qty: 90 TABLET | Refills: 0 | Status: SHIPPED | OUTPATIENT
Start: 2023-11-01 | End: 2024-01-31

## 2023-11-02 ENCOUNTER — OFFICE VISIT (OUTPATIENT)
Dept: ANESTHESIOLOGY | Facility: CLINIC | Age: 83
End: 2023-11-02
Attending: PREVENTIVE MEDICINE
Payer: COMMERCIAL

## 2023-11-02 ENCOUNTER — MEDICAL CORRESPONDENCE (OUTPATIENT)
Dept: HEALTH INFORMATION MANAGEMENT | Facility: CLINIC | Age: 83
End: 2023-11-02

## 2023-11-02 VITALS — HEART RATE: 92 BPM | SYSTOLIC BLOOD PRESSURE: 115 MMHG | OXYGEN SATURATION: 99 % | DIASTOLIC BLOOD PRESSURE: 65 MMHG

## 2023-11-02 DIAGNOSIS — M54.50 LUMBAR BACK PAIN: ICD-10-CM

## 2023-11-02 DIAGNOSIS — M43.9 COMPRESSION DEFORMITY OF VERTEBRA: ICD-10-CM

## 2023-11-02 DIAGNOSIS — M54.16 LUMBAR RADICULOPATHY: Primary | ICD-10-CM

## 2023-11-02 DIAGNOSIS — M47.16 LUMBAR SPONDYLOSIS WITH MYELOPATHY: ICD-10-CM

## 2023-11-02 PROCEDURE — 99204 OFFICE O/P NEW MOD 45 MIN: CPT | Performed by: ANESTHESIOLOGY

## 2023-11-02 RX ORDER — GABAPENTIN 300 MG/1
900 CAPSULE ORAL DAILY
Qty: 90 CAPSULE | Refills: 0 | Status: SHIPPED | OUTPATIENT
Start: 2023-11-02 | End: 2024-07-10

## 2023-11-02 ASSESSMENT — PAIN SCALES - GENERAL: PAINLEVEL: MODERATE PAIN (4)

## 2023-11-02 NOTE — LETTER
11/2/2023       RE: Sarah Vega  [id number]  [facility] [address]     Dear Colleague,    Thank you for referring your patient, Sarah Vega, to the [facility] [place] at . Please see a copy of my visit note below.      Pain Clinic New Patient Consult Note:    Referring Provider: Migue   Primary care provider: Kiana Camarena.    Sarah Vega is a 83 year old y.o. old female who presents to the pain clinic with left flank pain and left thigh pain with her son.     HPI:  Patient Supplied Answers To the  Pain Questionnaire      11/2/2023     1:44 PM    Pain -  Patient Entered Questionnaire/Answers   What number best describes your pain right now:  0 = No pain  to  10 = Worst pain imaginable 4   How would you describe the pain dull, aching    other   Which of the following worsen your pain sitting   Which of the following improve or reduce your pain walking   What number best describes your average pain for the past week:  0 = No pain  to  10 = Worst pain imaginable 7   What number best describes your LOWEST pain in past 24 hours:  0 = No pain  to  10 = Worst pain imaginable 0   What number best describes your WORST pain in past 24 hours:  0 = No pain  to  10 = Worst pain imaginable 8   When is your pain worst Night   What non-medicine treatments have you already had for your pain exercise       Detailed history:   Sarah is a 83 years old active female who care for her  with dementia and presents with her son who lives with them. The son works in Finance with the [employer]. She sustained a thoracic compression fracture with retropulsion. Her son thinks it was probably during the care of his father. The patient has started physical therapy and has noticed improvement. The son wants her to limit activities. She reports pain around the side in the iliac crest area with  radiation to the upper thigh.     HPI: Patient has had low back pain x 2 weeks, worse over the past week and severe last night.  Pain is worst when laying flat. Son notes he has to pull her up to help her move and she is in severe pain. She is taking tylenol 3 times per day, celebrex daily, and tizanidine once a day at night. Son does not feel tizanidine helped last night and she could not sleep due to pain.  She has a history of prior left femur fracture and has had increased pain in the femur when getting up and sitting down, but XR at clinic was negative. She notes some urinary leakage at times when bending over. She has had increased constipation since last week. Son notes she cares for  with dementia and does bend down and assist him. She lives with , son and his family. She previously used a cane, but had not required this recently. She was more unstable yesterday so son had her start using cane again yesterday. Son reports she has had minor back discomfort before in the past. She took tylenol 1 hour prior to arrival here, pain currently 6-7/10. Son feels it would be much worse if she were laying down. Son also notes that the patient's daughter who is a physical therapist evaluated the patient.  Daughter provided the patient with PT exercises and also noted that her muscles did not feel tense.     Tests/Imaging reviewed with the patient:    CT lumbar spine 10/4/2023  T12-L1: Grade 3 compression deformity of T12 with 4 mm retropulsion.  No evidence of paraspinous hematoma or soft tissue mass. No  significant spinal canal or neural foraminal stenosis.      L1-L2: Circumferential disc bulge. Bilateral facet arthropathy. Mild  bilateral neural foraminal stenosis. No spinal canal stenosis.     L2-L3: Circumferential disc bulge. Grade 1 anterolisthesis of L2 on  L3. Bilateral facet arthropathy. Mild to moderate right and mild left  neural foraminal narrowing. No spinal canal stenosis.     L3-L4:  Circumferential disc bulge. Bilateral facet arthropathy. Mild  to moderate bilateral neural foraminal narrowing. No spinal canal  stenosis.     L4-L5:Circumferential disc bulge. Bilateral advanced facet  arthropathy. Ligamentum flavum hypertrophy. Mild to moderate bilateral  neural foraminal narrowing. Mild-to-moderate spinal canal stenosis.     L5-S1: Circumferential disc bulge. Bilateral advanced facet  arthropathy. Ligamentum flavum hypertrophy. Mild to moderate bilateral  neural foraminal narrowing. No spinal canal stenosis.      The visualized adjacent paraspinous tissues are grossly within normal  limits.                                                                      Impression:  1. Worsened grade 3 compression deformity of T12 since the prior  radiographs 9/22/2023 with minimal retropulsion and focal kyphotic  deformity. No appreciable destructive soft tissue mass.  2. Multilevel advanced lumbar spondylosis greatest at L4-5 resulting  in mild to moderate spinal canal and neural foramina stenosis.  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       Significant Medical History:   Past Medical History:   Diagnosis Date    Anemia     Arthritis     OSTEO    Left knee pain 2/23/2011          Past Surgical History:  Past Surgical History:   Procedure Laterality Date    CATARACT IOL, RT/LT      COLONOSCOPY  1/29/2013    Procedure: COLONOSCOPY;;  Surgeon: Edgard Prieto MD;  Location: [facility] OR    ESOPHAGOSCOPY, GASTROSCOPY, DUODENOSCOPY (EGD), COMBINED  1/20/2012    Procedure:COMBINED ESOPHAGOSCOPY, GASTROSCOPY, DUODENOSCOPY (EGD); EGD, dysphagia; Surgeon:SHEREE VALLE; Location:[facility] OR    ESOPHAGOSCOPY, GASTROSCOPY, DUODENOSCOPY (EGD), COMBINED  7/18/2012    Procedure: COMBINED ESOPHAGOSCOPY, GASTROSCOPY, DUODENOSCOPY (EGD);  EGD for dysphagia ilana Tonio;  Surgeon: Rey Bee MD;  Location: [facility] OR    ESOPHAGOSCOPY, GASTROSCOPY, DUODENOSCOPY (EGD), COMBINED  1/29/2013    Procedure: COMBINED  ESOPHAGOSCOPY, GASTROSCOPY, DUODENOSCOPY (EGD), BIOPSY SINGLE OR MULTIPLE;  Colonoscopy, EGD, dysphagia;  Surgeon: Edgard Prieto MD;  Location: [facility] OR    INCISION AND DRAINAGE LOWER EXTREMITY, COMBINED Left 3/21/2018    Procedure: COMBINED INCISION AND DRAINAGE LOWER EXTREMITY;  Excision of left intramuscular thigh mass; removal of anterior femoral bone spur;  Surgeon: Ray Diaz MD;  Location: [facility] OR          Family History:  Family History   Family history unknown: Yes          Social History:  Social History     Socioeconomic History    Marital status:      Spouse name: Not on file    Number of children: Not on file    Years of education: Not on file    Highest education level: Not on file   Occupational History    Not on file   Tobacco Use    Smoking status: Never    Smokeless tobacco: Never   Substance and Sexual Activity    Alcohol use: No    Drug use: No    Sexual activity: Not Currently     Partners: Male     Birth control/protection: None   Other Topics Concern    Parent/sibling w/ CABG, MI or angioplasty before 65F 55M? Not Asked   Social History Narrative    Not on file     Social Determinants of Health     Financial Resource Strain: Low Risk  (9/26/2023)    Financial Resource Strain     Within the past 12 months, have you or your family members you live with been unable to get utilities (heat, electricity) when it was really needed?: No   Food Insecurity: Low Risk  (9/26/2023)    Food Insecurity     Within the past 12 months, did you worry that your food would run out before you got money to buy more?: No     Within the past 12 months, did the food you bought just not last and you didn t have money to get more?: No   Transportation Needs: Low Risk  (9/26/2023)    Transportation Needs     Within the past 12 months, has lack of transportation kept you from medical appointments, getting your medicines, non-medical meetings or appointments, work, or from getting things that you need?:  No   Physical Activity: Not on file   Stress: Not on file   Social Connections: Not on file   Interpersonal Safety: Not on file   Housing Stability: Low Risk  (9/26/2023)    Housing Stability     Do you have housing? : Yes     Are you worried about losing your housing?: No     Social History     Social History Narrative    Not on file          Allergies:  No Known Allergies    Current Medications:   Current Outpatient Medications   Medication Sig Dispense Refill    acetaminophen (TYLENOL) 500 MG tablet Take 1-2 tablets (500-1,000 mg) by mouth 3 times daily 180 tablet 3    CALCIUM CARB-CHOLECALCIFEROL 600-400 MG-UNIT per tablet TAKE 1 TABLET BY MOUTH TWICE A DAY 60 tablet 11    calcium carbonate 600 mg-vitamin D 400 units (CALTRATE) 600-400 MG-UNIT per tablet Take 1 tablet by mouth 2 times daily 180 tablet 3    capsaicin (ZOSTRIX) 0.025 % external cream Apply 1 g topically 3 times daily 50 g 0    celecoxib (CELEBREX) 100 MG capsule Take 1 capsule (100 mg) by mouth daily For pain 90 capsule 1    ciclopirox (PENLAC) 8 % external solution Apply to adjacent skin and affected nails daily.  Remove with alcohol every 7 days, then repeat. 6 mL 0    cyanocobalamin (VITAMIN B-12) 1000 MCG tablet TAKE 1 TABLET BY MOUTH EVERY DAY 90 tablet 2    famotidine (PEPCID) 20 MG tablet TAKE 1 TABLET BY MOUTH TWICE A  tablet 1    gabapentin (NEURONTIN) 300 MG capsule Take 3 capsules (900 mg) by mouth daily for 30 days Start 1 capsule in the evening for 7 days, 2 cap in the evening for 7 days then 3 cap in the evening. 90 capsule 0    HYDROcodone-acetaminophen (NORCO) 5-325 MG tablet Take 1 tablet by mouth 2 times daily as needed for severe pain 20 tablet 0    lactase (LACTAID) 3000 UNIT tablet One tablet orally with first bite of food containing dairy 90 tablet 3    omeprazole (PRILOSEC) 20 MG DR capsule TAKE 1 CAPSULE BY MOUTH EVERY DAY 90 capsule 1    ORDER FOR DME Pull up briefs for incontinence of urine change 2 to 3 times  daily 1 Container 11    salicylic acid (MEDIPLAST) 40 % miscellaneous Cut and apply on the wart every 48 hours for 4 weeks, until the wart is cleared 25 each 0    SENEXON-S 8.6-50 MG tablet TAKE 1 TABLET BY MOUTH DAILY AS NEEDED FOR CONSTIPATION. 90 tablet 0    simethicone (MYLICON) 125 MG chewable tablet Take 1 tablet (125 mg) by mouth 2 times daily as needed for intestinal gas 180 tablet 3    tiZANidine (ZANAFLEX) 2 MG tablet Take 1 tablet (2 mg) by mouth nightly as needed for muscle spasms 30 tablet 1    VITAMIN D, CHOLECALCIFEROL, PO Take by mouth daily      vitamin D3 (CHOLECALCIFEROL) 25 mcg (21294 units) capsule Take 1 capsule (250 mcg) by mouth daily 90 capsule 3    vitamin D3 (CHOLECALCIFEROL) 50 mcg (2000 units) tablet TAKE 1 TABLET BY MOUTH EVERY DAY 90 tablet 0          Current Pain Medications:  Medications related to Pain Management (From now, onward)      None             Past Pain Medications:  Tizanidine, celecoxib    Blood thinner:    none    Work History:    Current work status: stays at home        Physical Exam:     Vitals:    11/02/23 1332   BP: 115/65   BP Location: Right arm   Patient Position: Chair   Cuff Size: Adult Regular   Pulse: 92   SpO2: 99%       General Appearance: No distress, seated comfortably  Mood: Euthymic  HE ENT: Non constricted pupils  Respiratory: Non labored breathing  Skin: No rashes over exposed skin  MS: intact to light touch bilaterally  Neuro: no muscle atrophy, left knee flexion and extension is weaker.   Gait: nonantalgic, ambulates with out assistance, occasionally drags her left leg    Pain specific exam:    Slr positive on the left.     Laboratory results:  Recent Labs   Lab Test 09/26/23  1451 08/12/22  1622    140   POTASSIUM 4.1 4.3   CHLORIDE 105 105   CO2 26 27   ANIONGAP 9 8   GLC 70 79   BUN 21.5 27   CR 0.92 0.86   BIGG 9.4 9.7       CBC RESULTS:   Recent Labs   Lab Test 09/26/23  1451   WBC 4.8   RBC 4.36   HGB 9.1*   HCT 30.7*   MCV 70*   MCH  20.9*   MCHC 29.6*   RDW 17.4*            Imaging:  No images are attached to the encounter.     ASSESSMENT AND PLAN:     Encounter Diagnosis:    T12 compression fracture  T12,L1 radicular pain left side  Basstrups on imaging    Sarah Vega is a 83 year old y.o. old female who presents to the pain clinic with low back and left leg pain    I have summarized the patient s past medical history, discussed their clinical findings and the potential differential diagnosis with the patient. Significant past medical history pertinent to the patient s current condition includes osteoporosis and recent spontaneous compression fracture.  The clinical findings reveal slr positive on the left. The differential diagnosis discussed with the patient are listed above. I have discussed anatomy and possible sources of the pain using models and/or pictures (diagrams). I have discussed multi- disciplinary pain management options withthe patient as pertaining to their case as detailed above. The pain management options we discussed included, but were not limited to the recommendations below.  I also discussed with patient the risks, benefits and alternatives to each pain management option.  All of the patient s questions and concerns were answered to the best of my ability.    RECOMMENDATIONS:     1. Medications: We are prescribing the patient gabapetin 300 mg at bedtime for 7 days, 600 mg at bedtime for 7 days then 900 mg qhs. Dosing, side effects, risks/benefits/alternatives were discussed with the patient in detail.    2. Procedure: We are discussed the option of a T12-L1 epidural steroid injection. It is estimated that her fracture was possibly sustained 4-8 weeks ago. We will hold off on interventions at this time and evaluate response to PT and gabapentin.     We may consider MRI and then T12-L1 rikki in the future.     3. Physical therapy: Continue PT    4. D/w patient and son to continue activity as tolerated. We  discussed the importance of staying active.     Follow up: 4-6 weeks.           Again, thank you for allowing me to participate in the care of your patient.      Sincerely,    Ammy Tavares MD

## 2023-11-02 NOTE — PATIENT INSTRUCTIONS
Medications:     gabapentin (NEURONTIN) 300 MG capsule   Take 3 capsules (900 mg) by mouth daily for 30 days Start 1 capsule in the evening for 7 days, 2 cap in the evening for 7 days then 3 cap in the evening.     *Please provide the clinic with a minium of 1 week notice, on all prescription refills.         Recommended Follow up:      Follow up in  5-6 weeks. Video visit is okay.      Please call [phone] to schedule your clinic appointment if you don't already have an appointment scheduled.        To speak with a nurse, schedule/reschedule/cancel a clinic appointment, or request a medication refill call: [phone]    You can also reach us by [email or web address]: [email or web address]

## 2023-11-02 NOTE — PROGRESS NOTES
Pain Clinic New Patient Consult Note:    Referring Provider: Migue   Primary care provider: Kiana Camarena.    Sarah Vega is a 83 year old y.o. old female who presents to the pain clinic with left flank pain and left thigh pain with her son.     HPI:  Patient Supplied Answers To the  Pain Questionnaire      11/2/2023     1:44 PM    Pain -  Patient Entered Questionnaire/Answers   What number best describes your pain right now:  0 = No pain  to  10 = Worst pain imaginable 4   How would you describe the pain dull, aching    other   Which of the following worsen your pain sitting   Which of the following improve or reduce your pain walking   What number best describes your average pain for the past week:  0 = No pain  to  10 = Worst pain imaginable 7   What number best describes your LOWEST pain in past 24 hours:  0 = No pain  to  10 = Worst pain imaginable 0   What number best describes your WORST pain in past 24 hours:  0 = No pain  to  10 = Worst pain imaginable 8   When is your pain worst Night   What non-medicine treatments have you already had for your pain exercise       Detailed history:   Sarah is a 83 years old active female who care for her  with dementia and presents with her son who lives with them. The son works in Finance with the [employer]. She sustained a thoracic compression fracture with retropulsion. Her son thinks it was probably during the care of his father. The patient has started physical therapy and has noticed improvement. The son wants her to limit activities. She reports pain around the side in the iliac crest area with radiation to the upper thigh.     HPI: Patient has had low back pain x 2 weeks, worse over the past week and severe last night.  Pain is worst when laying flat. Son notes he has to pull her up to help her move and she is in severe pain. She is taking tylenol 3 times per day, celebrex daily, and tizanidine once a day at night. Son does not feel  tizanidine helped last night and she could not sleep due to pain.  She has a history of prior left femur fracture and has had increased pain in the femur when getting up and sitting down, but XR at clinic was negative. She notes some urinary leakage at times when bending over. She has had increased constipation since last week. Son notes she cares for  with dementia and does bend down and assist him. She lives with , son and his family. She previously used a cane, but had not required this recently. She was more unstable yesterday so son had her start using cane again yesterday. Son reports she has had minor back discomfort before in the past. She took tylenol 1 hour prior to arrival here, pain currently 6-7/10. Son feels it would be much worse if she were laying down. Son also notes that the patient's daughter who is a physical therapist evaluated the patient.  Daughter provided the patient with PT exercises and also noted that her muscles did not feel tense.     Tests/Imaging reviewed with the patient:    CT lumbar spine 10/4/2023  T12-L1: Grade 3 compression deformity of T12 with 4 mm retropulsion.  No evidence of paraspinous hematoma or soft tissue mass. No  significant spinal canal or neural foraminal stenosis.      L1-L2: Circumferential disc bulge. Bilateral facet arthropathy. Mild  bilateral neural foraminal stenosis. No spinal canal stenosis.     L2-L3: Circumferential disc bulge. Grade 1 anterolisthesis of L2 on  L3. Bilateral facet arthropathy. Mild to moderate right and mild left  neural foraminal narrowing. No spinal canal stenosis.     L3-L4: Circumferential disc bulge. Bilateral facet arthropathy. Mild  to moderate bilateral neural foraminal narrowing. No spinal canal  stenosis.     L4-L5:Circumferential disc bulge. Bilateral advanced facet  arthropathy. Ligamentum flavum hypertrophy. Mild to moderate bilateral  neural foraminal narrowing. Mild-to-moderate spinal canal stenosis.      L5-S1: Circumferential disc bulge. Bilateral advanced facet  arthropathy. Ligamentum flavum hypertrophy. Mild to moderate bilateral  neural foraminal narrowing. No spinal canal stenosis.      The visualized adjacent paraspinous tissues are grossly within normal  limits.                                                                      Impression:  1. Worsened grade 3 compression deformity of T12 since the prior  radiographs 9/22/2023 with minimal retropulsion and focal kyphotic  deformity. No appreciable destructive soft tissue mass.  2. Multilevel advanced lumbar spondylosis greatest at L4-5 resulting  in mild to moderate spinal canal and neural foramina stenosis.  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       Significant Medical History:   Past Medical History:   Diagnosis Date    Anemia     Arthritis     OSTEO    Left knee pain 2/23/2011          Past Surgical History:  Past Surgical History:   Procedure Laterality Date    CATARACT IOL, RT/LT      COLONOSCOPY  1/29/2013    Procedure: COLONOSCOPY;;  Surgeon: Edgard Prieto MD;  Location: [facility] OR    ESOPHAGOSCOPY, GASTROSCOPY, DUODENOSCOPY (EGD), COMBINED  1/20/2012    Procedure:COMBINED ESOPHAGOSCOPY, GASTROSCOPY, DUODENOSCOPY (EGD); EGD, dysphagia; Surgeon:SHEREE VALLE; Location:[facility] OR    ESOPHAGOSCOPY, GASTROSCOPY, DUODENOSCOPY (EGD), COMBINED  7/18/2012    Procedure: COMBINED ESOPHAGOSCOPY, GASTROSCOPY, DUODENOSCOPY (EGD);  EGD for dysphagia ilana Tonio;  Surgeon: Rey Bee MD;  Location: [facility]    ESOPHAGOSCOPY, GASTROSCOPY, DUODENOSCOPY (EGD), COMBINED  1/29/2013    Procedure: COMBINED ESOPHAGOSCOPY, GASTROSCOPY, DUODENOSCOPY (EGD), BIOPSY SINGLE OR MULTIPLE;  Colonoscopy, EGD, dysphagia;  Surgeon: Edgard Prieto MD;  Location: [facility]    INCISION AND DRAINAGE LOWER EXTREMITY, COMBINED Left 3/21/2018    Procedure: COMBINED INCISION AND DRAINAGE LOWER EXTREMITY;  Excision of left intramuscular thigh mass; removal of anterior  femoral bone spur;  Surgeon: Ray Diaz MD;  Location: [facility] OR          Family History:  Family History   Family history unknown: Yes          Social History:  Social History     Socioeconomic History    Marital status:      Spouse name: Not on file    Number of children: Not on file    Years of education: Not on file    Highest education level: Not on file   Occupational History    Not on file   Tobacco Use    Smoking status: Never    Smokeless tobacco: Never   Substance and Sexual Activity    Alcohol use: No    Drug use: No    Sexual activity: Not Currently     Partners: Male     Birth control/protection: None   Other Topics Concern    Parent/sibling w/ CABG, MI or angioplasty before 65F 55M? Not Asked   Social History Narrative    Not on file     Social Determinants of Health     Financial Resource Strain: Low Risk  (9/26/2023)    Financial Resource Strain     Within the past 12 months, have you or your family members you live with been unable to get utilities (heat, electricity) when it was really needed?: No   Food Insecurity: Low Risk  (9/26/2023)    Food Insecurity     Within the past 12 months, did you worry that your food would run out before you got money to buy more?: No     Within the past 12 months, did the food you bought just not last and you didn t have money to get more?: No   Transportation Needs: Low Risk  (9/26/2023)    Transportation Needs     Within the past 12 months, has lack of transportation kept you from medical appointments, getting your medicines, non-medical meetings or appointments, work, or from getting things that you need?: No   Physical Activity: Not on file   Stress: Not on file   Social Connections: Not on file   Interpersonal Safety: Not on file   Housing Stability: Low Risk  (9/26/2023)    Housing Stability     Do you have housing? : Yes     Are you worried about losing your housing?: No     Social History     Social History Narrative    Not on file           Allergies:  No Known Allergies    Current Medications:   Current Outpatient Medications   Medication Sig Dispense Refill    acetaminophen (TYLENOL) 500 MG tablet Take 1-2 tablets (500-1,000 mg) by mouth 3 times daily 180 tablet 3    CALCIUM CARB-CHOLECALCIFEROL 600-400 MG-UNIT per tablet TAKE 1 TABLET BY MOUTH TWICE A DAY 60 tablet 11    calcium carbonate 600 mg-vitamin D 400 units (CALTRATE) 600-400 MG-UNIT per tablet Take 1 tablet by mouth 2 times daily 180 tablet 3    capsaicin (ZOSTRIX) 0.025 % external cream Apply 1 g topically 3 times daily 50 g 0    celecoxib (CELEBREX) 100 MG capsule Take 1 capsule (100 mg) by mouth daily For pain 90 capsule 1    ciclopirox (PENLAC) 8 % external solution Apply to adjacent skin and affected nails daily.  Remove with alcohol every 7 days, then repeat. 6 mL 0    cyanocobalamin (VITAMIN B-12) 1000 MCG tablet TAKE 1 TABLET BY MOUTH EVERY DAY 90 tablet 2    famotidine (PEPCID) 20 MG tablet TAKE 1 TABLET BY MOUTH TWICE A  tablet 1    gabapentin (NEURONTIN) 300 MG capsule Take 3 capsules (900 mg) by mouth daily for 30 days Start 1 capsule in the evening for 7 days, 2 cap in the evening for 7 days then 3 cap in the evening. 90 capsule 0    HYDROcodone-acetaminophen (NORCO) 5-325 MG tablet Take 1 tablet by mouth 2 times daily as needed for severe pain 20 tablet 0    lactase (LACTAID) 3000 UNIT tablet One tablet orally with first bite of food containing dairy 90 tablet 3    omeprazole (PRILOSEC) 20 MG DR capsule TAKE 1 CAPSULE BY MOUTH EVERY DAY 90 capsule 1    ORDER FOR DME Pull up briefs for incontinence of urine change 2 to 3 times daily 1 Container 11    salicylic acid (MEDIPLAST) 40 % miscellaneous Cut and apply on the wart every 48 hours for 4 weeks, until the wart is cleared 25 each 0    SENEXON-S 8.6-50 MG tablet TAKE 1 TABLET BY MOUTH DAILY AS NEEDED FOR CONSTIPATION. 90 tablet 0    simethicone (MYLICON) 125 MG chewable tablet Take 1 tablet (125 mg) by mouth 2 times  daily as needed for intestinal gas 180 tablet 3    tiZANidine (ZANAFLEX) 2 MG tablet Take 1 tablet (2 mg) by mouth nightly as needed for muscle spasms 30 tablet 1    VITAMIN D, CHOLECALCIFEROL, PO Take by mouth daily      vitamin D3 (CHOLECALCIFEROL) 25 mcg (19850 units) capsule Take 1 capsule (250 mcg) by mouth daily 90 capsule 3    vitamin D3 (CHOLECALCIFEROL) 50 mcg (2000 units) tablet TAKE 1 TABLET BY MOUTH EVERY DAY 90 tablet 0          Current Pain Medications:  Medications related to Pain Management (From now, onward)      None             Past Pain Medications:  Tizanidine, celecoxib    Blood thinner:    none    Work History:    Current work status: stays at home        Physical Exam:     Vitals:    11/02/23 1332   BP: 115/65   BP Location: Right arm   Patient Position: Chair   Cuff Size: Adult Regular   Pulse: 92   SpO2: 99%       General Appearance: No distress, seated comfortably  Mood: Euthymic  HE ENT: Non constricted pupils  Respiratory: Non labored breathing  Skin: No rashes over exposed skin  MS: intact to light touch bilaterally  Neuro: no muscle atrophy, left knee flexion and extension is weaker.   Gait: nonantalgic, ambulates with out assistance, occasionally drags her left leg    Pain specific exam:    Slr positive on the left.     Laboratory results:  Recent Labs   Lab Test 09/26/23  1451 08/12/22  1622    140   POTASSIUM 4.1 4.3   CHLORIDE 105 105   CO2 26 27   ANIONGAP 9 8   GLC 70 79   BUN 21.5 27   CR 0.92 0.86   BIGG 9.4 9.7       CBC RESULTS:   Recent Labs   Lab Test 09/26/23  1451   WBC 4.8   RBC 4.36   HGB 9.1*   HCT 30.7*   MCV 70*   MCH 20.9*   MCHC 29.6*   RDW 17.4*            Imaging:  No images are attached to the encounter.     ASSESSMENT AND PLAN:     Encounter Diagnosis:    T12 compression fracture  T12,L1 radicular pain left side  Basstrups on imaging    Sarah Vega is a 83 year old y.o. old female who presents to the pain clinic with low back and left leg  pain    I have summarized the patient s past medical history, discussed their clinical findings and the potential differential diagnosis with the patient. Significant past medical history pertinent to the patient s current condition includes osteoporosis and recent spontaneous compression fracture.  The clinical findings reveal slr positive on the left. The differential diagnosis discussed with the patient are listed above. I have discussed anatomy and possible sources of the pain using models and/or pictures (diagrams). I have discussed multi- disciplinary pain management options withthe patient as pertaining to their case as detailed above. The pain management options we discussed included, but were not limited to the recommendations below.  I also discussed with patient the risks, benefits and alternatives to each pain management option.  All of the patient s questions and concerns were answered to the best of my ability.    RECOMMENDATIONS:     1. Medications: We are prescribing the patient gabapetin 300 mg at bedtime for 7 days, 600 mg at bedtime for 7 days then 900 mg qhs. Dosing, side effects, risks/benefits/alternatives were discussed with the patient in detail.    2. Procedure: We are discussed the option of a T12-L1 epidural steroid injection. It is estimated that her fracture was possibly sustained 4-8 weeks ago. We will hold off on interventions at this time and evaluate response to PT and gabapentin.     We may consider MRI and then T12-L1 rikki in the future.     3. Physical therapy: Continue PT    4. D/w patient and son to continue activity as tolerated. We discussed the importance of staying active.     Follow up: 4-6 weeks.

## 2023-11-02 NOTE — NURSING NOTE
RN reviewed AVS with patient. Patient to contact clinic if any questions/concerns. Patient verbalized understanding.    Marizol Barrera RN

## 2023-11-02 NOTE — NURSING NOTE
Patient presents with:  Consult: Lower back pain      Moderate Pain (4)     Pain Medications       Analgesics Other Refills Start End     acetaminophen (TYLENOL) 500 MG tablet    3 9/26/2023     Sig - Route: Take 1-2 tablets (500-1,000 mg) by mouth 3 times daily - Oral    Class: E-Prescribe      Opioid Combinations Refills Start End     HYDROcodone-acetaminophen (NORCO) 5-325 MG tablet    0 10/6/2023     Sig - Route: Take 1 tablet by mouth 2 times daily as needed for severe pain - Oral    Class: E-Prescribe    Earliest Fill Date: 10/6/2023            What medications are you using for pain? Norco, tylenol     (New patients only) Have you been seen by another pain clinic/ provider? no    (Return Patients only) What refills are you needing today? no    Expectations: injection discussion    Anthony Centeno, EMT

## 2023-11-21 DIAGNOSIS — S22.059D CLOSED FRACTURE OF FIFTH THORACIC VERTEBRA WITH ROUTINE HEALING, UNSPECIFIED FRACTURE MORPHOLOGY, SUBSEQUENT ENCOUNTER: ICD-10-CM

## 2023-11-22 RX ORDER — TIZANIDINE 2 MG/1
2 TABLET ORAL
Qty: 30 TABLET | Refills: 1 | Status: SHIPPED | OUTPATIENT
Start: 2023-11-22 | End: 2024-01-22

## 2023-12-11 ENCOUNTER — VIRTUAL VISIT (OUTPATIENT)
Dept: ANESTHESIOLOGY | Facility: CLINIC | Age: 83
End: 2023-12-11
Attending: ANESTHESIOLOGY
Payer: COMMERCIAL

## 2023-12-11 DIAGNOSIS — M54.16 LUMBAR RADICULOPATHY: Primary | ICD-10-CM

## 2023-12-11 PROCEDURE — 99207 PR NO BILLABLE SERVICE THIS VISIT: CPT | Performed by: ANESTHESIOLOGY

## 2023-12-11 ASSESSMENT — PAIN SCALES - PAIN ENJOYMENT GENERAL ACTIVITY SCALE (PEG)
PEG_TOTALSCORE: 6
INTERFERED_GENERAL_ACTIVITY: 7
AVG_PAIN_PASTWEEK: 6
INTERFERED_ENJOYMENT_LIFE: 5

## 2023-12-11 ASSESSMENT — PAIN SCALES - GENERAL: PAINLEVEL: SEVERE PAIN (6)

## 2023-12-11 NOTE — NURSING NOTE
Sarah is a 83 year old who is being evaluated via a billable video visit.      How would you like to obtain your AVS? MyChart  If the video visit is dropped, the invitation should be resent by: Text to cell phone: [phone]  Will anyone else be joining your video visit? No

## 2023-12-11 NOTE — PROGRESS NOTES
I was able to connect with Sarah's son. He was at [place] taking a flight back to [place]. He stated that Sarah is not available for a video visit. I stated that I can do inperson or video visit. He plans to return to [place] from business trip and reschedule the visit after talking to his mother.

## 2023-12-11 NOTE — NURSING NOTE
Patient presents with:  Follow Up: Follow-up Lower Back - Right Side Rib Pain - Radiculopathy      Severe Pain (6)     Pain Medications       Analgesics Other Refills Start End     acetaminophen (TYLENOL) 500 MG tablet    3 9/26/2023     Sig - Route: Take 1-2 tablets (500-1,000 mg) by mouth 3 times daily - Oral    Class: E-Prescribe      Opioid Combinations Refills Start End     HYDROcodone-acetaminophen (NORCO) 5-325 MG tablet    0 10/6/2023     Sig - Route: Take 1 tablet by mouth 2 times daily as needed for severe pain - Oral    Class: E-Prescribe    Earliest Fill Date: 10/6/2023            What medications are you using for pain? Hydrocodone, Tylenol,     (New patients only) Have you been seen by another pain clinic/ provider? no    (Return Patients only) What refills are you needing today? no

## 2023-12-17 ENCOUNTER — TELEPHONE (OUTPATIENT)
Dept: FAMILY MEDICINE | Facility: CLINIC | Age: 83
End: 2023-12-17
Payer: COMMERCIAL

## 2023-12-17 DIAGNOSIS — S22.059D CLOSED FRACTURE OF FIFTH THORACIC VERTEBRA WITH ROUTINE HEALING, UNSPECIFIED FRACTURE MORPHOLOGY, SUBSEQUENT ENCOUNTER: ICD-10-CM

## 2023-12-17 NOTE — TELEPHONE ENCOUNTER
Pharmacy is requesting 90 days supply for tiZANidine (ZANAFLEX) 2 MG tablet .          Lazaro Faarax  [facility]

## 2023-12-18 RX ORDER — TIZANIDINE 2 MG/1
2 TABLET ORAL
Qty: 30 TABLET | Refills: 1 | OUTPATIENT
Start: 2023-12-18

## 2023-12-21 ENCOUNTER — PATIENT OUTREACH (OUTPATIENT)
Dept: GERIATRIC MEDICINE | Facility: CLINIC | Age: 83
End: 2023-12-21
Payer: COMMERCIAL

## 2023-12-21 NOTE — PROGRESS NOTES
[facility] Care Coordination Contact      [facility] Six-Month Telephone Assessment    6 month telephone assessment completed on 12/21/23 with sonZenobia.    ER visits: Yes -  [facility]  Hospitalizations: No  TCU stays: No  Significant health status changes: None reported, back pain is doing better but not resolved, needs to schedule follow up with doc.  Falls/Injuries: No  ADL/IADL changes: No  Changes in services: No, current POC working well.      Goals: See POC in chart for goal progress documentation.     Will see member in 6 months for an annual health risk assessment.   Encouraged member to call CC with any questions or concerns in the meantime.     Brooke Villareal, RN/BSN  [facility]  [phone]

## 2024-01-09 NOTE — TELEPHONE ENCOUNTER
Last OV 7/17/17 Matt    Left detailed message for SEGUNDO Lai care coordinator with [facility].   Verbal orders given to approve the requested services below per the 'Home Care, Assisted Living, or Nursing Home Evaluations and Treatments [facility] Policy'.    Edgardo Woods RN       49y male, AAOx4, Wolof speaking, [facility] nephrolithiasis presents with left hip/back pain for about 1 week.  Pain began while sitting in chair eating he suddenly turned to his right side and felt a "pulling sensation".  Pain is localized to left lower back/hip radiating down left leg laterally when walking stops before the knee. no headache, chest pain, shortness of breath, abdominal pain, moves all extremities, ind at baseline, placed in gown, side rails up for safety, bed in lowest position, call bell within reach, patient and family educated on plan of care, comfort and safety provided. 49y male, AAOx4, Syriac speaking, [facility] nephrolithiasis presents with left hip/back pain for about 1 week.  Pain began while sitting in chair eating he suddenly turned to his right side and felt a "pulling sensation".  Pain is localized to left lower back/hip radiating down left leg laterally when walking stops before the knee. no headache, chest pain, shortness of breath, abdominal pain, moves all extremities, ind at baseline, placed in gown, side rails up for safety, bed in lowest position, call bell within reach, patient and family educated on plan of care, comfort and safety provided.

## 2024-01-22 DIAGNOSIS — S22.059D CLOSED FRACTURE OF FIFTH THORACIC VERTEBRA WITH ROUTINE HEALING, UNSPECIFIED FRACTURE MORPHOLOGY, SUBSEQUENT ENCOUNTER: ICD-10-CM

## 2024-01-22 RX ORDER — TIZANIDINE 2 MG/1
TABLET ORAL
Qty: 30 TABLET | Refills: 0 | Status: SHIPPED | OUTPATIENT
Start: 2024-01-22 | End: 2024-02-15

## 2024-01-22 NOTE — TELEPHONE ENCOUNTER
30 day refill provided. Has appointment with me on 1/30/24 when will discuss plans for the medication. Thank you, Kiana Camarena MD MPH

## 2024-01-30 ENCOUNTER — OFFICE VISIT (OUTPATIENT)
Dept: FAMILY MEDICINE | Facility: CLINIC | Age: 84
End: 2024-01-30
Payer: COMMERCIAL

## 2024-01-30 VITALS
WEIGHT: 81.4 LBS | RESPIRATION RATE: 16 BRPM | BODY MASS INDEX: 18.84 KG/M2 | HEART RATE: 91 BPM | HEIGHT: 55 IN | DIASTOLIC BLOOD PRESSURE: 64 MMHG | SYSTOLIC BLOOD PRESSURE: 134 MMHG | OXYGEN SATURATION: 100 % | TEMPERATURE: 97.8 F

## 2024-01-30 DIAGNOSIS — M17.0 PRIMARY OSTEOARTHRITIS OF BOTH KNEES: ICD-10-CM

## 2024-01-30 DIAGNOSIS — Z00.00 ENCOUNTER FOR MEDICARE ANNUAL WELLNESS EXAM: Primary | ICD-10-CM

## 2024-01-30 DIAGNOSIS — N39.46 MIXED STRESS AND URGE URINARY INCONTINENCE: ICD-10-CM

## 2024-01-30 DIAGNOSIS — D51.8 VITAMIN B12 DEFICIENCY (DIETARY) ANEMIA: ICD-10-CM

## 2024-01-30 DIAGNOSIS — S06.5XAA SDH (SUBDURAL HEMATOMA) (H): ICD-10-CM

## 2024-01-30 DIAGNOSIS — R06.02 SHORTNESS OF BREATH: ICD-10-CM

## 2024-01-30 DIAGNOSIS — S06.5X9A TRAUMATIC SUBDURAL HEMORRHAGE WITH LOSS OF CONSCIOUSNESS OF UNSPECIFIED DURATION, INITIAL ENCOUNTER (H): ICD-10-CM

## 2024-01-30 DIAGNOSIS — M43.9 COMPRESSION DEFORMITY OF VERTEBRA: ICD-10-CM

## 2024-01-30 DIAGNOSIS — K40.90 LEFT INGUINAL HERNIA: ICD-10-CM

## 2024-01-30 PROCEDURE — G0439 PPPS, SUBSEQ VISIT: HCPCS | Performed by: PREVENTIVE MEDICINE

## 2024-01-30 PROCEDURE — 99214 OFFICE O/P EST MOD 30 MIN: CPT | Mod: 25 | Performed by: PREVENTIVE MEDICINE

## 2024-01-30 RX ORDER — RESPIRATORY SYNCYTIAL VIRUS VACCINE 120MCG/0.5
0.5 KIT INTRAMUSCULAR ONCE
Qty: 1 EACH | Refills: 0 | Status: CANCELLED | OUTPATIENT
Start: 2024-01-30 | End: 2024-01-30

## 2024-01-30 ASSESSMENT — ENCOUNTER SYMPTOMS
PARESTHESIAS: 0
HEMATURIA: 0
EYE PAIN: 0
WEAKNESS: 1
ARTHRALGIAS: 1
COUGH: 0
NAUSEA: 0
DIARRHEA: 0
DIZZINESS: 0
CONSTIPATION: 0
JOINT SWELLING: 0
HEARTBURN: 1
HEADACHES: 0
ABDOMINAL PAIN: 0
CHILLS: 0
SHORTNESS OF BREATH: 1
PALPITATIONS: 1
NERVOUS/ANXIOUS: 0
FREQUENCY: 1
MYALGIAS: 1
DYSURIA: 0
SORE THROAT: 0
HEMATOCHEZIA: 0
FEVER: 0

## 2024-01-30 ASSESSMENT — PAIN SCALES - GENERAL: PAINLEVEL: NO PAIN (0)

## 2024-01-30 ASSESSMENT — ACTIVITIES OF DAILY LIVING (ADL)
CURRENT_FUNCTION: TRANSPORTATION REQUIRES ASSISTANCE
CURRENT_FUNCTION: HOUSEWORK REQUIRES ASSISTANCE

## 2024-01-30 NOTE — PATIENT INSTRUCTIONS
Patient Education   Personalized Prevention Plan  You are due for the preventive services outlined below.  Your care team is available to assist you in scheduling these services.  If you have already completed any of these items, please share that information with your care team to update in your medical record.  Health Maintenance Due   Topic Date Due     ANNUAL REVIEW OF HM ORDERS  Never done     RSV VACCINE (Pregnancy & 60+) (1 - 1-dose 60+ series) Never done     Polio Vaccine (2 of 3 - Adult catch-up series) 07/27/2000     Zoster (Shingles) Vaccine (2 of 3) 07/25/2007     Eye Exam  10/29/2019     Diptheria Tetanus Pertussis (DTAP/TDAP/TD) Vaccine (2 - Td or Tdap) 10/13/2020     Annual Wellness Visit  02/05/2021     COVID-19 Vaccine (4 - 2023-24 season) 09/01/2023

## 2024-01-30 NOTE — PROGRESS NOTES
"Preventive Care Visit  [facility] LATHANARAYAN Camarena MD, Family Medicine  Jan 30, 2024      SUBJECTIVE:   Sarah is a 83 year old, presenting for the following:  Physical        1/30/2024     2:14 PM   Additional Questions   Roomed by linda clemens   Accompanied by none         1/30/2024     2:14 PM   Patient Reported Additional Medications   Patient reports taking the following new medications none       Here with son   Are you in the first 12 months of your Medicare coverage?  No    Healthy Habits:     In general, how would you rate your overall health?  Fair    Frequency of exercise:  2-3 days/week    Duration of exercise:  Less than 15 minutes    Do you usually eat at least 4 servings of fruit and vegetables a day, include whole grains    & fiber and avoid regularly eating high fat or \"junk\" foods?  No    Taking medications regularly:  Yes    Medication side effects:  Muscle aches    Ability to successfully perform activities of daily living:  Transportation requires assistance and housework requires assistance    Home Safety:  No safety concerns identified    Hearing Impairment:  No hearing concerns    In the past 6 months, have you been bothered by leaking of urine? Yes    In general, how would you rate your overall mental or emotional health?  Good    Additional concerns today:  No      Today's PHQ-2 Score:       1/30/2024     2:13 PM   PHQ-2 ( 1999 [employer])   Q1: Little interest or pleasure in doing things 0   Q2: Feeling down, depressed or hopeless 0   PHQ-2 Score 0   Q1: Little interest or pleasure in doing things Not at all   Q2: Feeling down, depressed or hopeless Not at all   PHQ-2 Score 0           Have you ever done Advance Care Planning? (For example, a Health Directive, POLST, or a discussion with a medical provider or your loved ones about your wishes): No, advance care planning information given to patient to review.  Patient declined advance care planning discussion at this " time.    Fall risk  Fallen 2 or more times in the past year?: Yes  Any fall with injury in the past year?: Yes  Timed Up and Go Test (>13.5 is fall risk; contact physician) : 10    Cognitive Screening   1) Repeat 3 items (Leader, Season, Table)    2) Clock draw: NORMAL  3) 3 item recall: Recalls 1 object   Results: NORMAL clock, 1-2 items recalled: COGNITIVE IMPAIRMENT LESS LIKELY    Mini-CogTM Copyright POP Knott. Licensed by the author for use in [facility]; reprinted with permission (david@[facility]). All rights reserved.      Do you have sleep apnea, excessive snoring or daytime drowsiness? : no    Reviewed and updated as needed this visit by clinical staff   Tobacco  Allergies  Meds  Problems  Med Hx  Surg Hx  Fam Hx          Reviewed and updated as needed this visit by Provider   Tobacco  Allergies  Meds  Problems  Med Hx  Surg Hx  Fam Hx          Social History     Tobacco Use    Smoking status: Never    Smokeless tobacco: Never   Substance Use Topics    Alcohol use: No             1/30/2024     2:13 PM   Alcohol Use   Prescreen: >3 drinks/day or >7 drinks/week? Not Applicable          No data to display              Do you have a current opioid prescription? No  Do you use any other controlled substances or medications that are not prescribed by a provider? None      Left inguinal hernia:  Increasing in size  Minimal pain  Main concern is urine urgency and incontinence   No urine burning  Having urine incontinence with bending etc or lifting   No blood in the urine    Has been very short of breath:  -with minimal exertion  -some leg edema more so on the left side   -No cough  Decreased appetite even with Ensure or Boost   Lots of acidity+  Early satiety    Hiatal hernia:  -2013 had endoscopy   -has not been able to eat  -No emesis  -No melena  -Some dysphagia  -no odynophagia     Muscle relaxer not taken daily. As needed is fine.     Current providers sharing in care for this patient  include:   Patient Care Team:  Kiana Camarena MD as PCP - General (Family Practice)  Kiana Camarena MD as Assigned PCP  Brooke Villareal, RN as Lead Care Coordinator (Primary Care - CC)    The following health maintenance items are reviewed in Epic and correct as of today:  Health Maintenance   Topic Date Due    RSV VACCINE (Pregnancy & 60+) (1 - 1-dose 60+ series) Never done    IPV IMMUNIZATION (2 of 3 - Adult catch-up series) 07/27/2000    ZOSTER IMMUNIZATION (2 of 3) 07/25/2007    EYE EXAM  10/29/2019    DTAP/TDAP/TD IMMUNIZATION (2 - Td or Tdap) 10/13/2020    MEDICARE ANNUAL WELLNESS VISIT  02/05/2021    COVID-19 Vaccine (4 - 2023-24 season) 09/01/2023    ANNUAL REVIEW OF HM ORDERS  01/30/2025    FALL RISK ASSESSMENT  01/30/2025    DEXA  03/11/2026    ADVANCE CARE PLANNING  01/30/2029    PHQ-2 (once per calendar year)  Completed    INFLUENZA VACCINE  Completed    Pneumococcal Vaccine: 65+ Years  Completed    HPV IMMUNIZATION  Aged Out    MENINGITIS IMMUNIZATION  Aged Out    RSV MONOCLONAL ANTIBODY  Aged Out     Labs reviewed in EPIC  BP Readings from Last 3 Encounters:   01/30/24 134/64   11/02/23 115/65   09/26/23 (!) 175/84    Wt Readings from Last 3 Encounters:   01/30/24 36.9 kg (81 lb 6.4 oz)   09/26/23 40.9 kg (90 lb 3.2 oz)   09/22/23 40.7 kg (89 lb 11.2 oz)                  Patient Active Problem List   Diagnosis    CARDIOVASCULAR SCREENING; LDL GOAL LESS THAN 160    Right lumbar radiculopathy    Osteoporosis    Advanced directives, counseling/discussion    Abnormal Pap smear    Dysphagia    Health Care Home    Iron deficiency anemia    Sliding hiatal hernia    Osteoarthritis of left thumb    Primary osteoarthritis of both knees    Gastroesophageal reflux disease without esophagitis    Calculus of gallbladder without cholecystitis without obstruction    Left inguinal hernia    Bilateral posterior capsular opacification    Dermatochalasis of eyelid    Closed intertrochanteric fracture of right femur  with routine healing    Closed fracture of fifth thoracic vertebra with routine healing    Closed fracture of second cervical vertebra with routine healing    Fall at home, initial encounter    SDH (subdural hematoma) (H)    Traumatic subdural hemorrhage with loss of consciousness of unspecified duration, initial encounter (H)     Past Surgical History:   Procedure Laterality Date    CATARACT IOL, RT/LT      COLONOSCOPY  1/29/2013    Procedure: COLONOSCOPY;;  Surgeon: Edgard Prieto MD;  Location: [facility] OR    ESOPHAGOSCOPY, GASTROSCOPY, DUODENOSCOPY (EGD), COMBINED  1/20/2012    Procedure:COMBINED ESOPHAGOSCOPY, GASTROSCOPY, DUODENOSCOPY (EGD); EGD, dysphagia; Surgeon:SHREEE VALLE; Location:[facility] OR    ESOPHAGOSCOPY, GASTROSCOPY, DUODENOSCOPY (EGD), COMBINED  7/18/2012    Procedure: COMBINED ESOPHAGOSCOPY, GASTROSCOPY, DUODENOSCOPY (EGD);  EGD for dysphagia rp Tonio;  Surgeon: Rey Bee MD;  Location: [facility] OR    ESOPHAGOSCOPY, GASTROSCOPY, DUODENOSCOPY (EGD), COMBINED  1/29/2013    Procedure: COMBINED ESOPHAGOSCOPY, GASTROSCOPY, DUODENOSCOPY (EGD), BIOPSY SINGLE OR MULTIPLE;  Colonoscopy, EGD, dysphagia;  Surgeon: Edgard Prieto MD;  Location: [facility] OR    INCISION AND DRAINAGE LOWER EXTREMITY, COMBINED Left 3/21/2018    Procedure: COMBINED INCISION AND DRAINAGE LOWER EXTREMITY;  Excision of left intramuscular thigh mass; removal of anterior femoral bone spur;  Surgeon: Ray Diaz MD;  Location: [facility] OR       Social History     Tobacco Use    Smoking status: Never    Smokeless tobacco: Never   Substance Use Topics    Alcohol use: No     Family History   Family history unknown: Yes         Current Outpatient Medications   Medication Sig Dispense Refill    acetaminophen (TYLENOL) 500 MG tablet Take 1-2 tablets (500-1,000 mg) by mouth 3 times daily 180 tablet 3    CALCIUM CARB-CHOLECALCIFEROL 600-400 MG-UNIT per tablet TAKE 1 TABLET BY MOUTH TWICE A DAY 60 tablet 11    calcium carbonate 600 mg-vitamin  D 400 units (CALTRATE) 600-400 MG-UNIT per tablet Take 1 tablet by mouth 2 times daily 180 tablet 3    capsaicin (ZOSTRIX) 0.025 % external cream Apply 1 g topically 3 times daily 50 g 0    celecoxib (CELEBREX) 100 MG capsule Take 1 capsule (100 mg) by mouth daily For pain 90 capsule 1    ciclopirox (PENLAC) 8 % external solution Apply to adjacent skin and affected nails daily.  Remove with alcohol every 7 days, then repeat. 6 mL 0    cyanocobalamin (VITAMIN B-12) 1000 MCG tablet TAKE 1 TABLET BY MOUTH EVERY DAY 90 tablet 2    famotidine (PEPCID) 20 MG tablet TAKE 1 TABLET BY MOUTH TWICE A  tablet 1    HYDROcodone-acetaminophen (NORCO) 5-325 MG tablet Take 1 tablet by mouth 2 times daily as needed for severe pain 20 tablet 0    lactase (LACTAID) 3000 UNIT tablet One tablet orally with first bite of food containing dairy 90 tablet 3    omeprazole (PRILOSEC) 20 MG DR capsule TAKE 1 CAPSULE BY MOUTH EVERY DAY 90 capsule 1    ORDER FOR DME Pull up briefs for incontinence of urine change 2 to 3 times daily 1 Container 11    salicylic acid (MEDIPLAST) 40 % miscellaneous Cut and apply on the wart every 48 hours for 4 weeks, until the wart is cleared 25 each 0    SENEXON-S 8.6-50 MG tablet TAKE 1 TABLET BY MOUTH DAILY AS NEEDED FOR CONSTIPATION. 90 tablet 0    simethicone (MYLICON) 125 MG chewable tablet Take 1 tablet (125 mg) by mouth 2 times daily as needed for intestinal gas 180 tablet 3    tiZANidine (ZANAFLEX) 2 MG tablet TAKE 1 TABLET (2 MG) BY MOUTH NIGHTLY AS NEEDED FOR MUSCLE SPASM 30 tablet 0    VITAMIN D, CHOLECALCIFEROL, PO Take by mouth daily      vitamin D3 (CHOLECALCIFEROL) 25 mcg (50679 units) capsule Take 1 capsule (250 mcg) by mouth daily 90 capsule 3    vitamin D3 (CHOLECALCIFEROL) 50 mcg (2000 units) tablet TAKE 1 TABLET BY MOUTH EVERY DAY 90 tablet 0    gabapentin (NEURONTIN) 300 MG capsule Take 3 capsules (900 mg) by mouth daily for 30 days Start 1 capsule in the evening for 7 days, 2 cap in the  "evening for 7 days then 3 cap in the evening. 90 capsule 0     No Known Allergies      Mammogram Screening - Patient over age 75, has elected to discontinue screenings.  Pertinent mammograms are reviewed under the imaging tab.  Review of Systems   Constitutional:  Negative for chills and fever.   HENT:  Negative for congestion, ear pain, hearing loss and sore throat.    Eyes:  Negative for pain and visual disturbance.   Respiratory:  Positive for shortness of breath. Negative for cough.    Cardiovascular:  Positive for chest pain and palpitations.   Gastrointestinal:  Negative for abdominal pain, constipation, diarrhea and nausea.   Genitourinary:  Positive for frequency and urgency. Negative for dysuria, genital sores and hematuria.   Musculoskeletal:  Positive for arthralgias and myalgias. Negative for joint swelling.   Skin:  Negative for rash.   Neurological:  Positive for weakness. Negative for dizziness and headaches.   Psychiatric/Behavioral:  The patient is not nervous/anxious.        OBJECTIVE:   /64   Pulse 91   Temp 97.8  F (36.6  C) (Tympanic)   Resp 16   Ht 1.346 m (4' 5\")   Wt 36.9 kg (81 lb 6.4 oz)   LMP  (LMP Unknown)   SpO2 100%   BMI 20.37 kg/m     Estimated body mass index is 20.37 kg/m  as calculated from the following:    Height as of this encounter: 1.346 m (4' 5\").    Weight as of this encounter: 36.9 kg (81 lb 6.4 oz).  Physical Exam  GENERAL APPEARANCE: frail+ ambulating with a walker   RESP: lungs clear to auscultation - no rales, rhonchi or wheezes  CV: regular rates and rhythm, normal S1 S2  ABDOMEN: no rebound or guarding   MS: trace to 1+ pitting edema bilaterally  SKIN: no suspicious lesions or rashes  NEURO: mentation intact and speech normal  PSYCH: mentation appears normal      Diagnostic Test Results:  Labs reviewed in Epic  No results found for this or any previous visit (from the past 24 hour(s)).    ASSESSMENT / PLAN:   Encounter for Medicare annual wellness " exam  - PRIMARY CARE FOLLOW-UP SCHEDULING  - REVIEW OF HEALTH MAINTENANCE PROTOCOL ORDERS    Compression deformity of vertebra  -seen by the Pain clinic  -rare use of muscle relaxer  -they had prescribed gabapentin and discussed the option of LESI    Mixed stress and urge urinary incontinence  -declined medication that would worsen dry mouth or constipation   - Physical Therapy Referral  -we discussed Pelvic PT     Left inguinal hernia  -increasing hence referral to General Surgery placed   - Adult General Surg Referral    Shortness of breath  -Fatigue and shortness of breath with minimal activity   - Echo Stress Test with Definity    Primary osteoarthritis of both knees  -acetaminophen as needed     Vitamin B12 deficiency (dietary) anemia  -last levels were elevated     Traumatic subdural hemorrhage with loss of consciousness of unspecified duration, initial encounter (H)  -resolved     SDH (subdural hematoma) (H)  -resolved       Counseling  Reviewed preventive health counseling, as reflected in patient instructions       Regular exercise       Healthy diet/nutrition       Vision screening       Bladder control       Fall risk prevention        She reports that she has never smoked. She has never used smokeless tobacco.      Appropriate preventive services were discussed with this patient, including applicable screening as appropriate for fall prevention, nutrition, physical activity, Tobacco-use cessation, weight loss and cognition.  Checklist reviewing preventive services available has been given to the patient.    Reviewed patients plan of care and provided an AVS. The Basic Care Plan (routine screening as documented in Health Maintenance) for Sarah meets the Care Plan requirement. This Care Plan has been established and reviewed with the Patient.          Signed Electronically by: Kiana Camarena MD MPH      Identified Health Risks  I have reviewed Opioid Use Disorder and Substance Use Disorder risk factors and  made any needed referrals.   .undefined[[id number]^^  Answers submitted by the patient for this visit:  Annual Preventive Visit (Submitted on 1/30/2024)  Chief Complaint: Annual Exam:  Blood in stool: No  heartburn: Yes  peripheral edema: No  mood changes: No  Skin sensation changes: No

## 2024-01-31 DIAGNOSIS — S22.059D CLOSED FRACTURE OF FIFTH THORACIC VERTEBRA WITH ROUTINE HEALING, UNSPECIFIED FRACTURE MORPHOLOGY, SUBSEQUENT ENCOUNTER: ICD-10-CM

## 2024-01-31 RX ORDER — CHOLECALCIFEROL (VITAMIN D3) 50 MCG
TABLET ORAL
Qty: 90 TABLET | Refills: 0 | Status: SHIPPED | OUTPATIENT
Start: 2024-01-31

## 2024-02-15 DIAGNOSIS — S22.059D CLOSED FRACTURE OF FIFTH THORACIC VERTEBRA WITH ROUTINE HEALING, UNSPECIFIED FRACTURE MORPHOLOGY, SUBSEQUENT ENCOUNTER: ICD-10-CM

## 2024-02-16 RX ORDER — TIZANIDINE 2 MG/1
TABLET ORAL
Qty: 90 TABLET | Refills: 0 | Status: SHIPPED | OUTPATIENT
Start: 2024-02-16 | End: 2024-05-16

## 2024-02-22 ENCOUNTER — ANCILLARY PROCEDURE (OUTPATIENT)
Dept: CARDIOLOGY | Facility: CLINIC | Age: 84
End: 2024-02-22
Attending: PREVENTIVE MEDICINE
Payer: COMMERCIAL

## 2024-02-22 VITALS — SYSTOLIC BLOOD PRESSURE: 174 MMHG | DIASTOLIC BLOOD PRESSURE: 54 MMHG | HEART RATE: 127 BPM

## 2024-02-22 DIAGNOSIS — R06.02 SHORTNESS OF BREATH: ICD-10-CM

## 2024-02-22 PROCEDURE — 93321 DOPPLER ECHO F-UP/LMTD STD: CPT | Performed by: STUDENT IN AN ORGANIZED HEALTH CARE EDUCATION/TRAINING PROGRAM

## 2024-02-22 PROCEDURE — 93018 CV STRESS TEST I&R ONLY: CPT | Performed by: STUDENT IN AN ORGANIZED HEALTH CARE EDUCATION/TRAINING PROGRAM

## 2024-02-22 PROCEDURE — 93325 DOPPLER ECHO COLOR FLOW MAPG: CPT | Performed by: STUDENT IN AN ORGANIZED HEALTH CARE EDUCATION/TRAINING PROGRAM

## 2024-02-22 PROCEDURE — 93017 CV STRESS TEST TRACING ONLY: CPT | Performed by: STUDENT IN AN ORGANIZED HEALTH CARE EDUCATION/TRAINING PROGRAM

## 2024-02-22 PROCEDURE — 93350 STRESS TTE ONLY: CPT | Performed by: STUDENT IN AN ORGANIZED HEALTH CARE EDUCATION/TRAINING PROGRAM

## 2024-02-22 PROCEDURE — 93016 CV STRESS TEST SUPVJ ONLY: CPT

## 2024-02-22 RX ORDER — DOBUTAMINE HYDROCHLORIDE 200 MG/100ML
2.5-2 INJECTION INTRAVENOUS CONTINUOUS
Status: ACTIVE | OUTPATIENT
Start: 2024-02-22

## 2024-02-22 RX ORDER — ATROPINE SULFATE 0.4 MG/ML
0.4 AMPUL (ML) INJECTION ONCE
Status: COMPLETED | OUTPATIENT
Start: 2024-02-22 | End: 2024-02-22

## 2024-02-22 RX ORDER — METOPROLOL TARTRATE 1 MG/ML
5 INJECTION, SOLUTION INTRAVENOUS EVERY 5 MIN PRN
Status: ACTIVE | OUTPATIENT
Start: 2024-02-22

## 2024-02-22 RX ADMIN — METOPROLOL TARTRATE 5 MG: 1 INJECTION, SOLUTION INTRAVENOUS at 15:02

## 2024-02-22 RX ADMIN — DOBUTAMINE HYDROCHLORIDE 10 MCG/KG/MIN: 200 INJECTION INTRAVENOUS at 14:50

## 2024-02-22 RX ADMIN — Medication 3 ML: at 14:32

## 2024-02-22 NOTE — LETTER
2024      Sarah Amaya  [address]  [place] [address]        Dear ,    We are writing to inform you of your test results.    The stress test did not show any signs of heart disease/blockage in the arteries of the heart.   Please let me know if you have any questions and thank you for choosing [facility].     Resulted Orders   Echo Stress Test with Definity    Narrative    [id number]  [id number]  [id number]  [id number]^JOHANN     [facility]  Echocardiography Laboratory  [address]  [place] [address]     Name: SARHA AMAYA  MRN: [id number]  : 1940  Study Date: 2024 02:20 PM  Age: 83 yrs  Gender: Female  Patient Location: [facility]  Reason For Study: Shortness of breath  Ordering Physician: JOHANN DELVALLE  Referring Physician: JOHANN DELVALLE  Performed By: Hallie Weinstein RDCS     BSA: 1.2 m2  Height: 53 in  Weight: 81 lb  HR: 88  BP: 140/78 mmHg  ______________________________________________________________________________  ______________________________________________________________________________  Interpretation Summary     Normal, low-risk dobutamine echocardiogram without evidence of ischemia.  The target heart rate was achieved.  Normal biventricular size, thickness, and global systolic function at  baseline, LVEF=55-60%.  With low-dose dobutamine, LVEF augmented and LV cavity size decreased  appropriately.  With peak dobutamine, LVEF increased further to >60% and LV cavity size  decreased appropriately.  No regional wall motion abnormalities at rest or with dobutamine.  No angina was elicited.  No ECG evidence of ischemia. Normal heart rate and blood pressure response to  dobutamine.  The aortic root and visualized ascending aorta are normal.  ______________________________________________________________________________  Stress  There was a normal BP response to drug infusion.  The drug infusion was stopped due  to target heart rate achieved.  Optison (NDC #6003-9898-09) given intravenously.  Patient was given 3.0 ml mixture of 3 ml Optison and 6 ml saline.  6.0 ml wasted.  IV start location R Forearm .  The maximum dose of dobutamine was 30mcg/kg/min.  The maximum dose of metoprolol was 1.0mg.  Arrhythmia induced during stress: occasional PVC's.     Stress Results     Protocol:  Dobutamine Stress Echo        Maximum Predicted HR:   136 bpm     Target HR: 116 bpm                       % Maximum Predicted HR: 90 %                           Stage DurationHeart Rate  BP   Dose                               (mm:ss)   (bpm)                        REST    0:00      88    140/78                         LOW    2:00      80    190/8010.00                        PEAK    8:52      123   198/4830.00                      RECOVERY            100   165/62                         Stress Duration:   8:52 mm:ss *                      Maximum Stress HR: 123 bpm *     Mitral Valve  There is mild (1+) mitral regurgitation.     Aortic Valve  There is trace to mild aortic regurgitation.     ______________________________________________________________________________  MMode/2D Measurements & Calculations  asc Aorta Diam: 2.6 cm  Asc Ao diam index BSA (cm/m2): 2.2  Asc Ao diam index Ht(cm/m): 1.9     Doppler Measurements & Calculations  MV E max baltazar: 75.4 cm/sec  MV A max baltazar: 108.0 cm/sec  MV E/A: 0.70     MV dec time: 0.18 sec  AI P1/2t: 326.5 msec  PA acc time: 0.07 sec     ______________________________________________________________________________  Report approved by: MD Joel Saez 02/22/2024 03:28 PM             If you have any questions or concerns, please call the clinic at the number listed above.       Sincerely,      Kiana Camarena MD

## 2024-02-23 NOTE — RESULT ENCOUNTER NOTE
Please send a letter:    Dear Sarah Vega,    The stress test did not show any signs of heart disease/blockage in the arteries of the heart.  Please let me know if you have any questions and thank you for choosing [facility].      Regards,    Kiana Camarena MD MPH

## 2024-02-26 ENCOUNTER — THERAPY VISIT (OUTPATIENT)
Dept: PHYSICAL THERAPY | Facility: CLINIC | Age: 84
End: 2024-02-26
Attending: PREVENTIVE MEDICINE
Payer: COMMERCIAL

## 2024-02-26 DIAGNOSIS — N39.46 MIXED STRESS AND URGE URINARY INCONTINENCE: ICD-10-CM

## 2024-02-26 PROCEDURE — 97110 THERAPEUTIC EXERCISES: CPT | Mod: GP

## 2024-02-26 PROCEDURE — 97161 PT EVAL LOW COMPLEX 20 MIN: CPT | Mod: GP

## 2024-02-26 PROCEDURE — 97112 NEUROMUSCULAR REEDUCATION: CPT | Mod: GP

## 2024-02-26 NOTE — Clinical Note
Mert Camarena,   I evaluated Sarah today for her pelvic floor. My assessment of her pelvic floor didn't reveal much, she has good strength there as well as appropriate relaxation. However, I did see the distension along her LLQ. This seems to be the area that is causing pressure and discomfort for her. I know that she has an upcoming appointment to assess for a hernia at the end of March. I don't know that physical therapy is appropriate at this time for her before further investigation is done regarding the distension/hernia. Just wanted to keep you in the loop on that, if you disagree please let me know as well.   If you have any other questions for me or want to discuss further let me know.   Thank you,   -Selin Villafuerte, PT, DPT

## 2024-02-26 NOTE — PROGRESS NOTES
"PHYSICAL THERAPY EVALUATION  Type of Visit: Evaluation    See electronic medical record for Abuse and Falls Screening details.    Subjective       Presenting condition or subjective complaint: issues around bladder control onset a few months ago. Patient present with her son who is helping to interpret today. Pt soncassidy states she can't wait to use bathroom sometimes and has to rush but very rarely has urge incontinence. Ongoing since 2021. Reports going to use bathroom every 2-3 hours during the day. Gets up at night 3-4x. Reports sometimes leaks at night wears pad due to occasional leakage. Reports feeling \"loose\" and pressure especially when bending over. Notes no issues with cough/sneeze and urinary incontinence. BM regular and not painful.     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, family, etc)  Equipment owned: [employer] Cane     Employment: No    Hobbies/Interests:      Patient goals for therapy: loose bladder issues       Objective      PELVIC EVALUATION  ADDITIONAL HISTORY:  Sex assigned at birth: Female  Gender identity: Female    Pronouns: She/Her Hers      Bladder History:  Feels bladder filling: Yes  Triggers for feeling of inability to wait to go to the bathroom: No    How long can you wait to urinate: depends  Gets up at night to urinate: Yes 3-4  Can stop the flow of urine when urinating: Yes  Volume of urine usually released: Medium   Other issues: Dribbling after urinating  Number of bladder infections in last 12 months:    Fluid intake per day: 6-8 " glasses      Medications taken for bladder: No     Activities causing urine leak:      Amount of urine typically leaked:    Pads used to help with leaking: Yes I use this many pads per day: occassionally      Bowel History:  Frequency of bowel movement: 1-2x/day. Reports no straining/pushing for BM.   Consistency of stool: Soft    Ignores the urge to defecate: No  Other bowel issues:    Length of time spent trying to have a bowel movement:      Sexual Function History:  Sexual orientation: Straight    Sexually active: No  Lubrication used: No No  Pelvic pain: Sitting; Standing  : reports feeling pressure with sitting for prolonged periods.   Pain or difficulty with orgasms/erection/ejaculation:      State of menopause: Post-menopause (I am done with menopause)  Hormone medications: No      Are you currently pregnant: No, Number of previous pregnancies: 2 miscarriages, Number of deliveries: 0    Discussed reason for referral regarding pelvic health needs and external/internal pelvic floor muscle examination with patient/guardian.  Opportunity provided to ask questions and verbal consent for assessment and intervention was given.    POSTURE: Standing Posture: Forward head, Thoracic kyphosis increased  LUMBAR SCREEN:  ext Mod loss      Functional Strength Testing: Double Leg Squat: Increased trunk lean, Improper use of glutes/hips, and therapist SBA   SLS: not able to balance without UE assist.     BREATHING SYMMETRY:  Atypical pattern, chest expansion; PF tension with inhalation     PELVIC EXAM  External Visual Inspection:  At rest: Elevated perineal body    Integumentary:   Introitus: Tight      Internal Digital Palpation:  Per Vagina:  Tenderness  Myofascial Resistance to Palpation: Firm  Digital Muscle Performance: P (Power): 4/5  Bear down: no movement  Cough: PF tension   DBE: pardoxical movement; PFM contract with inhalation     ABDOMINAL ASSESSMENT  LLQ: large protrusion/distension that is tender to palpate;  movable; roughly 6 cm diameter.     Assessment & Plan   CLINICAL IMPRESSIONS  Medical Diagnosis: Mixed stress and urge urinary incontinence    Treatment Diagnosis: Mixed stress and urge urinary incontinence   Impression/Assessment: Patient is a 83 year old female with bladder complaints.  The following significant findings have been identified: Pain, Impaired balance, Decreased proprioception, Decreased activity tolerance, and Impaired posture. These impairments interfere with their ability to perform self care tasks and household mobility as compared to previous level of function.     Clinical Decision Making (Complexity):  Clinical Presentation: Evolving/Changing  Clinical Presentation Rationale: based on medical and personal factors listed in PT evaluation  Clinical Decision Making (Complexity): Low complexity    PLAN OF CARE  Treatment Interventions:  Interventions: Gait Training, Manual Therapy, Neuromuscular Re-education, Therapeutic Activity, Therapeutic Exercise    Long Term Goals     PT Goal 1  Goal Identifier: LTG 1  Goal Description: pt will demonstrate improved bladder habits in order to decrease nigthtime voids to 1x/night  Rationale: to maximize safety and independence with performance of ADLs and functional tasks (continence during the day)  Goal Progress: currently going 3-4x/night  Target Date: 05/06/24      Frequency of Treatment: 1x/week taper as able  Duration of Treatment: 10 weeks    Recommended Referrals to Other Professionals:  MD follow to assess abdominal distension.   Education Assessment:   Learner/Method: Patient;Family    Risks and benefits of evaluation/treatment have been explained.   Patient/Family/caregiver agrees with Plan of Care.     Evaluation Time:     PT Eval, Low Complexity Minutes (85452): 30   Present: Yes: Language: Armenian, ID Number/Identifier: pt son interpreting      Signing Clinician: Selin Villafuerte PT      [facility]                                                                                    OUTPATIENT PHYSICAL THERAPY      PLAN OF TREATMENT FOR OUTPATIENT REHABILITATION   Patient's Last Name, First Name, Sarah Vegas YOB: 1940   Provider's Name   [facility]   Medical Record No.  [id number]     Onset Date: 01/30/24  Start of Care Date: 02/26/24     Medical Diagnosis:  Mixed stress and urge urinary incontinence      PT Treatment Diagnosis:  Mixed stress and urge urinary incontinence Plan of Treatment  Frequency/Duration: 1x/week taper as able/ 10 weeks    Certification date from 02/26/24 to 05/06/24         See note for plan of treatment details and functional goals     Selin Villafuerte, PT                         I CERTIFY THE NEED FOR THESE SERVICES FURNISHED UNDER        THIS PLAN OF TREATMENT AND WHILE UNDER MY CARE     (Physician attestation of this document indicates review and certification of the therapy plan).              Referring Provider:  Kiana Camarena    Initial Assessment  See Epic Evaluation- Start of Care Date: 02/26/24

## 2024-03-22 DIAGNOSIS — S22.059D CLOSED FRACTURE OF FIFTH THORACIC VERTEBRA WITH ROUTINE HEALING, UNSPECIFIED FRACTURE MORPHOLOGY, SUBSEQUENT ENCOUNTER: ICD-10-CM

## 2024-03-22 DIAGNOSIS — M17.0 PRIMARY OSTEOARTHRITIS OF BOTH KNEES: ICD-10-CM

## 2024-03-22 RX ORDER — CELECOXIB 100 MG/1
100 CAPSULE ORAL DAILY
Qty: 90 CAPSULE | Refills: 0 | Status: SHIPPED | OUTPATIENT
Start: 2024-03-22 | End: 2024-06-21

## 2024-03-27 ENCOUNTER — OFFICE VISIT (OUTPATIENT)
Dept: SURGERY | Facility: CLINIC | Age: 84
End: 2024-03-27
Attending: PREVENTIVE MEDICINE
Payer: COMMERCIAL

## 2024-03-27 VITALS
SYSTOLIC BLOOD PRESSURE: 125 MMHG | OXYGEN SATURATION: 100 % | DIASTOLIC BLOOD PRESSURE: 72 MMHG | HEART RATE: 83 BPM | BODY MASS INDEX: 19.77 KG/M2 | WEIGHT: 79 LBS | RESPIRATION RATE: 16 BRPM

## 2024-03-27 DIAGNOSIS — K40.90 LEFT INGUINAL HERNIA: ICD-10-CM

## 2024-03-27 PROCEDURE — 99203 OFFICE O/P NEW LOW 30 MIN: CPT | Performed by: SURGERY

## 2024-03-27 ASSESSMENT — PAIN SCALES - GENERAL: PAINLEVEL: NO PAIN (0)

## 2024-03-27 NOTE — PROGRESS NOTES
Patient seen in consultation for left inguinal hernia by Kiana Camarena    HPI:  Patient is a 83 year old female  Known left inguinal hernia, I had seen previously in 2018 or so  At that time was not bothering her  Now seems to be getting larger and causing some problems/pain  nothing makes the episode better.  No prior repairs      Review Of Systems    Skin: negative  Ears/Nose/Throat: negative  Respiratory: No shortness of breath, dyspnea on exertion, cough, or hemoptysis  Cardiovascular: negative  Gastrointestinal: reflux, does not eat much  Genitourinary: incontinence  Musculoskeletal: back pain, weakness  Neurologic: negative  Hematologic/Lymphatic/Immunologic: negative  Endocrine: negative      Past Medical History:   Diagnosis Date    Anemia     Arthritis     OSTEO    Left knee pain 2/23/2011       Past Surgical History:   Procedure Laterality Date    CATARACT IOL, RT/LT      COLONOSCOPY  1/29/2013    Procedure: COLONOSCOPY;;  Surgeon: Edgard Prieto MD;  Location: [facility] OR    ESOPHAGOSCOPY, GASTROSCOPY, DUODENOSCOPY (EGD), COMBINED  1/20/2012    Procedure:COMBINED ESOPHAGOSCOPY, GASTROSCOPY, DUODENOSCOPY (EGD); EGD, dysphagia; Surgeon:SHEREE VALLE; Location:[facility] OR    ESOPHAGOSCOPY, GASTROSCOPY, DUODENOSCOPY (EGD), COMBINED  7/18/2012    Procedure: COMBINED ESOPHAGOSCOPY, GASTROSCOPY, DUODENOSCOPY (EGD);  EGD for dysphagia rp Tonio;  Surgeon: Rey Bee MD;  Location: [facility] OR    ESOPHAGOSCOPY, GASTROSCOPY, DUODENOSCOPY (EGD), COMBINED  1/29/2013    Procedure: COMBINED ESOPHAGOSCOPY, GASTROSCOPY, DUODENOSCOPY (EGD), BIOPSY SINGLE OR MULTIPLE;  Colonoscopy, EGD, dysphagia;  Surgeon: Edgard Prieto MD;  Location: [facility] OR    INCISION AND DRAINAGE LOWER EXTREMITY, COMBINED Left 3/21/2018    Procedure: COMBINED INCISION AND DRAINAGE LOWER EXTREMITY;  Excision of left intramuscular thigh mass; removal of anterior femoral bone spur;  Surgeon: Ray Diaz MD;  Location:  OR       Social History      Socioeconomic History    Marital status:      Spouse name: Not on file    Number of children: Not on file    Years of education: Not on file    Highest education level: Not on file   Occupational History    Not on file   Tobacco Use    Smoking status: Never    Smokeless tobacco: Never   Substance and Sexual Activity    Alcohol use: No    Drug use: No    Sexual activity: Not Currently     Partners: Male     Birth control/protection: None   Other Topics Concern    Parent/sibling w/ CABG, MI or angioplasty before 65F 55M? Not Asked   Social History Narrative    Not on file     Social Determinants of Health     Financial Resource Strain: Low Risk  (9/26/2023)    Financial Resource Strain     Within the past 12 months, have you or your family members you live with been unable to get utilities (heat, electricity) when it was really needed?: No   Food Insecurity: Low Risk  (9/26/2023)    Food Insecurity     Within the past 12 months, did you worry that your food would run out before you got money to buy more?: No     Within the past 12 months, did the food you bought just not last and you didn t have money to get more?: No   Transportation Needs: Low Risk  (9/26/2023)    Transportation Needs     Within the past 12 months, has lack of transportation kept you from medical appointments, getting your medicines, non-medical meetings or appointments, work, or from getting things that you need?: No   Physical Activity: Not on file   Stress: Not on file   Social Connections: Not on file   Interpersonal Safety: Low Risk  (1/30/2024)    Interpersonal Safety     Do you feel physically and emotionally safe where you currently live?: Yes     Within the past 12 months, have you been hit, slapped, kicked or otherwise physically hurt by someone?: No     Within the past 12 months, have you been humiliated or emotionally abused in other ways by your partner or ex-partner?: No   Housing Stability: Low Risk  (9/26/2023)    Housing  Stability     Do you have housing? : Yes     Are you worried about losing your housing?: No       Current Outpatient Medications   Medication Sig Dispense Refill    acetaminophen (TYLENOL) 500 MG tablet Take 1-2 tablets (500-1,000 mg) by mouth 3 times daily 180 tablet 3    CALCIUM CARB-CHOLECALCIFEROL 600-400 MG-UNIT per tablet TAKE 1 TABLET BY MOUTH TWICE A DAY 60 tablet 11    calcium carbonate 600 mg-vitamin D 400 units (CALTRATE) 600-400 MG-UNIT per tablet Take 1 tablet by mouth 2 times daily 180 tablet 3    capsaicin (ZOSTRIX) 0.025 % external cream Apply 1 g topically 3 times daily 50 g 0    celecoxib (CELEBREX) 100 MG capsule TAKE 1 CAPSULE (100 MG) BY MOUTH DAILY FOR PAIN 90 capsule 0    ciclopirox (PENLAC) 8 % external solution Apply to adjacent skin and affected nails daily.  Remove with alcohol every 7 days, then repeat. 6 mL 0    cyanocobalamin (VITAMIN B-12) 1000 MCG tablet TAKE 1 TABLET BY MOUTH EVERY DAY 90 tablet 2    famotidine (PEPCID) 20 MG tablet TAKE 1 TABLET BY MOUTH TWICE A  tablet 1    gabapentin (NEURONTIN) 300 MG capsule Take 3 capsules (900 mg) by mouth daily for 30 days Start 1 capsule in the evening for 7 days, 2 cap in the evening for 7 days then 3 cap in the evening. 90 capsule 0    HYDROcodone-acetaminophen (NORCO) 5-325 MG tablet Take 1 tablet by mouth 2 times daily as needed for severe pain 20 tablet 0    lactase (LACTAID) 3000 UNIT tablet One tablet orally with first bite of food containing dairy 90 tablet 3    omeprazole (PRILOSEC) 20 MG DR capsule TAKE 1 CAPSULE BY MOUTH EVERY DAY 90 capsule 1    ORDER FOR DME Pull up briefs for incontinence of urine change 2 to 3 times daily 1 Container 11    salicylic acid (MEDIPLAST) 40 % miscellaneous Cut and apply on the wart every 48 hours for 4 weeks, until the wart is cleared 25 each 0    SENEXON-S 8.6-50 MG tablet TAKE 1 TABLET BY MOUTH DAILY AS NEEDED FOR CONSTIPATION. 90 tablet 0    simethicone (MYLICON) 125 MG chewable tablet  Take 1 tablet (125 mg) by mouth 2 times daily as needed for intestinal gas 180 tablet 3    tiZANidine (ZANAFLEX) 2 MG tablet TAKE 1 TABLET (2 MG) BY MOUTH NIGHTLY AS NEEDED FOR MUSCLE SPASM 90 tablet 0    VITAMIN D, CHOLECALCIFEROL, PO Take by mouth daily      vitamin D3 (CHOLECALCIFEROL) 25 mcg (82590 units) capsule Take 1 capsule (250 mcg) by mouth daily 90 capsule 3    vitamin D3 (CHOLECALCIFEROL) 50 mcg (2000 units) tablet TAKE 1 TABLET BY MOUTH EVERY DAY 90 tablet 0       Medications and history reviewed    Physical exam:  Vitals: /72   Pulse 83   Resp 16   Wt 35.8 kg (79 lb)   LMP  (LMP Unknown)   SpO2 100%   BMI 19.77 kg/m    BMI= Body mass index is 19.77 kg/m .    Constitutional: healthy, alert, and no distress  Head: Normocephalic. No masses, lesions, tenderness or abnormalities  Gastrointestinal: Abdomen soft, some mild left sided tenderness. BS normal. No masses, organomegaly  Left groin with obvious hernia, soft and easily reducible.  No palpable hernia on the right.  There is some tenderness over the hernia as well.  Musculoskeletal: extremities normal- no gross deformities noted, gait normal, and normal muscle tone  Skin: no suspicious lesions or rashes  Psychiatric: mentation appears normal and affect normal/bright  Patient able to get up on table without difficulty.      Assessment:     ICD-10-CM    1. Left inguinal hernia  K40.90 Adult General Surg Referral     Case Request: HERNIORRHAPHY, INGUINAL, ROBOT-ASSISTED, LAPAROSCOPIC, USING DA EUSEBIA XI left possible bilateral        Plan: Enlarging reducible left inguinal hernia.  Now more symptomatic so looking at repair.  Discussed open and minimally invasive approaches to repair.  Main advantages of the minimally invasive would be potentially less incisional pain with the smaller incisions as well as ability to evaluate the right side from within at time of surgery and would have access to fix if hernia present.  I think there is low  likelihood given absence on clinical exam and lack of symptoms.  After discussion of options with patient and her son he would like to proceed with the robotic approach.  Risks of surgery discussed including, but not limited to bleeding, infection, recurrence, damage to intra-abdominal contents such as nerves, blood vessels, bowel or other organs.  Risks of anesthesia also discussed.  Although mesh is a better long term repair if it gets infected it must be removed. Mesh problems such as fracture, erosion or adhesions are possible.  If there is evidence of an infection at time of surgery it will be cancelled and rescheduled to when well.    Discussed massaging hernia back in and using ice if becomes more painful.  If not able to reduce then go to emergency room.  Will work on scheduling for her.    aRy Diaz MD

## 2024-03-27 NOTE — LETTER
3/27/2024         RE: Sarah Vega  [address]  [facility] [address]        Dear Colleague,    Thank you for referring your patient, Sarah Vega, to the [facility]. Please see a copy of my visit note below.    Patient seen in consultation for left inguinal hernia by Kiana Camarena    HPI:  Patient is a 83 year old female  Known left inguinal hernia, I had seen previously in 2018 or so  At that time was not bothering her  Now seems to be getting larger and causing some problems/pain  nothing makes the episode better.  No prior repairs      Review Of Systems    Skin: negative  Ears/Nose/Throat: negative  Respiratory: No shortness of breath, dyspnea on exertion, cough, or hemoptysis  Cardiovascular: negative  Gastrointestinal: reflux, does not eat much  Genitourinary: incontinence  Musculoskeletal: back pain, weakness  Neurologic: negative  Hematologic/Lymphatic/Immunologic: negative  Endocrine: negative      Past Medical History:   Diagnosis Date     Anemia      Arthritis     OSTEO     Left knee pain 2/23/2011       Past Surgical History:   Procedure Laterality Date     CATARACT IOL, RT/LT       COLONOSCOPY  1/29/2013    Procedure: COLONOSCOPY;;  Surgeon: Edgard Prieto MD;  Location: [facility] OR     ESOPHAGOSCOPY, GASTROSCOPY, DUODENOSCOPY (EGD), COMBINED  1/20/2012    Procedure:COMBINED ESOPHAGOSCOPY, GASTROSCOPY, DUODENOSCOPY (EGD); EGD, dysphagia; Surgeon:SHEREE VALLE; Location:[facility] OR     ESOPHAGOSCOPY, GASTROSCOPY, DUODENOSCOPY (EGD), COMBINED  7/18/2012    Procedure: COMBINED ESOPHAGOSCOPY, GASTROSCOPY, DUODENOSCOPY (EGD);  EGD for dysphagia rp Otnio;  Surgeon: Rey Bee MD;  Location: [facility] OR     ESOPHAGOSCOPY, GASTROSCOPY, DUODENOSCOPY (EGD), COMBINED  1/29/2013    Procedure: COMBINED ESOPHAGOSCOPY, GASTROSCOPY, DUODENOSCOPY (EGD), BIOPSY SINGLE OR MULTIPLE;  Colonoscopy, EGD, dysphagia;  Surgeon: Edgard Prieto MD;  Location: [facility] OR     INCISION  AND DRAINAGE LOWER EXTREMITY, COMBINED Left 3/21/2018    Procedure: COMBINED INCISION AND DRAINAGE LOWER EXTREMITY;  Excision of left intramuscular thigh mass; removal of anterior femoral bone spur;  Surgeon: Ray Diaz MD;  Location:  OR       Social History     Socioeconomic History     Marital status:      Spouse name: Not on file     Number of children: Not on file     Years of education: Not on file     Highest education level: Not on file   Occupational History     Not on file   Tobacco Use     Smoking status: Never     Smokeless tobacco: Never   Substance and Sexual Activity     Alcohol use: No     Drug use: No     Sexual activity: Not Currently     Partners: Male     Birth control/protection: None   Other Topics Concern     Parent/sibling w/ CABG, MI or angioplasty before 65F 55M? Not Asked   Social History Narrative     Not on file     Social Determinants of Health     Financial Resource Strain: Low Risk  (9/26/2023)    Financial Resource Strain      Within the past 12 months, have you or your family members you live with been unable to get utilities (heat, electricity) when it was really needed?: No   Food Insecurity: Low Risk  (9/26/2023)    Food Insecurity      Within the past 12 months, did you worry that your food would run out before you got money to buy more?: No      Within the past 12 months, did the food you bought just not last and you didn t have money to get more?: No   Transportation Needs: Low Risk  (9/26/2023)    Transportation Needs      Within the past 12 months, has lack of transportation kept you from medical appointments, getting your medicines, non-medical meetings or appointments, work, or from getting things that you need?: No   Physical Activity: Not on file   Stress: Not on file   Social Connections: Not on file   Interpersonal Safety: Low Risk  (1/30/2024)    Interpersonal Safety      Do you feel physically and emotionally safe where you currently live?: Yes       Within the past 12 months, have you been hit, slapped, kicked or otherwise physically hurt by someone?: No      Within the past 12 months, have you been humiliated or emotionally abused in other ways by your partner or ex-partner?: No   Housing Stability: Low Risk  (9/26/2023)    Housing Stability      Do you have housing? : Yes      Are you worried about losing your housing?: No       Current Outpatient Medications   Medication Sig Dispense Refill     acetaminophen (TYLENOL) 500 MG tablet Take 1-2 tablets (500-1,000 mg) by mouth 3 times daily 180 tablet 3     CALCIUM CARB-CHOLECALCIFEROL 600-400 MG-UNIT per tablet TAKE 1 TABLET BY MOUTH TWICE A DAY 60 tablet 11     calcium carbonate 600 mg-vitamin D 400 units (CALTRATE) 600-400 MG-UNIT per tablet Take 1 tablet by mouth 2 times daily 180 tablet 3     capsaicin (ZOSTRIX) 0.025 % external cream Apply 1 g topically 3 times daily 50 g 0     celecoxib (CELEBREX) 100 MG capsule TAKE 1 CAPSULE (100 MG) BY MOUTH DAILY FOR PAIN 90 capsule 0     ciclopirox (PENLAC) 8 % external solution Apply to adjacent skin and affected nails daily.  Remove with alcohol every 7 days, then repeat. 6 mL 0     cyanocobalamin (VITAMIN B-12) 1000 MCG tablet TAKE 1 TABLET BY MOUTH EVERY DAY 90 tablet 2     famotidine (PEPCID) 20 MG tablet TAKE 1 TABLET BY MOUTH TWICE A  tablet 1     gabapentin (NEURONTIN) 300 MG capsule Take 3 capsules (900 mg) by mouth daily for 30 days Start 1 capsule in the evening for 7 days, 2 cap in the evening for 7 days then 3 cap in the evening. 90 capsule 0     HYDROcodone-acetaminophen (NORCO) 5-325 MG tablet Take 1 tablet by mouth 2 times daily as needed for severe pain 20 tablet 0     lactase (LACTAID) 3000 UNIT tablet One tablet orally with first bite of food containing dairy 90 tablet 3     omeprazole (PRILOSEC) 20 MG DR capsule TAKE 1 CAPSULE BY MOUTH EVERY DAY 90 capsule 1     ORDER FOR DME Pull up briefs for incontinence of urine change 2 to 3 times  daily 1 Container 11     salicylic acid (MEDIPLAST) 40 % miscellaneous Cut and apply on the wart every 48 hours for 4 weeks, until the wart is cleared 25 each 0     SENEXON-S 8.6-50 MG tablet TAKE 1 TABLET BY MOUTH DAILY AS NEEDED FOR CONSTIPATION. 90 tablet 0     simethicone (MYLICON) 125 MG chewable tablet Take 1 tablet (125 mg) by mouth 2 times daily as needed for intestinal gas 180 tablet 3     tiZANidine (ZANAFLEX) 2 MG tablet TAKE 1 TABLET (2 MG) BY MOUTH NIGHTLY AS NEEDED FOR MUSCLE SPASM 90 tablet 0     VITAMIN D, CHOLECALCIFEROL, PO Take by mouth daily       vitamin D3 (CHOLECALCIFEROL) 25 mcg (76267 units) capsule Take 1 capsule (250 mcg) by mouth daily 90 capsule 3     vitamin D3 (CHOLECALCIFEROL) 50 mcg (2000 units) tablet TAKE 1 TABLET BY MOUTH EVERY DAY 90 tablet 0       Medications and history reviewed    Physical exam:  Vitals: /72   Pulse 83   Resp 16   Wt 35.8 kg (79 lb)   LMP  (LMP Unknown)   SpO2 100%   BMI 19.77 kg/m    BMI= Body mass index is 19.77 kg/m .    Constitutional: healthy, alert, and no distress  Head: Normocephalic. No masses, lesions, tenderness or abnormalities  Gastrointestinal: Abdomen soft, some mild left sided tenderness. BS normal. No masses, organomegaly  Left groin with obvious hernia, soft and easily reducible.  No palpable hernia on the right.  There is some tenderness over the hernia as well.  Musculoskeletal: extremities normal- no gross deformities noted, gait normal, and normal muscle tone  Skin: no suspicious lesions or rashes  Psychiatric: mentation appears normal and affect normal/bright  Patient able to get up on table without difficulty.      Assessment:     ICD-10-CM    1. Left inguinal hernia  K40.90 Adult General Surg Referral     Case Request: HERNIORRHAPHY, INGUINAL, ROBOT-ASSISTED, LAPAROSCOPIC, USING DA EUSEBIA XI left possible bilateral        Plan: Enlarging reducible left inguinal hernia.  Now more symptomatic so looking at repair.  Discussed  open and minimally invasive approaches to repair.  Main advantages of the minimally invasive would be potentially less incisional pain with the smaller incisions as well as ability to evaluate the right side from within at time of surgery and would have access to fix if hernia present.  I think there is low likelihood given absence on clinical exam and lack of symptoms.  After discussion of options with patient and her son he would like to proceed with the robotic approach.  Risks of surgery discussed including, but not limited to bleeding, infection, recurrence, damage to intra-abdominal contents such as nerves, blood vessels, bowel or other organs.  Risks of anesthesia also discussed.  Although mesh is a better long term repair if it gets infected it must be removed. Mesh problems such as fracture, erosion or adhesions are possible.  If there is evidence of an infection at time of surgery it will be cancelled and rescheduled to when well.    Discussed massaging hernia back in and using ice if becomes more painful.  If not able to reduce then go to emergency room.  Will work on scheduling for her.    Ray Diaz MD      Again, thank you for allowing me to participate in the care of your patient.        Sincerely,        Ray Diaz MD

## 2024-03-28 ENCOUNTER — TELEPHONE (OUTPATIENT)
Dept: SURGERY | Facility: CLINIC | Age: 84
End: 2024-03-28
Payer: COMMERCIAL

## 2024-03-28 PROBLEM — K40.90 LEFT INGUINAL HERNIA: Status: ACTIVE | Noted: 2018-07-26

## 2024-03-28 NOTE — TELEPHONE ENCOUNTER
Type of surgery: HERNIORRHAPHY, INGUINAL, ROBOT-ASSISTED, LAPAROSCOPIC, USING DA EUSEBIA XI left possible bilateral   Location of surgery: [facility]  Date and time of surgery: 7/18  Surgeon: zack  Pre-Op Appt Date: 7/9  Post-Op Appt Date: 7/29   Packet sent out: Yes  Pre-cert/Authorization completed:  Not Applicable  Date:

## 2024-05-01 DIAGNOSIS — K21.9 GASTROESOPHAGEAL REFLUX DISEASE WITHOUT ESOPHAGITIS: ICD-10-CM

## 2024-05-15 ENCOUNTER — PATIENT OUTREACH (OUTPATIENT)
Dept: GERIATRIC MEDICINE | Facility: CLINIC | Age: 84
End: 2024-05-15
Payer: COMMERCIAL

## 2024-05-15 ASSESSMENT — LIFESTYLE VARIABLES
AUDIT-C TOTAL SCORE: 0
HOW OFTEN DO YOU HAVE SIX OR MORE DRINKS ON ONE OCCASION: NEVER
HOW MANY STANDARD DRINKS CONTAINING ALCOHOL DO YOU HAVE ON A TYPICAL DAY: PATIENT DOES NOT DRINK
SKIP TO QUESTIONS 9-10: 1
HOW OFTEN DO YOU HAVE A DRINK CONTAINING ALCOHOL: NEVER

## 2024-05-15 NOTE — PROGRESS NOTES
[facility] Care Coordination Contact    [facility] Home Visit Assessment     Home visit for Health Risk Assessment with Sarah Vega completed on May 15, 2024    Type of residence:: Private home - stairs  Current living arrangement:: I live in a private home with family     Assessment completed with:: Patient, Children    Current Care Plan  Member currently receiving the following home care services:  None  Member currently receiving the following community resources: [facility] [place]    Medication Review  Medication reconciliation completed in Epic: Yes  Medication set-up & administration: Independent-does not set up.  Self-administers medications.  Medication Risk Assessment Medication (1 or more, place referral to MTM): N/A: No risk factors identified  MTM Referral Placed: No: No risk factors idenified    Mental/Behavioral Health   Depression Screening:   PHQ-2 Total Score (Adult) - Positive if 3 or more points; Administer PHQ-9 if positive: 0       Mental health DX:: No        Falls Assessment:   Fallen 2 or more times in the past year?: No   Any fall with injury in the past year?: No    ADL/IADL Dependencies:   Dependent ADLs:: Independent  Dependent IADLs:: Cleaning, Cooking, Laundry, Shopping, Meal Preparation, Transportation, Medication Management, Money Management    Health Plan sponsored benefits: [employer]: Shared information regarding [employer] Fitness Program. Reviewed preventative health screening and health plan supplemental benefits/incentives. Reviewed medication disposal form.    PCA Assessment completed at visit: Not Applicable     Elderly Waiver Eligibility: Yes-will continue on EW    Care Plan & Recommendations: Member will continue to receive ICLS services 10hrs/week, incontinence products, nutritional supplements, and the [employer] Smartwatch.    See MNChoices for detailed assessment information.    Follow-Up Plan: Member informed of future contact, plan to f/u with member  with a 6 month telephone assessment.  Contact information shared with member and family, encouraged member to call with any questions or concerns at any time.    [place] care continuum providers: Please see Snapshot and Care Management Flowsheets for Specific details of care plan.    This CC note routed to PCP, Kiana Camarena.    Brooke Villareal, RN/BSN  [facility]  [phone]

## 2024-05-16 DIAGNOSIS — S22.059D CLOSED FRACTURE OF FIFTH THORACIC VERTEBRA WITH ROUTINE HEALING, UNSPECIFIED FRACTURE MORPHOLOGY, SUBSEQUENT ENCOUNTER: ICD-10-CM

## 2024-05-16 RX ORDER — TIZANIDINE 2 MG/1
TABLET ORAL
Qty: 90 TABLET | Refills: 0 | Status: SHIPPED | OUTPATIENT
Start: 2024-05-16 | End: 2024-08-19

## 2024-05-24 ENCOUNTER — PATIENT OUTREACH (OUTPATIENT)
Dept: GERIATRIC MEDICINE | Facility: CLINIC | Age: 84
End: 2024-05-24
Payer: COMMERCIAL

## 2024-05-24 NOTE — LETTER
May 24, 2024    [facility]  [address]  [facility] [address]    Mert Smith,    I hope you ve been well since we last spoke. Attached is your copy of your personalized Support Plan which summarizes our conversation.   My goal is to help you keep your health on track. Your Support Plan includes the steps we agreed would help you with that. We can talk about these steps during our next meeting or sooner if you d like.   Here are additional programs and services I can help you with:  Get to appointments with [facility]  If you need a ride to medical or dental visits, [facility] can help. Call to schedule a ride.   9 [phone] ([phone]), 8 a.m. - 6 p.m. CT, Monday - Friday  Access One-Pass to boost your health  Get a gym membership, at-home fitness classes, and free access to fun activities that help your brain. To get access, go to the[employer] or call One-Pass.    6 [phone] (TTY:[phone]), 8 a.m. - 9 p.m. CT, Monday - Friday  Get help paying for healthy food and utilities  As a member, you get these benefits:  $150 monthly allowance to buy healthy food at participating grocery stores  One $0 ride per day to participating grocery stores and back home  $100 monthly allowance to help pay your utility bills  If you don t know how to access these benefits, I'm here to help you and answer questions.  Set up your health care directive  A directive is a legal form that explains your health care wishes. You can request this form from me, and I ll walk you through it before you discuss your plans with your doctor.  Schedule your annual physical  I can help set up your annual physical at your clinic of choice. It's an important step towards good health.    Have questions? I m here to help.  Call me at [phone] (TTY:[phone]) 8am - 5pm, Monday - Friday.  I ll reach out to you again in 6 months to follow up via telephone.  Warm regards,    Brooke Villareal RN    E-mail:   [email or web address]  Phone: [phone]    Care Manager  [facility]    cc: member records                                                        American Indians can continue or begin to use [facility] ([facility] clinics. We will not require prior approval or impose any conditions for you to get services at these clinics. For elders age 65 years and older this includes Elderly Waiver (EW) services accessed through the [place]. If a doctor or other provider in a [facility] or [facility] clinic refers you to a provider in our network, we will not require you to see your primary care provider prior to the referral.      [id number]

## 2024-05-24 NOTE — PROGRESS NOTES
[facility] Care Coordination Contact    Received after visit chart from care coordinator.  Completed following tasks: Mailed copy of care plan/support plan to member along with Assessment Summary, Mailed [place] Choices signature sheet pages 3-4, Mailed Safe Medication Disposal , Mailed [employer] Leave Behind Letter, Submitted referrals/auths for ICLS, and Updated services in Database  , Mailed copy of ICLS Service plan to client. Mailed ICLS signature sheet for member to sign and return in SASE. Faxed a copy of ICLS Service plan to provider to sign and fax back.  , and Provider Signature - No POC Shared:  Member indicates that they do not want their POC shared with any EW providers.      Umu Man  Care Management Specialist  [facility]  [phone]

## 2024-06-05 ENCOUNTER — MEDICAL CORRESPONDENCE (OUTPATIENT)
Dept: HEALTH INFORMATION MANAGEMENT | Facility: CLINIC | Age: 84
End: 2024-06-05
Payer: COMMERCIAL

## 2024-06-21 DIAGNOSIS — S22.059D CLOSED FRACTURE OF FIFTH THORACIC VERTEBRA WITH ROUTINE HEALING, UNSPECIFIED FRACTURE MORPHOLOGY, SUBSEQUENT ENCOUNTER: ICD-10-CM

## 2024-06-21 DIAGNOSIS — M17.0 PRIMARY OSTEOARTHRITIS OF BOTH KNEES: ICD-10-CM

## 2024-06-21 RX ORDER — CELECOXIB 100 MG/1
100 CAPSULE ORAL DAILY
Qty: 90 CAPSULE | Refills: 0 | Status: SHIPPED | OUTPATIENT
Start: 2024-06-21 | End: 2024-07-11 | Stop reason: SINTOL

## 2024-06-26 NOTE — PROGRESS NOTES
2nd Attempt: Signed Letter not received from [facility], resent per process.    Siri Padilla  Case Management Specialist  [facility]  [phone]

## 2024-07-09 ENCOUNTER — OFFICE VISIT (OUTPATIENT)
Dept: FAMILY MEDICINE | Facility: CLINIC | Age: 84
End: 2024-07-09
Payer: COMMERCIAL

## 2024-07-09 ENCOUNTER — ANCILLARY PROCEDURE (OUTPATIENT)
Dept: GENERAL RADIOLOGY | Facility: CLINIC | Age: 84
End: 2024-07-09
Attending: PREVENTIVE MEDICINE
Payer: COMMERCIAL

## 2024-07-09 VITALS
HEART RATE: 82 BPM | HEIGHT: 55 IN | RESPIRATION RATE: 16 BRPM | WEIGHT: 78.6 LBS | SYSTOLIC BLOOD PRESSURE: 155 MMHG | OXYGEN SATURATION: 100 % | BODY MASS INDEX: 18.19 KG/M2 | TEMPERATURE: 98.5 F | DIASTOLIC BLOOD PRESSURE: 80 MMHG

## 2024-07-09 DIAGNOSIS — K21.9 GASTROESOPHAGEAL REFLUX DISEASE WITHOUT ESOPHAGITIS: ICD-10-CM

## 2024-07-09 DIAGNOSIS — K40.90 LEFT INGUINAL HERNIA: ICD-10-CM

## 2024-07-09 DIAGNOSIS — S06.5XAA SDH (SUBDURAL HEMATOMA) (H): ICD-10-CM

## 2024-07-09 DIAGNOSIS — Z01.818 PRE-OPERATIVE EXAMINATION: Primary | ICD-10-CM

## 2024-07-09 DIAGNOSIS — Z01.818 PRE-OPERATIVE EXAMINATION: ICD-10-CM

## 2024-07-09 DIAGNOSIS — D50.9 IRON DEFICIENCY ANEMIA, UNSPECIFIED IRON DEFICIENCY ANEMIA TYPE: ICD-10-CM

## 2024-07-09 LAB
ERYTHROCYTE [DISTWIDTH] IN BLOOD BY AUTOMATED COUNT: 18.4 % (ref 10–15)
HCT VFR BLD AUTO: 28.9 % (ref 35–47)
HGB BLD-MCNC: 8.3 G/DL (ref 11.7–15.7)
MCH RBC QN AUTO: 19.1 PG (ref 26.5–33)
MCHC RBC AUTO-ENTMCNC: 28.7 G/DL (ref 31.5–36.5)
MCV RBC AUTO: 67 FL (ref 78–100)
PLATELET # BLD AUTO: 273 10E3/UL (ref 150–450)
RBC # BLD AUTO: 4.34 10E6/UL (ref 3.8–5.2)
WBC # BLD AUTO: 4.6 10E3/UL (ref 4–11)

## 2024-07-09 PROCEDURE — 82728 ASSAY OF FERRITIN: CPT | Performed by: PREVENTIVE MEDICINE

## 2024-07-09 PROCEDURE — 36415 COLL VENOUS BLD VENIPUNCTURE: CPT | Performed by: PREVENTIVE MEDICINE

## 2024-07-09 PROCEDURE — 71046 X-RAY EXAM CHEST 2 VIEWS: CPT | Mod: TC | Performed by: RADIOLOGY

## 2024-07-09 PROCEDURE — 80048 BASIC METABOLIC PNL TOTAL CA: CPT | Performed by: PREVENTIVE MEDICINE

## 2024-07-09 PROCEDURE — 85027 COMPLETE CBC AUTOMATED: CPT | Performed by: PREVENTIVE MEDICINE

## 2024-07-09 PROCEDURE — 93000 ELECTROCARDIOGRAM COMPLETE: CPT | Performed by: PREVENTIVE MEDICINE

## 2024-07-09 PROCEDURE — 99214 OFFICE O/P EST MOD 30 MIN: CPT | Performed by: PREVENTIVE MEDICINE

## 2024-07-09 RX ORDER — RESPIRATORY SYNCYTIAL VIRUS VACCINE 120MCG/0.5
0.5 KIT INTRAMUSCULAR ONCE
Qty: 1 EACH | Refills: 0 | Status: CANCELLED | OUTPATIENT
Start: 2024-07-09 | End: 2024-07-09

## 2024-07-09 NOTE — PROGRESS NOTES
Preoperative Evaluation  [address] [address]  Phone: [phone]  Primary Provider: Kiana Camarena MD  Pre-op Performing Provider: Kiana Camarena MD  Jul 9, 2024 7/9/2024   Surgical Information   What procedure is being done? HERNIORRHAPHY, INGUINAL, ROBOT-ASSISTED, LAPAROSCOPIC, USING DA EUSEBIA XI left possible bilateral, LEFT    Facility or Hospital where procedure/surgery [facility] be performed[facility]   Who is doing the procedure / surgery? Dr. Ray Diaz    Date of surgery / procedure: july 18   Time of surgery / procedure: 11am   Where do you plan to recover after surgery? at home with family      Anesthesia:General    Fax number for surgical facility: Note does not need to be faxed, will be available electronically in Epic.    Assessment & Plan     The proposed surgical procedure is considered INTERMEDIATE risk.    Pre-operative examination  -procedure on 7/18/24  - CBC with platelets  - Basic metabolic panel  (Ca, Cl, CO2, Creat, Gluc, K, Na, BUN)  - EKG 12-lead complete w/read - Clinics  - XR Chest 2 Views  - CBC with platelets  - Basic metabolic panel  (Ca, Cl, CO2, Creat, Gluc, K, Na, BUN)    Left inguinal hernia  -progressive worsening and increase in size     SDH (subdural hematoma) (H)  -resolved     Gastroesophageal reflux disease without esophagitis  -stable     Iron deficiency anemia, unspecified iron deficiency anemia type  -has had difficulty with oral iron replacement in the past due to constipation   - Ferritin  -Hemoglobin is low at 8.3, worse than last check.  9 months ago hemoglobin was at 9.1  -Ferritin levels are also low at 5  -[facility] preop anemia guidelines reviewed  -Surgical procedure is considered lower risk for bleeding, in those situations IV iron is usually not indicated  -Will encourage the patient to start oral iron supplements again, ferrous gluconate 325 mg twice daily         Risks and  Recommendations  The patient has the following additional risks and recommendations for perioperative complications:  Cardiovascular:  -EKG with no acute changes  -Patient with history of shortness of breath and easy fatigability  -Underwent echo stress test February 24.  This did not show any evidence of ischemia.  Left ventricular ejection fraction was normal at 55-60%  Anemia/Bleeding/Clotting:    - Anemia and does not require treatment prior to surgery. Monitor hemoglobin postoperatively    Antiplatelet or Anticoagulation Medication Instructions   - Patient is on no antiplatelet or anticoagulation medications.    Additional Medication Instructions  Take all scheduled medications on the day of surgery EXCEPT for modifications listed below:   - fiber, laxatives: DO NOT TAKE day of surgery   - celecoxib (Celebrex): DO NOT TAKE 3 days before surgery. May continue without modification for management of severe pain.     Recommendation  Approval given to proceed with proposed procedure, without further diagnostic evaluation.    Kalani Smith is a 84 year old, presenting for the following:  Pre-Op Exam          7/9/2024     4:18 PM   Additional Questions   Roomed by Martinez   Accompanied by Son         7/9/2024     4:18 PM   Patient Reported Additional Medications   Patient reports taking the following new medications No     HPI related to upcoming procedure: 84 year old female with left inguinal hernia, this is becoming more symptomatic.         7/9/2024   Pre-Op Questionnaire   Have you ever had a heart attack or stroke? No   Have you ever had surgery on your heart or blood vessels, such as a stent placement, a coronary artery bypass, or surgery on an artery in your head, neck, heart, or legs? No   Do you have chest pain with activity? No   Do you have a history of heart failure? No   Do you currently have a cold, bronchitis or symptoms of other infection? No   Do you have a cough, shortness of breath, or wheezing?  No   Do you or anyone in your family have previous history of blood clots? No   Do you or does anyone in your family have a serious bleeding problem such as prolonged bleeding following surgeries or cuts? No   Have you ever had problems with anemia or been told to take iron pills? No   Have you had any abnormal blood loss such as black, tarry or bloody stools, or abnormal vaginal bleeding? No   Have you ever had a blood transfusion? No   Are you willing to have a blood transfusion if it is medically needed before, during, or after your surgery? Yes   Have you or any of your relatives ever had problems with anesthesia? No   Do you have sleep apnea, excessive snoring or daytime drowsiness? No   Do you have any artifical heart valves or other implanted medical devices like a pacemaker, defibrillator, or continuous glucose monitor? No   Do you have artificial joints? No   Are you allergic to latex? No   No exertional chest pain  Shortness of breath+ stable   No cough  No syncope  Active+ at home  May also exercise+  Independent with going to the bathroom  Able to wash dishes, can do laundry+  No rectal bleeding  No excessive bleeding  No fever          Wt Readings from Last 2 Encounters:   07/09/24 35.7 kg (78 lb 9.6 oz)   03/27/24 35.8 kg (79 lb)         Health Care Directive  Patient does not have a Health Care Directive or Living Will: Advance Directive received and scanned. Click on Code in the patient header to view.    Preoperative Review of    reviewed - no record of controlled substances prescribed.      Status of Chronic Conditions:  See problem list for active medical problems.  Problems all longstanding and stable, except as noted/documented.  See ROS for pertinent symptoms related to these conditions.    Patient Active Problem List    Diagnosis Date Noted    Mixed stress and urge urinary incontinence 02/26/2024     Priority: Medium    Traumatic subdural hemorrhage with loss of consciousness of  unspecified duration, initial encounter (H) 01/30/2024     Priority: Medium    Closed intertrochanteric fracture of right femur with routine healing 06/25/2022     Priority: Medium    Closed fracture of fifth thoracic vertebra with routine healing 06/25/2022     Priority: Medium    Closed fracture of second cervical vertebra with routine healing 06/25/2022     Priority: Medium    Fall at home, initial encounter 06/25/2022     Priority: Medium    SDH (subdural hematoma) (H) 06/25/2022     Priority: Medium    Bilateral posterior capsular opacification 10/29/2018     Priority: Medium    Dermatochalasis of eyelid 10/29/2018     Priority: Medium    Calculus of gallbladder without cholecystitis without obstruction 07/26/2018     Priority: Medium    Left inguinal hernia 07/26/2018     Priority: Medium    Primary osteoarthritis of both knees 09/20/2017     Priority: Medium    Gastroesophageal reflux disease without esophagitis 09/20/2017     Priority: Medium    Osteoarthritis of left thumb 04/10/2015     Priority: Medium    Sliding hiatal hernia 04/18/2013     Priority: Medium    Iron deficiency anemia 12/27/2012     Priority: Medium    Dysphagia 01/29/2012     Priority: Medium    Abnormal Pap smear 05/18/2011     Priority: Medium    Osteoporosis 03/28/2011     Priority: Medium    Right lumbar radiculopathy 02/23/2011     Priority: Medium    CARDIOVASCULAR SCREENING; LDL GOAL LESS THAN 160 10/31/2010     Priority: Medium      Past Medical History:   Diagnosis Date    Anemia     Arthritis     OSTEO    Left knee pain 2/23/2011     Past Surgical History:   Procedure Laterality Date    CATARACT IOL, RT/LT      COLONOSCOPY  1/29/2013    Procedure: COLONOSCOPY;;  Surgeon: Edgard Prieto MD;  Location:  OR    ESOPHAGOSCOPY, GASTROSCOPY, DUODENOSCOPY (EGD), COMBINED  1/20/2012    Procedure:COMBINED ESOPHAGOSCOPY, GASTROSCOPY, DUODENOSCOPY (EGD); EGD, dysphagia; Surgeon:SHEREE VALLE; Location: OR    ESOPHAGOSCOPY,  GASTROSCOPY, DUODENOSCOPY (EGD), COMBINED  7/18/2012    Procedure: COMBINED ESOPHAGOSCOPY, GASTROSCOPY, DUODENOSCOPY (EGD);  EGD for dysphagia rp Tonio;  Surgeon: Rey Bee MD;  Location: [facility] OR    ESOPHAGOSCOPY, GASTROSCOPY, DUODENOSCOPY (EGD), COMBINED  1/29/2013    Procedure: COMBINED ESOPHAGOSCOPY, GASTROSCOPY, DUODENOSCOPY (EGD), BIOPSY SINGLE OR MULTIPLE;  Colonoscopy, EGD, dysphagia;  Surgeon: Edgard Prieto MD;  Location: [facility] OR    INCISION AND DRAINAGE LOWER EXTREMITY, COMBINED Left 3/21/2018    Procedure: COMBINED INCISION AND DRAINAGE LOWER EXTREMITY;  Excision of left intramuscular thigh mass; removal of anterior femoral bone spur;  Surgeon: Ray Diaz MD;  Location: [facility] OR     Current Outpatient Medications   Medication Sig Dispense Refill    acetaminophen (TYLENOL) 500 MG tablet Take 1-2 tablets (500-1,000 mg) by mouth 3 times daily 180 tablet 3    CALCIUM CARB-CHOLECALCIFEROL 600-400 MG-UNIT per tablet TAKE 1 TABLET BY MOUTH TWICE A DAY 60 tablet 11    calcium carbonate 600 mg-vitamin D 400 units (CALTRATE) 600-400 MG-UNIT per tablet Take 1 tablet by mouth 2 times daily 180 tablet 3    capsaicin (ZOSTRIX) 0.025 % external cream Apply 1 g topically 3 times daily 50 g 0    celecoxib (CELEBREX) 100 MG capsule TAKE 1 CAPSULE (100 MG) BY MOUTH DAILY FOR PAIN 90 capsule 0    ciclopirox (PENLAC) 8 % external solution Apply to adjacent skin and affected nails daily.  Remove with alcohol every 7 days, then repeat. 6 mL 0    cyanocobalamin (VITAMIN B-12) 1000 MCG tablet TAKE 1 TABLET BY MOUTH EVERY DAY 90 tablet 2    famotidine (PEPCID) 20 MG tablet TAKE 1 TABLET BY MOUTH TWICE A  tablet 1    HYDROcodone-acetaminophen (NORCO) 5-325 MG tablet Take 1 tablet by mouth 2 times daily as needed for severe pain 20 tablet 0    lactase (LACTAID) 3000 UNIT tablet One tablet orally with first bite of food containing dairy 90 tablet 3    omeprazole (PRILOSEC) 20 MG DR capsule TAKE 1 CAPSULE BY  "MOUTH EVERY DAY 90 capsule 1    ORDER FOR DME Pull up briefs for incontinence of urine change 2 to 3 times daily 1 Container 11    salicylic acid (MEDIPLAST) 40 % miscellaneous Cut and apply on the wart every 48 hours for 4 weeks, until the wart is cleared 25 each 0    SENEXON-S 8.6-50 MG tablet TAKE 1 TABLET BY MOUTH DAILY AS NEEDED FOR CONSTIPATION. 90 tablet 0    simethicone (MYLICON) 125 MG chewable tablet Take 1 tablet (125 mg) by mouth 2 times daily as needed for intestinal gas 180 tablet 3    tiZANidine (ZANAFLEX) 2 MG tablet TAKE 1 TABLET (2 MG) BY MOUTH NIGHTLY AS NEEDED FOR MUSCLE SPASM 90 tablet 0    VITAMIN D, CHOLECALCIFEROL, PO Take by mouth daily      vitamin D3 (CHOLECALCIFEROL) 25 mcg (56222 units) capsule Take 1 capsule (250 mcg) by mouth daily 90 capsule 3    vitamin D3 (CHOLECALCIFEROL) 50 mcg (2000 units) tablet TAKE 1 TABLET BY MOUTH EVERY DAY 90 tablet 0    gabapentin (NEURONTIN) 300 MG capsule Take 3 capsules (900 mg) by mouth daily for 30 days Start 1 capsule in the evening for 7 days, 2 cap in the evening for 7 days then 3 cap in the evening. 90 capsule 0       Allergies   Allergen Reactions    Ibuprofen      Stomach upset        Social History     Tobacco Use    Smoking status: Never     Passive exposure: Never    Smokeless tobacco: Never   Substance Use Topics    Alcohol use: No     Family History   Family history unknown: Yes     History   Drug Use No             Review of Systems  Constitutional, HEENT, cardiovascular, pulmonary, gi and gu systems are negative, except as otherwise noted.    Objective    BP (!) 155/80   Pulse 82   Temp 98.5  F (36.9  C) (Temporal)   Resp 16   Ht 1.34 m (4' 4.76\")   Wt 35.7 kg (78 lb 9.6 oz)   LMP  (LMP Unknown)   SpO2 100%   BMI 19.86 kg/m     Estimated body mass index is 19.86 kg/m  as calculated from the following:    Height as of this encounter: 1.34 m (4' 4.76\").    Weight as of this encounter: 35.7 kg (78 lb 9.6 oz).  Physical Exam  GENERAL " APPEARANCE: pale+ alert, ambulating independently   EYES: Eyes grossly normal to inspection and conjunctivae and sclerae normal  HENT: and mouth without ulcers or lesions  NECK: no adenopathy and trachea midline and normal to palpation  RESP: lungs clear to auscultation - no rales, rhonchi or wheezes  CV: regular rates and rhythm, normal S1 S2  ABDOMEN: soft, non-tender and no rebound or guarding   MS: extremities normal- no gross deformities noted  SKIN: no suspicious lesions or rashes  NEURO: Normal strength and tone, mentation intact and speech normal  PSYCH: mentation appears normal      Recent Labs   Lab Test 09/26/23  1451   HGB 9.1*         POTASSIUM 4.1   CR 0.92        Diagnostics  Recent Results (from the past 48 hour(s))   CBC with platelets    Collection Time: 07/09/24  5:09 PM   Result Value Ref Range    WBC Count 4.6 4.0 - 11.0 10e3/uL    RBC Count 4.34 3.80 - 5.20 10e6/uL    Hemoglobin 8.3 (L) 11.7 - 15.7 g/dL    Hematocrit 28.9 (L) 35.0 - 47.0 %    MCV 67 (L) 78 - 100 fL    MCH 19.1 (L) 26.5 - 33.0 pg    MCHC 28.7 (L) 31.5 - 36.5 g/dL    RDW 18.4 (H) 10.0 - 15.0 %    Platelet Count 273 150 - 450 10e3/uL   Basic metabolic panel  (Ca, Cl, CO2, Creat, Gluc, K, Na, BUN)    Collection Time: 07/09/24  5:09 PM   Result Value Ref Range    Sodium 141 135 - 145 mmol/L    Potassium 4.5 3.4 - 5.3 mmol/L    Chloride 106 98 - 107 mmol/L    Carbon Dioxide (CO2) 26 22 - 29 mmol/L    Anion Gap 9 7 - 15 mmol/L    Urea Nitrogen 24.9 (H) 8.0 - 23.0 mg/dL    Creatinine 1.08 (H) 0.51 - 0.95 mg/dL    GFR Estimate 50 (L) >60 mL/min/1.73m2    Calcium 9.7 8.8 - 10.2 mg/dL    Glucose 91 70 - 99 mg/dL   Ferritin    Collection Time: 07/09/24  5:09 PM   Result Value Ref Range    Ferritin 5 (L) 11 - 328 ng/mL      EKG: appears normal, NSR, unchanged from previous tracings    Revised Cardiac Risk Index (RCRI)  The patient has the following serious cardiovascular risks for perioperative complications:   - No serious  cardiac risks = 0 points     RCRI Interpretation: 0 points: Class I (very low risk - 0.4% complication rate)         Signed Electronically by: Kiana Camarena MD MPH    Copy of this evaluation report is provided to requesting physician.

## 2024-07-09 NOTE — LETTER
July 11, 2024      Sarah Vega  [address]  [facility] [address]        Dear ,    We are writing to inform you of your test results.    Chest x-ray is not showing any infections or fluid buildup in the lungs.    Resulted Orders   XR Chest 2 Views    Narrative    EXAM: XR CHEST 2 VIEWS  LOCATION: [facility]  DATE: 7/9/2024    INDICATION:  Pre-operative examination  COMPARISON: PA and lateral views of the chest 3/2/2020      Impression    IMPRESSION:     Cardiac silhouette is normal in size. Large hiatal hernia. Aortic tortuosity without vessel enlargement.    The lungs are symmetrically inflated. Increased AP dimension of the chest influences diaphragmatic curvature. No pleural effusion or pleural thickening. No airspace or interstitial lung opacities.    There are severe compression deformities of one midthoracic and 1 low lumbar vertebra producing accentuated thoracic kyphosis. Small to moderate osteophytes throughout the thoracic spine.       If you have any questions or concerns, please call the clinic at the number listed above.       Sincerely,      Kiana Camarena MD

## 2024-07-10 LAB
ANION GAP SERPL CALCULATED.3IONS-SCNC: 9 MMOL/L (ref 7–15)
BUN SERPL-MCNC: 24.9 MG/DL (ref 8–23)
CALCIUM SERPL-MCNC: 9.7 MG/DL (ref 8.8–10.2)
CHLORIDE SERPL-SCNC: 106 MMOL/L (ref 98–107)
CREAT SERPL-MCNC: 1.08 MG/DL (ref 0.51–0.95)
DEPRECATED HCO3 PLAS-SCNC: 26 MMOL/L (ref 22–29)
EGFRCR SERPLBLD CKD-EPI 2021: 50 ML/MIN/1.73M2
FERRITIN SERPL-MCNC: 5 NG/ML (ref 11–328)
GLUCOSE SERPL-MCNC: 91 MG/DL (ref 70–99)
POTASSIUM SERPL-SCNC: 4.5 MMOL/L (ref 3.4–5.3)
SODIUM SERPL-SCNC: 141 MMOL/L (ref 135–145)

## 2024-07-10 NOTE — RESULT ENCOUNTER NOTE
Please send a letter:    Dear Sarah Vega,    Chest x-ray is not showing any infections or fluid buildup in the lungs.    Regards,    Kiana Camarena MD MPH

## 2024-07-10 NOTE — PATIENT INSTRUCTIONS
Do not take Celebrex 3 days before surgery  Do not take the muscle relaxer Zanaflex day of surgery.

## 2024-07-11 ENCOUNTER — TELEPHONE (OUTPATIENT)
Dept: FAMILY MEDICINE | Facility: CLINIC | Age: 84
End: 2024-07-11
Payer: COMMERCIAL

## 2024-07-11 DIAGNOSIS — D50.9 IRON DEFICIENCY ANEMIA, UNSPECIFIED IRON DEFICIENCY ANEMIA TYPE: Primary | ICD-10-CM

## 2024-07-11 RX ORDER — FERROUS GLUCONATE 324(38)MG
324 TABLET ORAL 2 TIMES DAILY
Qty: 60 TABLET | Refills: 1 | Status: SHIPPED | OUTPATIENT
Start: 2024-07-11 | End: 2024-08-14

## 2024-07-11 NOTE — TELEPHONE ENCOUNTER
RN spoke with pt's son (CTC on file) and relayed provider message. Zenobia verbalized understanding and is agreeable with plan.     Zenobia requesting prescription for ferrous gluconate 325 mg BID be sent to pended pharmacy.     Gregoria Cody RN    ----- Message from Kiana Camarena sent at 7/11/2024  8:07 AM CDT -----  Please CALL patient:    Dear Sarah Vega,    Chest X ray did not show any fluid in the lungs.  Electrolytes, and glucose are normal.   Kidney function is low, stay well hydrated.  Blood counts are showing that the patient is still anemic, a little worse than last time.  Hemoglobin has reduced from 9.1 to 8.3.  Since the surgical procedure that she will be having, should not involve any significant blood loss, iron infusion at this time is not indicated.  However, since the iron stores are very low, it is important to start taking oral iron supplementation.  Try and take ferrous gluconate 325 mg twice daily. Iron is best absorbed when taken on an empty stomach, with water or fruit juice (adults: full glass or 8 ounces) about 1 hour before or 2 hours after meals. However, to lessen the possibility of stomach upset, iron may be taken with food or immediately after meals.    It is also important to stop taking the Celebrex due to persistent low blood counts and history of ulcer in the stomach in the past.  Please let me know if there are any questions.     Regards,    Kiana Camarena MD MPH

## 2024-07-11 NOTE — RESULT ENCOUNTER NOTE
Please CALL patient:    Dear Sarah Vega,    Chest X ray did not show any fluid in the lungs.  Electrolytes, and glucose are normal.   Kidney function is low, stay well hydrated.  Blood counts are showing that the patient is still anemic, a little worse than last time.  Hemoglobin has reduced from 9.1 to 8.3.  Since the surgical procedure that she will be having, should not involve any significant blood loss, iron infusion at this time is not indicated.  However, since the iron stores are very low, it is important to start taking oral iron supplementation.  Try and take ferrous gluconate 325 mg twice daily. Iron is best absorbed when taken on an empty stomach, with water or fruit juice (adults: full glass or 8 ounces) about 1 hour before or 2 hours after meals. However, to lessen the possibility of stomach upset, iron may be taken with food or immediately after meals.    It is also important to stop taking the Celebrex due to persistent low blood counts and history of ulcer in the stomach in the past.  Please let me know if there are any questions.     Regards,    Kiana Camarena MD MPH

## 2024-07-17 ENCOUNTER — ANESTHESIA EVENT (OUTPATIENT)
Dept: SURGERY | Facility: AMBULATORY SURGERY CENTER | Age: 84
End: 2024-07-17
Payer: COMMERCIAL

## 2024-07-17 NOTE — ANESTHESIA PREPROCEDURE EVALUATION
Anesthesia Pre-Procedure Evaluation    Patient: Sarah Vega   MRN: [id number] : 1940        Procedure : Procedure(s):  HERNIORRHAPHY, INGUINAL, ROBOT-ASSISTED, LAPAROSCOPIC, USING DA EUSEBIA XI left possible bilateral          Past Medical History:   Diagnosis Date     Anemia      Arthritis     OSTEO     Left knee pain 2011      Past Surgical History:   Procedure Laterality Date     CATARACT IOL, RT/LT       COLONOSCOPY  2013    Procedure: COLONOSCOPY;;  Surgeon: Edgard Prieto MD;  Location: [facility]     ESOPHAGOSCOPY, GASTROSCOPY, DUODENOSCOPY (EGD), COMBINED  2012    Procedure:COMBINED ESOPHAGOSCOPY, GASTROSCOPY, DUODENOSCOPY (EGD); EGD, dysphagia; Surgeon:SHEREE VALLE; Location:[facility]     ESOPHAGOSCOPY, GASTROSCOPY, DUODENOSCOPY (EGD), COMBINED  2012    Procedure: COMBINED ESOPHAGOSCOPY, GASTROSCOPY, DUODENOSCOPY (EGD);  EGD for dysphagia rp Tonio;  Surgeon: Rey Bee MD;  Location: [facility]     ESOPHAGOSCOPY, GASTROSCOPY, DUODENOSCOPY (EGD), COMBINED  2013    Procedure: COMBINED ESOPHAGOSCOPY, GASTROSCOPY, DUODENOSCOPY (EGD), BIOPSY SINGLE OR MULTIPLE;  Colonoscopy, EGD, dysphagia;  Surgeon: Edgard Prieto MD;  Location: [facility]     INCISION AND DRAINAGE LOWER EXTREMITY, COMBINED Left 3/21/2018    Procedure: COMBINED INCISION AND DRAINAGE LOWER EXTREMITY;  Excision of left intramuscular thigh mass; removal of anterior femoral bone spur;  Surgeon: Ray Diaz MD;  Location: [facility]      Allergies   Allergen Reactions     Ibuprofen      Stomach upset      Social History     Tobacco Use     Smoking status: Never     Passive exposure: Never     Smokeless tobacco: Never   Substance Use Topics     Alcohol use: No      Wt Readings from Last 1 Encounters:   24 35.7 kg (78 lb 9.6 oz)        Anesthesia Evaluation   Pt has had prior anesthetic. Type: General.        ROS/MED HX  ENT/Pulmonary:  - neg pulmonary ROS     Neurologic: Comment: Had a fall with SDH  1/2024 but no residual effects      Cardiovascular:  - neg cardiovascular ROS   (+)  - -   -  - -                                 Previous cardiac testing   Echo: Date: 2/22/2024 Results:  nterpretation Summary     Normal, low-risk dobutamine echocardiogram without evidence of ischemia.  The target heart rate was achieved.  Normal biventricular size, thickness, and global systolic function at  baseline, LVEF=55-60%.  With low-dose dobutamine, LVEF augmented and LV cavity size decreased  appropriately.  With peak dobutamine, LVEF increased further to >60% and LV cavity size  decreased appropriately.  No regional wall motion abnormalities at rest or with dobutamine.  No angina was elicited.  No ECG evidence of ischemia. Normal heart rate and blood pressure response to  dobutamine.  The aortic root and visualized ascending aorta are normal.    Stress Test:  Date: 2/22/24 Results:  nterpretation Summary     Normal, low-risk dobutamine echocardiogram without evidence of ischemia.  The target heart rate was achieved.  Normal biventricular size, thickness, and global systolic function at  baseline, LVEF=55-60%.  With low-dose dobutamine, LVEF augmented and LV cavity size decreased  appropriately.  With peak dobutamine, LVEF increased further to >60% and LV cavity size  decreased appropriately.  No regional wall motion abnormalities at rest or with dobutamine.  No angina was elicited.  No ECG evidence of ischemia. Normal heart rate and blood pressure response to  dobutamine.  The aortic root and visualized ascending aorta are normal.    ECG Reviewed:  Date: Results:    Cath:  Date: Results:      METS/Exercise Tolerance:     Hematologic: Comments: Stable anemia with iron deficiency.     (+)      anemia,          Musculoskeletal:       GI/Hepatic:     (+) GERD,                   Renal/Genitourinary:  - neg Renal ROS     Endo:  - neg endo ROS     Psychiatric/Substance Use:  - neg psychiatric ROS     Infectious Disease:  - neg  "infectious disease ROS     Malignancy:  - neg malignancy ROS     Other: Comment: hernia           Physical Exam    Airway      Comment: Mild limitation to rom of neck, fairly good flexion    Mallampati: II   TM distance: > 3 FB    Mouth opening: > 3 cm    Respiratory Devices and Support         Dental       (+) Modest Abnormalities - crowns, retainers, 1 or 2 missing teeth      Cardiovascular   cardiovascular exam normal       Rhythm and rate: regular     Pulmonary   pulmonary exam normal        breath sounds clear to auscultation       OUTSIDE LABS:  CBC:   Lab Results   Component Value Date    WBC 4.6 07/09/2024    WBC 4.8 09/26/2023    HGB 8.3 (L) 07/09/2024    HGB 9.1 (L) 09/26/2023    HCT 28.9 (L) 07/09/2024    HCT 30.7 (L) 09/26/2023     07/09/2024     09/26/2023     BMP:   Lab Results   Component Value Date     07/09/2024     09/26/2023    POTASSIUM 4.5 07/09/2024    POTASSIUM 4.1 09/26/2023    CHLORIDE 106 07/09/2024    CHLORIDE 105 09/26/2023    CO2 26 07/09/2024    CO2 26 09/26/2023    BUN 24.9 (H) 07/09/2024    BUN 21.5 09/26/2023    CR 1.08 (H) 07/09/2024    CR 0.92 09/26/2023    GLC 91 07/09/2024    GLC 70 09/26/2023     COAGS: No results found for: \"PTT\", \"INR\", \"FIBR\"  POC: No results found for: \"BGM\", \"HCG\", \"HCGS\"  HEPATIC:   Lab Results   Component Value Date    ALBUMIN 4.2 09/26/2023    PROTTOTAL 7.4 09/26/2023    ALT 17 09/26/2023    AST 28 09/26/2023    ALKPHOS 72 09/26/2023    BILITOTAL 0.2 09/26/2023     OTHER:   Lab Results   Component Value Date    BIGG 9.7 07/09/2024    PHOS 3.7 06/30/2011    TSH 0.66 09/16/2020    T4 1.30 02/05/2020    SED 14 04/15/2016       Anesthesia Plan    ASA Status:  3    NPO Status:  NPO Appropriate    Anesthesia Type: General.     - Airway: ETT   Induction: Intravenous.   Maintenance: Balanced.        Consents    Anesthesia Plan(s) and associated risks, benefits, and realistic alternatives discussed. Questions answered and " patient/representative(s) expressed understanding.     - Discussed:     - Discussed with:  Patient      - Extended Intubation/Ventilatory Support Discussed: No.      - Patient is DNR/DNI Status: No     Use of blood products discussed: No .     Postoperative Care    Pain management: Multi-modal analgesia.        Comments:    Other Comments: Discussed risks of GA including possible issues with memory given her age. We also discussed she make take longer to wake up from anesthesia and if it takes too long she may have to be admitted inpatient. She and her son agreed           Radha Martínez MD    I have reviewed the pertinent notes and labs in the chart from the past 30 days and (re)examined the patient.  Any updates or changes from those notes are reflected in this note.

## 2024-07-18 ENCOUNTER — ANESTHESIA (OUTPATIENT)
Dept: SURGERY | Facility: AMBULATORY SURGERY CENTER | Age: 84
End: 2024-07-18
Payer: COMMERCIAL

## 2024-07-18 ENCOUNTER — HOSPITAL ENCOUNTER (OUTPATIENT)
Facility: AMBULATORY SURGERY CENTER | Age: 84
Discharge: HOME | End: 2024-07-18
Attending: SURGERY
Payer: COMMERCIAL

## 2024-07-18 VITALS
BODY MASS INDEX: 18.05 KG/M2 | HEART RATE: 83 BPM | RESPIRATION RATE: 16 BRPM | DIASTOLIC BLOOD PRESSURE: 81 MMHG | HEIGHT: 55 IN | OXYGEN SATURATION: 100 % | TEMPERATURE: 97.4 F | SYSTOLIC BLOOD PRESSURE: 164 MMHG | WEIGHT: 78 LBS

## 2024-07-18 DIAGNOSIS — K40.90 LEFT INGUINAL HERNIA: Primary | ICD-10-CM

## 2024-07-18 PROCEDURE — 49650 LAP ING HERNIA REPAIR INIT: CPT | Mod: 50 | Performed by: SURGERY

## 2024-07-18 PROCEDURE — 49650 LAP ING HERNIA REPAIR INIT: CPT | Performed by: ANESTHESIOLOGY

## 2024-07-18 PROCEDURE — S2900 ROBOTIC SURGICAL SYSTEM: HCPCS | Performed by: SURGERY

## 2024-07-18 PROCEDURE — 99100 ANES PT EXTEME AGE<1 YR&>70: CPT | Mod: QX | Performed by: NURSE ANESTHETIST, CERTIFIED REGISTERED

## 2024-07-18 PROCEDURE — 99100 ANES PT EXTEME AGE<1 YR&>70: CPT | Mod: QK | Performed by: ANESTHESIOLOGY

## 2024-07-18 PROCEDURE — 49650 LAP ING HERNIA REPAIR INIT: CPT | Performed by: NURSE ANESTHETIST, CERTIFIED REGISTERED

## 2024-07-18 DEVICE — MESH SURGICAL INGUINAL HRN REP MACROPOR 15CMX15CM  PPM1515X3: Type: IMPLANTABLE DEVICE | Site: GROIN | Status: FUNCTIONAL

## 2024-07-18 RX ORDER — ONDANSETRON 2 MG/ML
4 INJECTION INTRAMUSCULAR; INTRAVENOUS EVERY 30 MIN PRN
Status: DISCONTINUED | OUTPATIENT
Start: 2024-07-18 | End: 2024-07-19 | Stop reason: HOSPADM

## 2024-07-18 RX ORDER — DEXAMETHASONE SODIUM PHOSPHATE 10 MG/ML
4 INJECTION, SOLUTION INTRAMUSCULAR; INTRAVENOUS
Status: DISCONTINUED | OUTPATIENT
Start: 2024-07-18 | End: 2024-07-19 | Stop reason: HOSPADM

## 2024-07-18 RX ORDER — NALOXONE HYDROCHLORIDE 0.4 MG/ML
0.1 INJECTION, SOLUTION INTRAMUSCULAR; INTRAVENOUS; SUBCUTANEOUS
Status: DISCONTINUED | OUTPATIENT
Start: 2024-07-18 | End: 2024-07-19 | Stop reason: HOSPADM

## 2024-07-18 RX ORDER — PROPOFOL 10 MG/ML
INJECTION, EMULSION INTRAVENOUS CONTINUOUS PRN
Status: DISCONTINUED | OUTPATIENT
Start: 2024-07-18 | End: 2024-07-18

## 2024-07-18 RX ORDER — CEFAZOLIN SODIUM 2 G/50ML
2 SOLUTION INTRAVENOUS
Status: COMPLETED | OUTPATIENT
Start: 2024-07-18 | End: 2024-07-18

## 2024-07-18 RX ORDER — HYDROXYZINE HYDROCHLORIDE 10 MG/1
10 TABLET, FILM COATED ORAL EVERY 6 HOURS PRN
Status: DISCONTINUED | OUTPATIENT
Start: 2024-07-18 | End: 2024-07-19 | Stop reason: HOSPADM

## 2024-07-18 RX ORDER — ONDANSETRON 2 MG/ML
INJECTION INTRAMUSCULAR; INTRAVENOUS PRN
Status: DISCONTINUED | OUTPATIENT
Start: 2024-07-18 | End: 2024-07-18

## 2024-07-18 RX ORDER — OXYCODONE HYDROCHLORIDE 5 MG/1
5 TABLET ORAL
Status: DISCONTINUED | OUTPATIENT
Start: 2024-07-18 | End: 2024-07-19 | Stop reason: HOSPADM

## 2024-07-18 RX ORDER — OXYCODONE HYDROCHLORIDE 5 MG/1
5 TABLET ORAL
Status: COMPLETED | OUTPATIENT
Start: 2024-07-18 | End: 2024-07-18

## 2024-07-18 RX ORDER — FENTANYL CITRATE 50 UG/ML
INJECTION, SOLUTION INTRAMUSCULAR; INTRAVENOUS PRN
Status: DISCONTINUED | OUTPATIENT
Start: 2024-07-18 | End: 2024-07-18

## 2024-07-18 RX ORDER — FENTANYL CITRATE 50 UG/ML
25 INJECTION, SOLUTION INTRAMUSCULAR; INTRAVENOUS EVERY 5 MIN PRN
Status: DISCONTINUED | OUTPATIENT
Start: 2024-07-18 | End: 2024-07-19 | Stop reason: HOSPADM

## 2024-07-18 RX ORDER — LABETALOL HYDROCHLORIDE 5 MG/ML
10 INJECTION, SOLUTION INTRAVENOUS
Status: DISCONTINUED | OUTPATIENT
Start: 2024-07-18 | End: 2024-07-19 | Stop reason: HOSPADM

## 2024-07-18 RX ORDER — ONDANSETRON 4 MG/1
4 TABLET, ORALLY DISINTEGRATING ORAL EVERY 30 MIN PRN
Status: DISCONTINUED | OUTPATIENT
Start: 2024-07-18 | End: 2024-07-19 | Stop reason: HOSPADM

## 2024-07-18 RX ORDER — LIDOCAINE 40 MG/G
CREAM TOPICAL
Status: DISCONTINUED | OUTPATIENT
Start: 2024-07-18 | End: 2024-07-18 | Stop reason: HOSPADM

## 2024-07-18 RX ORDER — PROPOFOL 10 MG/ML
INJECTION, EMULSION INTRAVENOUS PRN
Status: DISCONTINUED | OUTPATIENT
Start: 2024-07-18 | End: 2024-07-18

## 2024-07-18 RX ORDER — SODIUM CHLORIDE, SODIUM LACTATE, POTASSIUM CHLORIDE, CALCIUM CHLORIDE 600; 310; 30; 20 MG/100ML; MG/100ML; MG/100ML; MG/100ML
INJECTION, SOLUTION INTRAVENOUS CONTINUOUS
Status: DISCONTINUED | OUTPATIENT
Start: 2024-07-18 | End: 2024-07-19 | Stop reason: HOSPADM

## 2024-07-18 RX ORDER — FENTANYL CITRATE 50 UG/ML
25 INJECTION, SOLUTION INTRAMUSCULAR; INTRAVENOUS
Status: DISCONTINUED | OUTPATIENT
Start: 2024-07-18 | End: 2024-07-19 | Stop reason: HOSPADM

## 2024-07-18 RX ORDER — SODIUM CHLORIDE, SODIUM LACTATE, POTASSIUM CHLORIDE, CALCIUM CHLORIDE 600; 310; 30; 20 MG/100ML; MG/100ML; MG/100ML; MG/100ML
INJECTION, SOLUTION INTRAVENOUS CONTINUOUS
Status: DISCONTINUED | OUTPATIENT
Start: 2024-07-18 | End: 2024-07-18 | Stop reason: HOSPADM

## 2024-07-18 RX ORDER — HYDROMORPHONE HYDROCHLORIDE 1 MG/ML
0.2 INJECTION, SOLUTION INTRAMUSCULAR; INTRAVENOUS; SUBCUTANEOUS EVERY 5 MIN PRN
Status: DISCONTINUED | OUTPATIENT
Start: 2024-07-18 | End: 2024-07-19 | Stop reason: HOSPADM

## 2024-07-18 RX ORDER — OXYCODONE HYDROCHLORIDE 5 MG/1
5-10 TABLET ORAL EVERY 4 HOURS PRN
Qty: 10 TABLET | Refills: 0 | Status: SHIPPED | OUTPATIENT
Start: 2024-07-18 | End: 2024-09-27

## 2024-07-18 RX ORDER — CEFAZOLIN SODIUM 2 G/50ML
2 SOLUTION INTRAVENOUS SEE ADMIN INSTRUCTIONS
Status: DISCONTINUED | OUTPATIENT
Start: 2024-07-18 | End: 2024-07-18 | Stop reason: HOSPADM

## 2024-07-18 RX ORDER — LIDOCAINE HYDROCHLORIDE 20 MG/ML
INJECTION, SOLUTION INFILTRATION; PERINEURAL PRN
Status: DISCONTINUED | OUTPATIENT
Start: 2024-07-18 | End: 2024-07-18

## 2024-07-18 RX ORDER — BUPIVACAINE HYDROCHLORIDE AND EPINEPHRINE 5; 5 MG/ML; UG/ML
INJECTION, SOLUTION PERINEURAL PRN
Status: DISCONTINUED | OUTPATIENT
Start: 2024-07-18 | End: 2024-07-18 | Stop reason: HOSPADM

## 2024-07-18 RX ORDER — MEPERIDINE HYDROCHLORIDE 25 MG/ML
12.5 INJECTION INTRAMUSCULAR; INTRAVENOUS; SUBCUTANEOUS EVERY 5 MIN PRN
Status: DISCONTINUED | OUTPATIENT
Start: 2024-07-18 | End: 2024-07-19 | Stop reason: HOSPADM

## 2024-07-18 RX ORDER — ESMOLOL HYDROCHLORIDE 10 MG/ML
INJECTION INTRAVENOUS PRN
Status: DISCONTINUED | OUTPATIENT
Start: 2024-07-18 | End: 2024-07-18

## 2024-07-18 RX ORDER — FENTANYL CITRATE 50 UG/ML
50 INJECTION, SOLUTION INTRAMUSCULAR; INTRAVENOUS EVERY 5 MIN PRN
Status: DISCONTINUED | OUTPATIENT
Start: 2024-07-18 | End: 2024-07-19 | Stop reason: HOSPADM

## 2024-07-18 RX ORDER — HYDROMORPHONE HYDROCHLORIDE 1 MG/ML
0.4 INJECTION, SOLUTION INTRAMUSCULAR; INTRAVENOUS; SUBCUTANEOUS EVERY 5 MIN PRN
Status: DISCONTINUED | OUTPATIENT
Start: 2024-07-18 | End: 2024-07-19 | Stop reason: HOSPADM

## 2024-07-18 RX ORDER — DEXAMETHASONE SODIUM PHOSPHATE 4 MG/ML
INJECTION, SOLUTION INTRA-ARTICULAR; INTRALESIONAL; INTRAMUSCULAR; INTRAVENOUS; SOFT TISSUE PRN
Status: DISCONTINUED | OUTPATIENT
Start: 2024-07-18 | End: 2024-07-18

## 2024-07-18 RX ORDER — OXYCODONE HYDROCHLORIDE 5 MG/1
10 TABLET ORAL
Status: DISCONTINUED | OUTPATIENT
Start: 2024-07-18 | End: 2024-07-19 | Stop reason: HOSPADM

## 2024-07-18 RX ORDER — LABETALOL 20 MG/4 ML (5 MG/ML) INTRAVENOUS SYRINGE
PRN
Status: DISCONTINUED | OUTPATIENT
Start: 2024-07-18 | End: 2024-07-18

## 2024-07-18 RX ORDER — ACETAMINOPHEN 325 MG/1
975 TABLET ORAL ONCE
Status: COMPLETED | OUTPATIENT
Start: 2024-07-18 | End: 2024-07-18

## 2024-07-18 RX ADMIN — PROPOFOL 125 MCG/KG/MIN: 10 INJECTION, EMULSION INTRAVENOUS at 12:08

## 2024-07-18 RX ADMIN — OXYCODONE HYDROCHLORIDE 5 MG: 5 TABLET ORAL at 14:58

## 2024-07-18 RX ADMIN — FENTANYL CITRATE 25 MCG: 50 INJECTION, SOLUTION INTRAMUSCULAR; INTRAVENOUS at 14:11

## 2024-07-18 RX ADMIN — PROPOFOL 40 MG: 10 INJECTION, EMULSION INTRAVENOUS at 13:43

## 2024-07-18 RX ADMIN — ACETAMINOPHEN 975 MG: 325 TABLET ORAL at 11:22

## 2024-07-18 RX ADMIN — FENTANYL CITRATE 25 MCG: 50 INJECTION, SOLUTION INTRAMUSCULAR; INTRAVENOUS at 14:27

## 2024-07-18 RX ADMIN — FENTANYL CITRATE 25 MCG: 50 INJECTION, SOLUTION INTRAMUSCULAR; INTRAVENOUS at 14:17

## 2024-07-18 RX ADMIN — PROPOFOL 20 MG: 10 INJECTION, EMULSION INTRAVENOUS at 12:45

## 2024-07-18 RX ADMIN — SODIUM CHLORIDE, SODIUM LACTATE, POTASSIUM CHLORIDE, CALCIUM CHLORIDE: 600; 310; 30; 20 INJECTION, SOLUTION INTRAVENOUS at 11:22

## 2024-07-18 RX ADMIN — ESMOLOL HYDROCHLORIDE 20 MG: 10 INJECTION INTRAVENOUS at 13:03

## 2024-07-18 RX ADMIN — FENTANYL CITRATE 50 MCG: 50 INJECTION, SOLUTION INTRAMUSCULAR; INTRAVENOUS at 12:38

## 2024-07-18 RX ADMIN — HYDROMORPHONE HYDROCHLORIDE 0.2 MG: 1 INJECTION, SOLUTION INTRAMUSCULAR; INTRAVENOUS; SUBCUTANEOUS at 14:44

## 2024-07-18 RX ADMIN — Medication 30 MG: at 12:08

## 2024-07-18 RX ADMIN — Medication 10 MG: at 12:38

## 2024-07-18 RX ADMIN — FENTANYL CITRATE 50 MCG: 50 INJECTION, SOLUTION INTRAMUSCULAR; INTRAVENOUS at 12:08

## 2024-07-18 RX ADMIN — Medication 10 MG: at 13:11

## 2024-07-18 RX ADMIN — PROPOFOL 30 MG: 10 INJECTION, EMULSION INTRAVENOUS at 13:09

## 2024-07-18 RX ADMIN — LIDOCAINE HYDROCHLORIDE 50 MG: 20 INJECTION, SOLUTION INFILTRATION; PERINEURAL at 12:08

## 2024-07-18 RX ADMIN — ONDANSETRON 4 MG: 2 INJECTION INTRAMUSCULAR; INTRAVENOUS at 12:17

## 2024-07-18 RX ADMIN — FENTANYL CITRATE 25 MCG: 50 INJECTION, SOLUTION INTRAMUSCULAR; INTRAVENOUS at 14:22

## 2024-07-18 RX ADMIN — LABETALOL 20 MG/4 ML (5 MG/ML) INTRAVENOUS SYRINGE 2.5 MG: at 13:16

## 2024-07-18 RX ADMIN — HYDROMORPHONE HYDROCHLORIDE 0.2 MG: 1 INJECTION, SOLUTION INTRAMUSCULAR; INTRAVENOUS; SUBCUTANEOUS at 14:34

## 2024-07-18 RX ADMIN — PROPOFOL 20 MG: 10 INJECTION, EMULSION INTRAVENOUS at 12:53

## 2024-07-18 RX ADMIN — PROPOFOL 100 MG: 10 INJECTION, EMULSION INTRAVENOUS at 12:08

## 2024-07-18 RX ADMIN — CEFAZOLIN SODIUM 2 G: 2 SOLUTION INTRAVENOUS at 12:00

## 2024-07-18 RX ADMIN — DEXAMETHASONE SODIUM PHOSPHATE 4 MG: 4 INJECTION, SOLUTION INTRA-ARTICULAR; INTRALESIONAL; INTRAMUSCULAR; INTRAVENOUS; SOFT TISSUE at 12:17

## 2024-07-18 RX ADMIN — ESMOLOL HYDROCHLORIDE 20 MG: 10 INJECTION INTRAVENOUS at 12:56

## 2024-07-18 NOTE — ANESTHESIA POSTPROCEDURE EVALUATION
Patient: Sarah Vega    Procedure: Procedure(s):  HERNIORRHAPHY, INGUINAL, ROBOT-ASSISTED, LAPAROSCOPIC, USING DA EUSEBIA XI bilateral       Anesthesia Type:  General    Note:  Disposition: Outpatient   Postop Pain Control: Uneventful            Sign Out: Well controlled pain   PONV: No   Neuro/Psych: Uneventful            Sign Out: Acceptable/Baseline neuro status   Airway/Respiratory: Uneventful            Sign Out: Acceptable/Baseline resp. status   CV/Hemodynamics: Uneventful            Sign Out: Acceptable CV status; No obvious hypovolemia; No obvious fluid overload   Other NRE: NONE   DID A NON-ROUTINE EVENT OCCUR?            Last vitals:  Vitals Value Taken Time   /85 07/18/24 1400   Temp 36.7  C (98  F) 07/18/24 1400   Pulse 81 07/18/24 1408   Resp 64 07/18/24 1408   SpO2 100 % 07/18/24 1408   Vitals shown include unfiled device data.    Electronically Signed By: Filippo Quintanilla MD  July 18, 2024  2:09 PM

## 2024-07-18 NOTE — DISCHARGE INSTRUCTIONS
"[facility]  Home Care Following Anesthesia  For 24 hours after surgery:  Get plenty of rest.  A responsible adult must stay with you for at least 24 hours after you leave the surgery center.  Do not drive or use heavy equipment.  If you have weakness or tingling, don't drive or use heavy equipment until this feeling goes away.   Do not drink alcohol.   Avoid strenuous or risky activities.  Ask for help when climbing stairs.  You may feel lightheaded.  IF so, sit for a few minutes before standing.  Have someone help you get up.   If you have nausea (feel sick to your stomach): Drink only clear liquids such as apple juice, ginger ale, broth or 7-Up.  Rest may also help.  Be sure to drink enough fluids.  Move to a regular diet as you feel able.   You may have a slight fever.  Call the doctor if your fever is over 100 F (37.7 C) (taken under the tongue) or lasts longer than 24 hours.  You may have a dry mouth, a sore throat, muscle aches or trouble sleeping. These should go away after 24 hours.  Do not make important or legal decisions.   It is recommended to avoid smoking.        Today you received a Marcaine or bupivacaine block to numb the nerves near your surgery site.  This is a block using local anesthetic or \"numbing\" medication injected around the nerves to anesthetize or \"numb\" the area supplied by those nerves.  This block is injected into the muscle layer near your surgical site.  The medication may numb the location where you had surgery for 6-18 hours, but may last up to 24 hours.  If your surgical site is an arm or leg you should be careful with your affected limb, since it is possible to injure your limb without being aware of it due to the numbing.  Until full feeling returns, you should guard against bumping or hitting your limb, and avoid extreme hot or cold temperatures on the skin.  As the block wears off, the feeling will return as a tingling or prickly " sensation near your surgical site.  You will experience more discomfort from your incision as the feeling returns.  You may want to take a pain pill (a narcotic or Tylenol if this was prescribed by your surgeon) when you start to experience mild pain before the pain beccomes more severe.  If your pain medications do not control your pain you should notifiy your surgeon.    Tips for taking pain medications  To get the best pain relief possible, remember these points:  Take pain medications as directed, before pain becomes severe.  Pain medication can upset your stomach: taking it with food may help.  Constipation is a common side effect of pain medication. Drink plenty of  fluids.  Eat foods high in fiber. Take a stool softener if recommended by your doctor or pharmacist.  Do not drink alcohol, drive or operate machinery while taking pain medications.  Ask about other ways to control pain, such as with heat, ice or relaxation.    Tylenol/Acetaminophen Consumption    If you feel your pain relief is insufficient, you may take Tylenol/Acetaminophen in addition to your narcotic pain medication.   Be careful not to exceed 4,000 mg of Tylenol/Acetaminophen in a 24 hour period from all sources.  If you are taking extra strength Tylenol/acetaminophen (500 mg), the maximum dose is 8 tablets in 24 hours.  If you are taking regular strength acetaminophen (325 mg), the maximum dose is 12 tablets in 24 hours.    Call a doctor for any of the following:  Signs of infection (fever, growing tenderness at the surgery site, a large amount of drainage or bleeding, severe pain, foul-smelling drainage, redness, swelling).  It has been over 8 to 10 hours since surgery and you are still not able to urinate (pass water).  Headache for over 24 hours.  Numbness, tingling or weakness the day after surgery (if you had spinal anesthesia).  Signs of Covid-19 infection (temperature over 100 degrees, shortness of breath, cough, loss of taste/smell,  generalized body aches, persistent headache, chills, sore throat, nausea/vomiting/diarrhea)  Your doctor is: Dr. Ray Diaz, General Surgery: [phone]               Or dial [phone] and ask for the resident on call for:  General Surgery  For emergency care, call the:  [facility]:  [phone] (TTY for hearing impaired: [phone])

## 2024-07-18 NOTE — ANESTHESIA CARE TRANSFER NOTE
Patient: Sarah Vega    Procedure: Procedure(s):  HERNIORRHAPHY, INGUINAL, ROBOT-ASSISTED, LAPAROSCOPIC, USING DA EUSEBIA XI bilateral       Diagnosis: Left inguinal hernia [K40.90]  Diagnosis Additional Information: No value filed.    Anesthesia Type:   General     Note:    Oropharynx: spontaneously breathing  Level of Consciousness: drowsy and awake  Oxygen Supplementation: face mask  Level of Supplemental Oxygen (L/min / FiO2): 6  Independent Airway: airway patency satisfactory and stable  Dentition: dentition unchanged  Vital Signs Stable: post-procedure vital signs reviewed and stable  Report to RN Given: handoff report given  Patient transferred to: PACU    Handoff Report: Identifed the Patient, Identified the Reponsible Provider, Reviewed the pertinent medical history, Discussed the surgical course, Reviewed Intra-OP anesthesia mangement and issues during anesthesia, Set expectations for post-procedure period and Allowed opportunity for questions and acknowledgement of understanding      Vitals:  Vitals Value Taken Time   /85 07/18/24 1400   Temp 36.7  C (98  F) 07/18/24 1400   Pulse 82 07/18/24 1402   Resp 17 07/18/24 1402   SpO2 100 % 07/18/24 1402   Vitals shown include unfiled device data.    Electronically Signed By: HERO Quintero CRNA  July 18, 2024  2:03 PM

## 2024-07-18 NOTE — ANESTHESIA PROCEDURE NOTES
Airway       Patient location during procedure: OR       Procedure Start/Stop Times: 7/18/2024 12:11 PM  Staff -        Performed By: SRNAIndications and Patient Condition       Indications for airway management: wero-procedural       Induction type:intravenous       Mask difficulty assessment: 2 - vent by mask + OA or adjuvant +/- NMBA    Final Airway Details       Final airway type: endotracheal airway       Successful airway: ETT - single  Endotracheal Airway Details        ETT size (mm): 6.0       Cuffed: yes       Successful intubation technique: direct laryngoscopy       DL Blade Type: MAC 3       Grade View of Cords: 1       Adjucts: stylet       Position: Right       Measured from: lips       Secured at (cm): 19       Bite block used: None    Post intubation assessment        Placement verified by: capnometry, equal breath sounds and chest rise        Number of attempts at approach: 1       Number of other approaches attempted: 0       Secured with: tape       Ease of procedure: easy       Dentition: Intact and Unchanged    Medication(s) Administered   Medication Administration Time: 7/18/2024 12:11 PM

## 2024-07-18 NOTE — OP NOTE
Date of Service: 7/18/2024    STAFF SURGEON:  Ray Diaz MD     ASSISTANT:  None.     PREOPERATIVE DIAGNOSIS: Left inguinal hernia     POSTOPERATIVE DIAGNOSIS: Bilateral inguinal hernia     NAME OF PROCEDURE(S):  Robotic Xi assisted laparoscopic bilateral inguinal hernia repair     INDICATIONS FOR PROCEDURE:  The patient is a 84-year-old female with reducible but fairly large left inguinal hernia.  As it was enlarging and seem to be causing more pain symptoms.  I offered a minimally invasive robotic repair.  Risks, benefits and alternatives discussed preoperatively and consent obtained.     EBL: 30 cc    ANESTHESIA:  General    COMPLICATIONS: None     DRAINS:  None.     SPECIMENS: None     IMPLANTS:   Implant Name Type Inv. Item Serial No.  Lot No. LRB No. Used Action   MESH SURGICAL INGUINAL HRN REP MACROPOR [id number]  [id number] - [id number] Mesh MESH SURGICAL INGUINAL HRN REP MACROPOR [id number]  [id number]  COVIDIEN [id number] Left 1 Implanted   MESH SURGICAL INGUINAL HRN REP MACROPOR [id number]  [id number] - [id number] Mesh MESH SURGICAL INGUINAL HRN REP MACROPOR [id number]  [id number]  COVIDIEN [id number] Right 1 Implanted       Cut to make about 10x15 cm formed mesh per side    OPERATIVE FINDINGS: Large direct left-sided inguinal hernia with a small right-sided indirect.  Both reducible with empty sacs at time of surgery.     PROCEDURE DETAIL:     Following consent, the patient was brought from the preoperative holding area to the operating suite and laid in supine position. The patient underwent general anesthesia. Abdomen was prepped and draped in usual fashion. Time out performed. Patient received antibiotics and had sequentials for DVT prophylaxis.    Following the time out I made a 2cm supraumbilical incision and dissected to the abdominal fascia which was elevated between two Kocher graspers and divided with curved cedeno scissors. I placed 2 0 vicryl stay sutures on either side of the fascial defect  then inserted the Christian cannula. This was secured and attached to gas insufflation. I then inserted the camera into the abdomen and under direct vision placed the robotic 8mm ports in the right and left abdomen. Patient was positioned Trendelenberg and I examed into the pelvis revealing the above hernia findings.   The [employer] Xi robot was then brought in and docked. I moved to the surgeon console. I began with creating a peritoneal flap on the left side and dissected in the preperitoneal space exposing Kelvin's ligament medially then expanding laterally.  Hernia sac was peritonealized and I divided the round ligament. I then expanded the pocket laterally until there was adequate space for the mesh.   I then proceeded in an identical fashion on the right side, connecting the preperitoneal spaces medially.  Here in the right the hernia was a smaller indirect type.  There was cord lipoma here on the right as well which was reduced.  I did have some bleeding from a lymph node here on the right which was able to be controlled with the bipolar cautery.   I used an approximately 10 x 15cm formed mesh per side to cover the areas with good overlap. Mesh secured with interrupted 3-0 vicryl sutures at Kelvin's ligament. The peritoneal flap was closed with running 3-0 Stratafix suture. Needles were removed and the robot undocked. I moved back to the patient bedside.   The ports were removed under direct vision and the abdomen desufflated. The supraumbilical fascial defect was closed with a figure of eight suture of 0 vicryl and the previously placed stay sutures. Skin incisions closed with 4-0 monocryl and covered with dermbond. Total of 18cc of 0.5% marcaine with epinephrine 1:200,000 used for the local. Final counts complete. Patient tolerated the procedure well and was discharge from the OR in a stable condition with plans for discharge home.           Ray Diaz MD

## 2024-07-29 ENCOUNTER — OFFICE VISIT (OUTPATIENT)
Dept: SURGERY | Facility: CLINIC | Age: 84
End: 2024-07-29
Payer: COMMERCIAL

## 2024-07-29 VITALS — SYSTOLIC BLOOD PRESSURE: 160 MMHG | HEART RATE: 88 BPM | DIASTOLIC BLOOD PRESSURE: 80 MMHG

## 2024-07-29 DIAGNOSIS — K40.91 UNILATERAL RECURRENT INGUINAL HERNIA WITHOUT OBSTRUCTION OR GANGRENE: Primary | ICD-10-CM

## 2024-07-29 PROCEDURE — 99024 POSTOP FOLLOW-UP VISIT: CPT | Mod: 57 | Performed by: SURGERY

## 2024-07-29 NOTE — PROGRESS NOTES
General Surgery Post Op    Pt returns for follow up visit s/p bilateral inguinal hernia repair on 7/18/2024.    Patient has been doing well, tolerating diet. Bowels moving well.  Had some initial incisional pain that is getting better with time.  Has noticed a lump in the left groin where her larger hernia was that can be uncomfortable still.    Physical exam: Vitals: BP (!) 160/80   Pulse 88   LMP  (LMP Unknown)   BMI= There is no height or weight on file to calculate BMI.    Exam:  Constitutional: healthy, alert, and no distress  Left groin with fair sized recurrent hernia, does feel to have loop of intestine, incarcerated      Assessment:     ICD-10-CM    1. Unilateral recurrent inguinal hernia without obstruction or gangrene  K40.91 Case Request: HERNIORRHAPHY, RECURRENT LEFT INGUINAL, OPEN with mesh        Plan: Unfortunately she is suffering early recurrence of her left inguinal hernia which was the side that was larger to begin with.  Though this seems to be incarcerated the hernia is soft and she does not have any obstructive symptoms.  Will work on getting her back in for an open approach with mesh to avoid the previously operated preperitoneal space and we also note the right side has been dealt with.  Discussed presenting to emergency room if developing obstructive symptoms.  Can schedule again for her.    Ray Diaz MD

## 2024-07-29 NOTE — LETTER
7/29/2024      Sarah Vega  [address]  [place] [address]      Dear Colleague,    Thank you for referring your patient, Sarah Vega, to the [facility]. Please see a copy of my visit note below.    General Surgery Post Op    Pt returns for follow up visit s/p bilateral inguinal hernia repair on 7/18/2024.    Patient has been doing well, tolerating diet. Bowels moving well.  Had some initial incisional pain that is getting better with time.  Has noticed a lump in the left groin where her larger hernia was that can be uncomfortable still.    Physical exam: Vitals: BP (!) 160/80   Pulse 88   LMP  (LMP Unknown)   BMI= There is no height or weight on file to calculate BMI.    Exam:  Constitutional: healthy, alert, and no distress  Left groin with fair sized recurrent hernia, does feel to have loop of intestine, incarcerated      Assessment:     ICD-10-CM    1. Unilateral recurrent inguinal hernia without obstruction or gangrene  K40.91 Case Request: HERNIORRHAPHY, RECURRENT LEFT INGUINAL, OPEN with mesh        Plan: Unfortunately she is suffering early recurrence of her left inguinal hernia which was the side that was larger to begin with.  Though this seems to be incarcerated the hernia is soft and she does not have any obstructive symptoms.  Will work on getting her back in for an open approach with mesh to avoid the previously operated preperitoneal space and we also note the right side has been dealt with.  Discussed presenting to emergency room if developing obstructive symptoms.  Can schedule again for her.    Ray Diaz MD      Again, thank you for allowing me to participate in the care of your patient.        Sincerely,        Ray Diaz MD

## 2024-07-30 ENCOUNTER — TELEPHONE (OUTPATIENT)
Dept: SURGERY | Facility: CLINIC | Age: 84
End: 2024-07-30
Payer: COMMERCIAL

## 2024-07-30 PROBLEM — K40.91 UNILATERAL RECURRENT INGUINAL HERNIA WITHOUT OBSTRUCTION OR GANGRENE: Status: ACTIVE | Noted: 2024-07-29

## 2024-07-30 NOTE — TELEPHONE ENCOUNTER
Type of surgery: HERNIORRHAPHY, RECURRENT LEFT INGUINAL, OPEN with mesh (Left)  Location of surgery: [facility]  Date and time of surgery: 9/27  Surgeon: zack   Pre-Op Appt Date: son will schedule   Post-Op Appt Date: son will schedule    Packet sent out: Yes  Pre-cert/Authorization completed:  No  Date:

## 2024-08-15 NOTE — PROGRESS NOTES
02/26/24 0500   Appointment Info   Signing clinician's name / credentials Selin Villafuerte,PT, DPT   Total/Authorized Visits 6 (PT)   Visits Used 1   Medical Diagnosis Mixed stress and urge urinary incontinence   PT Tx Diagnosis Mixed stress and urge urinary incontinence   Quick Adds Certification;Pelvic Consent   Progress Note/Certification   Start of Care Date 02/26/24   Onset of illness/injury or Date of Surgery 01/30/24   Therapy Frequency 1x/week taper as able   Predicted Duration 10 weeks   Certification date from 02/26/24   Certification date to 05/06/24   Progress Note Completed Date 02/26/24       Present Yes    Language Danish    ID or First/Last Name patient son interpreting   PT Goal 1   Goal Identifier LTG 1   Goal Description pt will demonstrate improved bladder habits in order to decrease nigthtime voids to 1x/night   Rationale to maximize safety and independence with performance of ADLs and functional tasks  (continence during the day)   Goal Progress currently going 3-4x/night   Target Date 05/06/24   Subjective Report   Subjective Report see eval   Objective Measures   Objective Measures Objective Measure 2   Objective Measure 1   Objective Measure elevated resting tension: slight discomfort with palpation at levator ani   Details paradoxical DBE: PF tension with inhalation   Objective Measure 2   Objective Measure Cough: PF contraction   Treatment Interventions (PT)   Interventions Therapeutic Procedure/Exercise;Neuromuscular Re-education   Therapeutic Procedure/Exercise   PTRx Ther Proc 1 Pelvic Floor Anatomy and Function   PTRx Ther Proc 1 - Details Education in pelvic floor anatomy and function provided prior to examination for purpose of demonstrating relationship between pelvic floor muscle function bladder function bowel function and/or sexual function.  Described anatomy with use of pelvic model and/or anatomy drawings.  Education provided in the  components of the pelvic floor muscle exam including internal and external palpation coordination strength and tissue status.  Verbally reviewed exam components through the process.  Educated patient on findings of examination and purpose of assigned home program to address findings.   PTRx Ther Proc 2 Pelvic Floor Muscle Strengthening Tara   PTRx Ther Proc 2 - Details education on intra-abdominal pressure management and effect pelvic floor has in role of this; working on PFM contraction before bending over   Therapeutic Procedures: strength, endurance, ROM, flexibility minutes (70784) 15   Ther Proc 1 General Bladder Information   Ther Proc 1 - Details bladder irritants normal voiding times general fluid recommendations education on effect of dehydration on bladder and urge   Therapeutic Procedures Ther Proc 2   Patient Response/Progress verbalized understanding   Ther Proc 2 Pt education   Ther Proc 2 - Details education son and pt; avoid aggravating activities, stay as active as able without increase in pain; education on following up with MD regarding LLQ   Neuromuscular Re-education   PTRx Neuro Re-ed 1 Diaphragmatic Breathing   PTRx Neuro Re-ed 1 - Details vc for Abdominal expansion and sending air low into abdomen    Neuromuscular re-ed of mvmt, balance, coord, kinesthetic sense, posture, proprioception minutes (97699) 8   Patient Response/Progress vc for improved expansion- very hard for pt to do; paradoxical movement with PFM   Eval/Assessments   PT Eval, Low Complexity Minutes (21053) 28   Education   Learner/Method Patient;Family   Plan   Home program print   Plan for next session core retraining; PF relaxation; elimination techniques   Comments   Pelvic Health Informed Consent Statement Discussed with patient/guardian reason for referral regarding pelvic health needs and external/internal pelvic floor muscle examination.  Opportunity provided to ask questions and verbal consent for assessment and  intervention was given.   Total Session Time   Timed Code Treatment Minutes 23   Total Treatment Time (sum of timed and untimed services) 51             DISCHARGE  Reason for Discharge: Patient has failed to schedule further appointments.    Equipment Issued: none    Discharge Plan: Patient to continue home program.    Referring Provider:  Kiana Camarena

## 2024-08-16 NOTE — PROGRESS NOTES
[facility] Care Coordination Contact    No Letter Received: 60 day tracking of letter complete, no letter received from [employer]. Tracking discontinued.    Umu Man  Care Management Specialist  [facility]  [phone]

## 2024-08-18 DIAGNOSIS — S22.059D CLOSED FRACTURE OF FIFTH THORACIC VERTEBRA WITH ROUTINE HEALING, UNSPECIFIED FRACTURE MORPHOLOGY, SUBSEQUENT ENCOUNTER: ICD-10-CM

## 2024-08-19 RX ORDER — TIZANIDINE 2 MG/1
TABLET ORAL
Qty: 90 TABLET | Refills: 0 | Status: SHIPPED | OUTPATIENT
Start: 2024-08-19

## 2024-09-24 ENCOUNTER — OFFICE VISIT (OUTPATIENT)
Dept: FAMILY MEDICINE | Facility: CLINIC | Age: 84
End: 2024-09-24
Payer: COMMERCIAL

## 2024-09-24 VITALS
OXYGEN SATURATION: 100 % | HEART RATE: 79 BPM | WEIGHT: 77.6 LBS | DIASTOLIC BLOOD PRESSURE: 78 MMHG | BODY MASS INDEX: 17.96 KG/M2 | RESPIRATION RATE: 16 BRPM | HEIGHT: 55 IN | TEMPERATURE: 98.5 F | SYSTOLIC BLOOD PRESSURE: 132 MMHG

## 2024-09-24 DIAGNOSIS — K40.91 UNILATERAL RECURRENT INGUINAL HERNIA WITHOUT OBSTRUCTION OR GANGRENE: Primary | ICD-10-CM

## 2024-09-24 DIAGNOSIS — K59.00 CONSTIPATION, UNSPECIFIED CONSTIPATION TYPE: ICD-10-CM

## 2024-09-24 DIAGNOSIS — Z01.818 PREOP GENERAL PHYSICAL EXAM: ICD-10-CM

## 2024-09-24 DIAGNOSIS — I10 PRIMARY HYPERTENSION: ICD-10-CM

## 2024-09-24 DIAGNOSIS — D50.9 IRON DEFICIENCY ANEMIA, UNSPECIFIED IRON DEFICIENCY ANEMIA TYPE: ICD-10-CM

## 2024-09-24 PROBLEM — S06.5XAA SDH (SUBDURAL HEMATOMA) (H): Status: RESOLVED | Noted: 2022-06-25 | Resolved: 2024-09-24

## 2024-09-24 PROBLEM — D64.9 ANEMIA, UNSPECIFIED: Status: ACTIVE | Noted: 2022-07-01

## 2024-09-24 PROBLEM — E73.9 LACTOSE INTOLERANCE, UNSPECIFIED: Status: ACTIVE | Noted: 2022-07-01

## 2024-09-24 PROBLEM — E55.9 VITAMIN D DEFICIENCY: Status: ACTIVE | Noted: 2023-10-03

## 2024-09-24 PROBLEM — H40.9 GLAUCOMA: Status: ACTIVE | Noted: 2022-07-01

## 2024-09-24 PROBLEM — S06.5X9A TRAUMATIC SUBDURAL HEMORRHAGE WITH LOSS OF CONSCIOUSNESS OF UNSPECIFIED DURATION, INITIAL ENCOUNTER (H): Status: RESOLVED | Noted: 2024-01-30 | Resolved: 2024-09-24

## 2024-09-24 LAB
ANION GAP SERPL CALCULATED.3IONS-SCNC: 8 MMOL/L (ref 7–15)
BUN SERPL-MCNC: 22.2 MG/DL (ref 8–23)
CALCIUM SERPL-MCNC: 9.8 MG/DL (ref 8.8–10.4)
CHLORIDE SERPL-SCNC: 103 MMOL/L (ref 98–107)
CREAT SERPL-MCNC: 0.95 MG/DL (ref 0.51–0.95)
EGFRCR SERPLBLD CKD-EPI 2021: 58 ML/MIN/1.73M2
ERYTHROCYTE [DISTWIDTH] IN BLOOD BY AUTOMATED COUNT: 22.5 % (ref 10–15)
GLUCOSE SERPL-MCNC: 75 MG/DL (ref 70–99)
HCO3 SERPL-SCNC: 28 MMOL/L (ref 22–29)
HCT VFR BLD AUTO: 34.8 % (ref 35–47)
HGB BLD-MCNC: 10.1 G/DL (ref 11.7–15.7)
MCH RBC QN AUTO: 22.2 PG (ref 26.5–33)
MCHC RBC AUTO-ENTMCNC: 29 G/DL (ref 31.5–36.5)
MCV RBC AUTO: 77 FL (ref 78–100)
PLATELET # BLD AUTO: 270 10E3/UL (ref 150–450)
POTASSIUM SERPL-SCNC: 4.7 MMOL/L (ref 3.4–5.3)
RBC # BLD AUTO: 4.55 10E6/UL (ref 3.8–5.2)
SODIUM SERPL-SCNC: 139 MMOL/L (ref 135–145)
WBC # BLD AUTO: 4.2 10E3/UL (ref 4–11)

## 2024-09-24 PROCEDURE — 85027 COMPLETE CBC AUTOMATED: CPT | Performed by: NURSE PRACTITIONER

## 2024-09-24 PROCEDURE — 80048 BASIC METABOLIC PNL TOTAL CA: CPT | Performed by: NURSE PRACTITIONER

## 2024-09-24 PROCEDURE — 36415 COLL VENOUS BLD VENIPUNCTURE: CPT | Performed by: NURSE PRACTITIONER

## 2024-09-24 PROCEDURE — 99214 OFFICE O/P EST MOD 30 MIN: CPT | Performed by: NURSE PRACTITIONER

## 2024-09-24 NOTE — LETTER
September 25, 2024      Sarah Vega  [address]  [place] [address]        Dear ,    We are writing to inform you of your test results.    Your kidney function is slightly abnormal, GFR is a tad low though this has improved from last check and electrolytes are normal. Dehydration, too little blood flow to the kidneys may cause low GFR. No interventions for now, will continue to monitor.     Your blood count has improved from last check. .  No abnormalities suggesting infection or other problems.   Your hemoglobin was low indicating anemia.  Anemia can cause fatigue and, occasionally, light-headedness.This is usually caused by an iron deficiency.  A diet higher in iron rich foods such as lean red meat and green, leafy vegetables is good for this.  A daily iron supplement may be useful in improving this too.    Resulted Orders   Basic metabolic panel  (Ca, Cl, CO2, Creat, Gluc, K, Na, BUN)   Result Value Ref Range    Sodium 139 135 - 145 mmol/L    Potassium 4.7 3.4 - 5.3 mmol/L    Chloride 103 98 - 107 mmol/L    Carbon Dioxide (CO2) 28 22 - 29 mmol/L    Anion Gap 8 7 - 15 mmol/L    Urea Nitrogen 22.2 8.0 - 23.0 mg/dL    Creatinine 0.95 0.51 - 0.95 mg/dL    GFR Estimate 58 (L) >60 mL/min/1.73m2      Comment:      eGFR calculated using 2021 CKD-EPI equation.    Calcium 9.8 8.8 - 10.4 mg/dL      Comment:      Reference intervals for this test were updated on 7/16/2024 to reflect our healthy population more accurately. There may be differences in the flagging of prior results with similar values performed with this method. Those prior results can be interpreted in the context of the updated reference intervals.    Glucose 75 70 - 99 mg/dL   CBC with platelets   Result Value Ref Range    WBC Count 4.2 4.0 - 11.0 10e3/uL    RBC Count 4.55 3.80 - 5.20 10e6/uL    Hemoglobin 10.1 (L) 11.7 - 15.7 g/dL    Hematocrit 34.8 (L) 35.0 - 47.0 %    MCV 77 (L) 78 - 100 fL    MCH 22.2 (L) 26.5 - 33.0 pg     MCHC 29.0 (L) 31.5 - 36.5 g/dL    RDW 22.5 (H) 10.0 - 15.0 %    Platelet Count 270 150 - 450 10e3/uL       If you have any questions or concerns, please call the clinic at the number listed above.       Sincerely,      HERO Avendano CNP

## 2024-09-24 NOTE — PATIENT INSTRUCTIONS
How to Take Your Medication Before Surgery  Preoperative Medication Instructions   Antiplatelet or Anticoagulation Medication Instructions   - Patient is on no antiplatelet or anticoagulation medications.    Additional Medication Instructions  Patient is on no additional chronic medications   - Herbal medications and vitamins: DO NOT TAKE 14 days prior to surgery.

## 2024-09-24 NOTE — PROGRESS NOTES
Preoperative Evaluation  [address] [address]  Phone: [phone]  Primary Provider: Kiana Camarena MD  Pre-op Performing Provider: HERO Avendano CNP  Sep 24, 2024             9/24/2024   Surgical Information   What procedure is being done? hernia   Facility or Hospital where procedure/surgery will be performed: [facility]   Who is doing the procedure / surgery? doctor   Date of surgery / procedure: sep 27   Time of surgery / procedure: 1030 am   Where do you plan to recover after surgery? at home with family        Fax number for surgical facility: Note does not need to be faxed, will be available electronically in Epic.    Assessment & Plan     The proposed surgical procedure is considered INTERMEDIATE risk.    Unilateral recurrent inguinal hernia without obstruction or gangrene  S/p Lap herniorrhaphy in July without success, now needing HERNIORRHAPHY, RECURRENT LEFT INGUINAL, OPEN with mesh on 9/27/24. Ok to proceed with surgery    Preop general physical exam  Denies history of complications from anesthesia. Stable HTN, she is not on medications.  No history of CAD, prior MI, CHF,  CKD, or DM.  Good functional status. Pt is able to walk four blocks or climb two flights of stairs.Patient denies CP, SOB, or palpitations during the last six months.  Stable. No changes in management.  - Basic metabolic panel  (Ca, Cl, CO2, Creat, Gluc, K, Na, BUN); Future  - CBC with platelets; Future    Iron deficiency anemia, unspecified iron deficiency anemia type  - CBC with platelets; Future    Primary hypertension  Controlled, no medications. Does not report any headaches, blurry vision, dizziness, chest pain, shortness of breath, or palpitations.    Constipation, unspecified constipation type  No current issues.      - No identified additional risk factors other than previously addressed    Preoperative Medication  Instructions  Antiplatelet or Anticoagulation Medication Instructions   - Patient is on no antiplatelet or anticoagulation medications.    Additional Medication Instructions  Patient is on no additional chronic medications   - Herbal medications and vitamins: DO NOT TAKE 14 days prior to surgery.    Recommendation  Approval given to proceed with proposed procedure, without further diagnostic evaluation.    Kalani Smith is a 84 year old, presenting for the following:  Pre-Op Exam          9/24/2024     3:25 PM   Additional Questions   Roomed by Martinez   Accompanied by son         9/24/2024     3:25 PM   Patient Reported Additional Medications   Patient reports taking the following new medications no     HPI related to upcoming procedure: Unilateral recurrent inguinal hernia without obstruction or gangrene        9/24/2024   Pre-Op Questionnaire   Have you ever had a heart attack or stroke? No   Have you ever had surgery on your heart or blood vessels, such as a stent placement, a coronary artery bypass, or surgery on an artery in your head, neck, heart, or legs? No   Do you have chest pain with activity? No   Do you have a history of heart failure? No   Do you currently have a cold, bronchitis or symptoms of other infection? No   Do you have a cough, shortness of breath, or wheezing? No   Do you or anyone in your family have previous history of blood clots? No   Do you or does anyone in your family have a serious bleeding problem such as prolonged bleeding following surgeries or cuts? No   Have you ever had problems with anemia or been told to take iron pills? (!) YES    Have you had any abnormal blood loss such as black, tarry or bloody stools, or abnormal vaginal bleeding? No   Have you ever had a blood transfusion? No   Are you willing to have a blood transfusion if it is medically needed before, during, or after your surgery? Yes   Have you or any of your relatives ever had problems with anesthesia? No   Do  you have sleep apnea, excessive snoring or daytime drowsiness? No   Do you have any artifical heart valves or other implanted medical devices like a pacemaker, defibrillator, or continuous glucose monitor? No   Do you have artificial joints? No   Are you allergic to latex? No        Health Care Directive  Patient does not have a Health Care Directive or Living Will: Discussed advance care planning with patient; however, patient declined at this time.    Preoperative Review of    reviewed - no record of controlled substances prescribed.    PDMP Review         Value Time User    State PDMP site checked  Yes 9/24/2024  4:37 PM Genevieve Mahoney APRN CNP                Patient Active Problem List    Diagnosis Date Noted    Unilateral recurrent inguinal hernia without obstruction or gangrene 07/29/2024     Priority: Medium    Mixed stress and urge urinary incontinence 02/26/2024     Priority: Medium    Traumatic subdural hemorrhage with loss of consciousness of unspecified duration, initial encounter (H) 01/30/2024     Priority: Medium    Closed intertrochanteric fracture of right femur with routine healing 06/25/2022     Priority: Medium    Closed fracture of fifth thoracic vertebra with routine healing 06/25/2022     Priority: Medium    Closed fracture of second cervical vertebra with routine healing 06/25/2022     Priority: Medium    Fall at home, initial encounter 06/25/2022     Priority: Medium    SDH (subdural hematoma) (H) 06/25/2022     Priority: Medium    Bilateral posterior capsular opacification 10/29/2018     Priority: Medium    Dermatochalasis of eyelid 10/29/2018     Priority: Medium    Calculus of gallbladder without cholecystitis without obstruction 07/26/2018     Priority: Medium    Left inguinal hernia 07/26/2018     Priority: Medium    Primary osteoarthritis of both knees 09/20/2017     Priority: Medium    Gastroesophageal reflux disease without esophagitis 09/20/2017     Priority: Medium     Osteoarthritis of left thumb 04/10/2015     Priority: Medium    Sliding hiatal hernia 04/18/2013     Priority: Medium    Iron deficiency anemia 12/27/2012     Priority: Medium    Dysphagia 01/29/2012     Priority: Medium    Abnormal Pap smear 05/18/2011     Priority: Medium    Osteoporosis 03/28/2011     Priority: Medium    Right lumbar radiculopathy 02/23/2011     Priority: Medium    CARDIOVASCULAR SCREENING; LDL GOAL LESS THAN 160 10/31/2010     Priority: Medium      Past Medical History:   Diagnosis Date    Anemia     Arthritis     OSTEO    Left knee pain 2/23/2011     Past Surgical History:   Procedure Laterality Date    CATARACT IOL, RT/LT      COLONOSCOPY  1/29/2013    Procedure: COLONOSCOPY;;  Surgeon: Edgard Prieto MD;  Location: [facility] OR    DAVINCI XI HERNIORRHAPHY INGUINAL Bilateral 7/18/2024    Procedure: HERNIORRHAPHY, INGUINAL, ROBOT-ASSISTED, LAPAROSCOPIC, USING DA EUSEBIA XI bilateral;  Surgeon: Ray Diaz MD;  Location: [facility] OR    ESOPHAGOSCOPY, GASTROSCOPY, DUODENOSCOPY (EGD), COMBINED  1/20/2012    Procedure:COMBINED ESOPHAGOSCOPY, GASTROSCOPY, DUODENOSCOPY (EGD); EGD, dysphagia; Surgeon:SHEREE VALLE; Location:[facility] OR    ESOPHAGOSCOPY, GASTROSCOPY, DUODENOSCOPY (EGD), COMBINED  7/18/2012    Procedure: COMBINED ESOPHAGOSCOPY, GASTROSCOPY, DUODENOSCOPY (EGD);  EGD for dysphagia rp Tonio;  Surgeon: Rey Bee MD;  Location: [facility] OR    ESOPHAGOSCOPY, GASTROSCOPY, DUODENOSCOPY (EGD), COMBINED  1/29/2013    Procedure: COMBINED ESOPHAGOSCOPY, GASTROSCOPY, DUODENOSCOPY (EGD), BIOPSY SINGLE OR MULTIPLE;  Colonoscopy, EGD, dysphagia;  Surgeon: Edgard Preito MD;  Location: [facility] OR    INCISION AND DRAINAGE LOWER EXTREMITY, COMBINED Left 3/21/2018    Procedure: COMBINED INCISION AND DRAINAGE LOWER EXTREMITY;  Excision of left intramuscular thigh mass; removal of anterior femoral bone spur;  Surgeon: Ray Diaz MD;  Location: [facility] OR     Current Outpatient Medications   Medication  Sig Dispense Refill    acetaminophen (TYLENOL) 500 MG tablet Take 1-2 tablets (500-1,000 mg) by mouth 3 times daily 180 tablet 3    CALCIUM CARB-CHOLECALCIFEROL 600-400 MG-UNIT per tablet TAKE 1 TABLET BY MOUTH TWICE A DAY 60 tablet 11    calcium carbonate 600 mg-vitamin D 400 units (CALTRATE) 600-400 MG-UNIT per tablet Take 1 tablet by mouth 2 times daily 180 tablet 3    capsaicin (ZOSTRIX) 0.025 % external cream Apply 1 g topically 3 times daily 50 g 0    ciclopirox (PENLAC) 8 % external solution Apply to adjacent skin and affected nails daily.  Remove with alcohol every 7 days, then repeat. 6 mL 0    cyanocobalamin (VITAMIN B-12) 1000 MCG tablet TAKE 1 TABLET BY MOUTH EVERY DAY 90 tablet 2    ferrous gluconate (FERGON) 324 (38 Fe) MG tablet Take 1 tablet (324 mg) by mouth daily (with breakfast) 90 tablet 0    lactase (LACTAID) 3000 UNIT tablet One tablet orally with first bite of food containing dairy 90 tablet 3    omeprazole (PRILOSEC) 20 MG DR capsule TAKE 1 CAPSULE BY MOUTH EVERY DAY 90 capsule 1    ORDER FOR DME Pull up briefs for incontinence of urine change 2 to 3 times daily 1 Container 11    oxyCODONE (ROXICODONE) 5 MG tablet Take 1-2 tablets (5-10 mg) by mouth every 4 hours as needed for moderate to severe pain 10 tablet 0    salicylic acid (MEDIPLAST) 40 % miscellaneous Cut and apply on the wart every 48 hours for 4 weeks, until the wart is cleared 25 each 0    SENEXON-S 8.6-50 MG tablet TAKE 1 TABLET BY MOUTH DAILY AS NEEDED FOR CONSTIPATION. 90 tablet 0    simethicone (MYLICON) 125 MG chewable tablet Take 1 tablet (125 mg) by mouth 2 times daily as needed for intestinal gas 180 tablet 3    tiZANidine (ZANAFLEX) 2 MG tablet TAKE 1 TABLET (2 MG) BY MOUTH NIGHTLY AS NEEDED FOR MUSCLE SPASM 90 tablet 0    VITAMIN D, CHOLECALCIFEROL, PO Take by mouth daily      vitamin D3 (CHOLECALCIFEROL) 25 mcg (30181 units) capsule Take 1 capsule (250 mcg) by mouth daily 90 capsule 3    vitamin D3 (CHOLECALCIFEROL) 50  "mcg (2000 units) tablet TAKE 1 TABLET BY MOUTH EVERY DAY 90 tablet 0       Allergies   Allergen Reactions    Ibuprofen      Stomach upset        Social History     Tobacco Use    Smoking status: Never     Passive exposure: Never    Smokeless tobacco: Never   Substance Use Topics    Alcohol use: No       History   Drug Use No             Review of Systems  Constitutional, HEENT, cardiovascular, pulmonary, GI, , musculoskeletal, neuro, skin, endocrine and psych systems are negative, except as otherwise noted.    Objective    BP (!) 150/72 (BP Location: Left arm, Patient Position: Sitting, Cuff Size: Adult Regular)   Pulse 79   Temp 98.5  F (36.9  C) (Temporal)   Resp 16   Ht 1.321 m (4' 4\")   Wt 35.2 kg (77 lb 9.6 oz)   LMP  (LMP Unknown)   SpO2 100%   BMI 20.18 kg/m     Estimated body mass index is 20.18 kg/m  as calculated from the following:    Height as of this encounter: 1.321 m (4' 4\").    Weight as of this encounter: 35.2 kg (77 lb 9.6 oz).    Physical Exam  GENERAL: alert and no distress  EYES: Eyes grossly normal to inspection, PERRL and conjunctivae and sclerae normal  HENT: ear canals and TM's normal, nose and mouth without ulcers or lesions  NECK: no adenopathy, no asymmetry, masses, or scars  RESP: lungs clear to auscultation - no rales, rhonchi or wheezes  CV: regular rate and rhythm, normal S1 S2, no S3 or S4, no murmur, click or rub, no peripheral edema  ABDOMEN: soft, nontender, no hepatosplenomegaly, no masses and bowel sounds normal  MS: no gross musculoskeletal defects noted, no edema  SKIN: no suspicious lesions or rashes  NEURO: Normal strength and tone, mentation intact and speech normal  PSYCH: mentation appears normal, affect normal/bright    Recent Labs   Lab Test 07/09/24  1709 09/26/23  1451   HGB 8.3* 9.1*    289    140   POTASSIUM 4.5 4.1   CR 1.08* 0.92        Diagnostics  Recent Results (from the past 24 hour(s))   Basic metabolic panel  (Ca, Cl, CO2, Creat, Gluc, " K, Na, BUN)    Collection Time: 09/24/24  4:12 PM   Result Value Ref Range    Sodium 139 135 - 145 mmol/L    Potassium 4.7 3.4 - 5.3 mmol/L    Chloride 103 98 - 107 mmol/L    Carbon Dioxide (CO2) 28 22 - 29 mmol/L    Anion Gap 8 7 - 15 mmol/L    Urea Nitrogen 22.2 8.0 - 23.0 mg/dL    Creatinine 0.95 0.51 - 0.95 mg/dL    GFR Estimate 58 (L) >60 mL/min/1.73m2    Calcium 9.8 8.8 - 10.4 mg/dL    Glucose 75 70 - 99 mg/dL   CBC with platelets    Collection Time: 09/24/24  4:12 PM   Result Value Ref Range    WBC Count 4.2 4.0 - 11.0 10e3/uL    RBC Count 4.55 3.80 - 5.20 10e6/uL    Hemoglobin 10.1 (L) 11.7 - 15.7 g/dL    Hematocrit 34.8 (L) 35.0 - 47.0 %    MCV 77 (L) 78 - 100 fL    MCH 22.2 (L) 26.5 - 33.0 pg    MCHC 29.0 (L) 31.5 - 36.5 g/dL    RDW 22.5 (H) 10.0 - 15.0 %    Platelet Count 270 150 - 450 10e3/uL      No EKG this visit, completed in the last 90 days., NSR    Revised Cardiac Risk Index (RCRI)  The patient has the following serious cardiovascular risks for perioperative complications:   - No serious cardiac risks = 0 points     RCRI Interpretation: 0 points: Class I (very low risk - 0.4% complication rate)       Signed Electronically by: HERO Avendano CNP  A copy of this evaluation report is provided to the requesting physician.

## 2024-09-26 ENCOUNTER — ANESTHESIA EVENT (OUTPATIENT)
Dept: SURGERY | Facility: AMBULATORY SURGERY CENTER | Age: 84
End: 2024-09-26
Payer: COMMERCIAL

## 2024-09-27 ENCOUNTER — HOSPITAL ENCOUNTER (OUTPATIENT)
Facility: AMBULATORY SURGERY CENTER | Age: 84
Discharge: HOME | End: 2024-09-27
Attending: SURGERY | Admitting: SURGERY
Payer: COMMERCIAL

## 2024-09-27 ENCOUNTER — MEDICAL CORRESPONDENCE (OUTPATIENT)
Dept: HEALTH INFORMATION MANAGEMENT | Facility: CLINIC | Age: 84
End: 2024-09-27

## 2024-09-27 ENCOUNTER — ANESTHESIA (OUTPATIENT)
Dept: SURGERY | Facility: AMBULATORY SURGERY CENTER | Age: 84
End: 2024-09-27
Payer: COMMERCIAL

## 2024-09-27 VITALS
OXYGEN SATURATION: 97 % | DIASTOLIC BLOOD PRESSURE: 85 MMHG | HEART RATE: 83 BPM | RESPIRATION RATE: 16 BRPM | TEMPERATURE: 97.4 F | SYSTOLIC BLOOD PRESSURE: 153 MMHG

## 2024-09-27 DIAGNOSIS — K40.91 UNILATERAL RECURRENT INGUINAL HERNIA WITHOUT OBSTRUCTION OR GANGRENE: Primary | ICD-10-CM

## 2024-09-27 DIAGNOSIS — K40.90 LEFT INGUINAL HERNIA: ICD-10-CM

## 2024-09-27 PROCEDURE — 49520 REREPAIR ING HERNIA REDUCE: CPT | Mod: LT

## 2024-09-27 PROCEDURE — 88302 TISSUE EXAM BY PATHOLOGIST: CPT | Performed by: PATHOLOGY

## 2024-09-27 PROCEDURE — G8918 PT W/O PREOP ORDER IV AB PRO: HCPCS

## 2024-09-27 PROCEDURE — G8907 PT DOC NO EVENTS ON DISCHARG: HCPCS

## 2024-09-27 PROCEDURE — 49520 REREPAIR ING HERNIA REDUCE: CPT | Mod: 78 | Performed by: SURGERY

## 2024-09-27 PROCEDURE — G8916 PT W IV AB GIVEN ON TIME: HCPCS

## 2024-09-27 RX ORDER — ONDANSETRON 4 MG/1
4 TABLET, ORALLY DISINTEGRATING ORAL EVERY 30 MIN PRN
Status: DISCONTINUED | OUTPATIENT
Start: 2024-09-27 | End: 2024-09-28 | Stop reason: HOSPADM

## 2024-09-27 RX ORDER — BUPIVACAINE HYDROCHLORIDE AND EPINEPHRINE 5; 5 MG/ML; UG/ML
INJECTION, SOLUTION PERINEURAL PRN
Status: DISCONTINUED | OUTPATIENT
Start: 2024-09-27 | End: 2024-09-27 | Stop reason: HOSPADM

## 2024-09-27 RX ORDER — PROPOFOL 10 MG/ML
INJECTION, EMULSION INTRAVENOUS CONTINUOUS PRN
Status: DISCONTINUED | OUTPATIENT
Start: 2024-09-27 | End: 2024-09-27

## 2024-09-27 RX ORDER — NALOXONE HYDROCHLORIDE 0.4 MG/ML
0.1 INJECTION, SOLUTION INTRAMUSCULAR; INTRAVENOUS; SUBCUTANEOUS
Status: DISCONTINUED | OUTPATIENT
Start: 2024-09-27 | End: 2024-09-28 | Stop reason: HOSPADM

## 2024-09-27 RX ORDER — EPHEDRINE SULFATE 50 MG/ML
INJECTION, SOLUTION INTRAMUSCULAR; INTRAVENOUS; SUBCUTANEOUS PRN
Status: DISCONTINUED | OUTPATIENT
Start: 2024-09-27 | End: 2024-09-27

## 2024-09-27 RX ORDER — DEXAMETHASONE SODIUM PHOSPHATE 4 MG/ML
4 INJECTION, SOLUTION INTRA-ARTICULAR; INTRALESIONAL; INTRAMUSCULAR; INTRAVENOUS; SOFT TISSUE
Status: DISCONTINUED | OUTPATIENT
Start: 2024-09-27 | End: 2024-09-28 | Stop reason: HOSPADM

## 2024-09-27 RX ORDER — CEFAZOLIN SODIUM 2 G/50ML
2 SOLUTION INTRAVENOUS
Status: COMPLETED | OUTPATIENT
Start: 2024-09-27 | End: 2024-09-27

## 2024-09-27 RX ORDER — ONDANSETRON 2 MG/ML
INJECTION INTRAMUSCULAR; INTRAVENOUS PRN
Status: DISCONTINUED | OUTPATIENT
Start: 2024-09-27 | End: 2024-09-27

## 2024-09-27 RX ORDER — FENTANYL CITRATE 50 UG/ML
INJECTION, SOLUTION INTRAMUSCULAR; INTRAVENOUS PRN
Status: DISCONTINUED | OUTPATIENT
Start: 2024-09-27 | End: 2024-09-27

## 2024-09-27 RX ORDER — SODIUM CHLORIDE, SODIUM LACTATE, POTASSIUM CHLORIDE, CALCIUM CHLORIDE 600; 310; 30; 20 MG/100ML; MG/100ML; MG/100ML; MG/100ML
INJECTION, SOLUTION INTRAVENOUS CONTINUOUS
Status: DISCONTINUED | OUTPATIENT
Start: 2024-09-27 | End: 2024-09-28 | Stop reason: HOSPADM

## 2024-09-27 RX ORDER — ACETAMINOPHEN 325 MG/1
975 TABLET ORAL ONCE
Status: COMPLETED | OUTPATIENT
Start: 2024-09-27 | End: 2024-09-27

## 2024-09-27 RX ORDER — LIDOCAINE 40 MG/G
CREAM TOPICAL
Status: DISCONTINUED | OUTPATIENT
Start: 2024-09-27 | End: 2024-09-28 | Stop reason: HOSPADM

## 2024-09-27 RX ORDER — GLYCOPYRROLATE 0.2 MG/ML
INJECTION, SOLUTION INTRAMUSCULAR; INTRAVENOUS PRN
Status: DISCONTINUED | OUTPATIENT
Start: 2024-09-27 | End: 2024-09-27

## 2024-09-27 RX ORDER — OXYCODONE HYDROCHLORIDE 5 MG/1
5 TABLET ORAL
Status: COMPLETED | OUTPATIENT
Start: 2024-09-27 | End: 2024-09-27

## 2024-09-27 RX ORDER — ONDANSETRON 2 MG/ML
4 INJECTION INTRAMUSCULAR; INTRAVENOUS EVERY 30 MIN PRN
Status: DISCONTINUED | OUTPATIENT
Start: 2024-09-27 | End: 2024-09-28 | Stop reason: HOSPADM

## 2024-09-27 RX ORDER — OXYCODONE HYDROCHLORIDE 5 MG/1
5 TABLET ORAL
Status: DISCONTINUED | OUTPATIENT
Start: 2024-09-27 | End: 2024-09-28 | Stop reason: HOSPADM

## 2024-09-27 RX ORDER — DEXAMETHASONE SODIUM PHOSPHATE 4 MG/ML
INJECTION, SOLUTION INTRA-ARTICULAR; INTRALESIONAL; INTRAMUSCULAR; INTRAVENOUS; SOFT TISSUE PRN
Status: DISCONTINUED | OUTPATIENT
Start: 2024-09-27 | End: 2024-09-27

## 2024-09-27 RX ORDER — FENTANYL CITRATE 50 UG/ML
25 INJECTION, SOLUTION INTRAMUSCULAR; INTRAVENOUS EVERY 5 MIN PRN
Status: DISCONTINUED | OUTPATIENT
Start: 2024-09-27 | End: 2024-09-28 | Stop reason: HOSPADM

## 2024-09-27 RX ORDER — LIDOCAINE HYDROCHLORIDE 20 MG/ML
INJECTION, SOLUTION INFILTRATION; PERINEURAL PRN
Status: DISCONTINUED | OUTPATIENT
Start: 2024-09-27 | End: 2024-09-27

## 2024-09-27 RX ORDER — FENTANYL CITRATE 50 UG/ML
50 INJECTION, SOLUTION INTRAMUSCULAR; INTRAVENOUS EVERY 5 MIN PRN
Status: DISCONTINUED | OUTPATIENT
Start: 2024-09-27 | End: 2024-09-28 | Stop reason: HOSPADM

## 2024-09-27 RX ORDER — OXYCODONE HYDROCHLORIDE 5 MG/1
10 TABLET ORAL
Status: DISCONTINUED | OUTPATIENT
Start: 2024-09-27 | End: 2024-09-28 | Stop reason: HOSPADM

## 2024-09-27 RX ORDER — PROPOFOL 10 MG/ML
INJECTION, EMULSION INTRAVENOUS PRN
Status: DISCONTINUED | OUTPATIENT
Start: 2024-09-27 | End: 2024-09-27

## 2024-09-27 RX ORDER — CEFAZOLIN SODIUM 2 G/50ML
2 SOLUTION INTRAVENOUS SEE ADMIN INSTRUCTIONS
Status: DISCONTINUED | OUTPATIENT
Start: 2024-09-27 | End: 2024-09-28 | Stop reason: HOSPADM

## 2024-09-27 RX ORDER — OXYCODONE HYDROCHLORIDE 5 MG/1
5-10 TABLET ORAL EVERY 4 HOURS PRN
Qty: 10 TABLET | Refills: 0 | Status: SHIPPED | OUTPATIENT
Start: 2024-09-27

## 2024-09-27 RX ADMIN — PROPOFOL 100 MG: 10 INJECTION, EMULSION INTRAVENOUS at 11:56

## 2024-09-27 RX ADMIN — SODIUM CHLORIDE, SODIUM LACTATE, POTASSIUM CHLORIDE, CALCIUM CHLORIDE: 600; 310; 30; 20 INJECTION, SOLUTION INTRAVENOUS at 11:50

## 2024-09-27 RX ADMIN — LIDOCAINE HYDROCHLORIDE 60 MG: 20 INJECTION, SOLUTION INFILTRATION; PERINEURAL at 11:56

## 2024-09-27 RX ADMIN — OXYCODONE HYDROCHLORIDE 5 MG: 5 TABLET ORAL at 13:39

## 2024-09-27 RX ADMIN — ACETAMINOPHEN 975 MG: 325 TABLET ORAL at 11:22

## 2024-09-27 RX ADMIN — DEXAMETHASONE SODIUM PHOSPHATE 4 MG: 4 INJECTION, SOLUTION INTRA-ARTICULAR; INTRALESIONAL; INTRAMUSCULAR; INTRAVENOUS; SOFT TISSUE at 12:03

## 2024-09-27 RX ADMIN — Medication 100 MCG: at 12:39

## 2024-09-27 RX ADMIN — Medication 10 MG: at 11:56

## 2024-09-27 RX ADMIN — Medication 50 MCG: at 12:12

## 2024-09-27 RX ADMIN — Medication 100 MCG: at 12:48

## 2024-09-27 RX ADMIN — Medication 100 MCG: at 11:58

## 2024-09-27 RX ADMIN — PROPOFOL 100 MCG/KG/MIN: 10 INJECTION, EMULSION INTRAVENOUS at 11:56

## 2024-09-27 RX ADMIN — FENTANYL CITRATE 50 MCG: 50 INJECTION, SOLUTION INTRAMUSCULAR; INTRAVENOUS at 11:56

## 2024-09-27 RX ADMIN — CEFAZOLIN SODIUM 2 G: 2 SOLUTION INTRAVENOUS at 11:50

## 2024-09-27 RX ADMIN — ONDANSETRON 4 MG: 2 INJECTION INTRAMUSCULAR; INTRAVENOUS at 12:03

## 2024-09-27 RX ADMIN — EPHEDRINE SULFATE 5 MG: 50 INJECTION, SOLUTION INTRAMUSCULAR; INTRAVENOUS; SUBCUTANEOUS at 12:19

## 2024-09-27 RX ADMIN — Medication 100 MCG: at 12:33

## 2024-09-27 RX ADMIN — GLYCOPYRROLATE 0.1 MG: 0.2 INJECTION, SOLUTION INTRAMUSCULAR; INTRAVENOUS at 12:13

## 2024-09-27 RX ADMIN — Medication 50 MCG: at 12:18

## 2024-09-27 NOTE — ANESTHESIA PROCEDURE NOTES
Airway       Patient location during procedure: OR       Procedure Start/Stop Times: 9/27/2024 11:59 AM  Staff -        Performed By: CRNAIndications and Patient Condition       Indications for airway management: wero-procedural       Induction type:intravenous       Mask difficulty assessment: 1 - vent by mask    Final Airway Details       Final airway type: endotracheal airway       Successful airway: ETT - single and Oral  Endotracheal Airway Details        ETT size (mm): 6.0       Cuffed: yes       Cuff volume (mL): 2       Successful intubation technique: direct laryngoscopy       DL Blade Type: MAC 3       Grade View of Cords: 1       Adjucts: stylet       Position: Right       Measured from: gums/teeth       Secured at (cm): 19    Post intubation assessment        Placement verified by: capnometry, equal breath sounds and chest rise        Number of attempts at approach: 1       Secured with: tape       Ease of procedure: easy       Dentition: Intact and Unchanged    Medication(s) Administered   Medication Administration Time: 9/27/2024 11:59 AM

## 2024-09-27 NOTE — DISCHARGE INSTRUCTIONS
Tylenol 975 mg was given at 11:22 AM. You can take another dose after 5:30 PM.  You should not take more then 4,000 mg of tylenol/acetaminophen in a 24 hour period.

## 2024-09-27 NOTE — OP NOTE
Date of Service: 9/27/2024     STAFF SURGEON:  aRy Diaz MD     ASSISTANT:  None.     PREOPERATIVE DIAGNOSIS: Recurrent left inguinal hernia     POSTOPERATIVE DIAGNOSIS: Postoperative hematoma     NAME OF PROCEDURE(S): Open repair of recurrent left inguinal hernia with mesh     INDICATIONS FOR PROCEDURE:  The patient is a 84-year-old female who underwent a robotic bilateral inguinal hernia repair with me back in July.  On postoperative exam there was rounded mass in the groin that felt most consistent with an early recurrence containing bowel though she was without any obstructive symptoms.  I offered an open approach to repair.  Risks, benefits and alternatives discussed and consent obtained.     EBL: 3 cc    ANESTHESIA: General    COMPLICATIONS: None     DRAINS:  None.     SPECIMENS:     ID Type Source Tests Collected by Time Destination   1 : Left inguinal hernia sac, hematoma and ileoinguinal nerve Tissue Hernia Sac, Inguinal, Left SURGICAL PATHOLOGY EXAM Ray Diaz MD 9/27/2024 12:34 PM         IMPLANTS: * No implants in log *     OPERATIVE FINDINGS: It appeared that the egg sized mass in the groin was hematoma within the previous hernia sac with thickened wall.  No new defects identified.     PROCEDURE DETAIL:  Following consent, the patient was brought from the preoperative holding area to the operating suite and laid in supine position.  She underwent general anesthesia with endotracheal ovation without difficulties.  The left groin was prepped and draped in usual fashion and timeout performed.  Antibiotics given.   I began with injection of local along the incision area then made a 4 cm transverse incision over the left groin with 15 blade then dissected through subcutaneous tissues and Zhang's fascia to the external oblique aponeurosis with cautery.  I opened the aponeurosis parallel to the inguinal ligament towards the pubic tubercle with Metzenbaums scissors.  I found the left  ilioinguinal nerve crossing this path and I decided to resect this.  I used some blunt finger dissection underneath the aponeurosis to create a space for future mesh placement.  I then dissected inferiorly around this mass extending down towards the left labia which was firm.  I was able to mobilize this to deliver through the wound.  This appeared as thickened hernia sac which was opened revealing old hematoma fluid.  There was no bowel in this appeared sealed down at the root coming from the inguinal canal.  I resected this at the level of the inguinal floor and passed it off for pathology.  The round ligament had previously been divided from the previous repair so there were no further structures extending from the canal.  I then used a running 2-0 Vicryl suture to close some of the inguinal floor over the end of the resected hematoma.  I then placed a 12 x 8 cm left-sided formed ProGrip mesh along the inguinal ligament and floor extending up medially and superiorly towards the ASIS under the aponeurosis.  The external oblique was reapproximated with running 2-0 Vicryl.  Zhang's was reapproximated with running 3-0 Vicryl followed by closure of skin with running subcuticular 4-0 Monocryl.  The incision was dressed with skin glue.  Further local injected in the area using a total of 20 cc of half percent Marcaine with epinephrine 1-200,000.  Final counts complete.  Patient tolerated procedure well.  Plans for discharge home following recovery.           Ray Diaz MD

## 2024-09-27 NOTE — ANESTHESIA CARE TRANSFER NOTE
Patient: Sarah Vega    Procedure: Procedure(s):  HERNIORRHAPHY, RECURRENT LEFT INGUINAL, OPEN with mesh       Diagnosis: Unilateral recurrent inguinal hernia without obstruction or gangrene [K40.91]  Diagnosis Additional Information: No value filed.    Anesthesia Type:   No value filed.     Note:      Level of Consciousness: drowsy  Oxygen Supplementation: face mask  Level of Supplemental Oxygen (L/min / FiO2): 5  Independent Airway: airway patency satisfactory and stable  Dentition: dentition unchanged  Vital Signs Stable: post-procedure vital signs reviewed and stable  Report to RN Given: handoff report given  Patient transferred to: PACU    Handoff Report: Identifed the Patient, Identified the Reponsible Provider, Reviewed the pertinent medical history, Discussed the surgical course, Reviewed Intra-OP anesthesia mangement and issues during anesthesia, Set expectations for post-procedure period and Allowed opportunity for questions and acknowledgement of understanding      Vitals:  Vitals Value Taken Time   bp 128/78    Temp 98    Pulse     Resp 16    SpO2 98        Electronically Signed By: HERO Mcgovern CRNA  September 27, 2024  1:04 PM

## 2024-09-27 NOTE — ANESTHESIA POSTPROCEDURE EVALUATION
Patient: Sarah Vega    Procedure: Procedure(s):  HERNIORRHAPHY, RECURRENT LEFT INGUINAL, OPEN with mesh       Anesthesia Type:  General    Note:  Disposition: Outpatient   Postop Pain Control: Uneventful            Sign Out: Well controlled pain   PONV: No   Neuro/Psych: Uneventful            Sign Out: Acceptable/Baseline neuro status   Airway/Respiratory: Uneventful            Sign Out: Acceptable/Baseline resp. status   CV/Hemodynamics: Uneventful            Sign Out: Acceptable CV status; No obvious hypovolemia; No obvious fluid overload   Other NRE: NONE   DID A NON-ROUTINE EVENT OCCUR? No       Last vitals:  Vitals Value Taken Time   /81 09/27/24 1330   Temp 97.4  F (36.3  C) 09/27/24 1303   Pulse 82 09/27/24 1330   Resp 16 09/27/24 1330   SpO2 97 % 09/27/24 1330       Electronically Signed By: Ciara Cano MD  September 27, 2024  3:26 PM

## 2024-09-27 NOTE — ANESTHESIA PREPROCEDURE EVALUATION
Anesthesia Pre-Procedure Evaluation    Patient: Sarah Vega   MRN: [id number] : 1940        Procedure : Procedure(s):  HERNIORRHAPHY, RECURRENT LEFT INGUINAL, OPEN with mesh          Past Medical History:   Diagnosis Date     Anemia      Arthritis     OSTEO     Left knee pain 2011      Past Surgical History:   Procedure Laterality Date     CATARACT IOL, RT/LT       COLONOSCOPY  2013    Procedure: COLONOSCOPY;;  Surgeon: Edgard Prieto MD;  Location: [facility] OR     DAVINCI XI HERNIORRHAPHY INGUINAL Bilateral 2024    Procedure: HERNIORRHAPHY, INGUINAL, ROBOT-ASSISTED, LAPAROSCOPIC, USING DA EUSEBIA XI bilateral;  Surgeon: Ray Diaz MD;  Location: [facility] OR     ESOPHAGOSCOPY, GASTROSCOPY, DUODENOSCOPY (EGD), COMBINED  2012    Procedure:COMBINED ESOPHAGOSCOPY, GASTROSCOPY, DUODENOSCOPY (EGD); EGD, dysphagia; Surgeon:SHEREE VALLE; Location:[facility] OR     ESOPHAGOSCOPY, GASTROSCOPY, DUODENOSCOPY (EGD), COMBINED  2012    Procedure: COMBINED ESOPHAGOSCOPY, GASTROSCOPY, DUODENOSCOPY (EGD);  EGD for dysphagia rp Tonio;  Surgeon: Rey Bee MD;  Location: [facility] OR     ESOPHAGOSCOPY, GASTROSCOPY, DUODENOSCOPY (EGD), COMBINED  2013    Procedure: COMBINED ESOPHAGOSCOPY, GASTROSCOPY, DUODENOSCOPY (EGD), BIOPSY SINGLE OR MULTIPLE;  Colonoscopy, EGD, dysphagia;  Surgeon: Edgard Prieto MD;  Location: [facility]     INCISION AND DRAINAGE LOWER EXTREMITY, COMBINED Left 3/21/2018    Procedure: COMBINED INCISION AND DRAINAGE LOWER EXTREMITY;  Excision of left intramuscular thigh mass; removal of anterior femoral bone spur;  Surgeon: Ray Diaz MD;  Location: [facility]      Allergies   Allergen Reactions     Ibuprofen      Stomach upset      Social History     Tobacco Use     Smoking status: Never     Passive exposure: Never     Smokeless tobacco: Never   Substance Use Topics     Alcohol use: No      Wt Readings from Last 1 Encounters:   24 35.2 kg (77 lb 9.6 oz)         Anesthesia Evaluation   Pt has had prior anesthetic. Type: General.    No history of anesthetic complications       ROS/MED HX  ENT/Pulmonary:  - neg pulmonary ROS     Neurologic: Comment: SDH from mechanical fall on 06/25/22; had transient LOC, no residual neuro deficits.      Cardiovascular:     (+)  hypertension- -   -  - -                                 Previous cardiac testing   Echo: Date: 02/22/24 Results:  Interpretation Summary     Normal, low-risk dobutamine echocardiogram without evidence of ischemia.  The target heart rate was achieved.  Normal biventricular size, thickness, and global systolic function at  baseline, LVEF=55-60%.  With low-dose dobutamine, LVEF augmented and LV cavity size decreased  appropriately.  With peak dobutamine, LVEF increased further to >60% and LV cavity size  decreased appropriately.  No regional wall motion abnormalities at rest or with dobutamine.  No angina was elicited.  No ECG evidence of ischemia. Normal heart rate and blood pressure response to  dobutamine.  The aortic root and visualized ascending aorta are normal.    Stress Test:  Date: Results:    ECG Reviewed:  Date: Results:    Cath:  Date: Results:      METS/Exercise Tolerance: 3 - Able to walk 1-2 blocks without stopping    Hematologic:     (+)      anemia,          Musculoskeletal: Comment: Mechanical fall on 06/25/22 resulting in small SDH, 1rib/T5 and C2 fractures and right hip fracture  (+)  arthritis,             GI/Hepatic: Comment: Recurrent inguinal hernia    (+) GERD, Symptomatic, esophageal disease,   hiatal hernia,              Renal/Genitourinary:     (+) renal disease, type: CRI,            Endo:       Psychiatric/Substance Use:       Infectious Disease:       Malignancy:       Other:            Physical Exam    Airway        Mallampati: II   TM distance: > 3 FB   Neck ROM: limited   Mouth opening: > 3 cm    Respiratory Devices and Support         Dental       (+) Multiple crowns, permanant  "mary      Cardiovascular   cardiovascular exam normal       Rhythm and rate: regular and normal     Pulmonary           breath sounds clear to auscultation   (+) decreased breath sounds       OUTSIDE LABS:  CBC:   Lab Results   Component Value Date    WBC 4.2 09/24/2024    WBC 4.6 07/09/2024    HGB 10.1 (L) 09/24/2024    HGB 8.3 (L) 07/09/2024    HCT 34.8 (L) 09/24/2024    HCT 28.9 (L) 07/09/2024     09/24/2024     07/09/2024     BMP:   Lab Results   Component Value Date     09/24/2024     07/09/2024    POTASSIUM 4.7 09/24/2024    POTASSIUM 4.5 07/09/2024    CHLORIDE 103 09/24/2024    CHLORIDE 106 07/09/2024    CO2 28 09/24/2024    CO2 26 07/09/2024    BUN 22.2 09/24/2024    BUN 24.9 (H) 07/09/2024    CR 0.95 09/24/2024    CR 1.08 (H) 07/09/2024    GLC 75 09/24/2024    GLC 91 07/09/2024     COAGS: No results found for: \"PTT\", \"INR\", \"FIBR\"  POC: No results found for: \"BGM\", \"HCG\", \"HCGS\"  HEPATIC:   Lab Results   Component Value Date    ALBUMIN 4.2 09/26/2023    PROTTOTAL 7.4 09/26/2023    ALT 17 09/26/2023    AST 28 09/26/2023    ALKPHOS 72 09/26/2023    BILITOTAL 0.2 09/26/2023     OTHER:   Lab Results   Component Value Date    BIGG 9.8 09/24/2024    PHOS 3.7 06/30/2011    TSH 0.66 09/16/2020    T4 1.30 02/05/2020    SED 14 04/15/2016       Anesthesia Plan    ASA Status:  3    NPO Status:  NPO Appropriate    Anesthesia Type: General.     - Airway: ETT   Induction: Intravenous, RSI, Propofol.   Maintenance: Balanced.        Consents    Anesthesia Plan(s) and associated risks, benefits, and realistic alternatives discussed. Questions answered and patient/representative(s) expressed understanding.     - Discussed:     - Discussed with:  Patient            Postoperative Care    Pain management: Multi-modal analgesia.   PONV prophylaxis: Ondansetron (or other 5HT-3), Dexamethasone or Solumedrol, Background Propofol Infusion     Comments:             Ciara aCno MD    I have " reviewed the pertinent notes and labs in the chart from the past 30 days and (re)examined the patient.  Any updates or changes from those notes are reflected in this note.

## 2024-10-02 LAB
PATH REPORT.COMMENTS IMP SPEC: NORMAL
PATH REPORT.COMMENTS IMP SPEC: NORMAL
PATH REPORT.FINAL DX SPEC: NORMAL
PATH REPORT.GROSS SPEC: NORMAL
PATH REPORT.MICROSCOPIC SPEC OTHER STN: NORMAL
PATH REPORT.RELEVANT HX SPEC: NORMAL
PHOTO IMAGE: NORMAL

## 2024-10-09 ENCOUNTER — OFFICE VISIT (OUTPATIENT)
Dept: SURGERY | Facility: CLINIC | Age: 84
End: 2024-10-09
Payer: COMMERCIAL

## 2024-10-09 ENCOUNTER — MEDICAL CORRESPONDENCE (OUTPATIENT)
Dept: HEALTH INFORMATION MANAGEMENT | Facility: CLINIC | Age: 84
End: 2024-10-09

## 2024-10-09 DIAGNOSIS — Z98.890 S/P LEFT INGUINAL HERNIA REPAIR: Primary | ICD-10-CM

## 2024-10-09 DIAGNOSIS — Z87.19 S/P LEFT INGUINAL HERNIA REPAIR: Primary | ICD-10-CM

## 2024-10-09 PROCEDURE — 99024 POSTOP FOLLOW-UP VISIT: CPT | Performed by: SURGERY

## 2024-10-09 NOTE — LETTER
10/9/2024      Sarah Vega  [address]  [place] [address]      Dear Colleague,    Thank you for referring your patient, Sarah Vega, to the [facility]. Please see a copy of my visit note below.    General Surgery Post Op    Pt returns for follow up visit s/p open repair recurrent left inguinal hernia on 9/27/2024.  Hernia sac ended up being chronic hematoma without true recurrent defect, no incarcerated bowel  Accompanied by son  Patient has been doing well, seems to be healing up well from the surgery.  Unfortunately she had a fall on Sunday so back and ribs are sore.  Can still feel some area of swelling in the area    Physical exam: Vitals: LMP  (LMP Unknown)   BMI= There is no height or weight on file to calculate BMI.    Exam:  Constitutional: healthy, alert, and no distress.  In wheelchair.  Area of left inguinal incision with some firm subcutaneous tissues, likely some tissue swelling as well as some of the residual hematoma/sac.  No definite recurrence.    Path:  Final Diagnosis   LEFT INGUINAL HERNIA, REPAIR:  Benign congested, focally hemorrhagic and partly infarcted fibroadipose connective tissue and a portion of morphologically unremarkable (ileoinguinal) nerve; consistent with wall of hernia sac like with prior or subclinical incarceration       Assessment:     ICD-10-CM    1. S/P left inguinal hernia repair  Z98.890     Z87.19         Plan: Overall recovering well.  Discussed that the swelling and the firm part of the tissues should improve with time.  If still noticing something there after a few more months then we can check things out again.    Ray Diaz MD      Again, thank you for allowing me to participate in the care of your patient.        Sincerely,        Ray Diaz MD

## 2024-10-09 NOTE — PROGRESS NOTES
General Surgery Post Op    Pt returns for follow up visit s/p open repair recurrent left inguinal hernia on 9/27/2024.  Hernia sac ended up being chronic hematoma without true recurrent defect, no incarcerated bowel  Accompanied by son  Patient has been doing well, seems to be healing up well from the surgery.  Unfortunately she had a fall on Sunday so back and ribs are sore.  Can still feel some area of swelling in the area    Physical exam: Vitals: LMP  (LMP Unknown)   BMI= There is no height or weight on file to calculate BMI.    Exam:  Constitutional: healthy, alert, and no distress.  In wheelchair.  Area of left inguinal incision with some firm subcutaneous tissues, likely some tissue swelling as well as some of the residual hematoma/sac.  No definite recurrence.    Path:  Final Diagnosis   LEFT INGUINAL HERNIA, REPAIR:  Benign congested, focally hemorrhagic and partly infarcted fibroadipose connective tissue and a portion of morphologically unremarkable (ileoinguinal) nerve; consistent with wall of hernia sac like with prior or subclinical incarceration       Assessment:     ICD-10-CM    1. S/P left inguinal hernia repair  Z98.890     Z87.19         Plan: Overall recovering well.  Discussed that the swelling and the firm part of the tissues should improve with time.  If still noticing something there after a few more months then we can check things out again.    Ray Diaz MD

## 2024-10-10 ENCOUNTER — ANCILLARY PROCEDURE (OUTPATIENT)
Dept: GENERAL RADIOLOGY | Facility: CLINIC | Age: 84
End: 2024-10-10
Attending: PHYSICIAN ASSISTANT
Payer: COMMERCIAL

## 2024-10-10 ENCOUNTER — OFFICE VISIT (OUTPATIENT)
Dept: URGENT CARE | Facility: URGENT CARE | Age: 84
End: 2024-10-10
Payer: COMMERCIAL

## 2024-10-10 VITALS
RESPIRATION RATE: 16 BRPM | DIASTOLIC BLOOD PRESSURE: 76 MMHG | WEIGHT: 77.7 LBS | OXYGEN SATURATION: 99 % | HEART RATE: 79 BPM | TEMPERATURE: 98.4 F | SYSTOLIC BLOOD PRESSURE: 130 MMHG | BODY MASS INDEX: 20.2 KG/M2

## 2024-10-10 DIAGNOSIS — S39.92XA BACK INJURY, INITIAL ENCOUNTER: ICD-10-CM

## 2024-10-10 DIAGNOSIS — S29.9XXA CHEST WALL INJURY, INITIAL ENCOUNTER: Primary | ICD-10-CM

## 2024-10-10 DIAGNOSIS — J90 BILATERAL PLEURAL EFFUSION: ICD-10-CM
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
## 2024-10-10 PROCEDURE — 72070 X-RAY EXAM THORAC SPINE 2VWS: CPT | Mod: TC | Performed by: RADIOLOGY

## 2024-10-10 PROCEDURE — 71111 X-RAY EXAM RIBS/CHEST4/> VWS: CPT | Mod: TC | Performed by: RADIOLOGY

## 2024-10-10 PROCEDURE — 99213 OFFICE O/P EST LOW 20 MIN: CPT | Performed by: PHYSICIAN ASSISTANT

## 2024-10-10 RX ORDER — LIDOCAINE 50 MG/G
1 PATCH TOPICAL EVERY 24 HOURS
Qty: 30 PATCH | Refills: 0 | Status: SHIPPED | OUTPATIENT
Start: 2024-10-10

## 2024-10-10 ASSESSMENT — ENCOUNTER SYMPTOMS
NECK STIFFNESS: 0
WHEEZING: 0
COLOR CHANGE: 0
CARDIOVASCULAR NEGATIVE: 1
NECK PAIN: 0
MYALGIAS: 1
JOINT SWELLING: 0
BACK PAIN: 1
SHORTNESS OF BREATH: 0
CHEST TIGHTNESS: 0
FATIGUE: 0
WOUND: 0
ARTHRALGIAS: 1
NUMBNESS: 0
CHILLS: 0
PALPITATIONS: 0
FEVER: 0
COUGH: 0

## 2024-10-10 NOTE — PROGRESS NOTES
Kalani Smith is a 84 year old, presenting for the following health issues with son:  Fall (Fell on Saturday, had a hernia surgery a couple weeks ago and has been taking the leftover oxy/tylenol for the pain, complaining of chest pain and back pain, is having difficulty raising both arms, is wondering if she is just bruised or if something is fractured )    HPI   Musculoskeletal problem/pain  Onset/Duration: 5days ago  Description  Location: chest, ribs and back  Joint Swelling: no  Redness: no  Pain: YES  Warmth: no  Intensity:  moderate  Progression of Symptoms:  same  Accompanying signs and symptoms:   Fevers: no  Numbness/tingling/weakness: no  History  Trauma to the area: Yes, sustained chest wall and back injuries after falling at home.  No head or neck injuries or LOC.  No shortness of breath or hemoptysis.  Recent illness:  no  Previous similar problem: Yes, has known hx of vertebral fx  Previous evaluation:  no  Precipitating or alleviating factors:  Aggravating factors include: palpation, movement, overuse  Therapies tried and outcome: rest/inactivity, immobilization, leftover oxycodone with minimal relief    Patient Active Problem List   Diagnosis    CARDIOVASCULAR SCREENING; LDL GOAL LESS THAN 160    Right lumbar radiculopathy    Osteoporosis    Abnormal Pap smear    Dysphagia    Iron deficiency anemia    Sliding hiatal hernia    Osteoarthritis of left thumb    Primary osteoarthritis of both knees    Gastroesophageal reflux disease without esophagitis    Calculus of gallbladder without cholecystitis without obstruction    Left inguinal hernia    Bilateral posterior capsular opacification    Dermatochalasis of eyelid    Closed intertrochanteric fracture of right femur with routine healing    Closed fracture of fifth thoracic vertebra with routine healing    Closed fracture of second cervical vertebra with routine healing    Fall at home, initial encounter    Mixed stress and urge urinary  incontinence    Unilateral recurrent inguinal hernia without obstruction or gangrene    Vitamin D deficiency    Lactose intolerance, unspecified    HTN (hypertension)    Glaucoma    Constipation, unspecified    Anemia, unspecified     Current Outpatient Medications   Medication Sig Dispense Refill    acetaminophen (TYLENOL) 500 MG tablet Take 1-2 tablets (500-1,000 mg) by mouth 3 times daily 180 tablet 3    CALCIUM CARB-CHOLECALCIFEROL 600-400 MG-UNIT per tablet TAKE 1 TABLET BY MOUTH TWICE A DAY 60 tablet 11    calcium carbonate 600 mg-vitamin D 400 units (CALTRATE) 600-400 MG-UNIT per tablet Take 1 tablet by mouth 2 times daily 180 tablet 3    capsaicin (ZOSTRIX) 0.025 % external cream Apply 1 g topically 3 times daily 50 g 0    ciclopirox (PENLAC) 8 % external solution Apply to adjacent skin and affected nails daily.  Remove with alcohol every 7 days, then repeat. 6 mL 0    cyanocobalamin (VITAMIN B-12) 1000 MCG tablet TAKE 1 TABLET BY MOUTH EVERY DAY 90 tablet 2    ferrous gluconate (FERGON) 324 (38 Fe) MG tablet Take 1 tablet (324 mg) by mouth daily (with breakfast) 90 tablet 0    lactase (LACTAID) 3000 UNIT tablet One tablet orally with first bite of food containing dairy 90 tablet 3    omeprazole (PRILOSEC) 20 MG DR capsule TAKE 1 CAPSULE BY MOUTH EVERY DAY 90 capsule 1    ORDER FOR DME Pull up briefs for incontinence of urine change 2 to 3 times daily 1 Container 11    oxyCODONE (ROXICODONE) 5 MG tablet Take 1-2 tablets (5-10 mg) by mouth every 4 hours as needed for moderate to severe pain. 10 tablet 0    salicylic acid (MEDIPLAST) 40 % miscellaneous Cut and apply on the wart every 48 hours for 4 weeks, until the wart is cleared 25 each 0    SENEXON-S 8.6-50 MG tablet TAKE 1 TABLET BY MOUTH DAILY AS NEEDED FOR CONSTIPATION. 90 tablet 0    simethicone (MYLICON) 125 MG chewable tablet Take 1 tablet (125 mg) by mouth 2 times daily as needed for intestinal gas 180 tablet 3    tiZANidine (ZANAFLEX) 2 MG tablet  TAKE 1 TABLET (2 MG) BY MOUTH NIGHTLY AS NEEDED FOR MUSCLE SPASM 90 tablet 0    VITAMIN D, CHOLECALCIFEROL, PO Take by mouth daily      vitamin D3 (CHOLECALCIFEROL) 25 mcg (24000 units) capsule Take 1 capsule (250 mcg) by mouth daily 90 capsule 3    vitamin D3 (CHOLECALCIFEROL) 50 mcg (2000 units) tablet TAKE 1 TABLET BY MOUTH EVERY DAY 90 tablet 0     No current facility-administered medications for this visit.          Allergies   Allergen Reactions    Ibuprofen      Stomach upset     Review of Systems   Constitutional:  Negative for chills, fatigue and fever.   Respiratory:  Negative for cough, chest tightness, shortness of breath and wheezing.    Cardiovascular: Negative.  Negative for chest pain, palpitations and leg swelling.   Musculoskeletal:  Positive for arthralgias, back pain, gait problem and myalgias. Negative for joint swelling, neck pain and neck stiffness.   Skin:  Negative for color change, pallor, rash and wound.   Neurological:  Negative for numbness.   All other systems reviewed and are negative.          Objective    /76 (BP Location: Left arm, Patient Position: Sitting, Cuff Size: Adult Small)   Pulse 79   Temp 98.4  F (36.9  C) (Tympanic)   Resp 16   Wt 35.2 kg (77 lb 11.2 oz)   LMP  (LMP Unknown)   SpO2 99%   BMI 20.20 kg/m    Body mass index is 20.2 kg/m .  Physical Exam  Vitals and nursing note reviewed.   Constitutional:       General: She is not in acute distress.     Appearance: Normal appearance. She is normal weight. She is not ill-appearing.   Cardiovascular:      Rate and Rhythm: Normal rate and regular rhythm.      Pulses: Normal pulses.      Heart sounds: Normal heart sounds, S1 normal and S2 normal.   Pulmonary:      Effort: Pulmonary effort is normal. No tachypnea, accessory muscle usage, prolonged expiration, respiratory distress or retractions.      Breath sounds: Normal breath sounds and air entry. No decreased breath sounds, wheezing, rhonchi or rales.   Chest:       Chest wall: Tenderness present. No mass, lacerations, deformity, swelling or crepitus. There is no dullness to percussion.   Musculoskeletal:      Thoracic back: Deformity, spasms, tenderness and bony tenderness present. No swelling, edema, signs of trauma or lacerations. Decreased range of motion. Scoliosis present.   Skin:     General: Skin is warm.      Capillary Refill: Capillary refill takes less than 2 seconds.   Neurological:      General: No focal deficit present.      Mental Status: She is alert and oriented to person, place, and time.   Psychiatric:         Mood and Affect: Mood normal.         Behavior: Behavior normal.         Thought Content: Thought content normal.         Judgment: Judgment normal.        Results for orders placed or performed in visit on 10/10/24 (from the past 24 hour(s))   XR Ribs and Chest Bilateral G/E 4 Views    Narrative    EXAM: XR RIBS AND CHEST BILATERAL G/E 4 VIEWS  LOCATION: [facility]  DATE: 10/10/2024    INDICATION:  Fall, initial encounter  COMPARISON: Chest radiographs of 9/20/2024      Impression    IMPRESSION: Large hiatal hernia. The cardiac silhouette is normal in size and configuration. No pneumothorax. Small bilateral pleural effusions. No consolidation. Diffuse osseous demineralization which limits evaluation for nondisplaced fractures. No   definite rib fracture within the previously described limitations.   XR Thoracic Spine 2 Views    Narrative    EXAM: XR THORACIC SPINE 2 VIEWS  LOCATION: [facility]  DATE: 10/10/2024    INDICATION: Fall, initial encounter.    COMPARISON: CT thoracolumbar spine 10/4/2023, two view chest x-ray 7/17/2017.      Impression    IMPRESSION: Severe anterior wedge compression fracture deformities of the T5 and T12 vertebral bodies again noted. Vertebral body heights and contours of the thoracic spine otherwise normal. No new fractures. Accentuation of normal  thoracic kyphosis   centered at T10. Alignment otherwise normal.             Assessment/Plan:  Chest wall injury, initial encounter:  Xrays are negative for acute fractures or dislocations. Most likely strain/sprain/contusion.  Recommend RICE and will give lidocaine prn pain which she can take with tylenol as well.  Will avoid narcotics due to sedation and risk of falls.  Recheck in clinic if symptoms worsen or if symptoms do not improve.   -     XR Ribs and Chest Bilateral G/E 4 Views  -     lidocaine (LIDODERM) 5 % patch; Place 1 patch over 12 hours onto the skin every 24 hours. To prevent lidocaine toxicity, patient should be patch free for 12 hrs daily.    Back injury, initial encounter:  Xrays are negative for acute fractures or dislocations. Most likely strain/sprain/contusion.  Same treatment as above.  -     XR Thoracic Spine 2 Views  -     lidocaine (LIDODERM) 5 % patch; Place 1 patch over 12 hours onto the skin every 24 hours. To prevent lidocaine toxicity, patient should be patch free for 12 hrs daily.    Fall, initial encounter    Bilateral pleural effusion:  May be incidental finding.  No symptoms. She is to follow up with her PCP regarding this.        Nilsa Laird PA-C

## 2024-10-30 DIAGNOSIS — K21.9 GASTROESOPHAGEAL REFLUX DISEASE WITHOUT ESOPHAGITIS: ICD-10-CM

## 2024-11-13 DIAGNOSIS — D50.9 IRON DEFICIENCY ANEMIA, UNSPECIFIED IRON DEFICIENCY ANEMIA TYPE: ICD-10-CM

## 2024-11-13 RX ORDER — FERROUS GLUCONATE 324(38)MG
324 TABLET ORAL
Qty: 90 TABLET | Refills: 0 | Status: SHIPPED | OUTPATIENT
Start: 2024-11-13

## 2024-11-18 DIAGNOSIS — S22.059D CLOSED FRACTURE OF FIFTH THORACIC VERTEBRA WITH ROUTINE HEALING, UNSPECIFIED FRACTURE MORPHOLOGY, SUBSEQUENT ENCOUNTER: ICD-10-CM

## 2024-11-20 RX ORDER — TIZANIDINE 2 MG/1
TABLET ORAL
Qty: 90 TABLET | Refills: 0 | Status: SHIPPED | OUTPATIENT
Start: 2024-11-20

## 2024-11-26 ENCOUNTER — PATIENT OUTREACH (OUTPATIENT)
Dept: GERIATRIC MEDICINE | Facility: CLINIC | Age: 84
End: 2024-11-26
Payer: COMMERCIAL

## 2024-11-26 NOTE — PROGRESS NOTES
[facility] Care Coordination Contact    CHW spoke with member's Son Zenobia to get updates on member's MEDICAL ASSISTANCE Renewal that is due by Dec. Zenobia indicated that they received a letter from the [facility] stating that the member's MEDICAL ASSISTANCE have been automatically renewed. CHW reminded Zenobia that member in due for an Eye Exam, Zenobia is going to take care of it. CHW contact info was provided .    OLAMIDE Rodriguez  [facility]  [phone]

## 2024-12-10 ENCOUNTER — PATIENT OUTREACH (OUTPATIENT)
Dept: GERIATRIC MEDICINE | Facility: CLINIC | Age: 84
End: 2024-12-10
Payer: COMMERCIAL

## 2024-12-10 NOTE — PROGRESS NOTES
[facility] Care Coordination Contact      [facility] Six-Month Telephone Assessment    6 month telephone assessment completed on 12/10/24 with sonZenobia.    ER visits: No  Hospitalizations: No  TCU stays: No  Significant health status changes: None reported.  Falls/Injuries: No  ADL/IADL changes: No  Changes in services: No, current POC working well.    Goals: See Support Plan for goal progress documentation.      Will see member in 6 months for an annual health risk assessment.   Encouraged member to call CC with any questions or concerns in the meantime.     Brooke Villareal, RN/BSN  [facility]  [phone]

## 2025-02-09 DIAGNOSIS — D50.9 IRON DEFICIENCY ANEMIA, UNSPECIFIED IRON DEFICIENCY ANEMIA TYPE: ICD-10-CM

## 2025-02-10 RX ORDER — FERROUS GLUCONATE 324(38)MG
324 TABLET ORAL
Qty: 90 TABLET | Refills: 0 | Status: SHIPPED | OUTPATIENT
Start: 2025-02-10

## 2025-03-12 ENCOUNTER — PATIENT OUTREACH (OUTPATIENT)
Dept: GERIATRIC MEDICINE | Facility: CLINIC | Age: 85
End: 2025-03-12
Payer: COMMERCIAL

## 2025-03-12 NOTE — PROGRESS NOTES
[facility] Care Coordination Contact    CHW spoke with member's son Zenobia to get update on medical assistance renewal that is due by March. Zenobia indicated that medical assistance renewal paperwork has been sent and they are waiting for the eligibility letter from the [facility]. CHW reminded Zenobia that member is due for a Wellness Exam and an Eye Exam as well. Zenobia would like to take care scheduling both appointment's by him self. CHW contact info was provided.     OLAMIDE Rodriguez  [facility]  [phone]

## 2025-04-22 ENCOUNTER — PATIENT OUTREACH (OUTPATIENT)
Dept: GERIATRIC MEDICINE | Facility: CLINIC | Age: 85
End: 2025-04-22
Payer: COMMERCIAL

## 2025-04-22 ASSESSMENT — LIFESTYLE VARIABLES
SKIP TO QUESTIONS 9-10: 1
AUDIT-C TOTAL SCORE: 0

## 2025-04-22 NOTE — PROGRESS NOTES
[facility] Care Coordination Contact    [facility] Home Visit Assessment     Home visit for Health Risk Assessment with Sarah Vega completed on April 22, 2025    Type of residence:: Private home - stairs  Current living arrangement:: I live in a private home with family     Assessment completed with:: Patient, Children    Current Care Plan  Member currently receiving the following home care services:  None  Member currently receiving the following community resources: [facility] Programs- ICLS    Medication Review  Medication reconciliation completed in Epic: Yes  Medication set-up & administration: Family/informal caregiver sets up weekly.  Self-administers medications.  Medication Risk Assessment Medication (1 or more, place referral to MTM): N/A: No risk factors identified  MTM Referral Placed: No: No risk factors idenified    Mental/Behavioral Health   Depression Screening:   PHQ-2 Total Score (Adult) - Positive if 3 or more points; Administer PHQ-9 if positive: 0       Mental health DX:: No        Falls Assessment:   Fallen 2 or more times in the past year?: No   Any fall with injury in the past year?: No    ADL/IADL Dependencies:   Dependent ADLs:: Independent  Dependent IADLs:: Cleaning, Cooking, Laundry, Shopping, Meal Preparation, Transportation, Medication Management, Money Management    Health Plan sponsored benefits: [employer]: Shared information regarding [employer] Program. Reviewed preventative health screening and health plan supplemental benefits/incentives. Reviewed medication disposal form.    CFSS Assessment completed at visit: [place] assessment completed, and assessment indicates that member does not meet access criteria for CFSS.     Elderly Waiver Eligibility: Yes-will continue on EW    Care Plan & Recommendations: Member will continue to receive ICLS services which has increased to 12 hours/week along with incontinence products and nutritional supplements.    [facility]  [facility] for detailed assessment information.    Follow-Up Plan: Member informed of future contact, plan to f/u with member with a 6 month telephone assessment.  Contact information shared with member and family, encouraged member to call with any questions or concerns at any time.    [facility] care continuum providers: Please see Snapshot and Care Management Flowsheets for Specific details of care plan.    This CC note routed to PCP, Kiana Camarena.    Brooke Villareal RN/BSN  [facility]  [phone]

## 2025-04-23 ENCOUNTER — TELEPHONE (OUTPATIENT)
Dept: FAMILY MEDICINE | Facility: CLINIC | Age: 85
End: 2025-04-23
Payer: COMMERCIAL

## 2025-04-23 NOTE — LETTER
April 23, 2025    Sarah Vega  [address] [place] [address]    Dear Sarah,    At [facility] we care about your health and are committed to providing quality patient care.     Here is a list of Health Maintenance topics that are due now or due soon:  Health Maintenance Due   Topic Date Due    ZOSTER IMMUNIZATION (2 of 3) 07/25/2007    RSV VACCINE (1 - 1-dose 75+ series) Never done    EYE EXAM  10/29/2019    DTAP/TDAP/TD IMMUNIZATION (2 - Td or Tdap) 10/13/2020    INFLUENZA VACCINE (1) 09/01/2024    COVID-19 Vaccine (4 - 2024-25 season) 09/01/2024    MEDICARE ANNUAL WELLNESS VISIT  01/30/2025    ANNUAL REVIEW OF HM ORDERS  01/30/2025        We are recommending that you:  Schedule a WELLNESS (Preventative/Physical) APPOINTMENT with your primary care provider. If you go elsewhere for your wellness appointments then please disregard this reminder    To schedule an appointment or discuss this further, you may contact us by phone at the [facility] at [phone] or online through the patient [email or web address] @ [email or web address]    Thank you for trusting [facility] and we appreciate the opportunity to serve you.  We look forward to supporting your healthcare needs in the future.    Your partners in health,      Quality Committee at [facility]

## 2025-04-23 NOTE — TELEPHONE ENCOUNTER
Patient Quality Outreach    Patient is due for the following:   Physical Annual Wellness Visit      Topic Date Due    Zoster (Shingles) Vaccine (2 of 3) 07/25/2007    Diptheria Tetanus Pertussis (DTAP/TDAP/TD) Vaccine (2 - Td or Tdap) 10/13/2020    Flu Vaccine (1) 09/01/2024    COVID-19 Vaccine (4 - 2024-25 season) 09/01/2024       Action(s) Taken:   Schedule a Annual Wellness Visit    Type of outreach:    Sent letter.    Questions for provider review:    None         Paris Warren MA  Chart routed to None.

## 2025-05-01 ENCOUNTER — PATIENT OUTREACH (OUTPATIENT)
Dept: GERIATRIC MEDICINE | Facility: CLINIC | Age: 85
End: 2025-05-01
Payer: COMMERCIAL

## 2025-05-01 NOTE — LETTER
May 1, 2025    PILY AMAYA  [address]  [place] [address]      Mert Smith,    I hope you ve been well since we last spoke. Attached is your copy of your personalized Support Plan which summarizes our conversation.   My goal is to help you keep your health on track. Your Support Plan includes the steps we agreed would help you with that. We can talk about these steps during our next meeting or sooner if you d like.   Here are additional programs and services I can help you with:    Get to appointments with [facility]    If you need a ride to medical or dental visits, [facility] can help. Call to schedule a ride. 6 [phone] ([phone]), 8 a.m. - 6 p.m. CT, Monday - Friday.    Access One-Pass to boost your health    Get a gym membership, at-home fitness classes, and free access to fun activities that help your brain. To get access, go to [employer] or call One-[employer].   6 [phone] (TTY:[phone]), 8 a.m. - 9 p.m. CT, Monday - Friday      Set up your health care directive  A directive is a legal form that explains your health care wishes. You can request this form from me, and I ll walk you through it before you discuss your plans with your doctor.    Schedule your annual physical  I can help set up your annual physical at your clinic of choice. It's an important step towards good health.      Have questions? I m here to help.  Call me at [phone] (TTY:[phone]) 8am - 5pm, Monday - Friday.  I ll reach out to you again in 6 months to follow up via telephone.  Warm regards,    Brooke Villareal RN    E-mail:  [email or web address]  Phone: [phone]    Care Manager  [facility]    cc: member records                                                                    [id number]

## 2025-05-01 NOTE — PROGRESS NOTES
[facility] Care Coordination Contact    Received after visit chart from care coordinator.  Completed following tasks: Mailed copy of support plan to member, Mailed [place] Cho[place] signature sheet pages 3-4, Mailed Safe Medication Disposal , Mailed [employer] Leave Behind Letter, and Updated services in Database. Mailed copy of ICLS Service plan to member and submitted authorization to health plan. Care Coordinator obtained member signature - no tracking required. Saved to [employer]. Faxed a copy of ICLS Service plan to provider (Pedro Vann) to sign and fax back. Provider Signature - No Support Plan Shared:  Member indicates that they do not want their support plan shared with any EW providers.    Lyn Kim  Care Management Specialist  [facility]  [phone]

## 2025-05-11 DIAGNOSIS — D50.9 IRON DEFICIENCY ANEMIA, UNSPECIFIED IRON DEFICIENCY ANEMIA TYPE: ICD-10-CM

## 2025-05-12 RX ORDER — FERROUS GLUCONATE 324(38)MG
324 TABLET ORAL
Qty: 90 TABLET | Refills: 0 | Status: SHIPPED | OUTPATIENT
Start: 2025-05-12

## (undated) DEVICE — GLOVE BIOGEL PI MICRO SZ 7.5 48575

## (undated) DEVICE — DRAPE IOBAN INCISE 23X17" 6650EZ

## (undated) DEVICE — SU VICRYL 2-0 SH 27" UND J417H

## (undated) DEVICE — SU VICRYL+ 3-0 27IN SH UND VCP416H

## (undated) DEVICE — PREP CHLORAPREP 26ML TINTED ORANGE  260815

## (undated) DEVICE — DAVINCI XI NDL DRIVER MEGA SUTURE CUT 8MM 470309

## (undated) DEVICE — GLOVE BIOGEL PI MICRO INDICATOR UNDERGLOVE SZ 7.5 48975

## (undated) DEVICE — DRAPE LAP W/ARMBOARD 29410

## (undated) DEVICE — DAVINCI HOT SHEARS TIP COVER  400180

## (undated) DEVICE — SOL WATER IRRIG 1000ML BOTTLE 07139-09

## (undated) DEVICE — PACK LAP CHOLE CUSTOM ASC

## (undated) DEVICE — SU VICRYL 0 UR-6 27" J603H

## (undated) DEVICE — ESU PENCIL W/HOLSTER

## (undated) DEVICE — SU MONOCRYL 4-0 PS-2 18" UND Y496G

## (undated) DEVICE — GOWN XLG DISP 9545

## (undated) DEVICE — DAVINCI XI OBTURATOR BLADELESS 8MM 470359

## (undated) DEVICE — PACK MINOR SBA15MIFSE

## (undated) DEVICE — SU VICRYL 3-0 SH 27" UND J416H

## (undated) DEVICE — DAVINCI XI DRAPE COLUMN 470341

## (undated) DEVICE — ADHESIVE SWIFTSET 0.8ML OCTYL SS6

## (undated) DEVICE — DAVINCI XI SEAL UNIVERSAL 5-8MM 470361

## (undated) DEVICE — SUPPORTER SCROTAL SUSPENSORY LG LATEX

## (undated) DEVICE — DRAPE MAYO STAND 23X54 8337

## (undated) DEVICE — ESU GROUND PAD ADULT W/CORD E7507

## (undated) DEVICE — ENDO TROCAR BLUNT TIP KII BALLOON 12X100MM C0R47

## (undated) DEVICE — ADH SKIN CLOSURE PREMIERPRO EXOFIN 1.0ML 3470

## (undated) DEVICE — SYR 50ML SLIP TIP W/O NDL 309654

## (undated) DEVICE — DECANTER TRANSFER DEVICE 2008S

## (undated) DEVICE — SU DERMABOND ADVANCED .7ML DNX12

## (undated) DEVICE — SOL WATER IRRIG 500ML BOTTLE 2F7113

## (undated) DEVICE — SU MONOCRYL 4-0 PS-2 27" UND Y426H

## (undated) DEVICE — ESU PENCIL SMOKE EVAC W/ROCKER SWITCH 0703-047-000

## (undated) DEVICE — SU STRATAFIX PDS PLUS 3-0 SPIRAL SH 15CM SXPP1B420

## (undated) DEVICE — DAVINCI XI MONOPOLAR SCISSORS HOT SHEARS 8MM 470179

## (undated) DEVICE — GLOVE BIOGEL PI ULTRATOUCH G SZ 7.5 42175

## (undated) DEVICE — SUCTION TIP YANKAUER W/O VENT K86

## (undated) DEVICE — SYR EAR BULB 3OZ 0035830

## (undated) DEVICE — DAVINCI XI DRAPE ARM 470015

## (undated) DEVICE — PREP CHLORAPREP 26ML TINTED HI-LITE ORANGE 930815

## (undated) DEVICE — GLOVE PROTEXIS BLUE W/NEU-THERA 7.5  2D73EB75

## (undated) DEVICE — LINEN TOWEL PACK X5 5464

## (undated) DEVICE — SUCTION CANISTER MEDIVAC LINER 1500ML W/LID 65651-515

## (undated) DEVICE — DAVINCI XI FCP BIPOLAR FENESTRATED 470205

## (undated) DEVICE — GLOVE PROTEXIS W/NEU-THERA 7.5  2D73TE75

## (undated) RX ORDER — EPHEDRINE SULFATE 50 MG/ML
INJECTION, SOLUTION INTRAMUSCULAR; INTRAVENOUS; SUBCUTANEOUS
Status: DISPENSED
Start: 2024-09-27

## (undated) RX ORDER — PROPOFOL 10 MG/ML
INJECTION, EMULSION INTRAVENOUS
Status: DISPENSED
Start: 2018-03-21

## (undated) RX ORDER — ACETAMINOPHEN 325 MG/1
TABLET ORAL
Status: DISPENSED
Start: 2024-07-18

## (undated) RX ORDER — CEFAZOLIN SODIUM 2 G/50ML
SOLUTION INTRAVENOUS
Status: DISPENSED
Start: 2024-07-18

## (undated) RX ORDER — HYDROMORPHONE HYDROCHLORIDE 1 MG/ML
INJECTION, SOLUTION INTRAMUSCULAR; INTRAVENOUS; SUBCUTANEOUS
Status: DISPENSED
Start: 2024-07-18

## (undated) RX ORDER — PROPOFOL 10 MG/ML
INJECTION, EMULSION INTRAVENOUS
Status: DISPENSED
Start: 2024-07-18

## (undated) RX ORDER — ONDANSETRON 2 MG/ML
INJECTION INTRAMUSCULAR; INTRAVENOUS
Status: DISPENSED
Start: 2018-03-21

## (undated) RX ORDER — OXYCODONE HYDROCHLORIDE 5 MG/1
TABLET ORAL
Status: DISPENSED
Start: 2018-03-21

## (undated) RX ORDER — DEXAMETHASONE SODIUM PHOSPHATE 4 MG/ML
INJECTION, SOLUTION INTRA-ARTICULAR; INTRALESIONAL; INTRAMUSCULAR; INTRAVENOUS; SOFT TISSUE
Status: DISPENSED
Start: 2024-07-18

## (undated) RX ORDER — ONDANSETRON 2 MG/ML
INJECTION INTRAMUSCULAR; INTRAVENOUS
Status: DISPENSED
Start: 2024-07-18

## (undated) RX ORDER — ESMOLOL HYDROCHLORIDE 10 MG/ML
INJECTION INTRAVENOUS
Status: DISPENSED
Start: 2024-07-18

## (undated) RX ORDER — DEXMEDETOMIDINE HYDROCHLORIDE 4 UG/ML
INJECTION, SOLUTION INTRAVENOUS
Status: DISPENSED
Start: 2024-07-18

## (undated) RX ORDER — PROPOFOL 10 MG/ML
INJECTION, EMULSION INTRAVENOUS
Status: DISPENSED
Start: 2024-09-27

## (undated) RX ORDER — GABAPENTIN 300 MG/1
CAPSULE ORAL
Status: DISPENSED
Start: 2018-03-21

## (undated) RX ORDER — FENTANYL CITRATE 50 UG/ML
INJECTION, SOLUTION INTRAMUSCULAR; INTRAVENOUS
Status: DISPENSED
Start: 2024-09-27

## (undated) RX ORDER — ONDANSETRON 2 MG/ML
INJECTION INTRAMUSCULAR; INTRAVENOUS
Status: DISPENSED
Start: 2024-09-27

## (undated) RX ORDER — FENTANYL CITRATE-0.9 % NACL/PF 10 MCG/ML
PLASTIC BAG, INJECTION (ML) INTRAVENOUS
Status: DISPENSED
Start: 2024-07-18

## (undated) RX ORDER — BUPIVACAINE HYDROCHLORIDE AND EPINEPHRINE 5; 5 MG/ML; UG/ML
INJECTION, SOLUTION EPIDURAL; INTRACAUDAL; PERINEURAL
Status: DISPENSED
Start: 2024-07-18

## (undated) RX ORDER — ACETAMINOPHEN 325 MG/1
TABLET ORAL
Status: DISPENSED
Start: 2018-03-21

## (undated) RX ORDER — FENTANYL CITRATE 50 UG/ML
INJECTION, SOLUTION INTRAMUSCULAR; INTRAVENOUS
Status: DISPENSED
Start: 2024-07-18

## (undated) RX ORDER — LIDOCAINE HYDROCHLORIDE 20 MG/ML
INJECTION, SOLUTION EPIDURAL; INFILTRATION; INTRACAUDAL; PERINEURAL
Status: DISPENSED
Start: 2018-03-21

## (undated) RX ORDER — OXYCODONE HYDROCHLORIDE 5 MG/1
TABLET ORAL
Status: DISPENSED
Start: 2024-09-27

## (undated) RX ORDER — ACETAMINOPHEN 325 MG/1
TABLET ORAL
Status: DISPENSED
Start: 2024-09-27

## (undated) RX ORDER — DEXAMETHASONE SODIUM PHOSPHATE 4 MG/ML
INJECTION, SOLUTION INTRA-ARTICULAR; INTRALESIONAL; INTRAMUSCULAR; INTRAVENOUS; SOFT TISSUE
Status: DISPENSED
Start: 2018-03-21

## (undated) RX ORDER — HYDROMORPHONE HYDROCHLORIDE 2 MG/ML
INJECTION, SOLUTION INTRAMUSCULAR; INTRAVENOUS; SUBCUTANEOUS
Status: DISPENSED
Start: 2018-03-21

## (undated) RX ORDER — CEFAZOLIN SODIUM 2 G/50ML
SOLUTION INTRAVENOUS
Status: DISPENSED
Start: 2024-09-27

## (undated) RX ORDER — GLYCOPYRROLATE 0.2 MG/ML
INJECTION, SOLUTION INTRAMUSCULAR; INTRAVENOUS
Status: DISPENSED
Start: 2024-09-27

## (undated) RX ORDER — BUPIVACAINE HYDROCHLORIDE AND EPINEPHRINE 5; 5 MG/ML; UG/ML
INJECTION, SOLUTION EPIDURAL; INTRACAUDAL; PERINEURAL
Status: DISPENSED
Start: 2024-09-27

## (undated) RX ORDER — DEXAMETHASONE SODIUM PHOSPHATE 4 MG/ML
INJECTION, SOLUTION INTRA-ARTICULAR; INTRALESIONAL; INTRAMUSCULAR; INTRAVENOUS; SOFT TISSUE
Status: DISPENSED
Start: 2024-09-27

## (undated) RX ORDER — OXYCODONE HYDROCHLORIDE 5 MG/1
TABLET ORAL
Status: DISPENSED
Start: 2024-07-18